# Patient Record
Sex: MALE | Race: WHITE | NOT HISPANIC OR LATINO | Employment: OTHER | URBAN - METROPOLITAN AREA
[De-identification: names, ages, dates, MRNs, and addresses within clinical notes are randomized per-mention and may not be internally consistent; named-entity substitution may affect disease eponyms.]

---

## 2018-02-05 RX ORDER — CLOBETASOL PROPIONATE 0.46 MG/ML
SOLUTION TOPICAL
Qty: 50 ML | Refills: 5 | Status: SHIPPED | OUTPATIENT
Start: 2018-02-05 | End: 2018-02-20

## 2018-02-05 NOTE — TELEPHONE ENCOUNTER
Patient requesting refill on clobetasol solution [facility]. Has apt with you on 2/20/2018

## 2018-02-20 ENCOUNTER — OFFICE VISIT (OUTPATIENT)
Dept: DERMATOLOGY | Facility: CLINIC | Age: 68
End: 2018-02-20
Payer: MEDICARE

## 2018-02-20 VITALS — HEIGHT: 69 IN | WEIGHT: 169 LBS | BODY MASS INDEX: 25.03 KG/M2

## 2018-02-20 DIAGNOSIS — Z87.2 HISTORY OF ACTINIC KERATOSES: ICD-10-CM

## 2018-02-20 DIAGNOSIS — L81.4 SOLAR LENTIGO: ICD-10-CM

## 2018-02-20 DIAGNOSIS — L82.1 SEBORRHEIC KERATOSES: ICD-10-CM

## 2018-02-20 DIAGNOSIS — L73.8 SEBACEOUS HYPERPLASIA OF FACE: ICD-10-CM

## 2018-02-20 DIAGNOSIS — L93.0 DISCOID LUPUS: Primary | ICD-10-CM

## 2018-02-20 PROCEDURE — 99999 PR PBB SHADOW E&M-EST. PATIENT-LVL II: CPT | Mod: PBBFAC,,, | Performed by: DERMATOLOGY

## 2018-02-20 PROCEDURE — 1159F MED LIST DOCD IN RCRD: CPT | Mod: ,,, | Performed by: DERMATOLOGY

## 2018-02-20 PROCEDURE — 1126F AMNT PAIN NOTED NONE PRSNT: CPT | Mod: ,,, | Performed by: DERMATOLOGY

## 2018-02-20 PROCEDURE — 99213 OFFICE O/P EST LOW 20 MIN: CPT | Mod: S$PBB,,, | Performed by: DERMATOLOGY

## 2018-02-20 PROCEDURE — 99212 OFFICE O/P EST SF 10 MIN: CPT | Mod: PBBFAC,PO | Performed by: DERMATOLOGY

## 2018-02-20 RX ORDER — LISINOPRIL 20 MG/1
20 TABLET ORAL DAILY
COMMUNITY
End: 2023-02-14

## 2018-02-20 RX ORDER — CLOBETASOL PROPIONATE 0.46 MG/ML
SOLUTION TOPICAL
Qty: 50 ML | Refills: 5 | Status: SHIPPED | OUTPATIENT
Start: 2018-02-20 | End: 2021-06-10

## 2018-02-20 NOTE — PROGRESS NOTES
Subjective:       Patient ID:  Sid Cuello Jr. is a 67 y.o. male who presents for   Chief Complaint   Patient presents with    Skin Check     UBSE     Patient last seen 6/2016  DLE (dx 9/2015)  Strict sun protection  Clobetasol ointment controls flare    FINAL PATHOLOGIC DIAGNOSIS  [facility] DIAGNOSIS:  LEFT CHEEK, PUNCH:  Perivascular and periadnexal dermatitis with increased interstitial mucin and focal  interface damage.  See note.  Note: The pattern is suspicious for a connective tissue disorder such as lupus.  Mitch Mandujano M.D., [facility]        Review of Systems   Constitutional: Negative for fever, chills, weight loss, weight gain, fatigue, night sweats and malaise.   HENT: Negative for mouth sores.    Respiratory: Negative for cough and shortness of breath.    Genitourinary: Negative for frequency.   Musculoskeletal: Positive for arthralgias (chronic mild attributes to OA).   Skin: Positive for rash, activity-related sunscreen use and wears hat. Negative for itching and daily sunscreen use.   Hematologic/Lymphatic: Bruises/bleeds easily.        Objective:    Physical Exam   Constitutional: He appears well-developed and well-nourished. No distress.   Neurological: He is alert and oriented to person, place, and time. He is not disoriented.   Psychiatric: He has a normal mood and affect.   Skin:   Areas Examined (abnormalities noted in diagram):   Scalp / Hair Palpated and Inspected  Head / Face Inspection Performed  Neck Inspection Performed  Chest / Axilla Inspection Performed  Abdomen Inspection Performed  Back Inspection Performed  RUE Inspected  LUE Inspection Performed  Nails and Digits Inspection Performed                   Diagram Legend     Erythematous scaling macule/papule c/w actinic keratosis       Vascular papule c/w angioma      Pigmented verrucoid papule/plaque c/w seborrheic keratosis      Yellow umbilicated papule c/w sebaceous hyperplasia      Irregularly shaped tan macule c/w  lentigo     1-2 mm smooth white papules consistent with Milia      Movable subcutaneous cyst with punctum c/w epidermal inclusion cyst      Subcutaneous movable cyst c/w pilar cyst      Firm pink to brown papule c/w dermatofibroma      Pedunculated fleshy papule(s) c/w skin tag(s)      Evenly pigmented macule c/w junctional nevus     Mildly variegated pigmented, slightly irregular-bordered macule c/w mildly atypical nevus      Flesh colored to evenly pigmented papule c/w intradermal nevus       Pink pearly papule/plaque c/w basal cell carcinoma      Erythematous hyperkeratotic cursted plaque c/w SCC      Surgical scar with no sign of skin cancer recurrence      Open and closed comedones      Inflammatory papules and pustules      Verrucoid papule consistent consistent with wart     Erythematous eczematous patches and plaques     Dystrophic onycholytic nail with subungual debris c/w onychomycosis     Umbilicated papule    Erythematous-base heme-crusted tan verrucoid plaque consistent with inflamed seborrheic keratosis     Erythematous Silvery Scaling Plaque c/w Psoriasis     See annotation      Assessment / Plan:        Discoid lupus  -     clobetasol (TEMOVATE) 0.05 % external solution; AAA scalp QHS PRN flare  Dispense: 50 mL; Refill: 5  Continue strict sun protection, clobetasol solution BID PRN  JULIANA negative 3/2016  Recent labs for cardiologist Dr Fisher, obtain copy  ROS reassuring    Discussed plaquenil for better control, patient declines, wishes to manage with topicals only    History of actinic keratoses  No lesion on exposed skin today.   Repeat UBSE in 6 months    Solar lentigo  This is a benign hyperpigmented sun induced lesion. Daily sun protection will reduce the number of new lesions. Treatment of these benign lesions are considered cosmetic.    Seborrheic keratoses  These are benign inherited growths without a malignant potential. Reassurance given to patient. No treatment is necessary.      Sebaceous hyperplasia of face  This is a common condition representing benign enlargement of the sebaceous lobule. It typically occurs in adulthood. Reassurance given to patient.     Patient instructed in importance in daily sun protection of at least spf 30. Sun avoidance and topical protection discussed.   Recommend Elta MD (physician office) COTZ sensitive (available online) for daily use on face and neck.  Patient encouraged to wear hat for all outdoor exposure.   Also discussed sun protective clothing.             Follow-up if symptoms worsen or fail to improve.

## 2019-04-26 ENCOUNTER — TELEPHONE (OUTPATIENT)
Dept: UROLOGY | Facility: CLINIC | Age: 69
End: 2019-04-26

## 2019-04-26 NOTE — TELEPHONE ENCOUNTER
Spoke with patient's wife she states patient has a lump about the size of prune on his testicle requesting to be seen earlier. Per  patient scheduled for Monday 4/29 at 10am. Wife verbally voiced understanding.

## 2019-04-26 NOTE — TELEPHONE ENCOUNTER
----- Message from Olga Lidia Fuller sent at 4/26/2019  9:36 AM CDT -----  Contact: Iris Cuello (Spouse)  Type:  Sooner Apoointment Request    Caller is requesting a sooner appointment.  Caller declined first available appointment listed below.  Caller will not accept being placed on the waitlist and is requesting a message be sent to doctor.    Name of Caller:  Iris Cuello (Spouse)  When is the first available appointment?  05/13/2019  Symptoms:  large lump on testicle  Best Call Back Number:  [phone] or [phone]  Additional Information:

## 2019-04-29 ENCOUNTER — APPOINTMENT (OUTPATIENT)
Dept: LAB | Facility: HOSPITAL | Age: 69
End: 2019-04-29
Attending: UROLOGY
Payer: MEDICARE

## 2019-04-29 ENCOUNTER — OFFICE VISIT (OUTPATIENT)
Dept: UROLOGY | Facility: CLINIC | Age: 69
End: 2019-04-29
Payer: MEDICARE

## 2019-04-29 VITALS
HEIGHT: 69 IN | DIASTOLIC BLOOD PRESSURE: 91 MMHG | RESPIRATION RATE: 18 BRPM | WEIGHT: 178.56 LBS | SYSTOLIC BLOOD PRESSURE: 138 MMHG | BODY MASS INDEX: 26.45 KG/M2 | HEART RATE: 104 BPM

## 2019-04-29 DIAGNOSIS — N50.89 SCROTAL MASS: Primary | ICD-10-CM

## 2019-04-29 DIAGNOSIS — R31.29 MICROHEMATURIA: ICD-10-CM

## 2019-04-29 DIAGNOSIS — Z12.5 PROSTATE CANCER SCREENING: ICD-10-CM

## 2019-04-29 LAB
BACTERIA #/AREA URNS HPF: ABNORMAL /HPF
BILIRUB SERPL-MCNC: ABNORMAL MG/DL
BLOOD URINE, POC: ABNORMAL
COLOR, POC UA: YELLOW
GLUCOSE UR QL STRIP: ABNORMAL
KETONES UR QL STRIP: ABNORMAL
LEUKOCYTE ESTERASE URINE, POC: ABNORMAL
MICROSCOPIC COMMENT: ABNORMAL
NITRITE, POC UA: ABNORMAL
PH, POC UA: 8
PROTEIN, POC: 30
RBC #/AREA URNS HPF: 5 /HPF (ref 0–4)
SPECIFIC GRAVITY, POC UA: 1.02
SQUAMOUS #/AREA URNS HPF: 2 /HPF
UROBILINOGEN, POC UA: 1
WBC #/AREA URNS HPF: 2 /HPF (ref 0–5)

## 2019-04-29 PROCEDURE — 81000 URINALYSIS NONAUTO W/SCOPE: CPT

## 2019-04-29 PROCEDURE — 99213 OFFICE O/P EST LOW 20 MIN: CPT | Mod: PBBFAC,PN | Performed by: UROLOGY

## 2019-04-29 PROCEDURE — 81002 URINALYSIS NONAUTO W/O SCOPE: CPT | Mod: PBBFAC,PN | Performed by: UROLOGY

## 2019-04-29 PROCEDURE — 99203 PR OFFICE/OUTPT VISIT, NEW, LEVL III, 30-44 MIN: ICD-10-PCS | Mod: S$PBB,,, | Performed by: UROLOGY

## 2019-04-29 PROCEDURE — 99999 PR PBB SHADOW E&M-EST. PATIENT-LVL III: CPT | Mod: PBBFAC,,, | Performed by: UROLOGY

## 2019-04-29 PROCEDURE — 99203 OFFICE O/P NEW LOW 30 MIN: CPT | Mod: S$PBB,,, | Performed by: UROLOGY

## 2019-04-29 PROCEDURE — 99999 PR PBB SHADOW E&M-EST. PATIENT-LVL III: ICD-10-PCS | Mod: PBBFAC,,, | Performed by: UROLOGY

## 2019-04-29 NOTE — PROGRESS NOTES
"[facility] Urology New Patient/H&P:    Sid Cuello Jr. is a 68 y.o. male who presents for evaluation of lump on testicle    Just noticed a grape-sized smooth lump in right scrotum a few days ago.  Not bothersome, no pain  Dont know how long it's been there  Feels separate from, or on side of testicle like "a 3rd testicle" and feels like it is floating around on its own    No hematuria, dysuria, LUTS  AUA SS 1/0  Udip trc blood otherwise negative  Takes plavix and asa 81 for CAD, Hx coronary stenting 2007  1 ppd smoker still.  Dr Sotomayor is cardiologist ~Q4 mos visits - last seen february  Last PCP Brad, so no PCP in 2 years  Last psa 1.4 in 2015  No fam history of testis ca, prostate ca, bladder ca or any gu malig    Past Medical History:   Diagnosis Date    Coronary artery disease     Essential hypertension 8/10/2015    Hyperlipidemia LDL goal < 100 8/10/2015       Past Surgical History:   Procedure Laterality Date    CORONARY ANGIOPLASTY WITH STENT PLACEMENT  2007    VASECTOMY         Family History   Problem Relation Age of Onset    No Known Problems Mother     Melanoma Neg Hx     Psoriasis Neg Hx     Lupus Neg Hx     Eczema Neg Hx        Social History     Socioeconomic History    Marital status:      Spouse name: Not on file    Number of children: Not on file    Years of education: Not on file    Highest education level: Not on file   Occupational History    Not on file   Social Needs    Financial resource strain: Not on file    Food insecurity:     Worry: Not on file     Inability: Not on file    Transportation needs:     Medical: Not on file     Non-medical: Not on file   Tobacco Use    Smoking status: Current Every Day Smoker    Smokeless tobacco: Never Used   Substance and Sexual Activity    Alcohol use: Yes    Drug use: Not on file    Sexual activity: Not on file   Lifestyle    Physical activity:     Days per week: Not on file     Minutes per session: Not on file    " "Stress: Not on file   Relationships    Social connections:     Talks on phone: Not on file     Gets together: Not on file     Attends Sikh service: Not on file     Active member of club or organization: Not on file     Attends meetings of clubs or organizations: Not on file     Relationship status: Not on file   Other Topics Concern    Not on file   Social History Narrative    Not on file       Review of patient's allergies indicates:  No Known Allergies    Medications Reviewed: see MAR    ROS:    Constitutional: denies fevers, chills, night sweats, fatigue, malaise  Respiratory: negative for cough, shortness of breath, wheezing, dyspnea.  Cardiovascular: + for high blood pressure, negative for chest pain, varicose veins, ankle swelling, palpitations, syncope.  GI: negative for abdominal pain, heartburn, indigestion, nausea, vomiting, constipation, diarrhea, blood in stool.   Urology: as noted above in HPI  Endocrinology: negative for cold intolerance, excessive thirst, not feeling tired/sluggish, no heat intolerance.   Hematology/Lymph: negative for easy bleeding, easy bruising, swollen glands.  Musculoskeletal: negative for back pain, joint pain, joint swelling, neck pain.  Allergy-Immunology: negative for seasonal allergies, negative for unusual infections.   Skin: negative for boils, breast lumps, hives, itching, rash.   Neurology: negative for, dizziness, headache, tingling/numbness, tremors.   Psych: satisfied with life; negative for, anxiety, depression, suicidal thoughts.     PHYSICAL EXAM:    Vitals:    04/29/19 0956   BP: (!) 138/91   Pulse: 104   Resp: 18     Body mass index is 26.37 kg/m². Weight: 81 kg (178 lb 9.2 oz) Height: 5' 9" (175.3 cm)       General: Alert, cooperative, no distress, appears stated age  Head: Normocephalic, without obvious abnormality, atraumatic  Neck: no masses, no thyromegaly, no lymphadenopathy  Eyes: PERRL, conjunctiva/corneas clear  Lungs: Respirations unlabored, " normal effort, no accessory muscle use  CV: Warm and well perfused extremities  Abdomen: Soft, non-tender, no CVA tenderness, no hepatosplenomegaly, no hernia  Penis: phallus normal, circumcised, well cared for, no plaques or lesions.   Scrotum: no cysts, no lesions, no rash, no hydrocele.   - on exam felt firm mass in right inguinal canal - no impulse on valsalva or cough as if to suggest hernia  - with valsalva and/or manipulation, dropped into upper hemiscrotum and noted to be 2-3cm discrete firm round smooth mobile mass, likely of spermatic cord given location  Epididymes: normal, nontender, symmetrical, no masses or cysts.   Testes: normal, both descended, no masses.   Urethra: palpably normal with orthotopic meatus of normal size    MICHAEL: normal sphincter tone, no masses, no hemmorrhoids   PROSTATE: 25-30g, no nodules, non-tender, symmetrical.   Extremities: Extremities normal, atraumatic, no cyanosis or edema  Skin: Normal color, texture, and turgor, no rashes or lesions  Psych: Appropriate, well oriented, normal affect, normal mood  Neuro: Non-focal    Lab Results   Component Value Date    PSA 1.4 08/10/2015    PSA 1.1 06/14/2011    PSA 1.1 02/06/2007       LABS:    Recent Results (from the past 336 hour(s))   POCT URINE DIPSTICK WITHOUT MICROSCOPE    Collection Time: 04/29/19 10:07 AM   Result Value Ref Range    Color, UA yellow     Spec Grav UA 1.020     pH, UA 8     WBC, UA neg     Nitrite, UA neg     Protein 30     Glucose, UA neg     Ketones, UA neg     Urobilinogen, UA 1.0     Bilirubin neg     Blood, UA neg          Assessment/Diagnosis:    1. Scrotal mass  POCT URINE DIPSTICK WITHOUT MICROSCOPE    US Scrotum And Testicles   2. Microhematuria  Urinalysis Microscopic   3. Prostate cancer screening  PSA, Screening       Plans:  Discrete round mobile mass in right upper hemiscrotum, possible along spermatic cord as was in inguinal canal at rest and dropped to right upper scrotum with manipulation  Start  with screening scrotal US. If cystic or benign appearing (such as spermatic cord lipoma), can follow as majority of paratesticular masses benign.  If not simply cystic/fat component without vascularity, consider further eval with CT and possible resection.   Briefly discussed, would approach inguinal to explore cord/testis and remove mass.  Would need cardiac clearance/bloodthinner hold    As well since trc blood on urine dip (likely from chronic anticoag) will send UA micro and further eval if true microhematuria >3-5 rbc/hpf, with ct urogram and cysto.  Will chart check and advise.    Also discussed annual prostate cancer screening, with MICHAEL and PSA. PSA last in 2015 normal and historically normal. MICHAEL benign. Will check psa when goes for US    30 mins spent in encounter, over half in counseling  Will chart check results for further plan and f/u.

## 2019-05-01 DIAGNOSIS — R31.21 ASYMPTOMATIC MICROSCOPIC HEMATURIA: ICD-10-CM

## 2019-05-06 ENCOUNTER — HOSPITAL ENCOUNTER (OUTPATIENT)
Dept: RADIOLOGY | Facility: HOSPITAL | Age: 69
Discharge: HOME | End: 2019-05-06
Attending: UROLOGY
Payer: MEDICARE

## 2019-05-06 DIAGNOSIS — N50.89 SCROTAL MASS: ICD-10-CM

## 2019-05-06 DIAGNOSIS — R31.21 ASYMPTOMATIC MICROSCOPIC HEMATURIA: ICD-10-CM

## 2019-05-06 PROCEDURE — 25500020 PHARM REV CODE 255: Performed by: UROLOGY

## 2019-05-06 PROCEDURE — 74178 CT ABD&PLV WO CNTR FLWD CNTR: CPT | Mod: 26,,, | Performed by: RADIOLOGY

## 2019-05-06 PROCEDURE — 76870 US SCROTUM AND TESTICLES: ICD-10-PCS | Mod: 26,,, | Performed by: RADIOLOGY

## 2019-05-06 PROCEDURE — 76870 US EXAM SCROTUM: CPT | Mod: 26,,, | Performed by: RADIOLOGY

## 2019-05-06 PROCEDURE — 74178 CT ABD&PLV WO CNTR FLWD CNTR: CPT | Mod: TC

## 2019-05-06 PROCEDURE — 74178 CT UROGRAM ABD PELVIS W WO: ICD-10-PCS | Mod: 26,,, | Performed by: RADIOLOGY

## 2019-05-06 PROCEDURE — 76870 US EXAM SCROTUM: CPT | Mod: TC

## 2019-05-06 RX ADMIN — IOHEXOL 125 ML: 350 INJECTION, SOLUTION INTRAVENOUS at 10:05

## 2019-05-23 ENCOUNTER — TELEPHONE (OUTPATIENT)
Dept: UROLOGY | Facility: CLINIC | Age: 69
End: 2019-05-23

## 2019-05-23 DIAGNOSIS — N50.89 MASS OF RIGHT TESTIS: Primary | ICD-10-CM

## 2019-05-23 DIAGNOSIS — R93.5 ABNORMAL COMPUTERIZED AXIAL TOMOGRAPHY OF ABDOMEN: ICD-10-CM

## 2019-05-23 DIAGNOSIS — R16.0 HEPATOMEGALY: ICD-10-CM

## 2019-05-23 DIAGNOSIS — D49.59 TESTICULAR TUMOR: ICD-10-CM

## 2019-05-24 DIAGNOSIS — R31.29 MICROHEMATURIA: Primary | ICD-10-CM

## 2019-05-24 RX ORDER — LIDOCAINE HYDROCHLORIDE 20 MG/ML
JELLY TOPICAL ONCE
Status: CANCELLED | OUTPATIENT
Start: 2019-05-24 | End: 2019-05-24

## 2019-05-24 NOTE — TELEPHONE ENCOUNTER
Patient with right scrotal/inguinal mass and microhematuria  Previously refused advice for cystoscopy to complete GH workup but did do CTU and scrotal US of mass    Mass is not simply cystic and some concern of malignancy has been raised by radiology. Largely cystic, which is usually not malignant, has septations/complex features, and there is non specific lymph node swelling on CT. Needs further eval.    Would recommend serum testicular tumor markers to evaluate for malignancy, order placed, and returning for cystoscopy to complete hematuria evaluation and discuss further management of right inguinoscrotal mass    Please have him complete tumor markers 1 week prior to cysto date he chooses in june

## 2019-05-24 NOTE — TELEPHONE ENCOUNTER
Patient advised of test results.    He is agreeable to have cysto 6/4.  Please place orders.   Scheduled for labs next week.

## 2019-05-28 ENCOUNTER — LAB VISIT (OUTPATIENT)
Dept: LAB | Facility: HOSPITAL | Age: 69
End: 2019-05-28
Attending: UROLOGY
Payer: MEDICARE

## 2019-05-28 DIAGNOSIS — R93.5 ABNORMAL COMPUTERIZED AXIAL TOMOGRAPHY OF ABDOMEN: ICD-10-CM

## 2019-05-28 DIAGNOSIS — N50.89 MASS OF RIGHT TESTIS: ICD-10-CM

## 2019-05-28 LAB
AFP SERPL-MCNC: 2.7 NG/ML (ref 0–8.4)
LDH SERPL L TO P-CCNC: 145 U/L (ref 110–260)

## 2019-05-28 PROCEDURE — 36415 COLL VENOUS BLD VENIPUNCTURE: CPT

## 2019-05-28 PROCEDURE — 82105 ALPHA-FETOPROTEIN SERUM: CPT

## 2019-05-28 PROCEDURE — 83615 LACTATE (LD) (LDH) ENZYME: CPT

## 2019-05-29 ENCOUNTER — TELEPHONE (OUTPATIENT)
Dept: UROLOGY | Facility: CLINIC | Age: 69
End: 2019-05-29

## 2019-05-29 LAB — B-HCG SERPL-ACNC: <0.6 IU/L

## 2019-05-29 NOTE — TELEPHONE ENCOUNTER
Spoke w pts wife she had questions verifying meds to be stopped before cystoscopy all questions were answered to the best of my knowledge ( encourage to hold blood thinners and if taking fish oil) pts wife voiced ok and understanding.

## 2019-05-29 NOTE — TELEPHONE ENCOUNTER
----- Message from Kacie Medina sent at 5/29/2019  9:29 AM CDT -----  Contact: self  Type: Needs Medical Advice    Who Called:  Wife, Jovana Cuello  Symptoms (please be specific):    How long has patient had these symptoms:    Pharmacy name and phone #:    Best Call Back Number: [phone]  Additional Information: Patient needs to know when and what medications he should stop before his colonoscopy on 06/04/19. Please call patient's wife. Thanks!

## 2019-05-31 ENCOUNTER — TELEPHONE (OUTPATIENT)
Dept: UROLOGY | Facility: CLINIC | Age: 69
End: 2019-05-31

## 2019-05-31 NOTE — TELEPHONE ENCOUNTER
Ok to cancel cysto but should advise him to come in at f/u slot on Monday 6/3 that is held to discuss abnormal results of CT and further plans

## 2019-05-31 NOTE — TELEPHONE ENCOUNTER
----- Message from Shanika Little sent at 5/31/2019  9:05 AM CDT -----  Type: Needs Medical Advice    Who Called:  self  Symptoms (please be specific):  Pt requesting to speak to Dr   How long has patient had these symptoms:  Has a procedure scheduled Tuesday   Pharmacy name and phone #:    Best Call Back Number: [phone] (home)     Additional Information: states his lab results does show this ?

## 2019-05-31 NOTE — TELEPHONE ENCOUNTER
Spoke w pt states he would like to cancel procedure on 6/4 will call and reschedule if he has any further prob.

## 2019-06-02 NOTE — PROGRESS NOTES
"[facility] Urology Progress Note    Sid Cuello Jr. is a 68 y.o. male who presents for follow up of scrotal mass and hematuria    I last saw him on 4/29/19 noticing new finding of a grape-sized smooth lump in right scrotum separate from, or on side of testicle like "a 3rd testicle" and feels like it is floating around on its own  No hematuria, dysuria, LUTS (AUA SS 1/0) and Udip with trace blood. Takes plavix and asa 81 for CAD, Hx coronary stenting 2007. 1 ppd smoker still. Dr Sotomayor is cardiologist ~Q4 mos visits - last seen february  Last PCP Brad, so no PCP in 2 years, Last psa 1.4 in 2015. No fam history of testis ca, prostate ca, bladder ca or any gu malig  On exam felt firm mass in right inguinal canal - no impulse on valsalva or cough as if to suggest hernia. With valsalva and/or manipulation, dropped into upper hemiscrotum and noted to be 2-3cm discrete firm round smooth mobile mass, likely of spermatic cord origin given location. MICHAEL 25-30g benign. PSA 1.0.  UA micro did demonstrate 5 rbc/hpf so CT urogram done to evaluate, and carry through midthigh to correlate with scrotal US for eval of right hemiscrotal mass  Serum tumor markers as precaution ordered and normal (B-hCG <0.6, AFP 2.7, )    Scrotal US 5/6/19:  A cystic mass is present within the high right hemiscrotum, measuring 2.2 x 1.7 x 2.4 cm.  A mildly thickened septation color Doppler blood flow extends through the finding. The right testicle measures 5.3 x 1.9 x 3.6 cm and the left 5.1 x 1.9 x 3.3 cm.  Normal testicular blood flow is present bilaterally.  There is no testicular mass.  There is a small right hydrocele.  There are small bilateral epididymal cysts measuring up to 9 mm.  - Mildly complex cystic mass within the high right hemiscrotum, with a thickened septation demonstrating blood flow raising some consideration for neoplasm.    CT urogram 5/6/19:  The liver is diffusely hypoattenuating reflecting fatty infiltration. " " The spleen, pancreas, adrenal glands, and bowel are unremarkable. There is no nephroureterolithiasis or hydroureteronephrosis.  No renal, ureteral, or bladder mass is identified.  The prostate gland is moderately enlarged.  There is moderate calcification of the aorta. There are no suspicious osseous lesions.  There is a cystic mass containing a moderately thickened septation located within the distal right inguinal canal, measuring 2.7 x 2.7 by 2.1 cm.  There are a few mildly enlarged bilateral iliofemoral lymph nodes measuring up to 11 mm in short axis.    He was scheduled for cystoscopy 6/4/19 to complete micro hematuria workup and further discuss management of his scrotal mass, though called in wanted to cancel his cystoscopy and therefore presents today for office consultation to further discuss the findings on his CT and scrotal ultrasound as they relate to his right hemiscrotal mass.    No gross blood in urine  No inguinoscrotal pain from mass.    ROS: A comprehensive 10 system review was performed and is negative except as noted above in HPI    PHYSICAL EXAM:    Vitals:    06/03/19 0908   BP: (!) 146/93   Pulse: 85   Resp: 18     Body mass index is 27.02 kg/m². Weight: 83 kg (182 lb 15.7 oz) Height: 5' 9" (175.3 cm)       General: Alert, cooperative, no distress, appears stated age   Head: Normocephalic, without obvious abnormality, atraumatic   Eyes: PERRL, conjunctiva/corneas clear   Lungs: Respirations unlabored   Heart: Warm and well perfused   Abdomen: soft NT ND  Extremities: Extremities normal, atraumatic, no cyanosis or edema   Skin: Skin color, texture, turgor normal, no rashes or lesions   Psych: Appropriate   Neurologic: Non-focal       Recent Results (from the past 336 hour(s))   BHCG Quant, Tumor Marker    Collection Time: 05/28/19  9:16 AM   Result Value Ref Range    Beta-hCG, Quantitative (Tumor Marker) <0.6 <1.4 IU/L   AFP TUMOR MARKER    Collection Time: 05/28/19  9:16 AM   Result Value Ref " Range    AFP 2.7 0.0 - 8.4 ng/mL   LACTATE DEHYDROGENASE    Collection Time: 05/28/19  9:16 AM   Result Value Ref Range     110 - 260 U/L       ASSESSMENT   1. Scrotal mass      right   2. Microhematuria         Plan    We did have a long discussion about the differential diagnosis of paratesticular masses, noting 70% are often benign. Given largely cystic component this is most likely, however given complexity with internal vascularity and local questionably lymphadenopathy and solid component, with imaging concerning for potential malignancy, would recommend resection. Given the US/CT appearance and potential for malignancy we did discuss surgical excision.   Given the rare chance of malignancy we did discuss radical orchiectomy vs excision of mass and risks and benefits of both.   Discussed this as an inguinal approach. All benefits and risks of procedure discussed with patient, including but not limited to need for further procedures/treatment based on pathology, pain, infection, bleeding, hematoma, persistence of pain. All questions answered and appropriate informed consent obtained for right radical orchiectomy vs excision of spermatic cord mass  Did discuss going into the procedure with planned radical orchiectomy for oncologic control, however if intraoperatively the mass appeared quite distinctly separate from any spermatic cord or testicular involvement could just excise the mass and manage further dependent on pathology.  We did discuss the implications of radical orchiectomy in detail.  He will need cardiac clearance from Dr Sotomayor prior, as well as be cleared to hold aspirin and plavix for 1 week preop and 2-3 days postop.   Discussed slightly higher risk of scrotal hematoma and bleeding complications with baseline bloodthinner use.  Will perform flexible cystoscopy in same anesthetic event to complete microhematuria evaluation.   All risks and benefits were discussed, and appropriate informed  consent was obtained.  He will consider surgical resection at this time and he does have follow-up with his cardiologist later this month who I will cc on all the notes and recommendations, as well as to get clearance both to proceed with general anesthesia and to hold blood thinners in the perioperative period.    25 mins spent in encounter, over half in counseling

## 2019-06-03 ENCOUNTER — OFFICE VISIT (OUTPATIENT)
Dept: UROLOGY | Facility: CLINIC | Age: 69
End: 2019-06-03
Payer: MEDICARE

## 2019-06-03 VITALS
HEART RATE: 85 BPM | HEIGHT: 69 IN | DIASTOLIC BLOOD PRESSURE: 93 MMHG | RESPIRATION RATE: 18 BRPM | BODY MASS INDEX: 27.11 KG/M2 | WEIGHT: 183 LBS | SYSTOLIC BLOOD PRESSURE: 146 MMHG

## 2019-06-03 DIAGNOSIS — R31.29 MICROHEMATURIA: ICD-10-CM

## 2019-06-03 DIAGNOSIS — N50.89 SCROTAL MASS: Primary | ICD-10-CM

## 2019-06-03 PROCEDURE — 99214 PR OFFICE/OUTPT VISIT, EST, LEVL IV, 30-39 MIN: ICD-10-PCS | Mod: S$PBB,,, | Performed by: UROLOGY

## 2019-06-03 PROCEDURE — 99999 PR PBB SHADOW E&M-EST. PATIENT-LVL III: CPT | Mod: PBBFAC,,, | Performed by: UROLOGY

## 2019-06-03 PROCEDURE — 99213 OFFICE O/P EST LOW 20 MIN: CPT | Mod: PBBFAC,PN | Performed by: UROLOGY

## 2019-06-03 PROCEDURE — 99214 OFFICE O/P EST MOD 30 MIN: CPT | Mod: S$PBB,,, | Performed by: UROLOGY

## 2019-06-03 PROCEDURE — 99999 PR PBB SHADOW E&M-EST. PATIENT-LVL III: ICD-10-PCS | Mod: PBBFAC,,, | Performed by: UROLOGY

## 2019-06-26 ENCOUNTER — TELEPHONE (OUTPATIENT)
Dept: UROLOGY | Facility: CLINIC | Age: 69
End: 2019-06-26

## 2019-06-26 NOTE — TELEPHONE ENCOUNTER
Spoke with patient.  He has not decided if he is going to do the surgery or not.  If he decides to proceed, he will schedule with Dr. Christie for clearance.

## 2019-06-26 NOTE — TELEPHONE ENCOUNTER
----- Message from Kymberly Frausto sent at 6/26/2019 12:32 PM CDT -----  Type: Needs Medical Advice    Who Called:  Kallie Cherry    Best Call Back Number: [phone]  Additional Information: patient was supposed to come there to get surgical clearance today however stated he wasn't coming because he hasn't decided to have the surgery yet

## 2019-09-27 ENCOUNTER — PATIENT MESSAGE (OUTPATIENT)
Dept: UROLOGY | Facility: CLINIC | Age: 69
End: 2019-09-27

## 2019-09-29 DIAGNOSIS — N50.89 PARATESTICULAR MASS: Primary | ICD-10-CM

## 2019-09-29 DIAGNOSIS — R31.0 GROSS HEMATURIA: ICD-10-CM

## 2019-09-29 RX ORDER — LIDOCAINE HYDROCHLORIDE 10 MG/ML
10 INJECTION, SOLUTION EPIDURAL; INFILTRATION; INTRACAUDAL; PERINEURAL ONCE
Status: CANCELLED | OUTPATIENT
Start: 2019-09-29 | End: 2019-09-29

## 2019-10-10 ENCOUNTER — HOSPITAL ENCOUNTER (OUTPATIENT)
Dept: PREADMISSION TESTING | Facility: HOSPITAL | Age: 69
Discharge: HOME | End: 2019-10-10
Attending: UROLOGY
Payer: MEDICARE

## 2019-10-10 ENCOUNTER — HOSPITAL ENCOUNTER (OUTPATIENT)
Dept: RADIOLOGY | Facility: HOSPITAL | Age: 69
Discharge: HOME | End: 2019-10-10
Attending: UROLOGY
Payer: MEDICARE

## 2019-10-10 VITALS — BODY MASS INDEX: 25.92 KG/M2 | HEIGHT: 69 IN | WEIGHT: 175 LBS

## 2019-10-10 DIAGNOSIS — R31.0 GROSS HEMATURIA: ICD-10-CM

## 2019-10-10 DIAGNOSIS — N50.89 PARATESTICULAR MASS: ICD-10-CM

## 2019-10-10 LAB
ANION GAP SERPL CALC-SCNC: 9 MMOL/L (ref 8–16)
BASOPHILS # BLD AUTO: 0.02 K/UL (ref 0–0.2)
BASOPHILS NFR BLD: 0.4 % (ref 0–1.9)
BILIRUB UR QL STRIP: ABNORMAL
BUN SERPL-MCNC: 7 MG/DL (ref 8–23)
CALCIUM SERPL-MCNC: 8.8 MG/DL (ref 8.7–10.5)
CHLORIDE SERPL-SCNC: 101 MMOL/L (ref 95–110)
CLARITY UR: CLEAR
CO2 SERPL-SCNC: 30 MMOL/L (ref 23–29)
COLOR UR: YELLOW
CREAT SERPL-MCNC: 1 MG/DL (ref 0.5–1.4)
DIFFERENTIAL METHOD: ABNORMAL
EOSINOPHIL # BLD AUTO: 0.1 K/UL (ref 0–0.5)
EOSINOPHIL NFR BLD: 1 % (ref 0–8)
ERYTHROCYTE [DISTWIDTH] IN BLOOD BY AUTOMATED COUNT: 14.3 % (ref 11.5–14.5)
EST. GFR  (AFRICAN AMERICAN): >60 ML/MIN/1.73 M^2
EST. GFR  (NON AFRICAN AMERICAN): >60 ML/MIN/1.73 M^2
GLUCOSE SERPL-MCNC: 100 MG/DL (ref 70–110)
GLUCOSE UR QL STRIP: NEGATIVE
HCT VFR BLD AUTO: 43.2 % (ref 40–54)
HGB BLD-MCNC: 14.4 G/DL (ref 14–18)
HGB UR QL STRIP: ABNORMAL
IMM GRANULOCYTES # BLD AUTO: 0 K/UL (ref 0–0.04)
INR PPP: 1 (ref 0.8–1.2)
KETONES UR QL STRIP: ABNORMAL
LEUKOCYTE ESTERASE UR QL STRIP: NEGATIVE
LYMPHOCYTES # BLD AUTO: 1.7 K/UL (ref 1–4.8)
LYMPHOCYTES NFR BLD: 32.9 % (ref 18–48)
MCH RBC QN AUTO: 36.8 PG (ref 27–31)
MCHC RBC AUTO-ENTMCNC: 33.3 G/DL (ref 32–36)
MCV RBC AUTO: 111 FL (ref 82–98)
MONOCYTES # BLD AUTO: 0.4 K/UL (ref 0.3–1)
MONOCYTES NFR BLD: 7.6 % (ref 4–15)
NEUTROPHILS # BLD AUTO: 3.1 K/UL (ref 1.8–7.7)
NEUTROPHILS NFR BLD: 58.1 % (ref 38–73)
NITRITE UR QL STRIP: NEGATIVE
NRBC BLD-RTO: 0 /100 WBC
PH UR STRIP: 6 [PH] (ref 5–8)
PLATELET # BLD AUTO: 162 K/UL (ref 150–350)
PMV BLD AUTO: 9.7 FL (ref 9.2–12.9)
POTASSIUM SERPL-SCNC: 3.8 MMOL/L (ref 3.5–5.1)
PROT UR QL STRIP: ABNORMAL
PROTHROMBIN TIME: 9.9 SEC (ref 9–12.5)
RBC # BLD AUTO: 3.91 M/UL (ref 4.6–6.2)
SODIUM SERPL-SCNC: 140 MMOL/L (ref 136–145)
SP GR UR STRIP: >=1.03 (ref 1–1.03)
URN SPEC COLLECT METH UR: ABNORMAL
UROBILINOGEN UR STRIP-ACNC: 1 EU/DL
WBC # BLD AUTO: 5.26 K/UL (ref 3.9–12.7)

## 2019-10-10 PROCEDURE — 80048 BASIC METABOLIC PNL TOTAL CA: CPT

## 2019-10-10 PROCEDURE — 85610 PROTHROMBIN TIME: CPT

## 2019-10-10 PROCEDURE — 85025 COMPLETE CBC W/AUTO DIFF WBC: CPT

## 2019-10-10 PROCEDURE — 36415 COLL VENOUS BLD VENIPUNCTURE: CPT

## 2019-10-10 PROCEDURE — 99900103 DSU ONLY-NO CHARGE-INITIAL HR (STAT)

## 2019-10-10 PROCEDURE — 71046 X-RAY EXAM CHEST 2 VIEWS: CPT | Mod: 26,,, | Performed by: RADIOLOGY

## 2019-10-10 PROCEDURE — 81003 URINALYSIS AUTO W/O SCOPE: CPT

## 2019-10-10 PROCEDURE — 71046 XR CHEST PA AND LATERAL: ICD-10-PCS | Mod: 26,,, | Performed by: RADIOLOGY

## 2019-10-10 PROCEDURE — 71046 X-RAY EXAM CHEST 2 VIEWS: CPT | Mod: TC,FY

## 2019-10-10 PROCEDURE — 93005 ELECTROCARDIOGRAM TRACING: CPT

## 2019-10-10 PROCEDURE — 87086 URINE CULTURE/COLONY COUNT: CPT

## 2019-10-10 PROCEDURE — 93010 ELECTROCARDIOGRAM REPORT: CPT | Mod: ,,, | Performed by: INTERNAL MEDICINE

## 2019-10-10 PROCEDURE — 99900104 DSU ONLY-NO CHARGE-EA ADD'L HR (STAT)

## 2019-10-10 PROCEDURE — 93010 EKG 12-LEAD: ICD-10-PCS | Mod: ,,, | Performed by: INTERNAL MEDICINE

## 2019-10-10 NOTE — DISCHARGE INSTRUCTIONS
To confirm, Your doctor has instructed you that surgery is scheduled for: 10/23/19   BRAYAN  [phone]    Please report to [facility], [facility] the morning of surgery. You must check-in and receive a wristband before going to your procedure.    Pre-Op will call the afternoon prior to surgery between 1:00 and 6:00 PM with the final arrival time.  Phone number: [phone]    PLEASE NOTE:  The surgery schedule has many variables which may affect the time of your surgery case.  Family members should be available if your surgery time changes.  Plan to be here the day of your procedure between 4-6 hours.    MEDICATIONS:  TAKE ONLY THESE MEDICATIONS WITH A SMALL SIP OF WATER THE MORNING OF YOUR PROCEDURE:   METOPROLOL    DO NOT TAKE THESE MEDICATIONS 5-7 DAYS PRIOR to your procedure or per your surgeon's request: ASPIRIN AND PLAVIX,  ALEVE, ADVIL, IBUPROFEN, FISH OIL VITAMIN E, HERBALS-LAST DOSE 10/15/19  (May take Tylenol)                                       NO LISINOPRIL AM OF SURGERY    ONLY if you are prescribed any types of blood thinners such as:  Aspirin, Coumadin, Plavix, Pradaxa, Xarelto, Aggrenox, Effient, Eliquis, Savasya, Brilinta, or any other, ask your surgeon whether you should stop taking them and how long before surgery you should stop.  You may also need to verify with the prescribing physician if it is ok to stop your medication.      INSTRUCTIONS IMPORTANT!!  · Do not eat or drink anything between midnight and the time of your procedure- this includes gum, mints, and candy.  · Do not smoke or drink alcoholic beverages 24 hours prior to your procedure.  · Shower the night before AND the morning of your procedure with a Chlorhexidine wash such as Hibiclens or Dial antibacterial soap from the neck down.  Do not get it on your face or in your eyes.  You may use your own shampoo and face wash. This helps your skin to be as bacteria free as possible.    · If you wear contact  lenses, dentures, hearing aids or glasses, bring a container to put them in during surgery and give to a family member for safe keeping.  Please leave all jewelry, piercing's and valuables at home.   · DO NOT remove hair from the surgery site.  Do not shave the incision site unless you are given specific instructions to do so.    · ONLY if you have been diagnosed with sleep apnea please bring your C-PAP machine.  · ONLY if you wear home oxygen please bring your portable oxygen tank the day of your procedure.  · ONLY if you have a history of OPEN HEART SURGERY you will need a clearance from your Cardiologist per Anesthesia.      · ONLY for patients requiring bowel prep, written instructions will be given by your doctor's office.  · ONLY if you have a neuro stimulator, please bring the controller with you the morning of surgery  · ONLY if a type and screen test is needed before surgery, please return:  · If your doctor has scheduled you for an overnight stay, bring a small overnight bag with any personal items you need.  · Make arrangements in advance for transportation home by a responsible adult.  It is not safe to drive a vehicle during the 24 hours after anesthesia.      · Visiting hours are 10:00AM to 8:30PM.  For the safety of all patients, visitors under the age of 12 are not allowed above the first floor of the hospital.    · All [facility] facilities and properties are tobacco free.  Smoking is NOT allowed.       If you have any questions about these instructions, call Pre-Op Admit  Nursing at [phone] or the Pre-Op Day Surgery Unit at [phone].

## 2019-10-11 LAB — BACTERIA UR CULT: NO GROWTH

## 2019-10-22 ENCOUNTER — ANESTHESIA EVENT (OUTPATIENT)
Dept: SURGERY | Facility: HOSPITAL | Age: 69
End: 2019-10-22
Payer: MEDICARE

## 2019-10-22 RX ORDER — SODIUM CHLORIDE, SODIUM LACTATE, POTASSIUM CHLORIDE, CALCIUM CHLORIDE 600; 310; 30; 20 MG/100ML; MG/100ML; MG/100ML; MG/100ML
500 INJECTION, SOLUTION INTRAVENOUS ONCE
Status: CANCELLED | OUTPATIENT
Start: 2019-10-22 | End: 2019-10-22

## 2019-10-23 ENCOUNTER — HOSPITAL ENCOUNTER (OUTPATIENT)
Facility: HOSPITAL | Age: 69
Discharge: HOME | End: 2019-10-23
Attending: UROLOGY | Admitting: UROLOGY
Payer: MEDICARE

## 2019-10-23 ENCOUNTER — ANESTHESIA (OUTPATIENT)
Dept: SURGERY | Facility: HOSPITAL | Age: 69
End: 2019-10-23
Payer: MEDICARE

## 2019-10-23 VITALS
HEART RATE: 65 BPM | TEMPERATURE: 98 F | HEIGHT: 69 IN | RESPIRATION RATE: 18 BRPM | OXYGEN SATURATION: 97 % | WEIGHT: 179 LBS | DIASTOLIC BLOOD PRESSURE: 88 MMHG | SYSTOLIC BLOOD PRESSURE: 167 MMHG | BODY MASS INDEX: 26.51 KG/M2

## 2019-10-23 DIAGNOSIS — R31.0 GROSS HEMATURIA: ICD-10-CM

## 2019-10-23 DIAGNOSIS — N50.89 PARATESTICULAR MASS: ICD-10-CM

## 2019-10-23 PROCEDURE — 63600175 PHARM REV CODE 636 W HCPCS: Performed by: NURSE ANESTHETIST, CERTIFIED REGISTERED

## 2019-10-23 PROCEDURE — 99900103 DSU ONLY-NO CHARGE-INITIAL HR (STAT): Performed by: UROLOGY

## 2019-10-23 PROCEDURE — 63600175 PHARM REV CODE 636 W HCPCS: Performed by: ANESTHESIOLOGY

## 2019-10-23 PROCEDURE — D9220A PRA ANESTHESIA: Mod: CRNA,,, | Performed by: NURSE ANESTHETIST, CERTIFIED REGISTERED

## 2019-10-23 PROCEDURE — 25000003 PHARM REV CODE 250: Performed by: NURSE ANESTHETIST, CERTIFIED REGISTERED

## 2019-10-23 PROCEDURE — 71000033 HC RECOVERY, INTIAL HOUR: Performed by: UROLOGY

## 2019-10-23 PROCEDURE — 36000707: Performed by: UROLOGY

## 2019-10-23 PROCEDURE — 63600175 PHARM REV CODE 636 W HCPCS: Performed by: UROLOGY

## 2019-10-23 PROCEDURE — 36000706: Performed by: UROLOGY

## 2019-10-23 PROCEDURE — D9220A PRA ANESTHESIA: ICD-10-PCS | Mod: CRNA,,, | Performed by: NURSE ANESTHETIST, CERTIFIED REGISTERED

## 2019-10-23 PROCEDURE — 52000 PR CYSTOURETHROSCOPY: ICD-10-PCS | Mod: 59,,, | Performed by: UROLOGY

## 2019-10-23 PROCEDURE — D9220A PRA ANESTHESIA: Mod: ANES,,, | Performed by: ANESTHESIOLOGY

## 2019-10-23 PROCEDURE — 37000008 HC ANESTHESIA 1ST 15 MINUTES: Performed by: UROLOGY

## 2019-10-23 PROCEDURE — 52000 CYSTOURETHROSCOPY: CPT | Mod: 59,,, | Performed by: UROLOGY

## 2019-10-23 PROCEDURE — 54530 PR REMOVAL TESTIS,RADICAL: ICD-10-PCS | Mod: RT,,, | Performed by: UROLOGY

## 2019-10-23 PROCEDURE — 99900104 DSU ONLY-NO CHARGE-EA ADD'L HR (STAT): Performed by: UROLOGY

## 2019-10-23 PROCEDURE — 37000009 HC ANESTHESIA EA ADD 15 MINS: Performed by: UROLOGY

## 2019-10-23 PROCEDURE — D9220A PRA ANESTHESIA: ICD-10-PCS | Mod: ANES,,, | Performed by: ANESTHESIOLOGY

## 2019-10-23 PROCEDURE — 88309 TISSUE SPECIMEN TO PATHOLOGY - SURGERY: ICD-10-PCS | Mod: 26,,, | Performed by: PATHOLOGY

## 2019-10-23 PROCEDURE — 71000015 HC POSTOP RECOV 1ST HR: Performed by: UROLOGY

## 2019-10-23 PROCEDURE — 54530 REMOVAL OF TESTIS: CPT | Mod: RT,,, | Performed by: UROLOGY

## 2019-10-23 PROCEDURE — 25000003 PHARM REV CODE 250: Performed by: ANESTHESIOLOGY

## 2019-10-23 PROCEDURE — 88309 TISSUE EXAM BY PATHOLOGIST: CPT | Performed by: PATHOLOGY

## 2019-10-23 PROCEDURE — 88309 TISSUE EXAM BY PATHOLOGIST: CPT | Mod: 26,,, | Performed by: PATHOLOGY

## 2019-10-23 RX ORDER — GLYCOPYRROLATE 0.2 MG/ML
INJECTION INTRAMUSCULAR; INTRAVENOUS
Status: DISCONTINUED | OUTPATIENT
Start: 2019-10-23 | End: 2019-10-23

## 2019-10-23 RX ORDER — DEXAMETHASONE SODIUM PHOSPHATE 4 MG/ML
INJECTION, SOLUTION INTRA-ARTICULAR; INTRALESIONAL; INTRAMUSCULAR; INTRAVENOUS; SOFT TISSUE
Status: DISCONTINUED | OUTPATIENT
Start: 2019-10-23 | End: 2019-10-23

## 2019-10-23 RX ORDER — FENTANYL CITRATE 50 UG/ML
INJECTION, SOLUTION INTRAMUSCULAR; INTRAVENOUS
Status: DISCONTINUED | OUTPATIENT
Start: 2019-10-23 | End: 2019-10-23

## 2019-10-23 RX ORDER — ONDANSETRON 2 MG/ML
4 INJECTION INTRAMUSCULAR; INTRAVENOUS ONCE
Status: ACTIVE | OUTPATIENT
Start: 2019-10-23

## 2019-10-23 RX ORDER — CEPHALEXIN 500 MG/1
500 CAPSULE ORAL 3 TIMES DAILY
Qty: 15 CAPSULE | Refills: 0 | Status: SHIPPED | OUTPATIENT
Start: 2019-10-23 | End: 2019-10-28

## 2019-10-23 RX ORDER — MEPERIDINE HYDROCHLORIDE 50 MG/ML
12.5 INJECTION INTRAMUSCULAR; INTRAVENOUS; SUBCUTANEOUS ONCE AS NEEDED
Status: ACTIVE | OUTPATIENT
Start: 2019-10-23 | End: 2019-10-24

## 2019-10-23 RX ORDER — ONDANSETRON 2 MG/ML
4 INJECTION INTRAMUSCULAR; INTRAVENOUS DAILY PRN
Status: DISCONTINUED | OUTPATIENT
Start: 2019-10-23 | End: 2019-10-23 | Stop reason: HOSPADM

## 2019-10-23 RX ORDER — SODIUM CHLORIDE, SODIUM LACTATE, POTASSIUM CHLORIDE, CALCIUM CHLORIDE 600; 310; 30; 20 MG/100ML; MG/100ML; MG/100ML; MG/100ML
10 INJECTION, SOLUTION INTRAVENOUS CONTINUOUS
Status: ACTIVE | OUTPATIENT
Start: 2019-10-23

## 2019-10-23 RX ORDER — MIDAZOLAM HYDROCHLORIDE 1 MG/ML
INJECTION, SOLUTION INTRAMUSCULAR; INTRAVENOUS
Status: DISCONTINUED | OUTPATIENT
Start: 2019-10-23 | End: 2019-10-23

## 2019-10-23 RX ORDER — ROCURONIUM BROMIDE 10 MG/ML
INJECTION, SOLUTION INTRAVENOUS
Status: DISCONTINUED | OUTPATIENT
Start: 2019-10-23 | End: 2019-10-23

## 2019-10-23 RX ORDER — SODIUM CHLORIDE 0.9 % (FLUSH) 0.9 %
3 SYRINGE (ML) INJECTION
Status: ACTIVE | OUTPATIENT
Start: 2019-10-23

## 2019-10-23 RX ORDER — LIDOCAINE HYDROCHLORIDE 10 MG/ML
1 INJECTION, SOLUTION EPIDURAL; INFILTRATION; INTRACAUDAL; PERINEURAL ONCE
Status: ACTIVE | OUTPATIENT
Start: 2019-10-23

## 2019-10-23 RX ORDER — OXYCODONE AND ACETAMINOPHEN 5; 325 MG/1; MG/1
1 TABLET ORAL EVERY 6 HOURS PRN
Qty: 15 TABLET | Refills: 0 | Status: SHIPPED | OUTPATIENT
Start: 2019-10-23 | End: 2021-02-23

## 2019-10-23 RX ORDER — HYDROMORPHONE HYDROCHLORIDE 2 MG/ML
0.2 INJECTION, SOLUTION INTRAMUSCULAR; INTRAVENOUS; SUBCUTANEOUS EVERY 5 MIN PRN
Status: ACTIVE | OUTPATIENT
Start: 2019-10-23

## 2019-10-23 RX ORDER — HYDROMORPHONE HYDROCHLORIDE 2 MG/ML
0.2 INJECTION, SOLUTION INTRAMUSCULAR; INTRAVENOUS; SUBCUTANEOUS EVERY 5 MIN PRN
Status: DISCONTINUED | OUTPATIENT
Start: 2019-10-23 | End: 2019-10-23 | Stop reason: HOSPADM

## 2019-10-23 RX ORDER — FENTANYL CITRATE 50 UG/ML
25 INJECTION, SOLUTION INTRAMUSCULAR; INTRAVENOUS EVERY 5 MIN PRN
Status: DISCONTINUED | OUTPATIENT
Start: 2019-10-23 | End: 2019-10-23 | Stop reason: HOSPADM

## 2019-10-23 RX ORDER — FENTANYL CITRATE 50 UG/ML
25 INJECTION, SOLUTION INTRAMUSCULAR; INTRAVENOUS EVERY 5 MIN PRN
Status: ACTIVE | OUTPATIENT
Start: 2019-10-23

## 2019-10-23 RX ORDER — OXYCODONE HYDROCHLORIDE 5 MG/1
5 TABLET ORAL
Status: ACTIVE | OUTPATIENT
Start: 2019-10-23

## 2019-10-23 RX ORDER — LIDOCAINE HYDROCHLORIDE 10 MG/ML
10 INJECTION, SOLUTION EPIDURAL; INFILTRATION; INTRACAUDAL; PERINEURAL ONCE
Status: DISCONTINUED | OUTPATIENT
Start: 2019-10-23 | End: 2019-10-23 | Stop reason: HOSPADM

## 2019-10-23 RX ORDER — LIDOCAINE HCL/PF 100 MG/5ML
SYRINGE (ML) INTRAVENOUS
Status: DISCONTINUED | OUTPATIENT
Start: 2019-10-23 | End: 2019-10-23

## 2019-10-23 RX ORDER — DIPHENHYDRAMINE HYDROCHLORIDE 50 MG/ML
25 INJECTION INTRAMUSCULAR; INTRAVENOUS EVERY 6 HOURS PRN
Status: DISCONTINUED | OUTPATIENT
Start: 2019-10-23 | End: 2019-10-23 | Stop reason: HOSPADM

## 2019-10-23 RX ORDER — SODIUM CHLORIDE 0.9 % (FLUSH) 0.9 %
3 SYRINGE (ML) INJECTION EVERY 8 HOURS
Status: DISCONTINUED | OUTPATIENT
Start: 2019-10-23 | End: 2019-10-23 | Stop reason: HOSPADM

## 2019-10-23 RX ORDER — ONDANSETRON HYDROCHLORIDE 2 MG/ML
INJECTION, SOLUTION INTRAMUSCULAR; INTRAVENOUS
Status: DISCONTINUED | OUTPATIENT
Start: 2019-10-23 | End: 2019-10-23

## 2019-10-23 RX ORDER — SODIUM CHLORIDE, SODIUM LACTATE, POTASSIUM CHLORIDE, CALCIUM CHLORIDE 600; 310; 30; 20 MG/100ML; MG/100ML; MG/100ML; MG/100ML
75 INJECTION, SOLUTION INTRAVENOUS CONTINUOUS
Status: ACTIVE | OUTPATIENT
Start: 2019-10-23

## 2019-10-23 RX ORDER — SODIUM CHLORIDE 0.9 % (FLUSH) 0.9 %
3 SYRINGE (ML) INJECTION EVERY 8 HOURS
Status: ACTIVE | OUTPATIENT
Start: 2019-10-23

## 2019-10-23 RX ORDER — PROPOFOL 10 MG/ML
VIAL (ML) INTRAVENOUS
Status: DISCONTINUED | OUTPATIENT
Start: 2019-10-23 | End: 2019-10-23

## 2019-10-23 RX ORDER — CEFAZOLIN SODIUM 2 G/50ML
2 SOLUTION INTRAVENOUS ONCE
Status: COMPLETED | OUTPATIENT
Start: 2019-10-23 | End: 2019-10-23

## 2019-10-23 RX ORDER — OXYCODONE HYDROCHLORIDE 5 MG/1
5 TABLET ORAL
Status: DISCONTINUED | OUTPATIENT
Start: 2019-10-23 | End: 2019-10-23 | Stop reason: HOSPADM

## 2019-10-23 RX ORDER — SUCCINYLCHOLINE CHLORIDE 20 MG/ML
INJECTION INTRAMUSCULAR; INTRAVENOUS
Status: DISCONTINUED | OUTPATIENT
Start: 2019-10-23 | End: 2019-10-23

## 2019-10-23 RX ORDER — ACETAMINOPHEN 10 MG/ML
INJECTION, SOLUTION INTRAVENOUS
Status: DISCONTINUED | OUTPATIENT
Start: 2019-10-23 | End: 2019-10-23

## 2019-10-23 RX ORDER — PHENYLEPHRINE HYDROCHLORIDE 10 MG/ML
INJECTION INTRAVENOUS
Status: DISCONTINUED | OUTPATIENT
Start: 2019-10-23 | End: 2019-10-23

## 2019-10-23 RX ORDER — SODIUM CHLORIDE 0.9 % (FLUSH) 0.9 %
3 SYRINGE (ML) INJECTION
Status: DISCONTINUED | OUTPATIENT
Start: 2019-10-23 | End: 2019-10-23 | Stop reason: HOSPADM

## 2019-10-23 RX ORDER — DIPHENHYDRAMINE HYDROCHLORIDE 50 MG/ML
25 INJECTION INTRAMUSCULAR; INTRAVENOUS EVERY 6 HOURS PRN
Status: ACTIVE | OUTPATIENT
Start: 2019-10-23

## 2019-10-23 RX ORDER — MEPERIDINE HYDROCHLORIDE 50 MG/ML
12.5 INJECTION INTRAMUSCULAR; INTRAVENOUS; SUBCUTANEOUS ONCE AS NEEDED
Status: DISCONTINUED | OUTPATIENT
Start: 2019-10-23 | End: 2019-10-23 | Stop reason: HOSPADM

## 2019-10-23 RX ADMIN — ROCURONIUM BROMIDE 10 MG: 10 INJECTION, SOLUTION INTRAVENOUS at 11:10

## 2019-10-23 RX ADMIN — DEXAMETHASONE SODIUM PHOSPHATE 4 MG: 4 INJECTION, SOLUTION INTRAMUSCULAR; INTRAVENOUS at 11:10

## 2019-10-23 RX ADMIN — OXYCODONE HYDROCHLORIDE 5 MG: 5 TABLET ORAL at 01:10

## 2019-10-23 RX ADMIN — PROPOFOL 160 MG: 10 INJECTION, EMULSION INTRAVENOUS at 11:10

## 2019-10-23 RX ADMIN — FENTANYL CITRATE 100 MCG: 50 INJECTION, SOLUTION INTRAMUSCULAR; INTRAVENOUS at 11:10

## 2019-10-23 RX ADMIN — SODIUM CHLORIDE, SODIUM LACTATE, POTASSIUM CHLORIDE, AND CALCIUM CHLORIDE 10 ML/HR: .6; .31; .03; .02 INJECTION, SOLUTION INTRAVENOUS at 10:10

## 2019-10-23 RX ADMIN — LIDOCAINE HYDROCHLORIDE 75 MG: 20 INJECTION, SOLUTION INTRAVENOUS at 11:10

## 2019-10-23 RX ADMIN — ACETAMINOPHEN 1000 MG: 10 INJECTION, SOLUTION INTRAVENOUS at 11:10

## 2019-10-23 RX ADMIN — SODIUM CHLORIDE, SODIUM LACTATE, POTASSIUM CHLORIDE, AND CALCIUM CHLORIDE: .6; .31; .03; .02 INJECTION, SOLUTION INTRAVENOUS at 11:10

## 2019-10-23 RX ADMIN — ONDANSETRON 4 MG: 2 INJECTION, SOLUTION INTRAMUSCULAR; INTRAVENOUS at 11:10

## 2019-10-23 RX ADMIN — SUCCINYLCHOLINE CHLORIDE 120 MG: 20 INJECTION, SOLUTION INTRAMUSCULAR; INTRAVENOUS at 11:10

## 2019-10-23 RX ADMIN — CEFAZOLIN SODIUM 2 G: 2 SOLUTION INTRAVENOUS at 11:10

## 2019-10-23 RX ADMIN — GLYCOPYRROLATE 0.2 MG: 0.2 INJECTION, SOLUTION INTRAMUSCULAR; INTRAVENOUS at 11:10

## 2019-10-23 RX ADMIN — PHENYLEPHRINE HYDROCHLORIDE 100 MCG: 10 INJECTION INTRAVENOUS at 12:10

## 2019-10-23 RX ADMIN — MIDAZOLAM 2 MG: 1 INJECTION INTRAMUSCULAR; INTRAVENOUS at 10:10

## 2019-10-23 NOTE — PLAN OF CARE
arrived ambulatory with wife in the waiting room, plan of care was reviewed and warm blanket provided

## 2019-10-23 NOTE — ANESTHESIA PREPROCEDURE EVALUATION
10/23/2019  Sid Cuello Jr. is a 68 y.o., male.    Anesthesia Evaluation    I have reviewed the Patient Summary Reports.    I have reviewed the Nursing Notes.   I have reviewed the Medications.     Review of Systems  Anesthesia Hx:  No problems with previous Anesthesia    Social:  Smoker    Cardiovascular:   Hypertension, well controlled CAD asymptomatic CABG/stent (stent 12 years ago)  hyperlipidemia    Pulmonary:  Pulmonary Normal    Renal/:  Renal/ Normal     Neurological:  Neurology Normal    Endocrine:  Endocrine Normal        Physical Exam  General:  Well nourished    Airway/Jaw/Neck:  Airway Findings: Mouth Opening: Normal Tongue: Normal  General Airway Assessment: Adult  Oropharynx Findings:  Mallampati: II  Jaw/Neck Findings:  Neck ROM: Normal ROM     Eyes/Ears/Nose:  Eyes/Ears/Nose Findings:    Dental:  Dental Findings:   Chest/Lungs:  Chest/Lungs Findings: Normal Respiratory Rate     Heart/Vascular:  Heart Findings: Rate: Normal  Rhythm: Regular Rhythm        Mental Status:  Mental Status Findings:  Cooperative, Alert and Oriented         Anesthesia Plan  Type of Anesthesia, risks & benefits discussed:  Anesthesia Type:  general  Patient's Preference:   Intra-op Monitoring Plan: standard ASA monitors  Intra-op Monitoring Plan Comments:   Post Op Pain Control Plan: multimodal analgesia  Post Op Pain Control Plan Comments:   Induction:   IV  Beta Blocker:  Patient is on a Beta-Blocker and has received one dose within the past 24 hours (No further documentation required).       Informed Consent: Patient understands risks and agrees with Anesthesia plan.  Questions answered. Anesthesia consent signed with patient.  ASA Score: 3     Day of Surgery Review of History & Physical:  There are no significant changes.   H&P completed by Anesthesiologist.       Ready For Surgery From Anesthesia  Perspective.

## 2019-10-23 NOTE — DISCHARGE INSTRUCTIONS
Anesthesia: General Anesthesia     You are watched continuously during your procedure by your anesthesia provider.     Youre due to have surgery. During surgery, youll be given medicine called anesthesia or anesthetic. This will keep you comfortable and pain-free. Your anesthesia provider will use general anesthesia.  What is general anesthesia?  General anesthesia puts you into a state like deep sleep. It goes into the bloodstream (IV anesthetics), into the lungs (gas anesthetics), or both. You feel nothing during the procedure. You will not remember it. During the procedure, the anesthesia provider monitors you continuously. He or she checks your heart rate and rhythm, blood pressure, breathing, and blood oxygen.  · IV anesthetics. IV anesthetics are given through an IV line in your arm. Theyre often given first. This is so you are asleep before a gas anesthetic is started. Some kinds of IV anesthetics relieve pain. Others relax you. Your doctor will decide which kind is best in your case.  · Gas anesthetics. Gas anesthetics are breathed into the lungs. They are often used to keep you asleep. They can be given through a facemask or a tube placed in your larynx or trachea (breathing tube).  ¨ If you have a facemask, your anesthesia provider will most likely place it over your nose and mouth while youre still awake. Youll breathe oxygen through the mask as your IV anesthetic is started. Gas anesthetic may be added through the mask.  ¨ If you have a tube in the larynx or trachea, it will be inserted into your throat after youre asleep.  Anesthesia tools and medicines  You will likely have:  · IV anesthetics. These are put into an IV line into your bloodstream.  · Gas anesthetics. You breathe these anesthetics into your lungs, where they pass into your bloodstream.  · Pulse oximeter. This is a small clip that is attached to the end of your finger. This measures your blood oxygen level.  · Electrocardiography  leads (electrodes). These are small sticky pads that are placed on your chest. They record your heart rate and rhythm.  · Blood pressure cuff. This reads your blood pressure.  Risks and possible complications  General anesthesia has some risks. These include:  · Breathing problems  · Nausea and vomiting  · Sore throat or hoarseness (usually temporary)  · Allergic reaction to the anesthetic  · Irregular heartbeat (rare)  · Cardiac arrest (rare)   Anesthesia safety  · Follow all instructions you are given for how long not to eat or drink before your procedure.  · Be sure your doctor knows what medicines and drugs you take. This includes over-the-counter medicines, herbs, supplements, alcohol or other drugs. You will be asked when those were last taken.  · Have an adult family member or friend drive you home after the procedure.  · For the first 24 hours after your surgery:  ¨ Do not drive or use heavy equipment.  ¨ Do not make important decisions or sign legal documents. If important decisions or signing legal documents is necessary during the first 24 hours after surgery, have a trusted family member or spouse act on your behalf.  ¨ Avoid alcohol.  ¨ Have a responsible adult stay with you. He or she can watch for problems and help keep you safe.  Date Last Reviewed: 12/1/2016  © 1863-7560 [employer]. [address] [place] [address], [place] [address]. All rights reserved. This information is not intended as a substitute for professional medical care. Always follow your healthcare professional's instructions.        Discharge Instructions for Deep Vein Thrombosis (DVT)  A blood clot or thrombus that forms in a large, deep vein is called a deep vein thrombosis (DVT). If a DVT is not treated, part of the clot (embolus) can break off and travel to your lungs. This is called a pulmonary embolus (PE). This can cut off the flow of blood to part or all of the lung. PE is a medical emergency and may cause  death.   Healthcare providers use the term venous thromboembolism (VTE) to describe these two conditions: DVT and PE. They use the term VTE because the two conditions are very closely related. And because their prevention and treatment are also closely related.   Make sure you follow all instructions for taking your medicine, follow-up care, and diet and lifestyle changes.  Medicine  Your healthcare provider will usually prescribe an anticoagulant medicine. This medicine is a blood thinner that helps to prevent new blood clots. Anticoagulants can be given by mouth (oral), by injection, or into your vein (intravenous or IV). Commonly used anticoagulants include warfarin and heparin. Newer anticoagulants may also be used. They include rivaroxaban, apixaban, dabigatran, and enoxaparin. Your healthcare provider will provide specific instructions on how to take your anticoagulant. You may take more than one type for a period of time.  Make sure to take your anticoagulant exactly as directed. If you miss a dose, call your healthcare provider to find out what you should do. These medicines increase the chance of bleeding. So it is very important to take them correctly. Be sure to tell all of your healthcare providers, including dentists, that you are taking an anticoagulant.  Follow-up monitoring  If you are taking warfarin, you will need regular blood tests to see how it is working. These blood tests include a prothrombin time (PT), also reported as an international normalized ratio (INR). Your dose of warfarin may be changed based on the test results. It is very important that you keep your testing appointments after you leave the hospital.  Be sure you understand the following information before you go home:   My goal PT/INR is between _____ and _____.   My next PT/INR blood draw will be on ______________ (date) at _______________ (time) at __________________________________________ (name of healthcare provider or  clinic and address).   The name of the healthcare provider who will monitor my anticoagulation is ________________________ and the phone number is _________________________.  Depending on which anticoagulant you are prescribed, you may need other blood tests. Before you are discharged, your healthcare provider will review what testing is needed in detail.  Diet and warfarin  Vitamin K can interact with warfarin and reduce its ability to thin your blood. Vitamin K helps your blood clot. So sudden changes in vitamin K intake can affect the way warfarin works. You dont need to avoid foods with vitamin K. Instead, keep the amount you eat about the same each day. Foods high in vitamin K include:  · Leafy green vegetables like spinach, cabbage, and kale  · Avocado  · Asparagus  · Egg yolks  · Oils like canola, olive, and soybean  When taking warfarin, don't change your diet without first checking with your healthcare provider.  The other anticoagulants do not have the same interaction with vitamin K that warfarin does.   Medicines and your anticoagulant  Some medicines may cause problems with anticoagulant. Check with your healthcare provider before making any changes to your medicines. And don't take over-the-counter (OTC) medicines without checking with your provider. Some medicines interact with your anticoagulant and make your blood too thin. This increases your risk of bleeding. Others may stop your anticoagulant from doing its job, making your blood too thick. So it is very important to tell your healthcare provider about all of the medicines you take, including OTCs and herbal supplements. Don't start or stop taking any medicine, including OTCs, unless your healthcare provider tells you to.  Medicines that may cause problems with your anticoagulant include:  · Some antibiotic medicines  · Some heart medicines  · Cimetidine  · Aspirin or other nonsteroidal anti-inflammatory drugs (NSAIDs) like ibuprofen,  naproxen.  · Some medicines for depression, cancer, HIV infection, diabetes, seizures, gout, high cholesterol, or thyroid disease  · Vitamins with vitamin K  · Some herbal products like Beasley's wort, garlic, coenzyme Q10, tumeric, and ginkgo biloba  Home care  To help prevent blood clots, you might do the following:  · Wiggle your toes and move your ankles while sitting or lying down.  · When traveling by car, make frequent stops to get up and move around.  · On long airplane rides, get up and move around when possible. If you cant get up, wiggle your toes, move your ankles and tighten your calves to keep your blood moving.  · If you have to stay in bed, do leg exercises.  · Wear support or compression stockings, if prescribed by your healthcare provider.  · Rest and put your legs up whenever they feel swollen or heavy.  · Raise the foot of your mattress 5 to 6 inches, using a foam wedge.  Lifestyle changes  To help you stay healthy, especially your heart and blood vessels, you should:  · Begin an exercise program, if you are not exercising. Ask your healthcare provider how to get started. Try walking, inside or out.  · Stay at a healthy weight. Get help to lose any extra pounds.  · Keep blood pressure in a healthy range  · If you smoke, make a plan to quit. Ask your healthcare provider about stop-smoking programs to help you quit.  Call 911  Call 911 right away if you have the following symptoms. They may mean a blood clot in your lungs:  · Chest pain  · Trouble breathing  · Fast heartbeat  · Sweating  · Fainting  · Coughing (may cough up blood)  · Heavy or uncontrolled bleeding  When to call your healthcare provider  Call your healthcare provider if you have pain, swelling, or redness in your leg, arm, or other area. These symptoms may mean a blood clot.  If you take blood thinners and are bleeding, you may have:  · Blood in the urine   · Bleeding with bowel movements  · Very dark or tar-like stool  · Vomiting  with blood  · Coughing with blood  · Bleeding from the nose  · Bleeding from the gums  · A cut that will not stop bleeding  · Bleeding from the vagina  Date Last Reviewed: 5/1/2016  © 5841-5289 [employer]. [address] [place] [address], [place] [address]. All rights reserved. This information is not intended as a substitute for professional medical care. Always follow your healthcare professional's instructions.

## 2019-10-23 NOTE — OP NOTE
[facility]     Date: 10/23/2019     Staff Surgeon: David Mera MD     Assistant: VERONICA Shipman     Pre-Op Diagnosis:   Right paratesticular mass  Microhematuria     Post-Op Diagnosis: same     Procedure(s) Performed:   Right radical inguinal orchiectomy  Cystoscopy, flexible     Specimen(s): right testicle with paratesticular mass and distal spermatic cord     Anesthesia: General LMA anesthesia     Findings: right firm paratesticular mass within spermatic cord, approx 2-3cm with some cystic component and surrounding hypervascularity     Estimated Blood Loss: minimal     Drains: none     Complications: none    Indications for procedure:  Mr. Cuello is a 67 yo M who presented in April 2019 with right scrotal mass, paratesticular.  On examination a for mass was felt in the right inguinal canal which could be brought down into the upper hemiscrotum noting to be a 2-3 cm discrete firm round smooth mobile mass of spermatic cord origin.  He was also noted to have microscopic blood in his urine with 5 red blood cells per high-power field on urinalysis microscopic.  Scrotal ultrasound demonstrated a 2.4 cm cystic mass in the high right hemiscrotum with a thickened septation demonstrating Doppler blood flow raising consideration for neoplasm.  Follow-up CT urogram to workup his microscopic hematuria was negative in the urinary tract, and carried through the mid thigh to evaluate the partesticular mass.  A cystic mass containing moderately thickened septation in the distal right inguinal canal measuring 2.7 cm with a few mildly enlarged bilateral iliofemoral lymph nodes measuring up to 11 mm in short axis was noted.  He was scheduled for cystoscopy to complete his microscopic hematuria workup in further discussion of management of his scrotal mass, but he cancelled.  Of note he has a 1 pack per day smoker and takes aspirin and Plavix. We did have a long discussion about the differential  diagnosis of paratesticular masses, noting approximately 70% to be benign, though given imaging findings with vascularity concerning for neoplasm, discussed surgical excision. He elected to proceed with right radical inguinal orchiectomy (with possible excision of mass only a found to be distinctly separate from spermatic cord) after discussion of risks, benefits, and alternatives.  Elected to proceed with flexible cystoscopy concurrent with this procedure to complete his microhematuria workup.  All questions answered and appropriate informed consent obtained.      Procedure in detail:   After appropriate informed consent was obtained, the patient was taken to the operating room and placed in the supine position. He was prepped and draped in standard sterile fashion. Preoperative antibiotics, ancef, were given, and a WHO-approved time out was performed.      An approximate 4-5 inch incision was made over right inguinal area between anterior superior iliac spine and pubic tubercle. Bovie electrocautery was used to dissect through subcutaneous fat and fascial layers until the external oblique fascia was exposed, and then incised from external ring proximally about 5-6 inches. Hemostat placed on each border of fascial layer to facilitate identification and dissection.  Kitner used to bluntly dissect beneath the fascia to free it up. Ilioinguinal nerve identified and protected during dissection. Of note, fascia in this area was weak and there was more superior violation in fascia to incision from blunt dissection which was later closed separately.     The proximal spermatic cord was identified and bluntly dissected free with sweeping finger motion posteriorly and penrose placed around it and secured on tension with hemostat to isolate it in a tourniquet fashion. Further blunt dissection of cord performed proximally below external oblique fascia as far proximal as could be mobilized. The proximal cord was split into 2  vascular pedicles, each isolated in a clamp, with a Chloe clamp placed just distally on the stump below. The entire cord was clamped proximally above all this as well, and bovie electrocautery used to transect cord between the proximal 3 clamps and the more distal clamp to perform high ligation of the cord.     The two vascular pedicles were suture ligated with 2-0 silk stick ties, and reinforced with additional 0-silk tie more proximally. The main cord stump below was stick tied with 0-silk and then reinforced with 0-silk free tie, of which long suture tags were left in place in case of need for future identification. These stumps appeared hemostatic at this time and we focused on removal of the testis with remaining spermatic cord en bloc.     Through the inguinal canal along the cord, blunt dissection was employed anteriorly and posteriorly to the testis to free from scrotal attachments. Right hemiscrotum was then inverted toward inguinal canal and gentle pressure was used to deliver the right testis onto the operative field, which at this time was isolated with blue towels to avoid contact with surrounding tissue.  Gubernacular attachments were then taken down in hemostatic fashion with bovie, which was also used to gain hemostasis of surrounding tissue. The right testis, with paratesticular mass, and spermatic cord were removed en bloc and sent as one specimen to pathology. Inguinoscrotal area then copiously irrigated with sterile water, and all top gloves changed.     Inguinal ligament external oblique fascia reapproximated in running fashion with 2-0 vicryl lateral to medial, up to point medially to leave fingerbreadths to approximate inguinal ring, and taking caution to protect the nerve. 2-0 vicryl also used to close the smaller more superior opening in external oblique fascia mentioned above. The scrotum was then inverted once more and no significant bleeding noted. Bovie used as spot cautery to gain  adequate hemostasis.  Area again irrigated and once dry appeared hemostatic.     Inguinal incision closed in 3 layers, with two layers of 2-0 interrupted deep dermal sutures, and 4-0 monocryl running subcuticular suture to close the skin. Skin incision overlied by dermabond, which once adequately dry, atop which was placed 4x8s and large tegaderm as pressure dressing. Kerlix subscrotal fluff and jock strap for scrotal support placed.    Before placing scrotal support and jockstrap however, a digital flexible cystoscope was brought onto the field and passed per urethra into the bladder.  Anterior urethra appeared normal, and prostatic urethra had mild lateral lobe enlargement with some element of bladder outlet obstruction, approximately 80%.  The bladder was systematically inspected and the bilateral ureteral orifices were located in the orthotopic position on the trigone.  There were no mucosal lesions or tumors.  There were diffuse grade 1-2 trabeculations, more prominent at the bladder base, consistent with his prostatic obstruction.  On retroflexion, no median lobe was noted though there was some erythema at the bladder neck and hypervascularity over the impression of the prostate at the bladder base and at the bladder neck.  This is the most likely cause for his microscopic hematuria in combination with blood thinners.  Upon removal of the cystoscope, a 15 Tajik red rubber in and out catheter was passed per urethra to drain the bladder of the cystoscopic irrigation fluid.  There was a small amount of blood drip from the urethral meatus from passage of the catheter and pressure was held for 5 min.       The patient tolerated the procedure well. There were no complications. All instrument, sponge, and needle counts were correct x2 at the end of the case. He was awakened and taken to PACU without incident        Disposition: Home today status post uncomplicated procedure as above. He can remove dressings in 24  hours, after which time he should employ scrotal support. Has been given course of PO abx and PO pain control, and will follow up in 2 weeks for postop check and review of pathology

## 2019-10-23 NOTE — PLAN OF CARE
Pt awake, alert.  Vital signs stable, denies nausea, states pain is 3 , abd soft, dressings clean dry intact. Scrotal support in place. Small amount blood noted at penis, voided small amount of pale red urine, no clots. No adverse effects of anesthesia noted. Transferred to  Postop per stretcher,  Report given to Kacie

## 2019-10-23 NOTE — ANESTHESIA POSTPROCEDURE EVALUATION
Anesthesia Post Evaluation    Patient: Sid Cuello JrHeather    Procedure(s) Performed: Procedure(s) (LRB):  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 (Right)  CYSTOSCOPY (flexible) (N/A)    Final Anesthesia Type: general  Patient location during evaluation: PACU  Patient participation: Yes- Able to Participate  Level of consciousness: awake and alert and oriented  Post-procedure vital signs: reviewed and stable  Pain management: adequate  Airway patency: patent  PONV status at discharge: No PONV  Anesthetic complications: no      Cardiovascular status: blood pressure returned to baseline and stable  Respiratory status: unassisted and spontaneous ventilation  Hydration status: euvolemic  Follow-up not needed.          Vitals Value Taken Time   /76 10/23/2019  1:39 PM   Temp 36.5 °C (97.7 °F) 10/23/2019  1:20 PM   Pulse 80 10/23/2019  1:40 PM   Resp 23 10/23/2019  1:40 PM   SpO2 96 % 10/23/2019  1:40 PM   Vitals shown include unvalidated device data.      No case tracking events are documented in the log.      Pain/Eva Score: Eva Score: 8 (10/23/2019  1:25 PM)

## 2019-10-23 NOTE — BRIEF OP NOTE
[facility] Urology  Brief Operative/Discharge Note    Date: 10/23/2019    Staff Surgeon: David Mera MD    Assistant: VERONICA Shipman     Pre-Op Diagnosis: Right paratesticular mass, microhematuria     Post-Op Diagnosis: same     Procedure(s) Performed:   Right radical inguinal orchiectomy  cystoscopy     Specimen(s): right testicle with paratesticular mass and distal spermatic cord     Anesthesia: General LMA anesthesia     Findings: right firm paratesticular mass approx 2-3cm     Estimated Blood Loss: minimal     Drains: none     Complications: none     Disposition: pacu to home  Discharge home today status post uncomplicated procedure as above  Diet - resume home diet  Follow up: 2 week postop visit 11/8/19 at 1115 am  Instructions:   1.  Hold aspirin x3 days, and hold plavix x5 days  2.  No lifting, pushing, pulling greater than 10 lb x4 weeks  3.  Scrotal support at all times and postoperative healing period (type briefs, compression shorts, jockstrap)  4.  In 24 hours, okay to remove gauze/Tegaderm (clear dressing) and shower.  Pat incision dry.  5.  Incision has Dermabond (surgical glue ) overlying it and stitches underneath the skin which will dissolve on their own.  After about 1 week the Dermabond will start to flake up and peel off, after which time can help it along in the shower.  6.  May see small amount of blood in the urine or blood drip from tip of penis in the 1st 48hrs  7. No driving while taking Rx pain meds.  8. Complete antibiotics  9. Can alternate tylenol/advil and take Rx pain meds as needed (remember Rx pain meds can be constipating, so use stool softener if needed    Meds:     Medication List      START taking these medications    cephALEXin 500 MG capsule  Commonly known as:  KEFLEX  Take 1 capsule (500 mg total) by mouth 3 (three) times daily. for 5 days     oxyCODONE-acetaminophen 5-325 mg per tablet  Commonly known as:  PERCOCET  Take 1 tablet by mouth every 6 (six) hours  as needed (pain not relieved by otc agents).        CONTINUE taking these medications    aspirin 81 MG EC tablet  Commonly known as:  ECOTRIN     atorvastatin 40 MG tablet  Commonly known as:  LIPITOR     clobetasol 0.05 % external solution  Commonly known as:  TEMOVATE  AAA scalp QHS PRN flare     clopidogrel 75 mg tablet  Commonly known as:  PLAVIX     lisinopril 20 MG tablet  Commonly known as:  PRINIVIL,ZESTRIL     metoprolol succinate 25 MG 24 hr tablet  Commonly known as:  TOPROL-XL     triamcinolone acetonide 0.1% 0.1 % cream  Commonly known as:  KENALOG  AAA left cheek bid           Where to Get Your Medications      These medications were sent to [id number] - [place] - [address] [facility]  [address] [place] [address]    Phone:  [phone]   · cephALEXin 500 MG capsule  · oxyCODONE-acetaminophen 5-325 mg per tablet

## 2019-10-23 NOTE — TRANSFER OF CARE
"Anesthesia Transfer of Care Note    Patient: Sid Cuello Jr.    Procedure(s) Performed: Procedure(s) (LRB):  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 (Right)  CYSTOSCOPY (flexible) (N/A)    Patient location: PACU    Anesthesia Type: general    Transport from OR: Transported from OR on 2-3 L/min O2 by NC with adequate spontaneous ventilation    Post pain: adequate analgesia    Post assessment: no apparent anesthetic complications and tolerated procedure well    Post vital signs: stable    Level of consciousness: awake, alert and oriented    Nausea/Vomiting: no nausea/vomiting    Complications: none    Transfer of care protocol was followed      Last vitals:   Visit Vitals  BP (!) 170/97   Pulse 90   Temp 36.3 °C (97.4 °F) (Skin)   Resp 20   Ht 5' 9" (1.753 m)   Wt 81.2 kg (179 lb)   SpO2 (!) 94%   BMI 26.43 kg/m²     "

## 2019-10-23 NOTE — H&P
"[facility] Urology Progress Note     Sid Cuello Jr. is a 68 y.o. male who presents for follow up of scrotal mass and hematuria     I last saw him on 4/29/19 noticing new finding of a grape-sized smooth lump in right scrotum separate from, or on side of testicle like "a 3rd testicle" and feels like it is floating around on its own  No hematuria, dysuria, LUTS (AUA SS 1/0) and Udip with trace blood. Takes plavix and asa 81 for CAD, Hx coronary stenting 2007. 1 ppd smoker still. Dr Sotomayor is cardiologist ~Q4 mos visits - last seen february  Last PCP Brad, so no PCP in 2 years, Last psa 1.4 in 2015. No fam history of testis ca, prostate ca, bladder ca or any gu malig  On exam felt firm mass in right inguinal canal - no impulse on valsalva or cough as if to suggest hernia. With valsalva and/or manipulation, dropped into upper hemiscrotum and noted to be 2-3cm discrete firm round smooth mobile mass, likely of spermatic cord origin given location. MICHAEL 25-30g benign. PSA 1.0.  UA micro did demonstrate 5 rbc/hpf so CT urogram done to evaluate, and carry through midthigh to correlate with scrotal US for eval of right hemiscrotal mass  Serum tumor markers as precaution ordered and normal (B-hCG <0.6, AFP 2.7, )     Scrotal US 5/6/19:  A cystic mass is present within the high right hemiscrotum, measuring 2.2 x 1.7 x 2.4 cm.  A mildly thickened septation color Doppler blood flow extends through the finding. The right testicle measures 5.3 x 1.9 x 3.6 cm and the left 5.1 x 1.9 x 3.3 cm.  Normal testicular blood flow is present bilaterally.  There is no testicular mass.  There is a small right hydrocele.  There are small bilateral epididymal cysts measuring up to 9 mm.  - Mildly complex cystic mass within the high right hemiscrotum, with a thickened septation demonstrating blood flow raising some consideration for neoplasm.     CT urogram 5/6/19:  The liver is diffusely hypoattenuating reflecting fatty infiltration. " " The spleen, pancreas, adrenal glands, and bowel are unremarkable. There is no nephroureterolithiasis or hydroureteronephrosis.  No renal, ureteral, or bladder mass is identified.  The prostate gland is moderately enlarged.  There is moderate calcification of the aorta. There are no suspicious osseous lesions.  There is a cystic mass containing a moderately thickened septation located within the distal right inguinal canal, measuring 2.7 x 2.7 by 2.1 cm.  There are a few mildly enlarged bilateral iliofemoral lymph nodes measuring up to 11 mm in short axis.    He was scheduled for cystoscopy 6/4/19 to complete micro hematuria workup and further discuss management of his scrotal mass, though called in wanted to cancel his cystoscopy and therefore presents today for office consultation to further discuss the findings on his CT and scrotal ultrasound as they relate to his right hemiscrotal mass.    No gross blood in urine  No inguinoscrotal pain from mass.     ROS: A comprehensive 10 system review was performed and is negative except as noted above in HPI     PHYSICAL EXAM:         Vitals:     06/03/19 0908   BP: (!) 146/93   Pulse: 85   Resp: 18      Body mass index is 27.02 kg/m². Weight: 83 kg (182 lb 15.7 oz) Height: 5' 9" (175.3 cm)         General: Alert, cooperative, no distress, appears stated age   Head: Normocephalic, without obvious abnormality, atraumatic   Eyes: PERRL, conjunctiva/corneas clear   Lungs: Respirations unlabored   Heart: Warm and well perfused   Abdomen: soft NT ND  Extremities: Extremities normal, atraumatic, no cyanosis or edema   Skin: Skin color, texture, turgor normal, no rashes or lesions   Psych: Appropriate   Neurologic: Non-focal         Recent Results         Recent Results (from the past 336 hour(s))   CG Quant, Tumor Marker     Collection Time: 05/28/19  9:16 AM   Result Value Ref Range     Beta-hCG, Quantitative (Tumor Marker) <0.6 <1.4 IU/L   AFP TUMOR MARKER     Collection " Time: 05/28/19  9:16 AM   Result Value Ref Range     AFP 2.7 0.0 - 8.4 ng/mL   LACTATE DEHYDROGENASE     Collection Time: 05/28/19  9:16 AM   Result Value Ref Range      110 - 260 U/L            ASSESSMENT   1. Scrotal mass        right   2. Microhematuria            Plan    We did have a long discussion about the differential diagnosis of paratesticular masses, noting 70% are often benign. Given largely cystic component this is most likely, however given complexity with internal vascularity and local questionably lymphadenopathy and solid component, with imaging concerning for potential malignancy, would recommend resection. Given the US/CT appearance and potential for malignancy we did discuss surgical excision.   Given the rare chance of malignancy we did discuss radical orchiectomy vs excision of mass and risks and benefits of both.   Discussed this as an inguinal approach. All benefits and risks of procedure discussed with patient, including but not limited to need for further procedures/treatment based on pathology, pain, infection, bleeding, hematoma, persistence of pain. All questions answered and appropriate informed consent obtained for right radical orchiectomy vs excision of spermatic cord mass  Did discuss going into the procedure with planned radical orchiectomy for oncologic control, however if intraoperatively the mass appeared quite distinctly separate from any spermatic cord or testicular involvement could just excise the mass and manage further dependent on pathology.  We did discuss the implications of radical orchiectomy in detail.  He will need cardiac clearance from Dr Sotomayor prior, as well as be cleared to hold aspirin and plavix for 1 week preop and 2-3 days postop.   Discussed slightly higher risk of scrotal hematoma and bleeding complications with baseline bloodthinner use.  Will perform flexible cystoscopy in same anesthetic event to complete microhematuria evaluation.   All  risks and benefits were discussed, and appropriate informed consent was obtained.  He will consider surgical resection at this time and he does have follow-up with his cardiologist later this month who I will cc on all the notes and recommendations, as well as to get clearance both to proceed with general anesthesia and to hold blood thinners in the perioperative period.      Site marked in preop holding and all questions patient and wife had

## 2019-10-23 NOTE — PLAN OF CARE
Patient states he's ready to go home.  Pain tolerable at 3/10.  Denies nausea.  Dressing CDI.  Wife at beside.  Awaiting patient to be able to urinate.  IVF wide open.

## 2019-11-04 ENCOUNTER — TELEPHONE (OUTPATIENT)
Dept: UROLOGY | Facility: CLINIC | Age: 69
End: 2019-11-04

## 2019-11-04 NOTE — TELEPHONE ENCOUNTER
Patient's wife advised.  She says that he has some lower leg swelling on that side as well (sock is marking his ankle).

## 2019-11-04 NOTE — TELEPHONE ENCOUNTER
Right scrotum is swollen.  Orchiectomy 10/23.  Looks like there is another testicle there.  Dull ache, not pain.   No fever or chills.    Scheduled for f/u Friday.

## 2019-11-04 NOTE — TELEPHONE ENCOUNTER
----- Message from Zak Diez sent at 11/4/2019  9:56 AM CST -----  Contact: Iris  Type: Needs Medical Advice    Who Called:  Patient's wife Iris  Symptoms (please be specific):    How long has patient had these symptoms:    Pharmacy name and phone #:    Best Call Back Number: [phone]  Additional Information: have questions regarding patient's symptoms swelling

## 2019-11-04 NOTE — TELEPHONE ENCOUNTER
May be edema or hematoma as he did resume bloodthinners. As long as not having pain, would advise good scrotal support (jock strap, compression shorts, tight briefs etc) and will evaluate at visit friday

## 2019-11-08 ENCOUNTER — OFFICE VISIT (OUTPATIENT)
Dept: UROLOGY | Facility: CLINIC | Age: 69
End: 2019-11-08
Payer: MEDICARE

## 2019-11-08 VITALS
WEIGHT: 180.75 LBS | HEART RATE: 81 BPM | SYSTOLIC BLOOD PRESSURE: 145 MMHG | DIASTOLIC BLOOD PRESSURE: 90 MMHG | RESPIRATION RATE: 18 BRPM | BODY MASS INDEX: 26.77 KG/M2 | HEIGHT: 69 IN

## 2019-11-08 DIAGNOSIS — M79.673 PAIN OF FOOT, UNSPECIFIED LATERALITY: Primary | ICD-10-CM

## 2019-11-08 DIAGNOSIS — N50.89 SCROTAL SWELLING: Primary | ICD-10-CM

## 2019-11-08 DIAGNOSIS — N50.89 PARATESTICULAR MASS: ICD-10-CM

## 2019-11-08 PROCEDURE — 99999 PR PBB SHADOW E&M-EST. PATIENT-LVL III: ICD-10-PCS | Mod: PBBFAC,,, | Performed by: UROLOGY

## 2019-11-08 PROCEDURE — 99024 POSTOP FOLLOW-UP VISIT: CPT | Mod: POP,,, | Performed by: UROLOGY

## 2019-11-08 PROCEDURE — 99024 PR POST-OP FOLLOW-UP VISIT: ICD-10-PCS | Mod: POP,,, | Performed by: UROLOGY

## 2019-11-08 PROCEDURE — 99999 PR PBB SHADOW E&M-EST. PATIENT-LVL III: CPT | Mod: PBBFAC,,, | Performed by: UROLOGY

## 2019-11-08 PROCEDURE — 99213 OFFICE O/P EST LOW 20 MIN: CPT | Mod: PBBFAC,PN | Performed by: UROLOGY

## 2019-11-08 NOTE — PROGRESS NOTES
[facility] Urology Progress Note    Sid Cuello Jr. is a 69 y.o. male who presents for p/o follow up of right inguinal radical orchiectomy for paratesticular mass.    presented in April 2019 with right scrotal mass, paratesticular.  On examination a for mass was felt in the right inguinal canal which could be brought down into the upper hemiscrotum noting to be a 2-3 cm discrete firm round smooth mobile mass of spermatic cord origin.  He was also noted to have microscopic blood in his urine with 5 red blood cells per high-power field on urinalysis microscopic.  Scrotal ultrasound demonstrated a 2.4 cm cystic mass in the high right hemiscrotum with a thickened septation demonstrating Doppler blood flow raising consideration for neoplasm.  Follow-up CT urogram to workup his microscopic hematuria was negative in the urinary tract, and carried through the mid thigh to evaluate the partesticular mass.  A cystic mass containing moderately thickened septation in the distal right inguinal canal measuring 2.7 cm with a few mildly enlarged bilateral iliofemoral lymph nodes measuring up to 11 mm in short axis was noted.  He was scheduled for cystoscopy to complete his microscopic hematuria workup in further discussion of management of his scrotal mass, but he cancelled.  Of note he has a 1 pack per day smoker and takes aspirin and Plavix. We did have a long discussion about the differential diagnosis of paratesticular masses, noting approximately 70% to be benign, though given imaging findings with vascularity concerning for neoplasm, discussed surgical excision. He elected to proceed with right radical inguinal orchiectomy (with possible excision of mass only a found to be distinctly separate from spermatic cord) after discussion of risks, benefits, and alternatives.  Elected to proceed with flexible cystoscopy concurrent with this procedure to complete his microhematuria workup.    10/23/19:  right firm paratesticular mass  "within spermatic cord, approx 2-3cm with some cystic component and surrounding hypervascularity.   Cystoscopy with moderate prostatic lateral lobe obstruction with minimal bladder trabeculation and hypervascularity over impression of prostate at bladder base otherwise negative    Has done well postop.  Does have right scrotal swelling, minimal.  Has been wearing supportive underwear  No pain  Denies leg swelling, noted previous concern from his wife was after wearing tight socks  Has occasional weak stream but no sperm draining and notes his stream is pretty constant.  No bothersome lower urinary tract symptoms.    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  There is also an epididymal cyst 0.5 cm.        ROS: A comprehensive 10 system review was performed and is negative except as noted above in HPI    PHYSICAL EXAM:    Vitals:    11/08/19 1119   BP: (!) 145/90   Pulse: 81   Resp: 18     Body mass index is 26.7 kg/m². Weight: 82 kg (180 lb 12.4 oz) Height: 5' 9" (175.3 cm)       Right inguinal incision well healed  Some dermabond still in place  Mild swelling under incision  Right hemiscrotum with mild firmness within. Consistent with hematoma. NTTP  Difficult to assess if contiguous with inguinal canal. Cant r/o hernia      ASSESSMENT   1. Scrotal swelling  US Scrotum And Testicles   2. Paratesticular mass         Plan    Reviewed benign pathology.  He did resume blood thinners 3 days postoperatively, most suspicious for intrascrotal hematoma.  As there is still some edema and puffiness under the incision, and difficult to assess if this is contiguous with right hemiscrotal swelling concerning for hernia, advice he get a scrotal ultrasound in the next 2 weeks.  He should continue scrotal support.  He is asymptomatic and having a pain and some of the postoperative edema will continue to go down.  RTC 3 months for recheck and " follow up with LUTS.  If stable can return on an annual basis.

## 2019-11-11 ENCOUNTER — OFFICE VISIT (OUTPATIENT)
Dept: ORTHOPEDICS | Facility: CLINIC | Age: 69
End: 2019-11-11
Payer: MEDICARE

## 2019-11-11 ENCOUNTER — HOSPITAL ENCOUNTER (OUTPATIENT)
Dept: RADIOLOGY | Facility: HOSPITAL | Age: 69
Discharge: HOME | End: 2019-11-11
Attending: ORTHOPAEDIC SURGERY
Payer: MEDICARE

## 2019-11-11 VITALS
WEIGHT: 176.38 LBS | DIASTOLIC BLOOD PRESSURE: 89 MMHG | HEIGHT: 69 IN | SYSTOLIC BLOOD PRESSURE: 165 MMHG | HEART RATE: 83 BPM | BODY MASS INDEX: 26.12 KG/M2

## 2019-11-11 DIAGNOSIS — M79.671 RIGHT FOOT PAIN: Primary | ICD-10-CM

## 2019-11-11 DIAGNOSIS — M79.673 PAIN OF FOOT, UNSPECIFIED LATERALITY: ICD-10-CM

## 2019-11-11 PROCEDURE — 99999 PR PBB SHADOW E&M-EST. PATIENT-LVL III: CPT | Mod: PBBFAC,,, | Performed by: ORTHOPAEDIC SURGERY

## 2019-11-11 PROCEDURE — 99203 OFFICE O/P NEW LOW 30 MIN: CPT | Mod: S$PBB,,, | Performed by: ORTHOPAEDIC SURGERY

## 2019-11-11 PROCEDURE — 99999 PR PBB SHADOW E&M-EST. PATIENT-LVL III: ICD-10-PCS | Mod: PBBFAC,,, | Performed by: ORTHOPAEDIC SURGERY

## 2019-11-11 PROCEDURE — 99213 OFFICE O/P EST LOW 20 MIN: CPT | Mod: PBBFAC,25,PN | Performed by: ORTHOPAEDIC SURGERY

## 2019-11-11 PROCEDURE — 73630 X-RAY EXAM OF FOOT: CPT | Mod: TC,PN,RT

## 2019-11-11 PROCEDURE — 73630 XR FOOT COMPLETE 3 VIEW RIGHT: ICD-10-PCS | Mod: 26,RT,, | Performed by: RADIOLOGY

## 2019-11-11 PROCEDURE — 73630 X-RAY EXAM OF FOOT: CPT | Mod: 26,RT,, | Performed by: RADIOLOGY

## 2019-11-11 PROCEDURE — 99203 PR OFFICE/OUTPT VISIT, NEW, LEVL III, 30-44 MIN: ICD-10-PCS | Mod: S$PBB,,, | Performed by: ORTHOPAEDIC SURGERY

## 2021-02-09 ENCOUNTER — OFFICE VISIT (OUTPATIENT)
Dept: DERMATOLOGY | Facility: CLINIC | Age: 71
End: 2021-02-09
Payer: MEDICARE

## 2021-02-09 DIAGNOSIS — L82.1 SEBORRHEIC KERATOSES: ICD-10-CM

## 2021-02-09 DIAGNOSIS — L98.9 DISEASE OF SKIN AND SUBCUTANEOUS TISSUE: Primary | ICD-10-CM

## 2021-02-09 DIAGNOSIS — L81.4 SOLAR LENTIGO: ICD-10-CM

## 2021-02-09 PROCEDURE — 88305 TISSUE EXAM BY PATHOLOGIST: ICD-10-PCS | Mod: 26,,, | Performed by: PATHOLOGY

## 2021-02-09 PROCEDURE — 99214 OFFICE O/P EST MOD 30 MIN: CPT | Mod: 25,,, | Performed by: DERMATOLOGY

## 2021-02-09 PROCEDURE — 88305 TISSUE EXAM BY PATHOLOGIST: CPT | Mod: 26,,, | Performed by: PATHOLOGY

## 2021-02-09 PROCEDURE — 99999 PR PBB SHADOW E&M-EST. PATIENT-LVL III: CPT | Mod: PBBFAC,,, | Performed by: DERMATOLOGY

## 2021-02-09 PROCEDURE — 99214 PR OFFICE/OUTPT VISIT, EST, LEVL IV, 30-39 MIN: ICD-10-PCS | Mod: 25,,, | Performed by: DERMATOLOGY

## 2021-02-09 PROCEDURE — 88305 TISSUE EXAM BY PATHOLOGIST: CPT | Performed by: PATHOLOGY

## 2021-02-09 PROCEDURE — 99999 PR PBB SHADOW E&M-EST. PATIENT-LVL III: ICD-10-PCS | Mod: PBBFAC,,, | Performed by: DERMATOLOGY

## 2021-02-09 PROCEDURE — 99213 OFFICE O/P EST LOW 20 MIN: CPT | Mod: PBBFAC,25,PO | Performed by: DERMATOLOGY

## 2021-02-09 RX ORDER — BETAMETHASONE DIPROPIONATE 0.5 MG/G
CREAM TOPICAL
Qty: 50 G | Refills: 2 | Status: SHIPPED | OUTPATIENT
Start: 2021-02-09 | End: 2021-06-10 | Stop reason: ALTCHOICE

## 2021-02-09 RX ORDER — BETAMETHASONE DIPROPIONATE 0.5 MG/G
LOTION TOPICAL
Qty: 60 ML | Refills: 2 | Status: SHIPPED | OUTPATIENT
Start: 2021-02-09 | End: 2023-07-31 | Stop reason: SDUPTHER

## 2021-02-11 LAB
COMMENT: NORMAL
FINAL PATHOLOGIC DIAGNOSIS: NORMAL
GROSS: NORMAL
MICROSCOPIC EXAM: NORMAL

## 2021-02-19 ENCOUNTER — LAB VISIT (OUTPATIENT)
Dept: LAB | Facility: HOSPITAL | Age: 71
End: 2021-02-19
Attending: DERMATOLOGY
Payer: MEDICARE

## 2021-02-19 DIAGNOSIS — L98.9 DISEASE OF SKIN AND SUBCUTANEOUS TISSUE: ICD-10-CM

## 2021-02-19 LAB — ERYTHROCYTE [SEDIMENTATION RATE] IN BLOOD BY WESTERGREN METHOD: 35 MM/HR (ref 0–10)

## 2021-02-19 PROCEDURE — 86038 ANTINUCLEAR ANTIBODIES: CPT

## 2021-02-19 PROCEDURE — 85651 RBC SED RATE NONAUTOMATED: CPT

## 2021-02-19 PROCEDURE — 36415 COLL VENOUS BLD VENIPUNCTURE: CPT

## 2021-02-22 LAB — ANA SER QL IF: NORMAL

## 2021-02-23 ENCOUNTER — OFFICE VISIT (OUTPATIENT)
Dept: DERMATOLOGY | Facility: CLINIC | Age: 71
End: 2021-02-23
Payer: MEDICARE

## 2021-02-23 DIAGNOSIS — L93.0 DISCOID LUPUS: Primary | ICD-10-CM

## 2021-02-23 PROCEDURE — 99213 PR OFFICE/OUTPT VISIT, EST, LEVL III, 20-29 MIN: ICD-10-PCS | Mod: S$PBB,,, | Performed by: DERMATOLOGY

## 2021-02-23 PROCEDURE — 99999 PR PBB SHADOW E&M-EST. PATIENT-LVL II: ICD-10-PCS | Mod: PBBFAC,,, | Performed by: DERMATOLOGY

## 2021-02-23 PROCEDURE — 99212 OFFICE O/P EST SF 10 MIN: CPT | Mod: PBBFAC,PO | Performed by: DERMATOLOGY

## 2021-02-23 PROCEDURE — 99999 PR PBB SHADOW E&M-EST. PATIENT-LVL II: CPT | Mod: PBBFAC,,, | Performed by: DERMATOLOGY

## 2021-02-23 PROCEDURE — 99213 OFFICE O/P EST LOW 20 MIN: CPT | Mod: S$PBB,,, | Performed by: DERMATOLOGY

## 2021-02-23 RX ORDER — CYANOCOBALAMIN 1000 UG/ML
1000 INJECTION, SOLUTION INTRAMUSCULAR; SUBCUTANEOUS
COMMUNITY
Start: 2020-12-07

## 2021-03-09 ENCOUNTER — IMMUNIZATION (OUTPATIENT)
Dept: PRIMARY CARE CLINIC | Facility: CLINIC | Age: 71
End: 2021-03-09
Payer: MEDICARE

## 2021-03-09 DIAGNOSIS — Z23 NEED FOR VACCINATION: Primary | ICD-10-CM

## 2021-03-09 PROCEDURE — 91300 COVID-19, MRNA, LNP-S, PF, 30 MCG/0.3 ML DOSE VACCINE: ICD-10-PCS | Mod: S$GLB,,, | Performed by: FAMILY MEDICINE

## 2021-03-09 PROCEDURE — 0001A COVID-19, MRNA, LNP-S, PF, 30 MCG/0.3 ML DOSE VACCINE: CPT | Mod: CV19,S$GLB,, | Performed by: FAMILY MEDICINE

## 2021-03-09 PROCEDURE — 91300 COVID-19, MRNA, LNP-S, PF, 30 MCG/0.3 ML DOSE VACCINE: CPT | Mod: S$GLB,,, | Performed by: FAMILY MEDICINE

## 2021-03-09 PROCEDURE — 0001A COVID-19, MRNA, LNP-S, PF, 30 MCG/0.3 ML DOSE VACCINE: ICD-10-PCS | Mod: CV19,S$GLB,, | Performed by: FAMILY MEDICINE

## 2021-03-30 ENCOUNTER — IMMUNIZATION (OUTPATIENT)
Dept: PRIMARY CARE CLINIC | Facility: CLINIC | Age: 71
End: 2021-03-30
Payer: MEDICARE

## 2021-03-30 DIAGNOSIS — Z23 NEED FOR VACCINATION: Primary | ICD-10-CM

## 2021-03-30 PROCEDURE — 91300 COVID-19, MRNA, LNP-S, PF, 30 MCG/0.3 ML DOSE VACCINE: ICD-10-PCS | Mod: S$GLB,,, | Performed by: FAMILY MEDICINE

## 2021-03-30 PROCEDURE — 91300 COVID-19, MRNA, LNP-S, PF, 30 MCG/0.3 ML DOSE VACCINE: CPT | Mod: S$GLB,,, | Performed by: FAMILY MEDICINE

## 2021-03-30 PROCEDURE — 0002A COVID-19, MRNA, LNP-S, PF, 30 MCG/0.3 ML DOSE VACCINE: CPT | Mod: CV19,S$GLB,, | Performed by: FAMILY MEDICINE

## 2021-03-30 PROCEDURE — 0002A COVID-19, MRNA, LNP-S, PF, 30 MCG/0.3 ML DOSE VACCINE: ICD-10-PCS | Mod: CV19,S$GLB,, | Performed by: FAMILY MEDICINE

## 2021-04-29 PROBLEM — R77.8 ABNORMAL SPEP: Status: ACTIVE | Noted: 2021-04-29

## 2021-04-30 ENCOUNTER — OFFICE VISIT (OUTPATIENT)
Dept: HEMATOLOGY/ONCOLOGY | Facility: CLINIC | Age: 71
End: 2021-04-30
Payer: MEDICARE

## 2021-04-30 VITALS
TEMPERATURE: 98 F | BODY MASS INDEX: 23.18 KG/M2 | RESPIRATION RATE: 18 BRPM | SYSTOLIC BLOOD PRESSURE: 154 MMHG | DIASTOLIC BLOOD PRESSURE: 91 MMHG | HEIGHT: 69 IN | WEIGHT: 156.5 LBS | HEART RATE: 88 BPM

## 2021-04-30 DIAGNOSIS — R77.8 ABNORMAL SPEP: ICD-10-CM

## 2021-04-30 DIAGNOSIS — D53.9 NUTRITIONAL ANEMIA, UNSPECIFIED: ICD-10-CM

## 2021-04-30 DIAGNOSIS — R71.8 ELEVATED MCV: Primary | ICD-10-CM

## 2021-04-30 DIAGNOSIS — D75.89 MACROCYTOSIS: ICD-10-CM

## 2021-04-30 PROCEDURE — 99203 PR OFFICE/OUTPT VISIT, NEW, LEVL III, 30-44 MIN: ICD-10-PCS | Mod: S$GLB,,, | Performed by: INTERNAL MEDICINE

## 2021-04-30 PROCEDURE — 99203 OFFICE O/P NEW LOW 30 MIN: CPT | Mod: S$GLB,,, | Performed by: INTERNAL MEDICINE

## 2021-05-14 ENCOUNTER — OFFICE VISIT (OUTPATIENT)
Dept: URGENT CARE | Facility: CLINIC | Age: 71
End: 2021-05-14
Payer: MEDICARE

## 2021-05-14 VITALS
HEART RATE: 79 BPM | BODY MASS INDEX: 23.52 KG/M2 | DIASTOLIC BLOOD PRESSURE: 64 MMHG | HEIGHT: 69 IN | WEIGHT: 158.81 LBS | TEMPERATURE: 99 F | SYSTOLIC BLOOD PRESSURE: 97 MMHG | OXYGEN SATURATION: 100 %

## 2021-05-14 DIAGNOSIS — R05.9 COUGH: Primary | ICD-10-CM

## 2021-05-14 DIAGNOSIS — J40 BRONCHITIS: ICD-10-CM

## 2021-05-14 LAB
CTP QC/QA: YES
SARS-COV-2 RDRP RESP QL NAA+PROBE: NEGATIVE

## 2021-05-14 PROCEDURE — U0002 COVID-19 LAB TEST NON-CDC: HCPCS | Mod: QW,CR,S$GLB, | Performed by: NURSE PRACTITIONER

## 2021-05-14 PROCEDURE — 99204 OFFICE O/P NEW MOD 45 MIN: CPT | Mod: S$GLB,,, | Performed by: NURSE PRACTITIONER

## 2021-05-14 PROCEDURE — 99204 PR OFFICE/OUTPT VISIT, NEW, LEVL IV, 45-59 MIN: ICD-10-PCS | Mod: S$GLB,,, | Performed by: NURSE PRACTITIONER

## 2021-05-14 PROCEDURE — U0002: ICD-10-PCS | Mod: QW,CR,S$GLB, | Performed by: NURSE PRACTITIONER

## 2021-05-14 RX ORDER — DOXYCYCLINE 100 MG/1
100 CAPSULE ORAL 2 TIMES DAILY
Qty: 20 CAPSULE | Refills: 0 | Status: SHIPPED | OUTPATIENT
Start: 2021-05-14 | End: 2021-06-10 | Stop reason: ALTCHOICE

## 2021-05-14 RX ORDER — BENZONATATE 200 MG/1
200 CAPSULE ORAL 3 TIMES DAILY PRN
Qty: 30 CAPSULE | Refills: 0 | Status: SHIPPED | OUTPATIENT
Start: 2021-05-14 | End: 2021-05-24

## 2021-05-14 RX ORDER — PREDNISONE 20 MG/1
20 TABLET ORAL DAILY
Qty: 5 TABLET | Refills: 0 | Status: SHIPPED | OUTPATIENT
Start: 2021-05-14 | End: 2021-05-19

## 2021-05-17 ENCOUNTER — LAB VISIT (OUTPATIENT)
Dept: LAB | Facility: HOSPITAL | Age: 71
End: 2021-05-17
Attending: INTERNAL MEDICINE
Payer: MEDICARE

## 2021-05-17 DIAGNOSIS — R71.8 ELEVATED MCV: ICD-10-CM

## 2021-05-17 DIAGNOSIS — D53.9 NUTRITIONAL ANEMIA, UNSPECIFIED: ICD-10-CM

## 2021-05-17 DIAGNOSIS — D75.89 MACROCYTOSIS: ICD-10-CM

## 2021-05-17 DIAGNOSIS — R77.8 ABNORMAL SPEP: ICD-10-CM

## 2021-05-17 LAB
FOLATE SERPL-MCNC: 2.6 NG/ML (ref 4–24)
IGA SERPL-MCNC: 908 MG/DL (ref 40–350)
IGG SERPL-MCNC: 3450 MG/DL (ref 650–1600)
IGM SERPL-MCNC: 141 MG/DL (ref 50–300)
VIT B12 SERPL-MCNC: 1756 PG/ML (ref 210–950)

## 2021-05-17 PROCEDURE — 82784 ASSAY IGA/IGD/IGG/IGM EACH: CPT | Mod: 59 | Performed by: INTERNAL MEDICINE

## 2021-05-17 PROCEDURE — 86334 IMMUNOFIX E-PHORESIS SERUM: CPT | Performed by: INTERNAL MEDICINE

## 2021-05-17 PROCEDURE — 86334 PATHOLOGIST INTERPRETATION IFE: ICD-10-PCS | Mod: 26,,, | Performed by: PATHOLOGY

## 2021-05-17 PROCEDURE — 36415 COLL VENOUS BLD VENIPUNCTURE: CPT | Mod: PO | Performed by: INTERNAL MEDICINE

## 2021-05-17 PROCEDURE — 82232 ASSAY OF BETA-2 PROTEIN: CPT | Performed by: INTERNAL MEDICINE

## 2021-05-17 PROCEDURE — 83520 IMMUNOASSAY QUANT NOS NONAB: CPT | Performed by: INTERNAL MEDICINE

## 2021-05-17 PROCEDURE — 86334 IMMUNOFIX E-PHORESIS SERUM: CPT | Mod: 26,,, | Performed by: PATHOLOGY

## 2021-05-17 PROCEDURE — 82607 VITAMIN B-12: CPT | Performed by: INTERNAL MEDICINE

## 2021-05-17 PROCEDURE — 82746 ASSAY OF FOLIC ACID SERUM: CPT | Performed by: INTERNAL MEDICINE

## 2021-05-18 LAB
B2 MICROGLOB SERPL-MCNC: 3.9 UG/ML (ref 0–2.5)
INTERPRETATION SERPL IFE-IMP: NORMAL
KAPPA LC SER QL IA: 8.79 MG/DL (ref 0.33–1.94)
KAPPA LC/LAMBDA SER IA: 1.05 (ref 0.26–1.65)
LAMBDA LC SER QL IA: 8.39 MG/DL (ref 0.57–2.63)

## 2021-05-20 LAB — PATHOLOGIST INTERPRETATION IFE: NORMAL

## 2021-05-21 ENCOUNTER — APPOINTMENT (OUTPATIENT)
Dept: LAB | Facility: HOSPITAL | Age: 71
End: 2021-05-21
Attending: INTERNAL MEDICINE
Payer: MEDICARE

## 2021-05-27 ENCOUNTER — OFFICE VISIT (OUTPATIENT)
Dept: HEMATOLOGY/ONCOLOGY | Facility: CLINIC | Age: 71
End: 2021-05-27
Payer: MEDICARE

## 2021-05-27 VITALS
HEART RATE: 93 BPM | SYSTOLIC BLOOD PRESSURE: 133 MMHG | DIASTOLIC BLOOD PRESSURE: 88 MMHG | WEIGHT: 152.88 LBS | TEMPERATURE: 98 F | RESPIRATION RATE: 17 BRPM | BODY MASS INDEX: 22.58 KG/M2

## 2021-05-27 DIAGNOSIS — R77.8 ABNORMAL SPEP: Primary | ICD-10-CM

## 2021-05-27 DIAGNOSIS — R71.8 ELEVATED MCV: ICD-10-CM

## 2021-05-27 PROCEDURE — 99215 PR OFFICE/OUTPT VISIT, EST, LEVL V, 40-54 MIN: ICD-10-PCS | Mod: S$GLB,,, | Performed by: INTERNAL MEDICINE

## 2021-05-27 PROCEDURE — 99215 OFFICE O/P EST HI 40 MIN: CPT | Mod: S$GLB,,, | Performed by: INTERNAL MEDICINE

## 2021-06-10 ENCOUNTER — OFFICE VISIT (OUTPATIENT)
Dept: FAMILY MEDICINE | Facility: CLINIC | Age: 71
End: 2021-06-10
Payer: MEDICARE

## 2021-06-10 VITALS
SYSTOLIC BLOOD PRESSURE: 138 MMHG | RESPIRATION RATE: 17 BRPM | DIASTOLIC BLOOD PRESSURE: 89 MMHG | HEART RATE: 87 BPM | WEIGHT: 154.13 LBS | TEMPERATURE: 99 F | BODY MASS INDEX: 22.83 KG/M2 | HEIGHT: 69 IN | OXYGEN SATURATION: 100 %

## 2021-06-10 DIAGNOSIS — G62.9 NEUROPATHY: Primary | ICD-10-CM

## 2021-06-10 DIAGNOSIS — R20.2 PARESTHESIAS: ICD-10-CM

## 2021-06-10 PROCEDURE — 99203 PR OFFICE/OUTPT VISIT, NEW, LEVL III, 30-44 MIN: ICD-10-PCS | Mod: S$PBB,,, | Performed by: FAMILY MEDICINE

## 2021-06-10 PROCEDURE — 99203 OFFICE O/P NEW LOW 30 MIN: CPT | Mod: S$PBB,,, | Performed by: FAMILY MEDICINE

## 2021-06-10 PROCEDURE — 99215 OFFICE O/P EST HI 40 MIN: CPT | Performed by: FAMILY MEDICINE

## 2021-06-15 ENCOUNTER — TELEPHONE (OUTPATIENT)
Dept: PHYSICAL MEDICINE AND REHAB | Facility: CLINIC | Age: 71
End: 2021-06-15

## 2021-08-20 ENCOUNTER — OFFICE VISIT (OUTPATIENT)
Dept: PHYSICAL MEDICINE AND REHAB | Facility: CLINIC | Age: 71
End: 2021-08-20
Payer: MEDICARE

## 2021-08-20 ENCOUNTER — LAB VISIT (OUTPATIENT)
Dept: LAB | Facility: HOSPITAL | Age: 71
End: 2021-08-20
Attending: FAMILY MEDICINE
Payer: MEDICARE

## 2021-08-20 DIAGNOSIS — G62.9 NEUROPATHY: ICD-10-CM

## 2021-08-20 DIAGNOSIS — R20.2 PARESTHESIAS: ICD-10-CM

## 2021-08-20 DIAGNOSIS — G62.9 PERIPHERAL NERVE DISORDER: Primary | ICD-10-CM

## 2021-08-20 DIAGNOSIS — R20.2 PARESTHESIA: ICD-10-CM

## 2021-08-20 LAB — TSH SERPL DL<=0.005 MIU/L-ACNC: 1.25 UIU/ML (ref 0.4–4)

## 2021-08-20 PROCEDURE — 99499 UNLISTED E&M SERVICE: CPT | Mod: S$PBB,,, | Performed by: PHYSICAL MEDICINE & REHABILITATION

## 2021-08-20 PROCEDURE — 95886 MUSC TEST DONE W/N TEST COMP: CPT | Mod: PBBFAC,PN | Performed by: PHYSICAL MEDICINE & REHABILITATION

## 2021-08-20 PROCEDURE — 95886 PR EMG COMPLETE, W/ NERVE CONDUCTION STUDIES, 5+ MUSCLES: ICD-10-PCS | Mod: 26,S$PBB,, | Performed by: PHYSICAL MEDICINE & REHABILITATION

## 2021-08-20 PROCEDURE — 95886 MUSC TEST DONE W/N TEST COMP: CPT | Mod: 26,S$PBB,, | Performed by: PHYSICAL MEDICINE & REHABILITATION

## 2021-08-20 PROCEDURE — 95910 NRV CNDJ TEST 7-8 STUDIES: CPT | Mod: 26,S$PBB,, | Performed by: PHYSICAL MEDICINE & REHABILITATION

## 2021-08-20 PROCEDURE — 36415 COLL VENOUS BLD VENIPUNCTURE: CPT | Mod: PO | Performed by: FAMILY MEDICINE

## 2021-08-20 PROCEDURE — 95910 NRV CNDJ TEST 7-8 STUDIES: CPT | Mod: PBBFAC,PN | Performed by: PHYSICAL MEDICINE & REHABILITATION

## 2021-08-20 PROCEDURE — 99499 NO LOS: ICD-10-PCS | Mod: S$PBB,,, | Performed by: PHYSICAL MEDICINE & REHABILITATION

## 2021-08-20 PROCEDURE — 84425 ASSAY OF VITAMIN B-1: CPT | Performed by: FAMILY MEDICINE

## 2021-08-20 PROCEDURE — 95910 PR NERVE CONDUCTION STUDY; 7-8 STUDIES: ICD-10-PCS | Mod: 26,S$PBB,, | Performed by: PHYSICAL MEDICINE & REHABILITATION

## 2021-08-20 PROCEDURE — 84443 ASSAY THYROID STIM HORMONE: CPT | Performed by: FAMILY MEDICINE

## 2021-08-26 ENCOUNTER — OFFICE VISIT (OUTPATIENT)
Dept: HEMATOLOGY/ONCOLOGY | Facility: CLINIC | Age: 71
End: 2021-08-26
Payer: MEDICARE

## 2021-08-26 ENCOUNTER — TELEPHONE (OUTPATIENT)
Dept: HEMATOLOGY/ONCOLOGY | Facility: CLINIC | Age: 71
End: 2021-08-26

## 2021-08-26 VITALS
SYSTOLIC BLOOD PRESSURE: 115 MMHG | BODY MASS INDEX: 22.31 KG/M2 | TEMPERATURE: 98 F | WEIGHT: 151.13 LBS | RESPIRATION RATE: 18 BRPM | DIASTOLIC BLOOD PRESSURE: 83 MMHG | HEART RATE: 80 BPM

## 2021-08-26 DIAGNOSIS — D89.2 PARAPROTEINEMIA: ICD-10-CM

## 2021-08-26 DIAGNOSIS — R77.8 ABNORMAL SPEP: ICD-10-CM

## 2021-08-26 DIAGNOSIS — R71.8 ELEVATED MCV: Primary | ICD-10-CM

## 2021-08-26 PROCEDURE — 99212 OFFICE O/P EST SF 10 MIN: CPT | Mod: S$GLB,,, | Performed by: INTERNAL MEDICINE

## 2021-08-26 PROCEDURE — 99212 PR OFFICE/OUTPT VISIT, EST, LEVL II, 10-19 MIN: ICD-10-PCS | Mod: S$GLB,,, | Performed by: INTERNAL MEDICINE

## 2021-08-27 LAB — VIT B1 BLD-MCNC: 41 UG/L (ref 38–122)

## 2021-09-07 ENCOUNTER — OFFICE VISIT (OUTPATIENT)
Dept: FAMILY MEDICINE | Facility: CLINIC | Age: 71
End: 2021-09-07
Payer: MEDICARE

## 2021-09-07 VITALS
HEART RATE: 92 BPM | BODY MASS INDEX: 22.26 KG/M2 | DIASTOLIC BLOOD PRESSURE: 84 MMHG | SYSTOLIC BLOOD PRESSURE: 128 MMHG | OXYGEN SATURATION: 99 % | WEIGHT: 150.31 LBS | TEMPERATURE: 98 F | HEIGHT: 69 IN

## 2021-09-07 DIAGNOSIS — G62.89 NEUROPATHY, PERIPHERAL AXONAL: Primary | ICD-10-CM

## 2021-09-07 PROCEDURE — 99214 OFFICE O/P EST MOD 30 MIN: CPT | Performed by: FAMILY MEDICINE

## 2021-09-07 PROCEDURE — 99213 PR OFFICE/OUTPT VISIT, EST, LEVL III, 20-29 MIN: ICD-10-PCS | Mod: S$PBB,,, | Performed by: FAMILY MEDICINE

## 2021-09-07 PROCEDURE — 99213 OFFICE O/P EST LOW 20 MIN: CPT | Mod: S$PBB,,, | Performed by: FAMILY MEDICINE

## 2021-11-16 ENCOUNTER — LAB VISIT (OUTPATIENT)
Dept: LAB | Facility: HOSPITAL | Age: 71
End: 2021-11-16
Attending: FAMILY MEDICINE
Payer: MEDICARE

## 2021-11-16 DIAGNOSIS — R77.8 ABNORMAL SPEP: ICD-10-CM

## 2021-11-16 DIAGNOSIS — R71.8 ELEVATED MCV: ICD-10-CM

## 2021-11-16 LAB
ALBUMIN SERPL BCP-MCNC: 2.5 G/DL (ref 3.5–5.2)
ALP SERPL-CCNC: 159 U/L (ref 55–135)
ALT SERPL W/O P-5'-P-CCNC: 21 U/L (ref 10–44)
ANION GAP SERPL CALC-SCNC: 7 MMOL/L (ref 8–16)
ANISOCYTOSIS BLD QL SMEAR: SLIGHT
AST SERPL-CCNC: 57 U/L (ref 10–40)
BASOPHILS # BLD AUTO: 0.03 K/UL (ref 0–0.2)
BASOPHILS NFR BLD: 0.8 % (ref 0–1.9)
BILIRUB SERPL-MCNC: 0.6 MG/DL (ref 0.1–1)
BUN SERPL-MCNC: 3 MG/DL (ref 8–23)
CALCIUM SERPL-MCNC: 8.3 MG/DL (ref 8.7–10.5)
CHLORIDE SERPL-SCNC: 97 MMOL/L (ref 95–110)
CO2 SERPL-SCNC: 29 MMOL/L (ref 23–29)
CREAT SERPL-MCNC: 0.7 MG/DL (ref 0.5–1.4)
DIFFERENTIAL METHOD: ABNORMAL
EOSINOPHIL # BLD AUTO: 0 K/UL (ref 0–0.5)
EOSINOPHIL NFR BLD: 1 % (ref 0–8)
ERYTHROCYTE [DISTWIDTH] IN BLOOD BY AUTOMATED COUNT: 15.7 % (ref 11.5–14.5)
EST. GFR  (AFRICAN AMERICAN): >60 ML/MIN/1.73 M^2
EST. GFR  (NON AFRICAN AMERICAN): >60 ML/MIN/1.73 M^2
GLUCOSE SERPL-MCNC: 76 MG/DL (ref 70–110)
HCT VFR BLD AUTO: 35.5 % (ref 40–54)
HGB BLD-MCNC: 12 G/DL (ref 14–18)
IGA SERPL-MCNC: 848 MG/DL (ref 40–350)
IGG SERPL-MCNC: 3848 MG/DL (ref 650–1600)
IGM SERPL-MCNC: 141 MG/DL (ref 50–300)
IMM GRANULOCYTES # BLD AUTO: 0 K/UL (ref 0–0.04)
IMM GRANULOCYTES NFR BLD AUTO: 0 % (ref 0–0.5)
LYMPHOCYTES # BLD AUTO: 1.9 K/UL (ref 1–4.8)
LYMPHOCYTES NFR BLD: 47.5 % (ref 18–48)
MCH RBC QN AUTO: 36.8 PG (ref 27–31)
MCHC RBC AUTO-ENTMCNC: 33.8 G/DL (ref 32–36)
MCV RBC AUTO: 109 FL (ref 82–98)
MONOCYTES # BLD AUTO: 0.3 K/UL (ref 0.3–1)
MONOCYTES NFR BLD: 6.8 % (ref 4–15)
NEUTROPHILS # BLD AUTO: 1.8 K/UL (ref 1.8–7.7)
NEUTROPHILS NFR BLD: 43.9 % (ref 38–73)
NRBC BLD-RTO: 1 /100 WBC
PLATELET # BLD AUTO: 218 K/UL (ref 150–450)
PMV BLD AUTO: 9.6 FL (ref 9.2–12.9)
POTASSIUM SERPL-SCNC: 3 MMOL/L (ref 3.5–5.1)
PROT SERPL-MCNC: 8.8 G/DL (ref 6–8.4)
RBC # BLD AUTO: 3.26 M/UL (ref 4.6–6.2)
SODIUM SERPL-SCNC: 133 MMOL/L (ref 136–145)
WBC # BLD AUTO: 4 K/UL (ref 3.9–12.7)

## 2021-11-16 PROCEDURE — 80053 COMPREHEN METABOLIC PANEL: CPT | Performed by: INTERNAL MEDICINE

## 2021-11-16 PROCEDURE — 83520 IMMUNOASSAY QUANT NOS NONAB: CPT | Mod: 59 | Performed by: INTERNAL MEDICINE

## 2021-11-16 PROCEDURE — 82232 ASSAY OF BETA-2 PROTEIN: CPT | Performed by: INTERNAL MEDICINE

## 2021-11-16 PROCEDURE — 82784 ASSAY IGA/IGD/IGG/IGM EACH: CPT | Mod: 59 | Performed by: INTERNAL MEDICINE

## 2021-11-16 PROCEDURE — 84165 PROTEIN E-PHORESIS SERUM: CPT | Performed by: INTERNAL MEDICINE

## 2021-11-16 PROCEDURE — 36415 COLL VENOUS BLD VENIPUNCTURE: CPT | Mod: PO | Performed by: INTERNAL MEDICINE

## 2021-11-16 PROCEDURE — 84165 PROTEIN E-PHORESIS SERUM: CPT | Mod: 26,,, | Performed by: PATHOLOGY

## 2021-11-16 PROCEDURE — 84165 PATHOLOGIST INTERPRETATION SPE: ICD-10-PCS | Mod: 26,,, | Performed by: PATHOLOGY

## 2021-11-16 PROCEDURE — 85025 COMPLETE CBC W/AUTO DIFF WBC: CPT | Performed by: INTERNAL MEDICINE

## 2021-11-17 LAB
ALBUMIN SERPL ELPH-MCNC: 2.78 G/DL (ref 3.35–5.55)
ALPHA1 GLOB SERPL ELPH-MCNC: 0.27 G/DL (ref 0.17–0.41)
ALPHA2 GLOB SERPL ELPH-MCNC: 0.69 G/DL (ref 0.43–0.99)
B-GLOBULIN SERPL ELPH-MCNC: 0.98 G/DL (ref 0.5–1.1)
B2 MICROGLOB SERPL-MCNC: 4.6 UG/ML (ref 0–2.5)
GAMMA GLOB SERPL ELPH-MCNC: 3.78 G/DL (ref 0.67–1.58)
KAPPA LC SER QL IA: 11.82 MG/DL (ref 0.33–1.94)
KAPPA LC/LAMBDA SER IA: 1.27 (ref 0.26–1.65)
LAMBDA LC SER QL IA: 9.29 MG/DL (ref 0.57–2.63)
PATHOLOGIST INTERPRETATION SPE: NORMAL
PROT SERPL-MCNC: 8.5 G/DL (ref 6–8.4)

## 2022-01-02 NOTE — PROGRESS NOTES
[facility] Hematology/Oncology  PROGRESS NOTE -   Follow-up Visit      Subjective:       Patient ID:   NAME: Sid Cuello Jr. : 1950     71 y.o. male    Referring Doc: Светлана  Other Physicians: Rose Mary Posada           Chief Complaint: abn SPEP f/u       History of Present Illness:     Patient returns today for a regularly scheduled follow-up visit.  The patient is here today to go over the results of the recently ordered labs, tests and studies. He is here by himself.    He has chronic neuropathy issues in feet and he had nerve conduction study in the past.    He has been having a lot of fatigue and lack of energy      Breathing ok. No CP, SOB, HA's or N/V.     He is a retired           Discussed [employer] precautions and he had his vaccinations        ROS:   GEN: normal without any fever, night sweats or weight loss; chronic fatigue; chronic neuropathy in feet  HEENT: normal with no HA's, sore throat, stiff neck, changes in vision  CV: normal with no CP, SOB, PND, TRAN or orthopnea  PULM: normal with no SOB, cough, hemoptysis, sputum or pleuritic pain  GI: normal with no abdominal pain, nausea, vomiting, constipation, diarrhea, melanotic stools, BRBPR, or hematemesis  : normal with no hematuria, dysuria  BREAST: normal with no mass, discharge, pain  SKIN: normal with no rash, erythema, bruising, or swelling    Pain Scale: 0    Allergies:  Review of patient's allergies indicates:  No Known Allergies    Medications:    Current Outpatient Medications:     aspirin (ECOTRIN) 81 MG EC tablet, Take 81 mg by mouth once daily., Disp: , Rfl:     atorvastatin (LIPITOR) 40 MG tablet, , Disp: , Rfl:     betamethasone dipropionate (DIPROLENE) 0.05 % lotion, Apply thin film to scalp QHS PRN itch, Disp: 60 mL, Rfl: 2    clopidogrel (PLAVIX) 75 mg tablet, Take 75 mg by mouth once daily., Disp: , Rfl:     cyanocobalamin 1,000 mcg/mL injection, INJECT 1 ML INTO THE SKIN ONCE A MONTH, Disp: ,  "Rfl:     lisinopril (PRINIVIL,ZESTRIL) 20 MG tablet, Take 20 mg by mouth once daily., Disp: , Rfl:     metoprolol succinate (TOPROL-XL) 25 MG 24 hr tablet, Take 25 mg by mouth once daily., Disp: , Rfl:     triamcinolone acetonide 0.1% (KENALOG) 0.1 % cream, AAA left cheek bid, Disp: 45 g, Rfl: 3  No current facility-administered medications for this visit.    Facility-Administered Medications Ordered in Other Visits:     diphenhydrAMINE injection 25 mg, 25 mg, Intravenous, Q6H PRN, Dhruv Gutierrez MD    fentaNYL injection 25 mcg, 25 mcg, Intravenous, Q5 Min PRN, Dhruv Gutierrez MD    hydromorphone (PF) injection 0.2 mg, 0.2 mg, Intravenous, Q5 Min PRN, Dhruv Gutierrez MD    lactated ringers infusion, 10 mL/hr, Intravenous, Continuous, Dhruv Gutierrez MD, Stopped at 10/23/19 1450    lactated ringers infusion, 75 mL/hr, Intravenous, Continuous, Dhruv Gutierrez MD    lidocaine (PF) 10 mg/ml (1%) injection 10 mg, 1 mL, Intradermal, Once, Dhruv Gutierrez MD    ondansetron injection 4 mg, 4 mg, Intravenous, Once, Dhruv Gutierrez MD    oxyCODONE immediate release tablet 5 mg, 5 mg, Oral, PRN, Dhruv Gutierrez MD    promethazine (PHENERGAN) 6.25 mg in dextrose 5 % 50 mL IVPB, 6.25 mg, Intravenous, Q10 Min PRN, Dhruv Gutierrez MD    sodium chloride 0.9% flush 3 mL, 3 mL, Intravenous, Q8H, Dhruv Gutiererz MD    sodium chloride 0.9% flush 3 mL, 3 mL, Intravenous, PRN, Dhruv Gutierrez MD    PMHx/PSHx Updates:  See patient's last visit with me on 8/26/2021.  See H&P on 4/30/2021        Pathology:  Cancer Staging  No matching staging information was found for the patient.          Objective:     Vitals:  Blood pressure (!) 155/91, pulse 78, temperature 97.8 °F (36.6 °C), resp. rate 18, height 5' 9" (1.753 m), weight 70.3 kg (155 lb).    Physical Examination:   GEN: no apparent distress, comfortable; AAOx3  HEAD: atraumatic and normocephalic; alopecia  EYES: no " pallor, no icterus, PERRLA  ENT: OMM, no pharyngeal erythema, external ears WNL; no nasal discharge; no thrush  NECK: no masses, thyroid normal, trachea midline, no LAD/LN's, supple  CV: RRR with no murmur; normal pulse; normal S1 and S2; no pedal edema  CHEST: Normal respiratory effort; CTAB; normal breath sounds; no wheeze or crackles  ABDOM: nontender and nondistended; soft; normal bowel sounds; no rebound/guarding  MUSC/Skeletal: ROM normal; no crepitus; joints normal; no deformities or arthropathy  EXTREM: no clubbing, cyanosis, inflammation or swelling  SKIN: no rashes, lesions, ulcers, petechiae or subcutaneous nodules; chronic age related skin changes  : no toussaint  NEURO: grossly intact; motor/sensory WNL; AAOx3; no tremors  PSYCH: normal mood, affect and behavior  LYMPH: normal cervical, supraclavicular, axillary and groin LN's        Labs:     Lab Results   Component Value Date    WBC 4.00 11/16/2021    HGB 12.0 (L) 11/16/2021    HCT 35.5 (L) 11/16/2021     (H) 11/16/2021     11/16/2021     CMP  Sodium   Date Value Ref Range Status   11/16/2021 133 (L) 136 - 145 mmol/L Final     Potassium   Date Value Ref Range Status   11/16/2021 3.0 (L) 3.5 - 5.1 mmol/L Final     Chloride   Date Value Ref Range Status   11/16/2021 97 95 - 110 mmol/L Final     CO2   Date Value Ref Range Status   11/16/2021 29 23 - 29 mmol/L Final     Glucose   Date Value Ref Range Status   11/16/2021 76 70 - 110 mg/dL Final     BUN   Date Value Ref Range Status   11/16/2021 3 (L) 8 - 23 mg/dL Final     Creatinine   Date Value Ref Range Status   11/16/2021 0.7 0.5 - 1.4 mg/dL Final     Calcium   Date Value Ref Range Status   11/16/2021 8.3 (L) 8.7 - 10.5 mg/dL Final     Total Protein   Date Value Ref Range Status   11/16/2021 8.8 (H) 6.0 - 8.4 g/dL Final     Albumin   Date Value Ref Range Status   11/16/2021 2.5 (L) 3.5 - 5.2 g/dL Final     Total Bilirubin   Date Value Ref Range Status   11/16/2021 0.6 0.1 - 1.0 mg/dL Final      Comment:     For infants and newborns, interpretation of results should be based  on gestational age, weight and in agreement with clinical  observations.    Premature Infant recommended reference ranges:  Up to 24 hours.............<8.0 mg/dL  Up to 48 hours............<12.0 mg/dL  3-5 days..................<15.0 mg/dL  6-29 days.................<15.0 mg/dL       Alkaline Phosphatase   Date Value Ref Range Status   11/16/2021 159 (H) 55 - 135 U/L Final     AST   Date Value Ref Range Status   11/16/2021 57 (H) 10 - 40 U/L Final     ALT   Date Value Ref Range Status   11/16/2021 21 10 - 44 U/L Final     Anion Gap   Date Value Ref Range Status   11/16/2021 7 (L) 8 - 16 mmol/L Final     eGFR if    Date Value Ref Range Status   11/16/2021 >60.0 >60 mL/min/1.73 m^2 Final     eGFR if non    Date Value Ref Range Status   11/16/2021 >60.0 >60 mL/min/1.73 m^2 Final     Comment:     Calculation used to obtain the estimated glomerular filtration  rate (eGFR) is the CKD-EPI equation.        [place] Free Light Chains 0.33 - 1.94 mg/dL 11.82 High   8.79 High     Lambda Free Light Chains 0.57 - 2.63 mg/dL 9.29 High   8.39 High     Kappa/Lambda FLC Ratio 0.26 - 1.65 1.27  1.05 CM      Beta-2 Microglobulin 0.0 - 2.5 ug/mL 4.6 High        IgG 650 - 1600 mg/dL 3848 High   3450 High  CM    Comment: IgG Cord Blood Reference Range: 650-1600 mg/dL.   IgA 40 - 350 mg/dL 848 High   908 High  CM    Comment: IgA Cord Blood Reference Range: <5 mg/dL.   IgM 50 - 300 mg/dL 141  141 CM    Comment: IgM Cord Blood Reference Range: <25 mg/dL.     Pathologist Interpretation SPE REVIEWED    Comment:    Electronically reviewed and signed by:   Symone Adame MD   Signed on 11/17/21 at 15:41   Increased total protein.   Increased gamma globulin, polyclonal         Radiology/Diagnostic Studies:    No results found.    I have reviewed all available lab results and radiology reports.    Assessment/Plan:   (1) 71 y.o. male  with  diagnosis of abnormal SPEP who has been referred by Dr Sotomayor for evaluation by medical hematology/oncology.   -  He had an SPEP on 4/16/2021 with no evidence of any M-protein He sees Dr Anglin for discoid Lupus and I suspect that this is the cause of the protein issues seen on the labs.    5/27/2021:  - IgG is elevated but the immunofoxation studies in the serum and urine are both negative for any M-protein  - kappa/lambda ratio is also WNL  - I do not suspect multiple myeloma at this time  - I recommended referral to see Dr Blanco at [facility] (who is a paraproteinemia specialist) and also consideration for a bone marrow biopsy to further elucidate - however, he is not inclined to doing either at this time  - will repeat protein studies every 6 months      8/26/2021:  - patient here for short-term f/u visit  - repeat protein studies are due in Nov 2021  -recommended referral to see Dr Francis reese [facility] (who is a paraproteinemia specialist) and also consideration for a bone marrow biopsy to further elucidate - however, he was not inclined to doing either previously and does not want to go to [place]  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 set up bone marrow biopsy  2.  recommended referral to see Dr Blanco at [facility] (who is a paraproteinemia specialist) and he is  adamant that he will not go to [place]  3. Encourage tobacco cessation  4. Recommend consideration for referral to neurology  5. F/u with PCP, Card, Derm and        RTC in 4-6 weeks  Fax note to Albino Sotomayor Pinsky, Erickson, Safa       Discussion:     COVID-19 Discussion:    I had long discussion with patient and any applicable family about the COVID-19 coronavirus epidemic and the recommended precautions with regard to cancer and/or hematology patients. I have re-iterated the CDC recommendations for adequate hand washing, use of hand -like products, and coughing into elbow, etc. In addition, especially for our patients who are on chemotherapy and/or our otherwise immunocompromised patients, I have recommended avoidance of crowds, including movie theaters, restaurants, churches, etc. I have recommended avoidance of any sick or symptomatic family members and/or friends. I have also recommended avoidance of any raw and unwashed food products, and general avoidance of food items that have not been prepared by themselves. The patient has been asked to call us immediately with any symptom developments, issues, questions or other general concerns.       I spent over 25 mins of time with the patient. Reviewed results of the recently ordered labs, tests and studies; made directives with regards to the results. Over half of this time was spent couseling and coordinating care.    I have explained all of the above in detail and the patient understands all of the current recommendation(s). I have answered all of their questions to the best of my ability and to their complete satisfaction.   The patient is to continue with the current management plan.            Electronically signed by Antolin Javier MD                        Answers for HPI/ROS submitted by the patient on 12/31/2021  appetite change : No  unexpected weight change: No  mouth sores: No  visual disturbance: No  cough: No  shortness of  breath: No  chest pain: No  abdominal pain: No  diarrhea: No  frequency: No  back pain: No  rash: No  headaches: No  adenopathy: No  nervous/ anxious: No

## 2022-01-02 NOTE — H&P (VIEW-ONLY)
[facility] Hematology/Oncology  PROGRESS NOTE -   Follow-up Visit      Subjective:       Patient ID:   NAME: Sid Cuello Jr. : 1950     71 y.o. male    Referring Doc: Светлана  Other Physicians: Rose Mary Posada           Chief Complaint: abn SPEP f/u       History of Present Illness:     Patient returns today for a regularly scheduled follow-up visit.  The patient is here today to go over the results of the recently ordered labs, tests and studies. He is here by himself.    He has chronic neuropathy issues in feet and he had nerve conduction study in the past.    He has been having a lot of fatigue and lack of energy      Breathing ok. No CP, SOB, HA's or N/V.     He is a retired           Discussed Chronicity precautions and he had his vaccinations        ROS:   GEN: normal without any fever, night sweats or weight loss; chronic fatigue; chronic neuropathy in feet  HEENT: normal with no HA's, sore throat, stiff neck, changes in vision  CV: normal with no CP, SOB, PND, TRAN or orthopnea  PULM: normal with no SOB, cough, hemoptysis, sputum or pleuritic pain  GI: normal with no abdominal pain, nausea, vomiting, constipation, diarrhea, melanotic stools, BRBPR, or hematemesis  : normal with no hematuria, dysuria  BREAST: normal with no mass, discharge, pain  SKIN: normal with no rash, erythema, bruising, or swelling    Pain Scale: 0    Allergies:  Review of patient's allergies indicates:  No Known Allergies    Medications:    Current Outpatient Medications:     aspirin (ECOTRIN) 81 MG EC tablet, Take 81 mg by mouth once daily., Disp: , Rfl:     atorvastatin (LIPITOR) 40 MG tablet, , Disp: , Rfl:     betamethasone dipropionate (DIPROLENE) 0.05 % lotion, Apply thin film to scalp QHS PRN itch, Disp: 60 mL, Rfl: 2    clopidogrel (PLAVIX) 75 mg tablet, Take 75 mg by mouth once daily., Disp: , Rfl:     cyanocobalamin 1,000 mcg/mL injection, INJECT 1 ML INTO THE SKIN ONCE A MONTH, Disp: ,  "Rfl:     lisinopril (PRINIVIL,ZESTRIL) 20 MG tablet, Take 20 mg by mouth once daily., Disp: , Rfl:     metoprolol succinate (TOPROL-XL) 25 MG 24 hr tablet, Take 25 mg by mouth once daily., Disp: , Rfl:     triamcinolone acetonide 0.1% (KENALOG) 0.1 % cream, AAA left cheek bid, Disp: 45 g, Rfl: 3  No current facility-administered medications for this visit.    Facility-Administered Medications Ordered in Other Visits:     diphenhydrAMINE injection 25 mg, 25 mg, Intravenous, Q6H PRN, Dhruv Gutierrez MD    fentaNYL injection 25 mcg, 25 mcg, Intravenous, Q5 Min PRN, Dhruv Gutierrez MD    hydromorphone (PF) injection 0.2 mg, 0.2 mg, Intravenous, Q5 Min PRN, Dhruv Gutierrez MD    lactated ringers infusion, 10 mL/hr, Intravenous, Continuous, Dhruv Gutierrez MD, Stopped at 10/23/19 1450    lactated ringers infusion, 75 mL/hr, Intravenous, Continuous, Dhruv Gutierrez MD    lidocaine (PF) 10 mg/ml (1%) injection 10 mg, 1 mL, Intradermal, Once, hDruv Gutierrez MD    ondansetron injection 4 mg, 4 mg, Intravenous, Once, Dhruv Gutierrez MD    oxyCODONE immediate release tablet 5 mg, 5 mg, Oral, PRN, Dhruv Gutierrez MD    promethazine (PHENERGAN) 6.25 mg in dextrose 5 % 50 mL IVPB, 6.25 mg, Intravenous, Q10 Min PRN, Dhruv Gutierrez MD    sodium chloride 0.9% flush 3 mL, 3 mL, Intravenous, Q8H, Dhruv Gutierrez MD    sodium chloride 0.9% flush 3 mL, 3 mL, Intravenous, PRN, Dhruv Gutierrez MD    PMHx/PSHx Updates:  See patient's last visit with me on 8/26/2021.  See H&P on 4/30/2021        Pathology:  Cancer Staging  No matching staging information was found for the patient.          Objective:     Vitals:  Blood pressure (!) 155/91, pulse 78, temperature 97.8 °F (36.6 °C), resp. rate 18, height 5' 9" (1.753 m), weight 70.3 kg (155 lb).    Physical Examination:   GEN: no apparent distress, comfortable; AAOx3  HEAD: atraumatic and normocephalic; alopecia  EYES: no " pallor, no icterus, PERRLA  ENT: OMM, no pharyngeal erythema, external ears WNL; no nasal discharge; no thrush  NECK: no masses, thyroid normal, trachea midline, no LAD/LN's, supple  CV: RRR with no murmur; normal pulse; normal S1 and S2; no pedal edema  CHEST: Normal respiratory effort; CTAB; normal breath sounds; no wheeze or crackles  ABDOM: nontender and nondistended; soft; normal bowel sounds; no rebound/guarding  MUSC/Skeletal: ROM normal; no crepitus; joints normal; no deformities or arthropathy  EXTREM: no clubbing, cyanosis, inflammation or swelling  SKIN: no rashes, lesions, ulcers, petechiae or subcutaneous nodules; chronic age related skin changes  : no toussaint  NEURO: grossly intact; motor/sensory WNL; AAOx3; no tremors  PSYCH: normal mood, affect and behavior  LYMPH: normal cervical, supraclavicular, axillary and groin LN's        Labs:     Lab Results   Component Value Date    WBC 4.00 11/16/2021    HGB 12.0 (L) 11/16/2021    HCT 35.5 (L) 11/16/2021     (H) 11/16/2021     11/16/2021     CMP  Sodium   Date Value Ref Range Status   11/16/2021 133 (L) 136 - 145 mmol/L Final     Potassium   Date Value Ref Range Status   11/16/2021 3.0 (L) 3.5 - 5.1 mmol/L Final     Chloride   Date Value Ref Range Status   11/16/2021 97 95 - 110 mmol/L Final     CO2   Date Value Ref Range Status   11/16/2021 29 23 - 29 mmol/L Final     Glucose   Date Value Ref Range Status   11/16/2021 76 70 - 110 mg/dL Final     BUN   Date Value Ref Range Status   11/16/2021 3 (L) 8 - 23 mg/dL Final     Creatinine   Date Value Ref Range Status   11/16/2021 0.7 0.5 - 1.4 mg/dL Final     Calcium   Date Value Ref Range Status   11/16/2021 8.3 (L) 8.7 - 10.5 mg/dL Final     Total Protein   Date Value Ref Range Status   11/16/2021 8.8 (H) 6.0 - 8.4 g/dL Final     Albumin   Date Value Ref Range Status   11/16/2021 2.5 (L) 3.5 - 5.2 g/dL Final     Total Bilirubin   Date Value Ref Range Status   11/16/2021 0.6 0.1 - 1.0 mg/dL Final      Comment:     For infants and newborns, interpretation of results should be based  on gestational age, weight and in agreement with clinical  observations.    Premature Infant recommended reference ranges:  Up to 24 hours.............<8.0 mg/dL  Up to 48 hours............<12.0 mg/dL  3-5 days..................<15.0 mg/dL  6-29 days.................<15.0 mg/dL       Alkaline Phosphatase   Date Value Ref Range Status   11/16/2021 159 (H) 55 - 135 U/L Final     AST   Date Value Ref Range Status   11/16/2021 57 (H) 10 - 40 U/L Final     ALT   Date Value Ref Range Status   11/16/2021 21 10 - 44 U/L Final     Anion Gap   Date Value Ref Range Status   11/16/2021 7 (L) 8 - 16 mmol/L Final     eGFR if    Date Value Ref Range Status   11/16/2021 >60.0 >60 mL/min/1.73 m^2 Final     eGFR if non    Date Value Ref Range Status   11/16/2021 >60.0 >60 mL/min/1.73 m^2 Final     Comment:     Calculation used to obtain the estimated glomerular filtration  rate (eGFR) is the CKD-EPI equation.        [place] Free Light Chains 0.33 - 1.94 mg/dL 11.82 High   8.79 High     [place] Free Light Chains 0.57 - 2.63 mg/dL 9.29 High   8.39 High     Kappa/Lambda FLC Ratio 0.26 - 1.65 1.27  1.05 CM      Beta-2 Microglobulin 0.0 - 2.5 ug/mL 4.6 High        IgG 650 - 1600 mg/dL 3848 High   3450 High  CM    Comment: IgG Cord Blood Reference Range: 650-1600 mg/dL.   IgA 40 - 350 mg/dL 848 High   908 High  CM    Comment: IgA Cord Blood Reference Range: <5 mg/dL.   IgM 50 - 300 mg/dL 141  141 CM    Comment: IgM Cord Blood Reference Range: <25 mg/dL.     Pathologist Interpretation SPE REVIEWED    Comment:    Electronically reviewed and signed by:   Symone Adame MD   Signed on 11/17/21 at 15:41   Increased total protein.   Increased gamma globulin, polyclonal         Radiology/Diagnostic Studies:    No results found.    I have reviewed all available lab results and radiology reports.    Assessment/Plan:   (1) 71 y.o. male  with  diagnosis of abnormal SPEP who has been referred by Dr Sotomayor for evaluation by medical hematology/oncology.   -  He had an SPEP on 4/16/2021 with no evidence of any M-protein He sees Dr Anglin for discoid Lupus and I suspect that this is the cause of the protein issues seen on the labs.    5/27/2021:  - IgG is elevated but the immunofoxation studies in the serum and urine are both negative for any M-protein  - kappa/lambda ratio is also WNL  - I do not suspect multiple myeloma at this time  - I recommended referral to see Dr Blanco at [facility] (who is a paraproteinemia specialist) and also consideration for a bone marrow biopsy to further elucidate - however, he is not inclined to doing either at this time  - will repeat protein studies every 6 months      8/26/2021:  - patient here for short-term f/u visit  - repeat protein studies are due in Nov 2021  -recommended referral to see Dr Francis reese [facility] (who is a paraproteinemia specialist) and also consideration for a bone marrow biopsy to further elucidate - however, he was not inclined to doing either previously and does not want to go [place]  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 set up bone marrow biopsy  2.  recommended referral to see Dr Blanco at [facility] (who is a paraproteinemia specialist) and he is  adamant that he will not go to [place]  3. Encourage tobacco cessation  4. Recommend consideration for referral to neurology  5. F/u with PCP, Card, Derm and        RTC in 4-6 weeks  Fax note to Albino Sotomayor Pinsky, Erickson, Safa       Discussion:     COVID-19 Discussion:    I had long discussion with patient and any applicable family about the COVID-19 coronavirus epidemic and the recommended precautions with regard to cancer and/or hematology patients. I have re-iterated the CDC recommendations for adequate hand washing, use of hand -like products, and coughing into elbow, etc. In addition, especially for our patients who are on chemotherapy and/or our otherwise immunocompromised patients, I have recommended avoidance of crowds, including movie theaters, restaurants, churches, etc. I have recommended avoidance of any sick or symptomatic family members and/or friends. I have also recommended avoidance of any raw and unwashed food products, and general avoidance of food items that have not been prepared by themselves. The patient has been asked to call us immediately with any symptom developments, issues, questions or other general concerns.       I spent over 25 mins of time with the patient. Reviewed results of the recently ordered labs, tests and studies; made directives with regards to the results. Over half of this time was spent couseling and coordinating care.    I have explained all of the above in detail and the patient understands all of the current recommendation(s). I have answered all of their questions to the best of my ability and to their complete satisfaction.   The patient is to continue with the current management plan.            Electronically signed by Antolin Javier MD                        Answers for HPI/ROS submitted by the patient on 12/31/2021  appetite change : No  unexpected weight change: No  mouth sores: No  visual disturbance: No  cough: No  shortness of  breath: No  chest pain: No  abdominal pain: No  diarrhea: No  frequency: No  back pain: No  rash: No  headaches: No  adenopathy: No  nervous/ anxious: No

## 2022-01-03 ENCOUNTER — TELEPHONE (OUTPATIENT)
Dept: RADIOLOGY | Facility: HOSPITAL | Age: 72
End: 2022-01-03
Payer: MEDICARE

## 2022-01-03 ENCOUNTER — OFFICE VISIT (OUTPATIENT)
Dept: HEMATOLOGY/ONCOLOGY | Facility: CLINIC | Age: 72
End: 2022-01-03
Payer: MEDICARE

## 2022-01-03 VITALS
HEIGHT: 69 IN | DIASTOLIC BLOOD PRESSURE: 91 MMHG | RESPIRATION RATE: 18 BRPM | WEIGHT: 155 LBS | SYSTOLIC BLOOD PRESSURE: 155 MMHG | HEART RATE: 78 BPM | TEMPERATURE: 98 F | BODY MASS INDEX: 22.96 KG/M2

## 2022-01-03 DIAGNOSIS — R77.8 ABNORMAL SPEP: Primary | ICD-10-CM

## 2022-01-03 DIAGNOSIS — R71.8 ELEVATED MCV: ICD-10-CM

## 2022-01-03 PROCEDURE — 99214 OFFICE O/P EST MOD 30 MIN: CPT | Mod: S$GLB,,, | Performed by: INTERNAL MEDICINE

## 2022-01-03 PROCEDURE — 99214 PR OFFICE/OUTPT VISIT, EST, LEVL IV, 30-39 MIN: ICD-10-PCS | Mod: S$GLB,,, | Performed by: INTERNAL MEDICINE

## 2022-01-03 NOTE — NURSING
Bone marrow biopsy scheduled @ [facility] on 1/10 @ 10am with arrival @ 8am.  Patient to hold aspirin and Plavix after 1/4.  Pre-procedure instructions given and understanding verbalized.

## 2022-01-10 ENCOUNTER — HOSPITAL ENCOUNTER (OUTPATIENT)
Dept: RADIOLOGY | Facility: HOSPITAL | Age: 72
Discharge: HOME | End: 2022-01-10
Attending: INTERNAL MEDICINE
Payer: MEDICARE

## 2022-01-10 VITALS
WEIGHT: 155 LBS | RESPIRATION RATE: 18 BRPM | HEART RATE: 90 BPM | DIASTOLIC BLOOD PRESSURE: 78 MMHG | OXYGEN SATURATION: 97 % | HEIGHT: 69 IN | SYSTOLIC BLOOD PRESSURE: 128 MMHG | TEMPERATURE: 98 F | BODY MASS INDEX: 22.96 KG/M2

## 2022-01-10 DIAGNOSIS — R77.8 ABNORMAL SPEP: ICD-10-CM

## 2022-01-10 DIAGNOSIS — R71.8 ELEVATED MCV: ICD-10-CM

## 2022-01-10 LAB
APTT PPP: 30.5 SEC (ref 23.3–35.1)
BASOPHILS # BLD AUTO: 0.04 K/UL (ref 0–0.2)
BASOPHILS NFR BLD: 1.2 % (ref 0–1.9)
DIFFERENTIAL METHOD: ABNORMAL
EOSINOPHIL # BLD AUTO: 0 K/UL (ref 0–0.5)
EOSINOPHIL NFR BLD: 1.2 % (ref 0–8)
ERYTHROCYTE [DISTWIDTH] IN BLOOD BY AUTOMATED COUNT: 13.3 % (ref 11.5–14.5)
HCT VFR BLD AUTO: 37.8 % (ref 40–54)
HGB BLD-MCNC: 12.5 G/DL (ref 14–18)
IMM GRANULOCYTES # BLD AUTO: 0.01 K/UL (ref 0–0.04)
IMM GRANULOCYTES NFR BLD AUTO: 0.3 % (ref 0–0.5)
INR PPP: 1.2
LYMPHOCYTES # BLD AUTO: 1.4 K/UL (ref 1–4.8)
LYMPHOCYTES NFR BLD: 41 % (ref 18–48)
MCH RBC QN AUTO: 35.4 PG (ref 27–31)
MCHC RBC AUTO-ENTMCNC: 33.1 G/DL (ref 32–36)
MCV RBC AUTO: 107 FL (ref 82–98)
MONOCYTES # BLD AUTO: 0.2 K/UL (ref 0.3–1)
MONOCYTES NFR BLD: 6.5 % (ref 4–15)
NEUTROPHILS # BLD AUTO: 1.7 K/UL (ref 1.8–7.7)
NEUTROPHILS NFR BLD: 49.8 % (ref 38–73)
NRBC BLD-RTO: 0 /100 WBC
PLATELET # BLD AUTO: 164 K/UL (ref 150–450)
PMV BLD AUTO: 9.4 FL (ref 9.2–12.9)
PROTHROMBIN TIME: 14.1 SEC (ref 11.4–13.7)
RBC # BLD AUTO: 3.53 M/UL (ref 4.6–6.2)
SARS-COV-2 RDRP RESP QL NAA+PROBE: NEGATIVE
WBC # BLD AUTO: 3.39 K/UL (ref 3.9–12.7)

## 2022-01-10 PROCEDURE — 85025 COMPLETE CBC W/AUTO DIFF WBC: CPT | Performed by: RADIOLOGY

## 2022-01-10 PROCEDURE — 85730 THROMBOPLASTIN TIME PARTIAL: CPT | Performed by: RADIOLOGY

## 2022-01-10 PROCEDURE — 25000003 PHARM REV CODE 250: Performed by: RADIOLOGY

## 2022-01-10 PROCEDURE — 88313 SPECIAL STAINS GROUP 2: CPT | Mod: TC,59

## 2022-01-10 PROCEDURE — 63600175 PHARM REV CODE 636 W HCPCS: Performed by: RADIOLOGY

## 2022-01-10 PROCEDURE — 85610 PROTHROMBIN TIME: CPT | Performed by: RADIOLOGY

## 2022-01-10 PROCEDURE — U0002 COVID-19 LAB TEST NON-CDC: HCPCS | Performed by: RADIOLOGY

## 2022-01-10 PROCEDURE — 88341 IMHCHEM/IMCYTCHM EA ADD ANTB: CPT | Mod: TC,59

## 2022-01-10 PROCEDURE — 77012 CT SCAN FOR NEEDLE BIOPSY: CPT

## 2022-01-10 RX ORDER — SODIUM CHLORIDE 9 MG/ML
INJECTION, SOLUTION INTRAVENOUS
Status: DISCONTINUED | OUTPATIENT
Start: 2022-01-10 | End: 2022-01-10

## 2022-01-10 RX ORDER — MIDAZOLAM HYDROCHLORIDE 1 MG/ML
INJECTION INTRAMUSCULAR; INTRAVENOUS CODE/TRAUMA/SEDATION MEDICATION
Status: DISCONTINUED | OUTPATIENT
Start: 2022-01-10 | End: 2022-01-10

## 2022-01-10 RX ORDER — ACETAMINOPHEN 325 MG/1
650 TABLET ORAL EVERY 4 HOURS PRN
Status: DISCONTINUED | OUTPATIENT
Start: 2022-01-10 | End: 2022-01-11 | Stop reason: HOSPADM

## 2022-01-10 RX ORDER — FENTANYL CITRATE 50 UG/ML
INJECTION, SOLUTION INTRAMUSCULAR; INTRAVENOUS CODE/TRAUMA/SEDATION MEDICATION
Status: DISCONTINUED | OUTPATIENT
Start: 2022-01-10 | End: 2022-01-10

## 2022-01-10 RX ADMIN — FENTANYL CITRATE 50 MCG: 50 INJECTION INTRAMUSCULAR; INTRAVENOUS at 09:01

## 2022-01-10 RX ADMIN — MIDAZOLAM HYDROCHLORIDE 2 MG: 1 INJECTION, SOLUTION INTRAMUSCULAR; INTRAVENOUS at 09:01

## 2022-01-10 RX ADMIN — SODIUM CHLORIDE 250 ML/HR: 900 INJECTION, SOLUTION INTRAVENOUS at 09:01

## 2022-01-10 NOTE — DISCHARGE INSTRUCTIONS
Patient Education       Bone Marrow Aspiration or Biopsy   Why is this procedure done?   Blood cells are made in the inside of bones. This area is called the bone marrow. The doctor may take a sample of liquid bone marrow with a needle. This is a bone marrow aspiration. Other times the doctor may take a piece of solid tissue which is a bone marrow biopsy. The liquid sample looks like blood and is sent to the lab for testing. The solid tissue is sent to the pathology lab. Bone marrow is most often taken from the hip bone.  A bone marrow biopsy is done to:  · See how your bone marrow is working if your blood counts are not normal  · Learn more about why you are feeling sick  · See if a drug you are taking for a blood disease, such as leukemia, is working  · See if you are a match for someone who needs a transplant  · Look for cancers that affect the bone marrow  · Find out if cancer or infection has spread to your bones  What will the results be?   The biopsy test result will tell your doctor if you have an illness. It will also tell your doctor if more treatment is needed. Your doctor may do other tests to go along with this one.  What happens before the procedure?   · Your doctor will take your history and do an exam. Talk to your doctor about:  ? All the drugs you are taking. Be sure to include all prescription and over-the-counter (OTC) drugs, and herbal supplements. Tell the doctor about any drug allergy. Bring a list of drugs you take with you.  ? Any bleeding problems. Be sure to tell your doctor if you are taking any drugs that may cause bleeding. Some of these are warfarin, rivaroxaban, apixaban, ticagrelor, clopidogrel, ketorolac, ibuprofen, naproxen, or aspirin. Certain vitamins and herbs, such as garlic and fish oil, may also add to the risk for bleeding. You may need to stop these drugs as well. Talk to your doctor about them.  ? If you need to stop eating or drinking before your procedure.  · You may not  be allowed to drive right away after the procedure if you receive drugs to help you relax. Ask a family member or a friend to drive you home.  What happens during the procedure?   · You will either lie on your side or face down on your belly.  · Sometimes, the doctor will give you a special drug to help you relax for the procedure. If so, you may have an IV in your arm. These drugs can make you sleepy and you may not remember what happens.  · You will be given a numbing drug in the skin and muscle where the biopsy will be taken. This will help you stay pain free during the procedure.  · Your doctor will clean the area where the needle will be put in. Your doctor will put a needle through your skin and into the bone. The bone marrow liquid is pulled up into a syringe, or the biopsy is taken with a special tool.  · You may feel pain or pressure during the test. The doctor will take the needle out when there is enough. More than one sample may be needed.  · Your doctor will hold pressure on the site to stop bleeding. Then, a bandage is put on the site.  · The procedure takes 10 to 20 minutes.  What happens after the procedure?   · You will remain lying down for 30 to 60 minutes. When the bleeding stops, your doctor will tell you if you can go home. If you had drugs to relax you, someone will need to drive you home.  · You may feel some pain. Talk with your doctor about what drugs you may take to help with this pain.  · You may apply ice packs if needed for discomfort.  What care is needed at home?   · The biopsy site may have some bruising.  · You may have to limit your activity for a day or two. Talk to your doctor about the right amount of activity for you.  · Talk to your doctor about how to care for your biopsy site. Ask your doctor about:  ? When you should change your bandages  ? When you may take a bath or shower  ? When you may go back to your normal activities like work or driving  What follow-up care is  needed?   Your doctor may ask you to make visits to the office to check on your progress. Be sure to keep these visits.  What problems could happen?   · Pain  · Bleeding  · Infection  · Skin irritation  · Scarring  When do I need to call the doctor?   · Signs of infection. These include fever over 100.4°F (38°C) and chills.  · Signs of wound infection. These include swelling, redness, warmth around the cut site; too much pain when touched; yellowish, greenish, or bloody discharge; foul smell coming from the cut site; cut site opens up.  Where can I learn more?   American Association for Clinical Chemistry  https://labtestsonline.org/tests/bone-marrow-aspiration-and-biopsy   American Society of Clinical Oncology  https://www.cancer.net/navigating-cancer-care/diagnosing-cancer/tests-and-procedures/bone-marrow-aspiration-and-biopsy   [employer]  https://www.cancerresearchuk.org/about-cancer/cancer-in-general/tests/bone-marrow-test   Last Reviewed Date   2021-04-19  Consumer Information Use and Disclaimer   This information is not specific medical advice and does not replace information you receive from your health care provider. This is only a brief summary of general information. It does NOT include all information about conditions, illnesses, injuries, tests, procedures, treatments, therapies, discharge instructions or life-style choices that may apply to you. You must talk with your health care provider for complete information about your health and treatment options. This information should not be used to decide whether or not to accept your health care providers advice, instructions or recommendations. Only your health care provider has the knowledge and training to provide advice that is right for you.  Copyright   Copyright © 2021 [employer] and its affiliates and/or licensors. All rights reserved.

## 2022-01-10 NOTE — PLAN OF CARE
Pt tolerated bone marrow biopsy well no co pain no acute distress noted vs stable back to room via stretcher with rn to room in asu

## 2022-02-01 ENCOUNTER — OFFICE VISIT (OUTPATIENT)
Dept: HEMATOLOGY/ONCOLOGY | Facility: CLINIC | Age: 72
End: 2022-02-01
Payer: MEDICARE

## 2022-02-01 VITALS
DIASTOLIC BLOOD PRESSURE: 96 MMHG | BODY MASS INDEX: 22.3 KG/M2 | TEMPERATURE: 97 F | HEART RATE: 87 BPM | WEIGHT: 151 LBS | SYSTOLIC BLOOD PRESSURE: 149 MMHG

## 2022-02-01 DIAGNOSIS — R71.8 ELEVATED MCV: ICD-10-CM

## 2022-02-01 DIAGNOSIS — R77.8 ABNORMAL SPEP: Primary | ICD-10-CM

## 2022-02-01 PROCEDURE — 99213 PR OFFICE/OUTPT VISIT, EST, LEVL III, 20-29 MIN: ICD-10-PCS | Mod: S$GLB,,, | Performed by: INTERNAL MEDICINE

## 2022-02-01 PROCEDURE — 99213 OFFICE O/P EST LOW 20 MIN: CPT | Mod: S$GLB,,, | Performed by: INTERNAL MEDICINE

## 2022-07-21 ENCOUNTER — LAB VISIT (OUTPATIENT)
Dept: LAB | Facility: HOSPITAL | Age: 72
End: 2022-07-21
Attending: INTERNAL MEDICINE
Payer: MEDICARE

## 2022-07-21 DIAGNOSIS — R77.8 ABNORMAL SPEP: ICD-10-CM

## 2022-07-21 DIAGNOSIS — R71.8 ELEVATED MCV: ICD-10-CM

## 2022-07-21 LAB
ALBUMIN SERPL BCP-MCNC: 2.5 G/DL (ref 3.5–5.2)
ALP SERPL-CCNC: 149 U/L (ref 55–135)
ALT SERPL W/O P-5'-P-CCNC: 18 U/L (ref 10–44)
ANION GAP SERPL CALC-SCNC: 9 MMOL/L (ref 8–16)
AST SERPL-CCNC: 49 U/L (ref 10–40)
BASOPHILS # BLD AUTO: 0.03 K/UL (ref 0–0.2)
BASOPHILS NFR BLD: 0.8 % (ref 0–1.9)
BILIRUB SERPL-MCNC: 1.4 MG/DL (ref 0.1–1)
BUN SERPL-MCNC: 6 MG/DL (ref 8–23)
CALCIUM SERPL-MCNC: 8.5 MG/DL (ref 8.7–10.5)
CHLORIDE SERPL-SCNC: 97 MMOL/L (ref 95–110)
CO2 SERPL-SCNC: 27 MMOL/L (ref 23–29)
CREAT SERPL-MCNC: 0.8 MG/DL (ref 0.5–1.4)
DIFFERENTIAL METHOD: ABNORMAL
EOSINOPHIL # BLD AUTO: 0 K/UL (ref 0–0.5)
EOSINOPHIL NFR BLD: 0.8 % (ref 0–8)
ERYTHROCYTE [DISTWIDTH] IN BLOOD BY AUTOMATED COUNT: 14.5 % (ref 11.5–14.5)
EST. GFR  (AFRICAN AMERICAN): >60 ML/MIN/1.73 M^2
EST. GFR  (NON AFRICAN AMERICAN): >60 ML/MIN/1.73 M^2
GLUCOSE SERPL-MCNC: 86 MG/DL (ref 70–110)
HCT VFR BLD AUTO: 35.6 % (ref 40–54)
HGB BLD-MCNC: 11.7 G/DL (ref 14–18)
IGA SERPL-MCNC: 933 MG/DL (ref 40–350)
IGG SERPL-MCNC: 3731 MG/DL (ref 650–1600)
IGM SERPL-MCNC: 132 MG/DL (ref 50–300)
IMM GRANULOCYTES # BLD AUTO: 0 K/UL (ref 0–0.04)
IMM GRANULOCYTES NFR BLD AUTO: 0 % (ref 0–0.5)
LYMPHOCYTES # BLD AUTO: 1.2 K/UL (ref 1–4.8)
LYMPHOCYTES NFR BLD: 29.2 % (ref 18–48)
MCH RBC QN AUTO: 36.4 PG (ref 27–31)
MCHC RBC AUTO-ENTMCNC: 32.9 G/DL (ref 32–36)
MCV RBC AUTO: 111 FL (ref 82–98)
MONOCYTES # BLD AUTO: 0.3 K/UL (ref 0.3–1)
MONOCYTES NFR BLD: 8.1 % (ref 4–15)
NEUTROPHILS # BLD AUTO: 2.4 K/UL (ref 1.8–7.7)
NEUTROPHILS NFR BLD: 61.1 % (ref 38–73)
NRBC BLD-RTO: 0 /100 WBC
PLATELET # BLD AUTO: 196 K/UL (ref 150–450)
PMV BLD AUTO: 9.9 FL (ref 9.2–12.9)
POTASSIUM SERPL-SCNC: 3.9 MMOL/L (ref 3.5–5.1)
PROT SERPL-MCNC: 8.6 G/DL (ref 6–8.4)
RBC # BLD AUTO: 3.21 M/UL (ref 4.6–6.2)
SODIUM SERPL-SCNC: 133 MMOL/L (ref 136–145)
WBC # BLD AUTO: 3.97 K/UL (ref 3.9–12.7)

## 2022-07-21 PROCEDURE — 36415 COLL VENOUS BLD VENIPUNCTURE: CPT | Mod: PO | Performed by: INTERNAL MEDICINE

## 2022-07-21 PROCEDURE — 82784 ASSAY IGA/IGD/IGG/IGM EACH: CPT | Mod: 59 | Performed by: INTERNAL MEDICINE

## 2022-07-21 PROCEDURE — 85025 COMPLETE CBC W/AUTO DIFF WBC: CPT | Performed by: INTERNAL MEDICINE

## 2022-07-21 PROCEDURE — 84165 PROTEIN E-PHORESIS SERUM: CPT | Mod: 26,,, | Performed by: PATHOLOGY

## 2022-07-21 PROCEDURE — 82232 ASSAY OF BETA-2 PROTEIN: CPT | Performed by: INTERNAL MEDICINE

## 2022-07-21 PROCEDURE — 83520 IMMUNOASSAY QUANT NOS NONAB: CPT | Performed by: INTERNAL MEDICINE

## 2022-07-21 PROCEDURE — 84165 PROTEIN E-PHORESIS SERUM: CPT | Performed by: INTERNAL MEDICINE

## 2022-07-21 PROCEDURE — 80053 COMPREHEN METABOLIC PANEL: CPT | Performed by: INTERNAL MEDICINE

## 2022-07-21 PROCEDURE — 84165 PATHOLOGIST INTERPRETATION SPE: ICD-10-PCS | Mod: 26,,, | Performed by: PATHOLOGY

## 2022-07-22 LAB
ALBUMIN SERPL ELPH-MCNC: 2.69 G/DL (ref 3.35–5.55)
ALPHA1 GLOB SERPL ELPH-MCNC: 0.29 G/DL (ref 0.17–0.41)
ALPHA2 GLOB SERPL ELPH-MCNC: 0.7 G/DL (ref 0.43–0.99)
B-GLOBULIN SERPL ELPH-MCNC: 1.22 G/DL (ref 0.5–1.1)
B2 MICROGLOB SERPL-MCNC: 4.3 UG/ML (ref 0–2.5)
GAMMA GLOB SERPL ELPH-MCNC: 3.4 G/DL (ref 0.67–1.58)
KAPPA LC SER QL IA: 13.02 MG/DL (ref 0.33–1.94)
KAPPA LC/LAMBDA SER IA: 1.32 (ref 0.26–1.65)
LAMBDA LC SER QL IA: 9.88 MG/DL (ref 0.57–2.63)
PATHOLOGIST INTERPRETATION SPE: NORMAL
PROT SERPL-MCNC: 8.3 G/DL (ref 6–8.4)

## 2022-07-29 NOTE — PROGRESS NOTES
[facility] Hematology/Oncology  PROGRESS NOTE -   Follow-up Visit      Subjective:       Patient ID:   NAME: Sid Cuello Jr. : 1950     71 y.o. male    Referring Doc: Светлана  Other Physicians: Rose Mary Posada           Chief Complaint: abn SPEP f/u       History of Present Illness:     Patient returns today for a regularly scheduled follow-up visit.  The patient is here today to go over the results of the recently ordered labs, tests and studies. He is here by himself.    He had some recent labs with Dr Sotomayor and is seeing him next week    He has chronic neuropathy issues in feet and he had nerve conduction study in the past.    He continues to have residual fatigue and general lack of energy      Breathing ok. No CP, SOB, HA's or N/V.     He is a retired           Discussed covid19 precautions and he had his vaccinations        ROS:   GEN: normal without any fever, night sweats or weight loss; chronic fatigue; chronic neuropathy in feet  HEENT: normal with no HA's, sore throat, stiff neck, changes in vision  CV: normal with no CP, SOB, PND, TRAN or orthopnea  PULM: normal with no SOB, cough, hemoptysis, sputum or pleuritic pain  GI: normal with no abdominal pain, nausea, vomiting, constipation, diarrhea, melanotic stools, BRBPR, or hematemesis  : normal with no hematuria, dysuria  BREAST: normal with no mass, discharge, pain  SKIN: normal with no rash, erythema, bruising, or swelling    Pain Scale: 0    Allergies:  Review of patient's allergies indicates:  No Known Allergies    Medications:    Current Outpatient Medications:     aspirin (ECOTRIN) 81 MG EC tablet, Take 81 mg by mouth once daily., Disp: , Rfl:     atorvastatin (LIPITOR) 40 MG tablet, , Disp: , Rfl:     betamethasone dipropionate (DIPROLENE) 0.05 % lotion, Apply thin film to scalp QHS PRN itch, Disp: 60 mL, Rfl: 2    clopidogrel (PLAVIX) 75 mg tablet, Take 75 mg by mouth once daily., Disp: , Rfl:      cyanocobalamin 1,000 mcg/mL injection, INJECT 1 ML INTO THE SKIN ONCE A MONTH, Disp: , Rfl:     lisinopril (PRINIVIL,ZESTRIL) 20 MG tablet, Take 20 mg by mouth once daily., Disp: , Rfl:     metoprolol succinate (TOPROL-XL) 25 MG 24 hr tablet, Take 25 mg by mouth once daily., Disp: , Rfl:     triamcinolone acetonide 0.1% (KENALOG) 0.1 % cream, AAA left cheek bid, Disp: 45 g, Rfl: 3  No current facility-administered medications for this visit.    Facility-Administered Medications Ordered in Other Visits:     diphenhydrAMINE injection 25 mg, 25 mg, Intravenous, Q6H PRN, Dhruv Gutierrez MD    fentaNYL injection 25 mcg, 25 mcg, Intravenous, Q5 Min PRN, Dhruv Gutierrez MD    hydromorphone (PF) injection 0.2 mg, 0.2 mg, Intravenous, Q5 Min PRN, Dhruv Gutierrez MD    lactated ringers infusion, 10 mL/hr, Intravenous, Continuous, Dhruv Gutierrez MD, Stopped at 10/23/19 1450    lactated ringers infusion, 75 mL/hr, Intravenous, Continuous, Dhruv Gutierrez MD    lidocaine (PF) 10 mg/ml (1%) injection 10 mg, 1 mL, Intradermal, Once, Dhruv Gutierrez MD    ondansetron injection 4 mg, 4 mg, Intravenous, Once, Dhruv Gutierrez MD    oxyCODONE immediate release tablet 5 mg, 5 mg, Oral, PRN, Dhruv Gutierrez MD    promethazine (PHENERGAN) 6.25 mg in dextrose 5 % 50 mL IVPB, 6.25 mg, Intravenous, Q10 Min PRN, Dhruv Gutierrez MD    sodium chloride 0.9% flush 3 mL, 3 mL, Intravenous, Q8H, Dhruv Gutierrez MD    sodium chloride 0.9% flush 3 mL, 3 mL, Intravenous, PRN, Dhruv Gutierrez MD    PMHx/PSHx Updates:  See patient's last visit with me on 2/1/2022  See H&P on 4/30/2021        Pathology:  Cancer Staging  No matching staging information was found for the patient.    Bone marrow biopsy 1/10/2022:    BONE MARROW, RIGHT ILIAC CREST, ASPIRATE, CLOT SECTION, AND CORE BIOPSY:   --CELLULAR MARROW (APPROXIMATELY 25% TO 35%) WITH TRILINEAGE    HEMATOPOIETIC ELEMENTS AND  "OCCASIONAL   PLASMA CELLS; FURTHER CHARACTERIZATION PENDING IMMUNOHISTOCHEMISTRY;    FINAL DIAGNOSIS TO FOLLOW.   --PERIPHERAL BLOOD WITH NORMAL THROMBOCYTE COUNT (164,000/MICROLITER);    ANEMIA (HEMOGLOBIN 12.5 GRAM/     DECILITER); AND LEUKOPENIA (3,390/MICROLITER).     Plasma cells (bright CD38+/+) comprise approximately 2% of the    total sample and demonstrate polyclonal expression of immunoglobulin    light chains. The plasma cell population co-expresses CD19.      INTERPRETATION:     Immunophenotyping fails to identify any abnormal cell populations. A    small population (approximately 2%) of polyclonal plasma cells is    present.      Objective:     Vitals:  Blood pressure (!) 161/106, pulse 102, temperature 97.6 °F (36.4 °C), resp. rate 18, height 5' 9" (1.753 m), weight 67.2 kg (148 lb 1.6 oz).    Physical Examination:   GEN: no apparent distress, comfortable; AAOx3  HEAD: atraumatic and normocephalic; alopecia  EYES: no pallor, no icterus, PERRLA  ENT: OMM, no pharyngeal erythema, external ears WNL; no nasal discharge; no thrush  NECK: no masses, thyroid normal, trachea midline, no LAD/LN's, supple  CV: RRR with no murmur; normal pulse; normal S1 and S2; no pedal edema  CHEST: Normal respiratory effort; CTAB; normal breath sounds; no wheeze or crackles  ABDOM: nontender and nondistended; soft; normal bowel sounds; no rebound/guarding  MUSC/Skeletal: ROM normal; no crepitus; joints normal; no deformities or arthropathy  EXTREM: no clubbing, cyanosis, inflammation or swelling  SKIN: no rashes, lesions, ulcers, petechiae or subcutaneous nodules; chronic age related skin changes  : no toussaint  NEURO: grossly intact; motor/sensory WNL; AAOx3; no tremors  PSYCH: normal mood, affect and behavior  LYMPH: normal cervical, supraclavicular, axillary and groin LN's        Labs:     Lab Results   Component Value Date    WBC 3.97 07/21/2022    HGB 11.7 (L) 07/21/2022    HCT 35.6 (L) 07/21/2022     (H) " 07/21/2022     07/21/2022     CMP  Sodium   Date Value Ref Range Status   07/21/2022 133 (L) 136 - 145 mmol/L Final     Potassium   Date Value Ref Range Status   07/21/2022 3.9 3.5 - 5.1 mmol/L Final     Chloride   Date Value Ref Range Status   07/21/2022 97 95 - 110 mmol/L Final     CO2   Date Value Ref Range Status   07/21/2022 27 23 - 29 mmol/L Final     Glucose   Date Value Ref Range Status   07/21/2022 86 70 - 110 mg/dL Final     BUN   Date Value Ref Range Status   07/21/2022 6 (L) 8 - 23 mg/dL Final     Creatinine   Date Value Ref Range Status   07/21/2022 0.8 0.5 - 1.4 mg/dL Final     Calcium   Date Value Ref Range Status   07/21/2022 8.5 (L) 8.7 - 10.5 mg/dL Final     Total Protein   Date Value Ref Range Status   07/21/2022 8.6 (H) 6.0 - 8.4 g/dL Final     Albumin   Date Value Ref Range Status   07/21/2022 2.5 (L) 3.5 - 5.2 g/dL Final     Total Bilirubin   Date Value Ref Range Status   07/21/2022 1.4 (H) 0.1 - 1.0 mg/dL Final     Comment:     For infants and newborns, interpretation of results should be based  on gestational age, weight and in agreement with clinical  observations.    Premature Infant recommended reference ranges:  Up to 24 hours.............<8.0 mg/dL  Up to 48 hours............<12.0 mg/dL  3-5 days..................<15.0 mg/dL  6-29 days.................<15.0 mg/dL       Alkaline Phosphatase   Date Value Ref Range Status   07/21/2022 149 (H) 55 - 135 U/L Final     AST   Date Value Ref Range Status   07/21/2022 49 (H) 10 - 40 U/L Final     ALT   Date Value Ref Range Status   07/21/2022 18 10 - 44 U/L Final     Anion Gap   Date Value Ref Range Status   07/21/2022 9 8 - 16 mmol/L Final     eGFR if    Date Value Ref Range Status   07/21/2022 >60.0 >60 mL/min/1.73 m^2 Final     eGFR if non    Date Value Ref Range Status   07/21/2022 >60.0 >60 mL/min/1.73 m^2 Final     Comment:     Calculation used to obtain the estimated glomerular filtration  rate (eGFR)  is the CKD-EPI equation.         Kappa/Lambda FLC Ratio 0.26 - 1.65 1.27  1.32     Beta-2 Microglobulin 0.0 - 2.5 ug/mL 4.3        IgG 650 - 1600 mg/dL 3848 High   3731   Comment: IgG Cord Blood Reference Range: 650-1600 mg/dL.   IgA 40 - 350 mg/dL 848 High   933   Comment: IgA Cord Blood Reference Range: <5 mg/dL.   IgM 50 - 300 mg/dL 141  132   Comment: IgM Cord Blood Reference Range: <25 mg/dL.     Pathologist Interpretation SPE REVIEWED    Comment:    Electronically reviewed and signed by:   Odalys Washington M.D.   Signed on 07/22/22 at 16:55   Normal total protein.   Increased gamma globulin, polyclonal.   No paraprotein bands detected.          Contains abnormal data Protein Electrophoresis, Serum  Order: [id number]   Status: Final result     Visible to patient: Yes (seen)     Next appt: None     Dx: Elevated MCV; Abnormal SPEP     0 Result Notes    Component Ref Range & Units 11 d ago 8 mo ago   Protein, Serum 6.0 - 8.4 g/dL 8.3  8.5 High  CM    Comment: Serum protein electrophoresis and immunofixation results should be   interpreted in clinical context in that some therapeutic agents can   result   in false positive results (example, daratumumab). Correlation with   the   patient s therapeutic regimen is required.    Albumin 3.35 - 5.55 g/dL 2.69 Low   2.78 Low     Alpha-1 0.17 - 0.41 g/dL 0.29  0.27    Alpha-2 0.43 - 0.99 g/dL 0.70  0.69    Beta 0.50 - 1.10 g/dL 1.22 High   0.98    Gamma 0.67 - 1.58 g/dL 3.40 High   3.78 High     Resulting Agency           Pathologist Interpretation UPE REVIEWED    Comment:    Electronically reviewed and signed by:   Briana Melton D.O.   Signed on 07/25/22 at 22:40   Increased total protein.   Predominant protein is albumin at 100% of total protein         Radiology/Diagnostic Studies:    No results found.    I have reviewed all available lab results and radiology reports.    Assessment/Plan:   (1) 71 y.o. male  with diagnosis of abnormal SPEP who has been referred by   Светлана for evaluation by medical hematology/oncology.   -  He had an SPEP on 4/16/2021 with no evidence of any M-protein He sees Dr Anglin for discoid Lupus and I suspect that this is the cause of the protein issues seen on the labs.    5/27/2021:  - IgG is elevated but the immunofoxation studies in the serum and urine are both negative for any M-protein  - kappa/lambda ratio is also WNL  - I do not suspect multiple myeloma at this time  - I recommended referral to see Dr Blanco at [facility] (who is a paraproteinemia specialist) and also consideration for a bone marrow biopsy to further elucidate - however, he is not inclined to doing either at this time  - will repeat protein studies every 6 months      8/26/2021:  - patient here for short-term f/u visit  - repeat protein studies are due in Nov 2021  -recommended referral to see Dr Blanco at [facility] (who is a paraproteinemia specialist) and also consideration for a bone marrow biopsy to further elucidate - however, he was not inclined to doing either previously and does not want to go to [place]  - I have already told the patient that this is the only way one can get a second opinion    1/3/2022:  - he is adamant that he does not want to go to [place] or [facility]  - discussed again with the patient about getting a bone marrow biopsy and he is now willing to consider getting one done  - I explained to him that we are limited as to what I can do for him by what he will or will not do     2/1/2022:  - recent bone marrow biopsy on 1/10/2022 with only a 2% population of polyclonal plasma cells with on abnormal immunophenotypical population identified  - he declined referral to Dr Blanco at [facility]  - will proceed with observation and monitoring of his labs and protein studies for now    8/1/2022:  - discussed the results of the latest protein studies and they seem to be relatively stable; again there in no monoclonal component to his gammopathy  - he is adamant that he does  not want to be evaluated by my proteinopathy specialist Dr Blanco at [facility]  - continue with observtaion         (2) CAD s/p stent     (3) HTN and Hypercholeterolemia     (4) Discoid Lupus/SLE - followed by Dr Anglin     (5) Macrocytic indices with elevated MCV but no anemia     (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 recommend proceeding with observation and regular monitoring of labs  2. he declined referral to see Dr Blanco at [facility]  3. Encourage tobacco cessation  4. Recommend consideration for referral to neurology for his neuropathy issues  5. Repeat protein studies every 6 months  6. F/u with PCP, Card, Derm and        RTC in 6 months  Fax note to Albino Sotomayor Pinsky, [place], [place]       Discussion:     COVID-19 Discussion:    I had long discussion with patient and any applicable family about the COVID-19 coronavirus epidemic and the recommended precautions with regard to cancer and/or hematology patients. I have re-iterated the CDC recommendations for adequate hand washing, use of hand -like products, and coughing into elbow, etc. In addition, especially for our patients who are on chemotherapy and/or our otherwise immunocompromised patients, I have recommended avoidance of crowds, including movie theaters, restaurants, churches, etc. I have recommended avoidance of any sick or symptomatic family members and/or friends. I have also recommended avoidance of any raw and unwashed food products, and general avoidance of food items that have not been prepared by themselves. The patient has been asked to call us immediately with any symptom developments, issues, questions or other general concerns.       I spent over 25 mins of time with the patient. Reviewed results of the recently ordered labs, tests and studies; made directives with regards to the results. Over half of this time was spent couseling  and coordinating care.    I have explained all of the above in detail and the patient understands all of the current recommendation(s). I have answered all of their questions to the best of my ability and to their complete satisfaction.   The patient is to continue with the current management plan.            Electronically signed by Antolin Javier MD                             Answers for HPI/ROS submitted by the patient on 7/29/2022  appetite change : No  unexpected weight change: No  mouth sores: No  visual disturbance: No  cough: No  shortness of breath: No  chest pain: No  abdominal pain: No  diarrhea: No  frequency: No  back pain: No  rash: No  headaches: No  adenopathy: No  nervous/ anxious: No

## 2022-08-01 ENCOUNTER — OFFICE VISIT (OUTPATIENT)
Dept: HEMATOLOGY/ONCOLOGY | Facility: CLINIC | Age: 72
End: 2022-08-01
Payer: MEDICARE

## 2022-08-01 VITALS
WEIGHT: 148.13 LBS | HEIGHT: 69 IN | HEART RATE: 102 BPM | TEMPERATURE: 98 F | RESPIRATION RATE: 18 BRPM | DIASTOLIC BLOOD PRESSURE: 106 MMHG | SYSTOLIC BLOOD PRESSURE: 161 MMHG | BODY MASS INDEX: 21.94 KG/M2

## 2022-08-01 DIAGNOSIS — D53.9 NUTRITIONAL ANEMIA, UNSPECIFIED: ICD-10-CM

## 2022-08-01 DIAGNOSIS — R77.8 ABNORMAL SPEP: Primary | ICD-10-CM

## 2022-08-01 DIAGNOSIS — R71.8 ELEVATED MCV: ICD-10-CM

## 2022-08-01 DIAGNOSIS — D64.9 ANEMIA, UNSPECIFIED TYPE: ICD-10-CM

## 2022-08-01 PROCEDURE — 99213 OFFICE O/P EST LOW 20 MIN: CPT | Mod: S$GLB,,, | Performed by: INTERNAL MEDICINE

## 2022-08-01 PROCEDURE — 99213 PR OFFICE/OUTPT VISIT, EST, LEVL III, 20-29 MIN: ICD-10-PCS | Mod: S$GLB,,, | Performed by: INTERNAL MEDICINE

## 2023-01-23 ENCOUNTER — LAB VISIT (OUTPATIENT)
Dept: LAB | Facility: HOSPITAL | Age: 73
End: 2023-01-23
Attending: INTERNAL MEDICINE
Payer: MEDICARE

## 2023-01-23 DIAGNOSIS — R71.8 ELEVATED MCV: ICD-10-CM

## 2023-01-23 DIAGNOSIS — D53.9 NUTRITIONAL ANEMIA, UNSPECIFIED: ICD-10-CM

## 2023-01-23 DIAGNOSIS — R77.8 ABNORMAL SPEP: ICD-10-CM

## 2023-01-23 DIAGNOSIS — D64.9 ANEMIA, UNSPECIFIED TYPE: ICD-10-CM

## 2023-01-23 LAB
ALBUMIN SERPL BCP-MCNC: 2.9 G/DL (ref 3.5–5.2)
ALP SERPL-CCNC: 121 U/L (ref 55–135)
ALT SERPL W/O P-5'-P-CCNC: 11 U/L (ref 10–44)
ANION GAP SERPL CALC-SCNC: 11 MMOL/L (ref 8–16)
AST SERPL-CCNC: 37 U/L (ref 10–40)
B2 MICROGLOB SERPL-MCNC: 5.2 UG/ML (ref 0–2.5)
BASOPHILS # BLD AUTO: 0.03 K/UL (ref 0–0.2)
BASOPHILS NFR BLD: 0.6 % (ref 0–1.9)
BILIRUB SERPL-MCNC: 1.4 MG/DL (ref 0.1–1)
BUN SERPL-MCNC: 11 MG/DL (ref 8–23)
CALCIUM SERPL-MCNC: 8.7 MG/DL (ref 8.7–10.5)
CHLORIDE SERPL-SCNC: 98 MMOL/L (ref 95–110)
CO2 SERPL-SCNC: 25 MMOL/L (ref 23–29)
CREAT SERPL-MCNC: 1 MG/DL (ref 0.5–1.4)
DIFFERENTIAL METHOD: ABNORMAL
EOSINOPHIL # BLD AUTO: 0 K/UL (ref 0–0.5)
EOSINOPHIL NFR BLD: 0.9 % (ref 0–8)
ERYTHROCYTE [DISTWIDTH] IN BLOOD BY AUTOMATED COUNT: 15.1 % (ref 11.5–14.5)
EST. GFR  (NO RACE VARIABLE): >60 ML/MIN/1.73 M^2
FERRITIN SERPL-MCNC: 123 NG/ML (ref 20–300)
FOLATE SERPL-MCNC: 3.5 NG/ML (ref 4–24)
GLUCOSE SERPL-MCNC: 95 MG/DL (ref 70–110)
HCT VFR BLD AUTO: 36.8 % (ref 40–54)
HGB BLD-MCNC: 11.8 G/DL (ref 14–18)
IGA SERPL-MCNC: 783 MG/DL (ref 40–350)
IGG SERPL-MCNC: 3756 MG/DL (ref 650–1600)
IGM SERPL-MCNC: 128 MG/DL (ref 50–300)
IMM GRANULOCYTES # BLD AUTO: 0.01 K/UL (ref 0–0.04)
IMM GRANULOCYTES NFR BLD AUTO: 0.2 % (ref 0–0.5)
IRON SERPL-MCNC: 75 UG/DL (ref 45–160)
LYMPHOCYTES # BLD AUTO: 2.1 K/UL (ref 1–4.8)
LYMPHOCYTES NFR BLD: 45 % (ref 18–48)
MCH RBC QN AUTO: 34.6 PG (ref 27–31)
MCHC RBC AUTO-ENTMCNC: 32.1 G/DL (ref 32–36)
MCV RBC AUTO: 108 FL (ref 82–98)
MONOCYTES # BLD AUTO: 0.3 K/UL (ref 0.3–1)
MONOCYTES NFR BLD: 5.6 % (ref 4–15)
NEUTROPHILS # BLD AUTO: 2.2 K/UL (ref 1.8–7.7)
NEUTROPHILS NFR BLD: 47.7 % (ref 38–73)
NRBC BLD-RTO: 0 /100 WBC
PLATELET # BLD AUTO: 178 K/UL (ref 150–450)
PMV BLD AUTO: 10.5 FL (ref 9.2–12.9)
POTASSIUM SERPL-SCNC: 3.5 MMOL/L (ref 3.5–5.1)
PROT SERPL-MCNC: 8.9 G/DL (ref 6–8.4)
RBC # BLD AUTO: 3.41 M/UL (ref 4.6–6.2)
SATURATED IRON: 26 % (ref 20–50)
SODIUM SERPL-SCNC: 134 MMOL/L (ref 136–145)
TOTAL IRON BINDING CAPACITY: 290 UG/DL (ref 250–450)
TRANSFERRIN SERPL-MCNC: 196 MG/DL (ref 200–375)
VIT B12 SERPL-MCNC: 675 PG/ML (ref 210–950)
WBC # BLD AUTO: 4.62 K/UL (ref 3.9–12.7)

## 2023-01-23 PROCEDURE — 82746 ASSAY OF FOLIC ACID SERUM: CPT | Performed by: INTERNAL MEDICINE

## 2023-01-23 PROCEDURE — 82784 ASSAY IGA/IGD/IGG/IGM EACH: CPT | Mod: 59 | Performed by: INTERNAL MEDICINE

## 2023-01-23 PROCEDURE — 36415 COLL VENOUS BLD VENIPUNCTURE: CPT | Performed by: INTERNAL MEDICINE

## 2023-01-23 PROCEDURE — 83521 IG LIGHT CHAINS FREE EACH: CPT | Mod: 59 | Performed by: INTERNAL MEDICINE

## 2023-01-23 PROCEDURE — 84165 PROTEIN E-PHORESIS SERUM: CPT | Mod: 26,,, | Performed by: PATHOLOGY

## 2023-01-23 PROCEDURE — 82607 VITAMIN B-12: CPT | Performed by: INTERNAL MEDICINE

## 2023-01-23 PROCEDURE — 84165 PATHOLOGIST INTERPRETATION SPE: ICD-10-PCS | Mod: 26,,, | Performed by: PATHOLOGY

## 2023-01-23 PROCEDURE — 84165 PROTEIN E-PHORESIS SERUM: CPT | Performed by: INTERNAL MEDICINE

## 2023-01-23 PROCEDURE — 82232 ASSAY OF BETA-2 PROTEIN: CPT | Performed by: INTERNAL MEDICINE

## 2023-01-23 PROCEDURE — 80053 COMPREHEN METABOLIC PANEL: CPT | Performed by: INTERNAL MEDICINE

## 2023-01-23 PROCEDURE — 85025 COMPLETE CBC W/AUTO DIFF WBC: CPT | Performed by: INTERNAL MEDICINE

## 2023-01-23 PROCEDURE — 84466 ASSAY OF TRANSFERRIN: CPT | Performed by: INTERNAL MEDICINE

## 2023-01-23 PROCEDURE — 82728 ASSAY OF FERRITIN: CPT | Performed by: INTERNAL MEDICINE

## 2023-01-24 LAB
ALBUMIN SERPL ELPH-MCNC: 3.1 G/DL (ref 3.35–5.55)
ALPHA1 GLOB SERPL ELPH-MCNC: 0.22 G/DL (ref 0.17–0.41)
ALPHA2 GLOB SERPL ELPH-MCNC: 0.69 G/DL (ref 0.43–0.99)
B-GLOBULIN SERPL ELPH-MCNC: 1.2 G/DL (ref 0.5–1.1)
GAMMA GLOB SERPL ELPH-MCNC: 3.6 G/DL (ref 0.67–1.58)
KAPPA LC SER QL IA: 10.68 MG/DL (ref 0.33–1.94)
KAPPA LC/LAMBDA SER IA: 1.17 (ref 0.26–1.65)
LAMBDA LC SER QL IA: 9.1 MG/DL (ref 0.57–2.63)
PATHOLOGIST INTERPRETATION SPE: NORMAL
PROT SERPL-MCNC: 8.8 G/DL (ref 6–8.4)

## 2023-01-25 ENCOUNTER — LAB VISIT (OUTPATIENT)
Dept: LAB | Facility: HOSPITAL | Age: 73
End: 2023-01-25
Attending: INTERNAL MEDICINE
Payer: MEDICARE

## 2023-01-25 DIAGNOSIS — R77.8 ABNORMAL SPEP: ICD-10-CM

## 2023-01-25 DIAGNOSIS — R71.8 ELEVATED MCV: ICD-10-CM

## 2023-01-25 LAB
PROT 24H UR-MRATE: 144 MG/SPEC (ref 0–100)
PROT UR-MCNC: 32 MG/DL (ref 0–15)
URINE COLLECTION DURATION: 24 HR
URINE VOLUME: 450 ML

## 2023-01-25 PROCEDURE — 84166 PATHOLOGIST INTERPRETATION UPE: ICD-10-PCS | Mod: 26,,, | Performed by: PATHOLOGY

## 2023-01-25 PROCEDURE — 84166 PROTEIN E-PHORESIS/URINE/CSF: CPT | Performed by: INTERNAL MEDICINE

## 2023-01-25 PROCEDURE — 84156 ASSAY OF PROTEIN URINE: CPT | Performed by: INTERNAL MEDICINE

## 2023-01-25 PROCEDURE — 84166 PROTEIN E-PHORESIS/URINE/CSF: CPT | Mod: 26,,, | Performed by: PATHOLOGY

## 2023-01-26 LAB — PROT PATTERN UR ELPH-IMP: NORMAL

## 2023-01-27 LAB — PATHOLOGIST INTERPRETATION UPE: NORMAL

## 2023-02-02 ENCOUNTER — TELEPHONE (OUTPATIENT)
Dept: FAMILY MEDICINE | Facility: CLINIC | Age: 73
End: 2023-02-02

## 2023-02-14 ENCOUNTER — HOSPITAL ENCOUNTER (INPATIENT)
Facility: HOSPITAL | Age: 73
LOS: 3 days | Discharge: HOME | DRG: 308 | End: 2023-02-17
Attending: EMERGENCY MEDICINE | Admitting: INTERNAL MEDICINE
Payer: MEDICARE

## 2023-02-14 ENCOUNTER — OFFICE VISIT (OUTPATIENT)
Dept: FAMILY MEDICINE | Facility: CLINIC | Age: 73
DRG: 308 | End: 2023-02-14
Payer: MEDICARE

## 2023-02-14 VITALS
OXYGEN SATURATION: 99 % | BODY MASS INDEX: 23.49 KG/M2 | HEIGHT: 69 IN | SYSTOLIC BLOOD PRESSURE: 121 MMHG | DIASTOLIC BLOOD PRESSURE: 83 MMHG | WEIGHT: 158.63 LBS | HEART RATE: 71 BPM

## 2023-02-14 DIAGNOSIS — R06.02 SOB (SHORTNESS OF BREATH): Primary | ICD-10-CM

## 2023-02-14 DIAGNOSIS — I48.91 NEW ONSET ATRIAL FIBRILLATION: ICD-10-CM

## 2023-02-14 DIAGNOSIS — I48.92 NEW ONSET ATRIAL FLUTTER: Primary | ICD-10-CM

## 2023-02-14 DIAGNOSIS — R07.9 CHEST PAIN: ICD-10-CM

## 2023-02-14 DIAGNOSIS — I50.9 CHF (CONGESTIVE HEART FAILURE): ICD-10-CM

## 2023-02-14 DIAGNOSIS — I48.92 ATRIAL FLUTTER WITH RAPID VENTRICULAR RESPONSE: ICD-10-CM

## 2023-02-14 DIAGNOSIS — I50.9 ACUTE CONGESTIVE HEART FAILURE, UNSPECIFIED HEART FAILURE TYPE: ICD-10-CM

## 2023-02-14 LAB
ALBUMIN SERPL BCP-MCNC: 2.9 G/DL (ref 3.5–5.2)
ALP SERPL-CCNC: 93 U/L (ref 55–135)
ALT SERPL W/O P-5'-P-CCNC: 13 U/L (ref 10–44)
AMPHET+METHAMPHET UR QL: NEGATIVE
ANION GAP SERPL CALC-SCNC: 8 MMOL/L (ref 8–16)
AST SERPL-CCNC: 35 U/L (ref 10–40)
BACTERIA #/AREA URNS HPF: NEGATIVE /HPF
BARBITURATES UR QL SCN>200 NG/ML: NEGATIVE
BASOPHILS # BLD AUTO: 0.04 K/UL (ref 0–0.2)
BASOPHILS NFR BLD: 1 % (ref 0–1.9)
BENZODIAZ UR QL SCN>200 NG/ML: NEGATIVE
BILIRUB SERPL-MCNC: 1.1 MG/DL (ref 0.1–1)
BILIRUB UR QL STRIP: NEGATIVE
BNP SERPL-MCNC: 950 PG/ML (ref 0–99)
BUN SERPL-MCNC: 17 MG/DL (ref 8–23)
BZE UR QL SCN: NEGATIVE
CALCIUM SERPL-MCNC: 8.4 MG/DL (ref 8.7–10.5)
CANNABINOIDS UR QL SCN: NEGATIVE
CHLORIDE SERPL-SCNC: 99 MMOL/L (ref 95–110)
CK SERPL-CCNC: 57 U/L (ref 20–200)
CLARITY UR: CLEAR
CO2 SERPL-SCNC: 26 MMOL/L (ref 23–29)
COLOR UR: YELLOW
CREAT SERPL-MCNC: 1.1 MG/DL (ref 0.5–1.4)
CREAT UR-MCNC: 66 MG/DL (ref 23–375)
DIFFERENTIAL METHOD: ABNORMAL
EOSINOPHIL # BLD AUTO: 0 K/UL (ref 0–0.5)
EOSINOPHIL NFR BLD: 0.5 % (ref 0–8)
ERYTHROCYTE [DISTWIDTH] IN BLOOD BY AUTOMATED COUNT: 15.1 % (ref 11.5–14.5)
EST. GFR  (NO RACE VARIABLE): >60 ML/MIN/1.73 M^2
GLUCOSE SERPL-MCNC: 91 MG/DL (ref 70–110)
GLUCOSE UR QL STRIP: NEGATIVE
HCT VFR BLD AUTO: 37 % (ref 40–54)
HGB BLD-MCNC: 11.9 G/DL (ref 14–18)
HGB UR QL STRIP: ABNORMAL
HYALINE CASTS #/AREA URNS LPF: 1 /LPF
IMM GRANULOCYTES # BLD AUTO: 0.01 K/UL (ref 0–0.04)
IMM GRANULOCYTES NFR BLD AUTO: 0.2 % (ref 0–0.5)
INR PPP: 1.1 (ref 0.8–1.2)
KETONES UR QL STRIP: NEGATIVE
LEUKOCYTE ESTERASE UR QL STRIP: NEGATIVE
LIPASE SERPL-CCNC: 32 U/L (ref 4–60)
LYMPHOCYTES # BLD AUTO: 1.7 K/UL (ref 1–4.8)
LYMPHOCYTES NFR BLD: 39.9 % (ref 18–48)
MAGNESIUM SERPL-MCNC: 1.7 MG/DL (ref 1.6–2.6)
MCH RBC QN AUTO: 34.5 PG (ref 27–31)
MCHC RBC AUTO-ENTMCNC: 32.2 G/DL (ref 32–36)
MCV RBC AUTO: 107 FL (ref 82–98)
MICROSCOPIC COMMENT: ABNORMAL
MONOCYTES # BLD AUTO: 0.3 K/UL (ref 0.3–1)
MONOCYTES NFR BLD: 5.9 % (ref 4–15)
NEUTROPHILS # BLD AUTO: 2.2 K/UL (ref 1.8–7.7)
NEUTROPHILS NFR BLD: 52.5 % (ref 38–73)
NITRITE UR QL STRIP: NEGATIVE
NRBC BLD-RTO: 0 /100 WBC
OPIATES UR QL SCN: NEGATIVE
PCP UR QL SCN>25 NG/ML: NEGATIVE
PH UR STRIP: 6 [PH] (ref 5–8)
PLATELET # BLD AUTO: 156 K/UL (ref 150–450)
PMV BLD AUTO: 11.3 FL (ref 9.2–12.9)
POTASSIUM SERPL-SCNC: 3.8 MMOL/L (ref 3.5–5.1)
PROT SERPL-MCNC: 8.9 G/DL (ref 6–8.4)
PROT UR QL STRIP: NEGATIVE
PROTHROMBIN TIME: 11.4 SEC (ref 9–12.5)
RBC # BLD AUTO: 3.45 M/UL (ref 4.6–6.2)
RBC #/AREA URNS HPF: 5 /HPF (ref 0–4)
SARS-COV-2 RDRP RESP QL NAA+PROBE: NEGATIVE
SODIUM SERPL-SCNC: 133 MMOL/L (ref 136–145)
SP GR UR STRIP: 1.01 (ref 1–1.03)
SQUAMOUS #/AREA URNS HPF: 0 /HPF
TOXICOLOGY INFORMATION: NORMAL
TROPONIN I SERPL HS-MCNC: 11 PG/ML (ref 0–14.9)
TROPONIN I SERPL HS-MCNC: 12 PG/ML (ref 0–14.9)
TSH SERPL DL<=0.005 MIU/L-ACNC: 3.44 UIU/ML (ref 0.34–5.6)
URN SPEC COLLECT METH UR: ABNORMAL
UROBILINOGEN UR STRIP-ACNC: NEGATIVE EU/DL
WBC # BLD AUTO: 4.21 K/UL (ref 3.9–12.7)
WBC #/AREA URNS HPF: 1 /HPF (ref 0–5)

## 2023-02-14 PROCEDURE — 93005 ELECTROCARDIOGRAM TRACING: CPT | Performed by: SPECIALIST

## 2023-02-14 PROCEDURE — 25000003 PHARM REV CODE 250: Performed by: EMERGENCY MEDICINE

## 2023-02-14 PROCEDURE — 84484 ASSAY OF TROPONIN QUANT: CPT | Performed by: EMERGENCY MEDICINE

## 2023-02-14 PROCEDURE — 80307 DRUG TEST PRSMV CHEM ANLYZR: CPT | Performed by: EMERGENCY MEDICINE

## 2023-02-14 PROCEDURE — 99214 OFFICE O/P EST MOD 30 MIN: CPT | Mod: 25,S$PBB,AQ, | Performed by: FAMILY MEDICINE

## 2023-02-14 PROCEDURE — 93005 ELECTROCARDIOGRAM TRACING: CPT | Performed by: INTERNAL MEDICINE

## 2023-02-14 PROCEDURE — 93005 ELECTROCARDIOGRAM TRACING: CPT | Mod: PBBFAC | Performed by: FAMILY MEDICINE

## 2023-02-14 PROCEDURE — 85610 PROTHROMBIN TIME: CPT | Performed by: EMERGENCY MEDICINE

## 2023-02-14 PROCEDURE — 85025 COMPLETE CBC W/AUTO DIFF WBC: CPT | Performed by: EMERGENCY MEDICINE

## 2023-02-14 PROCEDURE — 99285 EMERGENCY DEPT VISIT HI MDM: CPT | Mod: 25

## 2023-02-14 PROCEDURE — 84443 ASSAY THYROID STIM HORMONE: CPT | Performed by: EMERGENCY MEDICINE

## 2023-02-14 PROCEDURE — 99214 PR OFFICE/OUTPT VISIT, EST, LEVL IV, 30-39 MIN: ICD-10-PCS | Mod: 25,S$PBB,AQ, | Performed by: FAMILY MEDICINE

## 2023-02-14 PROCEDURE — 93010 EKG 12-LEAD: ICD-10-PCS | Mod: ,,, | Performed by: SPECIALIST

## 2023-02-14 PROCEDURE — 93010 ELECTROCARDIOGRAM REPORT: CPT | Mod: ,,, | Performed by: SPECIALIST

## 2023-02-14 PROCEDURE — 96366 THER/PROPH/DIAG IV INF ADDON: CPT

## 2023-02-14 PROCEDURE — 96365 THER/PROPH/DIAG IV INF INIT: CPT

## 2023-02-14 PROCEDURE — 83880 ASSAY OF NATRIURETIC PEPTIDE: CPT | Performed by: EMERGENCY MEDICINE

## 2023-02-14 PROCEDURE — 83690 ASSAY OF LIPASE: CPT | Performed by: EMERGENCY MEDICINE

## 2023-02-14 PROCEDURE — 93010 POCT EKG 12-LEAD: ICD-10-PCS | Mod: S$PBB,AQ,, | Performed by: FAMILY MEDICINE

## 2023-02-14 PROCEDURE — 25000003 PHARM REV CODE 250: Performed by: INTERNAL MEDICINE

## 2023-02-14 PROCEDURE — 87040 BLOOD CULTURE FOR BACTERIA: CPT | Mod: 59 | Performed by: EMERGENCY MEDICINE

## 2023-02-14 PROCEDURE — 80053 COMPREHEN METABOLIC PANEL: CPT | Performed by: EMERGENCY MEDICINE

## 2023-02-14 PROCEDURE — 81001 URINALYSIS AUTO W/SCOPE: CPT | Performed by: EMERGENCY MEDICINE

## 2023-02-14 PROCEDURE — 82550 ASSAY OF CK (CPK): CPT | Performed by: EMERGENCY MEDICINE

## 2023-02-14 PROCEDURE — 63600175 PHARM REV CODE 636 W HCPCS: Performed by: EMERGENCY MEDICINE

## 2023-02-14 PROCEDURE — 93010 ELECTROCARDIOGRAM REPORT: CPT | Mod: S$PBB,AQ,, | Performed by: FAMILY MEDICINE

## 2023-02-14 PROCEDURE — 83735 ASSAY OF MAGNESIUM: CPT | Performed by: EMERGENCY MEDICINE

## 2023-02-14 PROCEDURE — 21400001 HC TELEMETRY ROOM

## 2023-02-14 PROCEDURE — U0002 COVID-19 LAB TEST NON-CDC: HCPCS | Performed by: EMERGENCY MEDICINE

## 2023-02-14 PROCEDURE — 96375 TX/PRO/DX INJ NEW DRUG ADDON: CPT

## 2023-02-14 PROCEDURE — 63600175 PHARM REV CODE 636 W HCPCS: Performed by: INTERNAL MEDICINE

## 2023-02-14 PROCEDURE — 99214 OFFICE O/P EST MOD 30 MIN: CPT | Mod: 25 | Performed by: FAMILY MEDICINE

## 2023-02-14 RX ORDER — TALC
6 POWDER (GRAM) TOPICAL NIGHTLY PRN
Status: DISCONTINUED | OUTPATIENT
Start: 2023-02-14 | End: 2023-02-17 | Stop reason: HOSPADM

## 2023-02-14 RX ORDER — LANOLIN ALCOHOL/MO/W.PET/CERES
800 CREAM (GRAM) TOPICAL
Status: DISCONTINUED | OUTPATIENT
Start: 2023-02-14 | End: 2023-02-17 | Stop reason: HOSPADM

## 2023-02-14 RX ORDER — FUROSEMIDE 20 MG/1
20 TABLET ORAL DAILY
Status: DISCONTINUED | OUTPATIENT
Start: 2023-02-15 | End: 2023-02-15

## 2023-02-14 RX ORDER — ATORVASTATIN CALCIUM 10 MG/1
10 TABLET, FILM COATED ORAL DAILY
COMMUNITY
Start: 2022-12-26

## 2023-02-14 RX ORDER — ONDANSETRON 2 MG/ML
4 INJECTION INTRAMUSCULAR; INTRAVENOUS EVERY 8 HOURS PRN
Status: DISCONTINUED | OUTPATIENT
Start: 2023-02-14 | End: 2023-02-17 | Stop reason: HOSPADM

## 2023-02-14 RX ORDER — ATORVASTATIN CALCIUM 10 MG/1
10 TABLET, FILM COATED ORAL DAILY
Status: DISCONTINUED | OUTPATIENT
Start: 2023-02-14 | End: 2023-02-17 | Stop reason: HOSPADM

## 2023-02-14 RX ORDER — SODIUM,POTASSIUM PHOSPHATES 280-250MG
2 POWDER IN PACKET (EA) ORAL
Status: DISCONTINUED | OUTPATIENT
Start: 2023-02-14 | End: 2023-02-17 | Stop reason: HOSPADM

## 2023-02-14 RX ORDER — POLYETHYLENE GLYCOL 3350 17 G/17G
17 POWDER, FOR SOLUTION ORAL DAILY PRN
Status: DISCONTINUED | OUTPATIENT
Start: 2023-02-14 | End: 2023-02-17 | Stop reason: HOSPADM

## 2023-02-14 RX ORDER — ACETAMINOPHEN 325 MG/1
650 TABLET ORAL EVERY 8 HOURS PRN
Status: DISCONTINUED | OUTPATIENT
Start: 2023-02-14 | End: 2023-02-17 | Stop reason: HOSPADM

## 2023-02-14 RX ORDER — METOPROLOL SUCCINATE 25 MG/1
25 TABLET, EXTENDED RELEASE ORAL DAILY
Status: DISCONTINUED | OUTPATIENT
Start: 2023-02-15 | End: 2023-02-15

## 2023-02-14 RX ORDER — DILTIAZEM HYDROCHLORIDE 30 MG/1
30 TABLET, FILM COATED ORAL EVERY 6 HOURS
Status: DISCONTINUED | OUTPATIENT
Start: 2023-02-14 | End: 2023-02-15

## 2023-02-14 RX ORDER — NALOXONE HCL 0.4 MG/ML
0.02 VIAL (ML) INJECTION
Status: DISCONTINUED | OUTPATIENT
Start: 2023-02-14 | End: 2023-02-17 | Stop reason: HOSPADM

## 2023-02-14 RX ORDER — LISINOPRIL 5 MG/1
5 TABLET ORAL DAILY
Status: ON HOLD | COMMUNITY
Start: 2022-12-17 | End: 2023-02-17 | Stop reason: SDUPTHER

## 2023-02-14 RX ORDER — ENOXAPARIN SODIUM 100 MG/ML
1 INJECTION SUBCUTANEOUS EVERY 12 HOURS
Status: DISCONTINUED | OUTPATIENT
Start: 2023-02-14 | End: 2023-02-16

## 2023-02-14 RX ORDER — ASPIRIN 81 MG/1
81 TABLET ORAL DAILY
Status: DISCONTINUED | OUTPATIENT
Start: 2023-02-15 | End: 2023-02-17 | Stop reason: HOSPADM

## 2023-02-14 RX ORDER — FUROSEMIDE 10 MG/ML
20 INJECTION INTRAMUSCULAR; INTRAVENOUS
Status: COMPLETED | OUTPATIENT
Start: 2023-02-14 | End: 2023-02-14

## 2023-02-14 RX ADMIN — DILTIAZEM HYDROCHLORIDE 10 MG/HR: 100 INJECTION, POWDER, LYOPHILIZED, FOR SOLUTION INTRAVENOUS at 12:02

## 2023-02-14 RX ADMIN — DILTIAZEM HYDROCHLORIDE 30 MG: 30 TABLET, FILM COATED ORAL at 09:02

## 2023-02-14 RX ADMIN — FUROSEMIDE 20 MG: 10 INJECTION, SOLUTION INTRAMUSCULAR; INTRAVENOUS at 02:02

## 2023-02-14 RX ADMIN — DILTIAZEM HYDROCHLORIDE 10 MG/HR: 100 INJECTION, POWDER, LYOPHILIZED, FOR SOLUTION INTRAVENOUS at 08:02

## 2023-02-14 NOTE — ED PROVIDER NOTES
Encounter Date: 2/14/2023       History     Chief Complaint   Patient presents with    Abnormal ECG     A FIB PER CARDIOLOGIST OFFICE    Cough    Shortness of Breath    Foot Swelling     BILAT     72-year-old male with history of coronary artery disease, hypertension, hyperlipidemia presents complaining of progressively worsening leg edema orthopnea PND and dyspnea with exertion over the past 2 months.  Denies any history of similar symptoms.  Denies abdominal pain or abdominal distention.  He has been urinating normally.  He has had a cough with clear sputum.  Patient was seen at his cardiologist's office today and was sent to the emergency room secondary to these symptoms along with new onset a fib/flutter.  He denies any headache or visual changes.  He denies any focal weakness numbness tingling or paresthesias.  He denies fever, chills body aches or malaise.  He denies any other problems complaints.      Review of patient's allergies indicates:  No Known Allergies  Past Medical History:   Diagnosis Date    Abnormal SPEP 4/29/2021    Coronary artery disease 2007    MI   1 c. stent    Discoid lupus     Essential hypertension 8/10/2015    Hyperlipidemia LDL goal < 100 8/10/2015     Past Surgical History:   Procedure Laterality Date    CORONARY ANGIOPLASTY WITH STENT PLACEMENT  2007    CYSTOSCOPY N/A 10/23/2019    Procedure: CYSTOSCOPY (flexible);  Surgeon: David Mera MD;  Location: 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 OR;  Service: Urology;  Laterality: Right;    VASECTOMY       Family History   Problem Relation Age of Onset    No Known Problems Mother     Melanoma Neg Hx     Psoriasis Neg Hx     Lupus Neg Hx     Eczema Neg Hx      Social History     Tobacco Use    Smoking status: Every Day     Types: Cigarettes     Smokeless tobacco: Never   Substance Use Topics    Alcohol use: Yes     Comment: social    Drug use: Never     Review of Systems   Constitutional:  Positive for activity change, appetite change and fatigue. Negative for chills, diaphoresis, fever and unexpected weight change.   HENT: Negative.  Negative for congestion, dental problem, ear pain, nosebleeds, rhinorrhea, sinus pain, sore throat, trouble swallowing and voice change.    Eyes: Negative.  Negative for pain and visual disturbance.   Respiratory:  Positive for cough and shortness of breath. Negative for chest tightness and wheezing.    Cardiovascular:  Positive for leg swelling. Negative for chest pain and palpitations.   Gastrointestinal: Negative.  Negative for abdominal pain, blood in stool, constipation, diarrhea, nausea and vomiting.   Endocrine: Negative.    Genitourinary: Negative.  Negative for difficulty urinating, dysuria, flank pain, frequency, penile pain, testicular pain and urgency.   Musculoskeletal: Negative.  Negative for arthralgias, back pain, gait problem, joint swelling, myalgias, neck pain and neck stiffness.   Skin: Negative.  Negative for pallor and rash.   Neurological:  Positive for light-headedness. Negative for dizziness, seizures, syncope, facial asymmetry, speech difficulty, weakness, numbness and headaches.   Hematological: Negative.  Does not bruise/bleed easily.   Psychiatric/Behavioral: Negative.  Negative for confusion.    All other systems reviewed and are negative.    Physical Exam     Initial Vitals [02/14/23 1139]   BP Pulse Resp Temp SpO2   110/87 (!) 142 20 98.3 °F (36.8 °C) 99 %      MAP       --         Physical Exam    Nursing note and vitals reviewed.  Constitutional: He appears well-nourished. He is active and cooperative. He appears ill. No distress.   HENT:   Head: Normocephalic and atraumatic.   Right Ear: Tympanic membrane normal.   Left Ear: Tympanic membrane normal.   Nose: Nose normal. No mucosal edema or  rhinorrhea.   Mouth/Throat: Uvula is midline, oropharynx is clear and moist and mucous membranes are normal. No oral lesions. No trismus in the jaw. No uvula swelling. No oropharyngeal exudate, posterior oropharyngeal edema, posterior oropharyngeal erythema or tonsillar abscesses.   Eyes: Conjunctivae, EOM and lids are normal. Pupils are equal, round, and reactive to light. Right eye exhibits no discharge. Left eye exhibits no discharge. No scleral icterus.   Neck: Trachea normal and phonation normal. Neck supple. No thyromegaly present. No stridor present. No tracheal deviation present. No JVD present.   Normal range of motion.   Full passive range of motion without pain.     Cardiovascular:  Normal heart sounds, intact distal pulses and normal pulses. An irregularly irregular rhythm present.   Tachycardia present.   Exam reveals no gallop, no distant heart sounds, no friction rub and no decreased pulses.       No murmur heard.  Pulmonary/Chest: Accessory muscle usage present. No stridor. Tachypnea noted. No respiratory distress. He has no decreased breath sounds. He has no wheezes. He has no rhonchi. He has rales in the right middle field, the right lower field, the left middle field and the left lower field.   Pulse ox 90% on room air, placed on 3 L of supplemental oxygen with improvement to 92%.  Patient is not on oxygen home.   Abdominal: Abdomen is soft. Bowel sounds are normal. He exhibits no distension, no pulsatile midline mass and no mass. There is no abdominal tenderness. No hernia. There is no rebound and no guarding.   Musculoskeletal:         General: No tenderness or edema. Normal range of motion.      Right hand: Normal. No tenderness or bony tenderness. Normal range of motion. Normal strength. Normal sensation. Normal capillary refill. Normal pulse.      Left hand: Normal. No tenderness or bony tenderness. Normal range of motion. Normal strength. Normal sensation. Normal capillary refill. Normal  pulse.      Cervical back: Normal, full passive range of motion without pain, normal range of motion and neck supple. No swelling, edema, erythema, rigidity, tenderness or bony tenderness. No pain with movement, spinous process tenderness or muscular tenderness. Normal range of motion and normal range of motion. Normal.      Thoracic back: Normal. No swelling, tenderness or bony tenderness. Normal range of motion.      Lumbar back: Normal. No swelling, edema, tenderness or bony tenderness. Normal range of motion.      Right lower leg: Swelling present.      Left lower leg: Swelling present.      Right ankle: Swelling present.      Left ankle: Swelling present.      Right foot: Normal range of motion and normal capillary refill. Swelling present. No tenderness or bony tenderness. Normal pulse.      Left foot: Normal range of motion and normal capillary refill. Swelling present. No tenderness or bony tenderness. Normal pulse.      Comments: Pulses are 2+ throughout, cap refill is less than 2 sec throughout, no edema noted, extremities are nontender throughout with full range of motion     Lymphadenopathy:     He has no cervical adenopathy.   Neurological: He is alert and oriented to person, place, and time. He has normal strength. No cranial nerve deficit or sensory deficit. Coordination and gait normal. GCS score is 15. GCS eye subscore is 4. GCS verbal subscore is 5. GCS motor subscore is 6.   Skin: Skin is warm and dry. Capillary refill takes less than 2 seconds. No ecchymosis, no petechiae and no rash noted. No cyanosis or erythema. No pallor.   Psychiatric: He has a normal mood and affect. His speech is normal and behavior is normal. Judgment and thought content normal. Cognition and memory are normal.       ED Course   Procedures  Labs Reviewed   CBC W/ AUTO DIFFERENTIAL - Abnormal; Notable for the following components:       Result Value    RBC 3.45 (*)     Hemoglobin 11.9 (*)     Hematocrit 37.0 (*)     MCV  107 (*)     MCH 34.5 (*)     RDW 15.1 (*)     All other components within normal limits   COMPREHENSIVE METABOLIC PANEL - Abnormal; Notable for the following components:    Sodium 133 (*)     Calcium 8.4 (*)     Total Protein 8.9 (*)     Albumin 2.9 (*)     Total Bilirubin 1.1 (*)     All other components within normal limits   B-TYPE NATRIURETIC PEPTIDE - Abnormal; Notable for the following components:     (*)     All other components within normal limits   CULTURE, BLOOD   CULTURE, BLOOD   TROPONIN I HIGH SENSITIVITY   PROTIME-INR   SARS-COV-2 RNA AMPLIFICATION, QUAL   MAGNESIUM   LIPASE   CK   TSH   DRUG SCREEN PANEL, URINE EMERGENCY   URINALYSIS, REFLEX TO URINE CULTURE   TROPONIN I HIGH SENSITIVITY        ECG Results              EKG 12-lead (In process)  Result time 02/14/23 12:01:47      In process by Interface, Lab In [facility] (02/14/23 12:01:47)                   Narrative:    Test Reason : R07.9,    Vent. Rate : 131 BPM     Atrial Rate : 094 BPM     P-R Int : 000 ms          QRS Dur : 082 ms      QT Int : 326 ms       P-R-T Axes : 000 -45 035 degrees     QTc Int : 481 ms    Atrial fibrillation with rapid ventricular response  Low voltage QRS  Left anterior fascicular block  Cannot rule out Anterior infarct ,age undetermined  Abnormal ECG  When compared with ECG of 10-OCT-2019 07:27,  Atrial fibrillation has replaced Sinus rhythm  Vent. rate has increased BY  51 BPM  Minimal criteria for Anterior infarct are now Present  Nonspecific T wave abnormality now evident in Lateral leads    Referred By: [email or web address]   SELF           Confirmed By:                                   Imaging Results              X-Ray Chest AP Portable (Final result)  Result time 02/14/23 12:31:32      Final result by Gideon De La Paz MD (02/14/23 12:31:32)                   Narrative:    Reason: SOB    FINDINGS:    Portable chest with comparison chest x-ray October 10, 2019 show normal cardiomediastinal silhouette.  Mixed  interstitial and hazy lung opacities of the right mid to lower lung noted. Retrocardiac linear opacities observed. Pulmonary vasculature is normal. No acute osseous abnormality.    IMPRESSION:    1.  Mixed interstitial and hazy lung opacities of the right mid to lower lung could reflect infectious infiltrate, atelectasis and/or asymmetric pulmonary edema.  2.  Retrocardiac linear opacities could reflect subsegmental atelectasis or infiltrate.    Electronically signed by:  Gideon De La Paz DO  2/14/2023 12:31 PM CST Workstation: [id number]                                     Medications   diltiaZEM 100 mg in dextrose 5% 100 mL IVPB (ready to mix system) (non-titrating) (10 mg/hr Intravenous New Bag 2/14/23 1208)   furosemide injection 20 mg (20 mg Intravenous Given 2/14/23 1449)     Medical Decision Making:   Initial Assessment:   72-year-old male with rapid heart rate shortness of breath and leg edema.  Differential Diagnosis:   Includes but is not limited to heart failure, acute coronary syndrome, pneumonia, hepatic failure, renal failure, hypoalbuminemia.  Clinical Tests:   Lab Tests: Reviewed and Ordered  Radiological Study: Ordered and Reviewed  Medical Tests: Ordered and Reviewed  ED Management:  Heart rate has improved with Cardizem.  X-ray findings suggestive of pulmonary edema.  Clinical exam is suggestive of CHF/volume overload as well with an elevated BNP and bilateral leg edema.  Leg edema is symmetric.  There is no tenderness palpation.  Extremities are neurovascularly intact throughout.  Diuresis has been initiated.  Heart rate is improved with IV Cardizem.  I have discussed the case in detail with the hospitalist provider who has assumed care and will admit.          Attending Attestation:         Attending Critical Care:   Critical Care Times:   Direct Patient Care (initial evaluation, reassessments, and time considering the case)................................................................12  minutes.   Additional History from reviewing old medical records or taking additional history from the family, EMS, PCP, etc.......................5 minutes.   Ordering, Reviewing, and Interpreting Diagnostic Studies...............................................................................................................5 minutes.   Documentation..................................................................................................................................................................................5 minutes.   Consultation with other Physicians. .................................................................................................................................................5 minutes.   Consultation with the patient's family directly relating to the patient's condition, care, and DNR status (when patient unable)......5 minutes.   ==============================================================  Total Critical Care Time - exclusive of procedural time: 37 minutes.  ==============================================================                     Clinical Impression:   Final diagnoses:  [I48.92] New onset atrial flutter (Primary)  [I48.92] Atrial flutter with rapid ventricular response  [I50.9] Acute congestive heart failure, unspecified heart failure type  [I48.91] New onset atrial fibrillation        ED Disposition Condition    Admit                 Oneyda Lopez MD  02/14/23 [id number]

## 2023-02-14 NOTE — Clinical Note
Diagnosis: New onset atrial fibrillation [320598]   Future Attending Provider: CATERINA TAN [[id number]]   Admitting Provider:: CATERINA TAN [[id number]]   Special Needs:: No Special Needs [1]

## 2023-02-14 NOTE — PROGRESS NOTES
Called 's office regarding the patient EKG per . Spoke to Karen who was relaying the information to the PA in the office. They consulted with  stating this was new onset os A-Fib and for the patient to report to the emergency room.

## 2023-02-14 NOTE — PROGRESS NOTES
SUBJECTIVE:    Patient ID: Sid Cuello Jr. is a 72 y.o. male.    Chief Complaint: Shortness of Breath, Cough, and Foot Swelling  72-year-old male last seen more than 1 year ago presents to clinic today complaining of shortness of breath, cough and leg swelling for the past 3 months.  Patient also states that whenever he eats he becomes very tired in needs to take a nap and he can take a nap anytime a day.    He he is being followed by Hematology for abnormal SPEP.          Significant past medical history  Discoid Taya:   Hyperlipidemia: Atorvastatin 10mg   Hypertension: Lisinopril 5mg, Metoprolol XL 25mg  Coronary artery disease status post stent placement 2007: Plavix 75mg           Specialist  Hematology/oncology:  Dr. Javier  Dermatology:  Dr. Anglin  Urology:  Dr. Mera  Orthopedics:  Dr. Trevizo  Cardiology: Dr Sotomayor        Smoke: 1 ppd  ETOH: 4 a day  Exercise: None       Shortness of Breath  This is a new problem. The current episode started 1 to 4 weeks ago. The problem occurs daily. The problem has been waxing and waning. The average episode lasts 2 Hours. Associated symptoms include leg swelling, orthopnea, rhinorrhea and sputum production. Pertinent negatives include no abdominal pain, chest pain, claudication, coryza, ear pain, fever, headaches, hemoptysis, leg pain, PND, rash, sore throat, syncope, vomiting or wheezing. The symptoms are aggravated by exercise. The patient has no known risk factors for DVT/PE. He has tried nothing for the symptoms. The treatment provided no relief. His past medical history is significant for CAD and a heart failure. There is no history of allergies, aspirin allergies, asthma, bronchiolitis, chronic lung disease, COPD, DVT, PE, pneumonia or a recent surgery.       Past Medical History:   Diagnosis Date    Abnormal SPEP 4/29/2021    Coronary artery disease 2007    MI   1 c. stent    Discoid lupus     Essential hypertension 8/10/2015    Hyperlipidemia LDL  goal < 100 8/10/2015     Social History     Socioeconomic History    Marital status:    Tobacco Use    Smoking status: Every Day     Types: Cigarettes    Smokeless tobacco: Never   Substance and Sexual Activity    Alcohol use: Yes     Comment: social    Drug use: Never     Past Surgical History:   Procedure Laterality Date    CORONARY ANGIOPLASTY WITH STENT PLACEMENT  2007    CYSTOSCOPY N/A 10/23/2019    Procedure: CYSTOSCOPY (flexible);  Surgeon: David Mera MD;  Location: [facility] OR;  Service: Urology;  Laterality: N/A;    FOOT SURGERY Left 1999    ORCHIECTOMY Right 10/23/2019    Procedure: 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     Current Outpatient Medications   Medication Sig Dispense Refill    aspirin (ECOTRIN) 81 MG EC tablet Take 81 mg by mouth once daily.      atorvastatin (LIPITOR) 10 MG tablet Take 10 mg by mouth once daily.      betamethasone dipropionate (DIPROLENE) 0.05 % lotion Apply thin film to scalp QHS PRN itch (Patient taking differently: Apply 1 application topically nightly. Apply thin film to scalp QHS PRN itch) 60 mL 2    clopidogrel (PLAVIX) 75 mg tablet Take 75 mg by mouth once daily.      cyanocobalamin 1,000 mcg/mL injection Inject 1,000 mcg into the muscle every 30 days.      lisinopriL (PRINIVIL,ZESTRIL) 5 MG tablet Take 5 mg by mouth once daily.      metoprolol succinate (TOPROL-XL) 25 MG 24 hr tablet Take 25 mg by mouth once daily.      triamcinolone acetonide 0.1% (KENALOG) 0.1 % cream AAA left cheek bid (Patient taking differently: Apply 1 g topically 2 (two) times daily. AAA left cheek bid) 45 g 3     fluticasone-umeclidin-vilanter (TRELEGY ELLIPTA) 100-62.5-25 mcg DsDv Inhale 1 puff into the lungs once daily. (Patient taking differently: Inhale 1 puff into the lungs once daily. NEW Rx - NOT YET STARTED) 60 each 3     Facility-Administered Medications Ordered in Other Visits   Medication Dose Route Frequency Provider Last Rate Last Admin    diltiaZEM 100 mg in dextrose 5% 100 mL IVPB (ready to mix system) (non-titrating)  10 mg/hr Intravenous Continuous Oneyda Lopez MD 10 mL/hr at 02/14/23 1208 10 mg/hr at 02/14/23 1208    diphenhydrAMINE injection 25 mg  25 mg Intravenous Q6H PRN Dhruv Gutierrez MD        fentaNYL injection 25 mcg  25 mcg Intravenous Q5 Min PRN Dhruv Gutierrez MD        hydromorphone (PF) injection 0.2 mg  0.2 mg Intravenous Q5 Min PRN Dhruv Gutierrez MD        lactated ringers infusion  10 mL/hr Intravenous Continuous Dhruv Gutierrez MD   Stopped at 10/23/19 1450    lactated ringers infusion  75 mL/hr Intravenous Continuous Dhruv Gutierrez MD        lidocaine (PF) 10 mg/ml (1%) injection 10 mg  1 mL Intradermal Once Dhruv Gutierrez MD        ondansetron injection 4 mg  4 mg Intravenous Once Dhruv Gutierrez MD        oxyCODONE immediate release tablet 5 mg  5 mg Oral PRN Dhruv Gutierrez MD        promethazine (PHENERGAN) 6.25 mg in dextrose 5 % 50 mL IVPB  6.25 mg Intravenous Q10 Min PRN Dhruv Gutierrez MD        sodium chloride 0.9% flush 3 mL  3 mL Intravenous Q8H Dhruv Gutierrez MD        sodium chloride 0.9% flush 3 mL  3 mL Intravenous PRN Dhruv Gutierrez MD         Review of patient's allergies indicates:  No Known Allergies    Review of Systems   Constitutional:  Positive for fatigue. Negative for activity change, appetite change, chills, diaphoresis and fever.   HENT:  Positive for rhinorrhea. Negative for congestion, ear pain and sore throat.    Respiratory:  Positive for cough, sputum production and shortness of breath. Negative  "for hemoptysis, choking, chest tightness and wheezing.    Cardiovascular:  Positive for orthopnea and leg swelling. Negative for chest pain, claudication, syncope and PND.   Gastrointestinal:  Negative for abdominal pain, anal bleeding, blood in stool, constipation, diarrhea and vomiting.   Genitourinary:  Negative for dysuria, flank pain, frequency and urgency.   Skin:  Negative for rash.   Neurological:  Negative for headaches.        Blood pressure 121/83, pulse 71, height 5' 9" (1.753 m), weight 71.9 kg (158 lb 9.6 oz), SpO2 99 %. Body mass index is 23.42 kg/m².   Objective:      Physical Exam  Vitals (Patient tachycardic) reviewed.   Constitutional:       General: He is not in acute distress.     Appearance: Normal appearance. He is normal weight. He is not ill-appearing or toxic-appearing.   HENT:      Head: Normocephalic and atraumatic.   Cardiovascular:      Rate and Rhythm: Tachycardia present. Rhythm irregular.      Comments: Very faint heart sounds.      Patient with EKG in clinic today demonstrates atrial fib patient does not have a history of atrial fibrillation contacted his Cardiology office a recommended he go to the emergency room for initial evaluation of new onset AFib.  Neurological:      Mental Status: He is alert.           Assessment:       1. SOB (shortness of breath)    2. New onset atrial fibrillation         Plan:           SOB (shortness of breath)  -     POCT EKG 12-LEAD (Manually Resulted by Ordering Provider)  -     X-Ray Chest PA And Lateral; Future; Expected date: 02/14/2023  -     BNP; Future; Expected date: 02/14/2023    New onset atrial fibrillation  Patient with new onset atrial fibrillation on EKG in clinic, cardiology office recommended referral to the emergency room for initial evaluation.                      "

## 2023-02-15 ENCOUNTER — CLINICAL SUPPORT (OUTPATIENT)
Dept: CARDIOLOGY | Facility: HOSPITAL | Age: 73
DRG: 308 | End: 2023-02-15
Attending: INTERNAL MEDICINE
Payer: MEDICARE

## 2023-02-15 VITALS — BODY MASS INDEX: 22.51 KG/M2 | WEIGHT: 152 LBS | HEIGHT: 69 IN

## 2023-02-15 PROBLEM — I50.9 CHF (CONGESTIVE HEART FAILURE): Status: ACTIVE | Noted: 2023-02-15

## 2023-02-15 LAB
ANION GAP SERPL CALC-SCNC: 6 MMOL/L (ref 8–16)
BUN SERPL-MCNC: 16 MG/DL (ref 8–23)
CALCIUM SERPL-MCNC: 8.3 MG/DL (ref 8.7–10.5)
CHLORIDE SERPL-SCNC: 101 MMOL/L (ref 95–110)
CO2 SERPL-SCNC: 28 MMOL/L (ref 23–29)
CREAT SERPL-MCNC: 1 MG/DL (ref 0.5–1.4)
ERYTHROCYTE [DISTWIDTH] IN BLOOD BY AUTOMATED COUNT: 15 % (ref 11.5–14.5)
EST. GFR  (NO RACE VARIABLE): >60 ML/MIN/1.73 M^2
GLUCOSE SERPL-MCNC: 93 MG/DL (ref 70–110)
HCT VFR BLD AUTO: 33.1 % (ref 40–54)
HGB BLD-MCNC: 10.8 G/DL (ref 14–18)
MAGNESIUM SERPL-MCNC: 1.7 MG/DL (ref 1.6–2.6)
MAGNESIUM SERPL-MCNC: 1.7 MG/DL (ref 1.6–2.6)
MCH RBC QN AUTO: 34.6 PG (ref 27–31)
MCHC RBC AUTO-ENTMCNC: 32.6 G/DL (ref 32–36)
MCV RBC AUTO: 106 FL (ref 82–98)
PLATELET # BLD AUTO: 145 K/UL (ref 150–450)
PMV BLD AUTO: 11.5 FL (ref 9.2–12.9)
POTASSIUM SERPL-SCNC: 3.6 MMOL/L (ref 3.5–5.1)
POTASSIUM SERPL-SCNC: 4.1 MMOL/L (ref 3.5–5.1)
RBC # BLD AUTO: 3.12 M/UL (ref 4.6–6.2)
SODIUM SERPL-SCNC: 135 MMOL/L (ref 136–145)
WBC # BLD AUTO: 3.97 K/UL (ref 3.9–12.7)

## 2023-02-15 PROCEDURE — 80048 BASIC METABOLIC PNL TOTAL CA: CPT | Performed by: INTERNAL MEDICINE

## 2023-02-15 PROCEDURE — 21400001 HC TELEMETRY ROOM

## 2023-02-15 PROCEDURE — 36415 COLL VENOUS BLD VENIPUNCTURE: CPT | Performed by: INTERNAL MEDICINE

## 2023-02-15 PROCEDURE — 84132 ASSAY OF SERUM POTASSIUM: CPT | Performed by: STUDENT IN AN ORGANIZED HEALTH CARE EDUCATION/TRAINING PROGRAM

## 2023-02-15 PROCEDURE — 85027 COMPLETE CBC AUTOMATED: CPT | Performed by: INTERNAL MEDICINE

## 2023-02-15 PROCEDURE — 83735 ASSAY OF MAGNESIUM: CPT | Mod: 91 | Performed by: STUDENT IN AN ORGANIZED HEALTH CARE EDUCATION/TRAINING PROGRAM

## 2023-02-15 PROCEDURE — 93306 TTE W/DOPPLER COMPLETE: CPT

## 2023-02-15 PROCEDURE — 25000003 PHARM REV CODE 250: Performed by: INTERNAL MEDICINE

## 2023-02-15 PROCEDURE — 63600175 PHARM REV CODE 636 W HCPCS: Performed by: INTERNAL MEDICINE

## 2023-02-15 PROCEDURE — 36415 COLL VENOUS BLD VENIPUNCTURE: CPT | Performed by: STUDENT IN AN ORGANIZED HEALTH CARE EDUCATION/TRAINING PROGRAM

## 2023-02-15 PROCEDURE — 83735 ASSAY OF MAGNESIUM: CPT | Performed by: INTERNAL MEDICINE

## 2023-02-15 RX ORDER — METOPROLOL SUCCINATE 50 MG/1
50 TABLET, EXTENDED RELEASE ORAL 2 TIMES DAILY
Status: DISCONTINUED | OUTPATIENT
Start: 2023-02-15 | End: 2023-02-17 | Stop reason: HOSPADM

## 2023-02-15 RX ORDER — FUROSEMIDE 10 MG/ML
40 INJECTION INTRAMUSCULAR; INTRAVENOUS 3 TIMES DAILY
Status: DISCONTINUED | OUTPATIENT
Start: 2023-02-15 | End: 2023-02-16

## 2023-02-15 RX ADMIN — ASPIRIN 81 MG: 81 TABLET, COATED ORAL at 08:02

## 2023-02-15 RX ADMIN — ENOXAPARIN SODIUM 70 MG: 80 INJECTION SUBCUTANEOUS at 08:02

## 2023-02-15 RX ADMIN — FUROSEMIDE 40 MG: 10 INJECTION, SOLUTION INTRAVENOUS at 09:02

## 2023-02-15 RX ADMIN — Medication 800 MG: at 05:02

## 2023-02-15 RX ADMIN — METOPROLOL SUCCINATE 50 MG: 50 TABLET, FILM COATED, EXTENDED RELEASE ORAL at 08:02

## 2023-02-15 RX ADMIN — POTASSIUM BICARBONATE 50 MEQ: 977.5 TABLET, EFFERVESCENT ORAL at 05:02

## 2023-02-15 RX ADMIN — ATORVASTATIN CALCIUM 10 MG: 10 TABLET, FILM COATED ORAL at 08:02

## 2023-02-15 RX ADMIN — FUROSEMIDE 40 MG: 10 INJECTION, SOLUTION INTRAVENOUS at 03:02

## 2023-02-15 RX ADMIN — DILTIAZEM HYDROCHLORIDE 30 MG: 30 TABLET, FILM COATED ORAL at 02:02

## 2023-02-15 RX ADMIN — Medication 800 MG: at 09:02

## 2023-02-15 RX ADMIN — FUROSEMIDE 40 MG: 10 INJECTION, SOLUTION INTRAVENOUS at 08:02

## 2023-02-15 NOTE — PROGRESS NOTES
[facility] Medicine  Progress Note    Patient Name: Sid Cuello Jr.  MRN: [id number]  Patient Class: IP- Inpatient   Admission Date: 2/14/2023  Length of Stay: 1 days  Attending Physician: Bala Sarkar MD  Primary Care Provider: Yan Posada MD        Subjective:     Principal Problem:Atrial fibrillation with RVR        HPI:  Patient is a 72-year-old  male with known history of CAD status post stenting in 2007, essential hypertension and discoid lupus who was sent to the ED from PCP's office secondary to new diagnosis of atrial fibrillation.    Patient reports being in his usual state of health until about Anjali when he noticed gradually worsening bilateral lower extremity edema.  This has become severe over the last 2 days.  Also he reports worsening shortness of breath over the last 2 weeks.  Shortness of breath is worse with exertion and better with rest.  Reports easy fatigability however this is a chronic issue for him.  Denies chest pain, palpitations, orthopnea or paroxysmal nocturnal dyspnea.  He has history of abnormal SPEP profile but no evidence of myeloma.  He follows with Dr. Javier.     He is a retired .  Smokes 1 pack per day.  Consumes alcohol about 3-4 times per week.    Rest of the 10 point review of systems is negative except as mentioned above.      Overview/Hospital Course:  No notes on file    Interval History:  Patient says he has been short of breath and having cough productive of clear sputum.  No fever chills.  He denies chest pain or palpitations.  He endorses lower extremity swelling that has been going on for some time.    He has had extensive workup in the past for abnormal serum proteins.  He has declined consult with [facility] in the past in visits with Dr Javier. He has hypoalbuminemia, peripheral neuropathy, and anemia with some concern for development of subvariant of MGUS / Amyloid light chain deposition.  He would greatly benefit from consultation with paraprotein specialist as recommended by Dr Wolfe in the past.     Workup including bone marrow biopsy has been overall inconclusive in establishing his diagnosis. He likely needs additional bone marrow biopsy and possible PET scan as an outpatient. Question of whether his paraproteinema could be playing a role in his CHF development and arrhythmia.     Review of Systems   All other systems reviewed and are negative.  Objective:     Vital Signs (Most Recent):  Temp: 97.8 °F (36.6 °C) (02/15/23 0737)  Pulse: 106 (02/15/23 0737)  Resp: 20 (02/15/23 0737)  BP: 108/71 (02/15/23 0737)  SpO2: (!) 91 % (02/15/23 0737)   Vital Signs (24h Range):  Temp:  [97.7 °F (36.5 °C)-98.3 °F (36.8 °C)] 97.8 °F (36.6 °C)  Pulse:  [] 106  Resp:  [20-34] 20  SpO2:  [84 %-99 %] 91 %  BP: ()/(50-93) 108/71     Weight: 69 kg (152 lb 3.2 oz)  Body mass index is 22.48 kg/m².    Intake/Output Summary (Last 24 hours) at 2/15/2023 1002  Last data filed at 2/15/2023 0915  Gross per 24 hour   Intake 120 ml   Output 600 ml   Net -480 ml      Physical Exam  Vitals reviewed.   Constitutional:       Appearance: Normal appearance.   HENT:      Head: Normocephalic and atraumatic.      Nose: Nose normal.      Mouth/Throat:      Mouth: Mucous membranes are moist.   Eyes:      Pupils: Pupils are equal, round, and reactive to light.   Cardiovascular:      Rate and Rhythm: Tachycardia present. Rhythm irregular.      Pulses: Normal pulses.      Heart sounds: Normal heart sounds. No murmur heard.    No friction rub. No gallop.   Pulmonary:      Effort: Pulmonary effort is normal. No respiratory distress.      Breath sounds: Normal breath sounds.      Comments: Crackles at the bases  Abdominal:      General: Abdomen is flat. Bowel sounds are normal. There is no distension.      Palpations: Abdomen is soft.      Tenderness: There is no abdominal tenderness.   Musculoskeletal:         General: No  swelling. Normal range of motion.      Cervical back: Normal range of motion and neck supple.      Right lower leg: Edema present.      Left lower leg: Edema present.   Skin:     General: Skin is warm and dry.   Neurological:      General: No focal deficit present.      Mental Status: He is alert and oriented to person, place, and time.   Psychiatric:         Mood and Affect: Mood normal.         Behavior: Behavior normal.         Thought Content: Thought content normal.         Judgment: Judgment normal.       Significant Labs: All pertinent labs within the past 24 hours have been reviewed.    Significant Imaging: I have reviewed all pertinent imaging results/findings within the past 24 hours.      Assessment/Plan:      * Atrial fibrillation with RVR  Patient with Paroxysmal (<7 days) atrial fibrillation which is uncontrolled currently with Calcium Channel Blocker. Patient is currently in atrial fibrillation.[id number] Score: 2. Anticoagulation indicated. Anticoagulation done with enoxaparin.    Telemetry monitoring   Continue diltiazem drip for rate control  Obtain echocardiogram > pending  Cardiology consultation > appreciate Dr Sotomayor's recommendations  Aggressive diuresis  Will adjust rate controlling medications as we complete the workup    CHF (congestive heart failure)  CHF exacerbation with volume overload, elevated BNP, and SOB / Coughing  - echo is pending  - diuresis as noted elsewhere  - hx of CAD  - further plans based on Echo results    Abnormal SPEP  The patient has had extensive workup for abnormal paraproteins. He has declined to see paraprotein specialist in the past.   - I am concerned he may have a MGUS subvariant and could possibly have amyloid light chain deposition. He has neuropathy, hypoalbuminemia (suggesting nephropathy), anemia, and may be playing a role in CHF / Arrhythmia  - Will strongly urge him to follow up with Dr Blanco at [facility] as recommended by Dr Javier  - he also may need  repeat bone marrow biopsy etc as an outpatient        Essential hypertension  Hold lisinopril given soft pressures and to accommodate calcium channel blockade   Continue beta-blocker; on metoprolol succinate 25 mg once daily  Adjusted per Dr Sotomayor      Coronary artery disease involving native coronary artery without angina pectoris  Continue aspirin.  Hold clopidogrel (remote history of stenting) to avoid triple therapy  Continue statin and beta-blocker      VTE Risk Mitigation (From admission, onward)         Ordered     enoxaparin injection 70 mg  Every 12 hours         02/14/23 2042     IP VTE HIGH RISK PATIENT  Once         02/14/23 2042     Place sequential compression device  Until discontinued         02/14/23 2042                Discharge Planning   KALINA:      Code Status: Full Code   Is the patient medically ready for discharge?:     Reason for patient still in hospital (select all that apply): Treatment                     Bala Sarkar MD  Department of Hospital Medicine   [facility]

## 2023-02-15 NOTE — ASSESSMENT & PLAN NOTE
Patient with Paroxysmal (<7 days) atrial fibrillation which is uncontrolled currently with Calcium Channel Blocker. Patient is currently in atrial fibrillation.[id number] Score: 2. Anticoagulation indicated. Anticoagulation done with enoxaparin.    Telemetry monitoring   Continue diltiazem drip for rate control  Obtain echocardiogram > pending  Cardiology consultation > appreciate Dr Sotomayor's recommendations  Aggressive diuresis  Will adjust rate controlling medications as we complete the workup

## 2023-02-15 NOTE — SUBJECTIVE & OBJECTIVE
Past Medical History:   Diagnosis Date    Abnormal SPEP 4/29/2021    Coronary artery disease 2007    MI   1 c. 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;  Surgeon: David Mera MD;  Location: [facility] OR;  Service: Urology;  Laterality: Right;    VASECTOMY         Review of patient's allergies indicates:  No Known Allergies    Current Facility-Administered Medications on File Prior to Encounter   Medication    diphenhydrAMINE injection 25 mg    fentaNYL injection 25 mcg    hydromorphone (PF) injection 0.2 mg    lactated ringers infusion    lactated ringers infusion    lidocaine (PF) 10 mg/ml (1%) injection 10 mg    ondansetron injection 4 mg    oxyCODONE immediate release tablet 5 mg    promethazine (PHENERGAN) 6.25 mg in dextrose 5 % 50 mL IVPB    sodium chloride 0.9% flush 3 mL    sodium chloride 0.9% flush 3 mL     Current Outpatient Medications on File Prior to Encounter   Medication Sig    aspirin (ECOTRIN) 81 MG EC tablet Take 81 mg by mouth once daily.    atorvastatin (LIPITOR) 10 MG tablet Take 10 mg by mouth once daily.    clopidogrel (PLAVIX) 75 mg tablet Take 75 mg by mouth once daily.    cyanocobalamin 1,000 mcg/mL injection Inject 1,000 mcg into the muscle every 30 days.    lisinopriL (PRINIVIL,ZESTRIL) 5 MG tablet Take 5 mg by mouth once daily.    metoprolol succinate (TOPROL-XL) 25 MG 24 hr tablet Take 25 mg by mouth once daily.    triamcinolone acetonide 0.1% (KENALOG) 0.1 % cream AAA left cheek bid (Patient taking differently: Apply 1 g topically 2  (two) times daily. AAA left cheek bid)    betamethasone dipropionate (DIPROLENE) 0.05 % lotion Apply thin film to scalp QHS PRN itch (Patient taking differently: Apply 1 application topically nightly. Apply thin film to scalp QHS PRN itch)    fluticasone-umeclidin-vilanter (TRELEGY ELLIPTA) 100-62.5-25 mcg DsDv Inhale 1 puff into the lungs once daily. (Patient taking differently: Inhale 1 puff into the lungs once daily. NEW Rx - NOT YET STARTED)    [DISCONTINUED] atorvastatin (LIPITOR) 40 MG tablet     [DISCONTINUED] lisinopril (PRINIVIL,ZESTRIL) 20 MG tablet Take 20 mg by mouth once daily.     Family History       Problem Relation (Age of Onset)    No Known Problems Mother          Tobacco Use    Smoking status: Every Day     Types: Cigarettes    Smokeless tobacco: Never   Substance and Sexual Activity    Alcohol use: Yes     Comment: social    Drug use: Never    Sexual activity: Not on file       Objective:     Vital Signs (Most Recent):  Temp: 98.3 °F (36.8 °C) (02/14/23 1139)  Pulse: 108 (02/14/23 1747)  Resp: (!) 33 (02/14/23 1747)  BP: 109/87 (02/14/23 1740)  SpO2: (!) 91 % (02/14/23 1747)   Vital Signs (24h Range):  Temp:  [98.3 °F (36.8 °C)] 98.3 °F (36.8 °C)  Pulse:  [] 108  Resp:  [20-33] 33  SpO2:  [91 %-99 %] 91 %  BP: (108-121)/(77-93) 109/87     Weight: 71.7 kg (158 lb)  Body mass index is 23.33 kg/m².    Physical Exam      General: Patient resting comfortably in no acute distress. Appears stated age  Head: Normocephalic and atraumatic   Eyes:  No conjunctival pallor. No scleral icterus.  Mouth: Oral mucosa moist   Lungs:  Tachypnea noted.  No accessory muscle use.  Cor:  Tachycardic.  Irregularly irregular.  No murmurs.  2+ pitting pedal edema.  Abd: Soft. Nontender  Musculoskeletal: No arthropathy, deformity.  Skin: No rashes, swelling, or erythema.  Neuro: A&O x 3. Moving all extremities equally. Follows commands  Ext: No clubbing. No cyanosis. Peripheral pulses +  Psych: Normal mood and  affect. Memory intact     Significant Labs: All pertinent labs within the past 24 hours have been reviewed.  CBC:   Recent Labs   Lab 02/14/23  1154   WBC 4.21   HGB 11.9*   HCT 37.0*        CMP:   Recent Labs   Lab 02/14/23  1154   *   K 3.8   CL 99   CO2 26   GLU 91   BUN 17   CREATININE 1.1   CALCIUM 8.4*   PROT 8.9*   ALBUMIN 2.9*   BILITOT 1.1*   ALKPHOS 93   AST 35   ALT 13   ANIONGAP 8     Cardiac Markers:   Recent Labs   Lab 02/14/23  1157   *     Troponin:   Recent Labs   Lab 02/14/23  1154 02/14/23  1634   TROPONINIHS 11.0 12.0       Significant Imaging: I have reviewed all pertinent imaging results/findings within the past 24 hours.    Imaging Results              X-Ray Chest AP Portable (Final result)  Result time 02/14/23 12:31:32      Final result by Gideon De La Paz MD (02/14/23 12:31:32)                   Narrative:    Reason: SOB    FINDINGS:    Portable chest with comparison chest x-ray October 10, 2019 show normal cardiomediastinal silhouette.  Mixed interstitial and hazy lung opacities of the right mid to lower lung noted. Retrocardiac linear opacities observed. Pulmonary vasculature is normal. No acute osseous abnormality.    IMPRESSION:    1.  Mixed interstitial and hazy lung opacities of the right mid to lower lung could reflect infectious infiltrate, atelectasis and/or asymmetric pulmonary edema.  2.  Retrocardiac linear opacities could reflect subsegmental atelectasis or infiltrate.    Electronically signed by:  Gideon De La Paz DO  2/14/2023 12:31 PM CST Workstation: [id number]                                    Results for orders placed or performed during the hospital encounter of 02/14/23   EKG 12-lead    Collection Time: 02/14/23 11:41 AM    Narrative    Test Reason : R07.9,    Vent. Rate : 131 BPM     Atrial Rate : 094 BPM     P-R Int : 000 ms          QRS Dur : 082 ms      QT Int : 326 ms       P-R-T Axes : 000 -45 035 degrees     QTc Int : 481 ms    Atrial  fibrillation with rapid ventricular response  Low voltage QRS  Left anterior fascicular block  Cannot rule out Anterior infarct ,age undetermined  Abnormal ECG  When compared with ECG of 10-OCT-2019 07:27,  Atrial fibrillation has replaced Sinus rhythm  Vent. rate has increased BY  51 BPM  Minimal criteria for Anterior infarct are now Present  Nonspecific T wave abnormality now evident in Lateral leads    Referred By: [email or web address]   SELF           Confirmed By:

## 2023-02-15 NOTE — ASSESSMENT & PLAN NOTE
CHF exacerbation with volume overload, elevated BNP, and SOB / Coughing  - echo is pending  - diuresis as noted elsewhere  - hx of CAD  - further plans based on Echo results

## 2023-02-15 NOTE — ASSESSMENT & PLAN NOTE
Patient with Paroxysmal (<7 days) atrial fibrillation which is uncontrolled currently with Calcium Channel Blocker. Patient is currently in atrial fibrillation.[id number] Score: 2. Anticoagulation indicated. Anticoagulation done with enoxaparin.    Telemetry monitoring   Continue diltiazem drip at 10 milligram/hour.  We will start oral diltiazem 30 mg q.6 hours in an attempt to wean the drip  Obtain echocardiogram   Cardiology consultation  Status post 20 mg of IV furosemide  Switch to p.o. furosemide tomorrow

## 2023-02-15 NOTE — PLAN OF CARE
[facility]  Initial Discharge Assessment       Primary Care Provider: Yan Posada MD    Admission Diagnosis: New onset atrial fibrillation [I48.91]  New onset atrial flutter [I48.92]    Admission Date: 2/14/2023  Expected Discharge Date:     Discharge Barriers Identified: None    Assessment completed at bedside.  Advanced directives not addressed at this time.  Patient intends to discharge home where he lives with his wife, Jovana Cuello [phone].  No needs identified at this time.    Payor: MEDICARE / Plan: MEDICARE PART A & B / Product Type: Government /     Extended Emergency Contact Information  Primary Emergency Contact: Jovana Cuello  Address: [address]           [place] [address] [address] [place]  Home Phone: [phone]  Mobile Phone: [phone]  Relation: Spouse  Preferred language: English   needed? No    Discharge Plan A: Home  Discharge Plan B: Home with family      [id number] - [place] - [address] & [address]  [address]  [place] [address]  Phone: [phone] Fax: [phone]    [facility] - [facility] [address]  [address] [address]  Phone: [phone] Fax: [phone]      Initial Assessment (most recent)       Adult Discharge Assessment - 02/15/23 1522          Discharge Assessment    Assessment Type Discharge Planning Assessment     Confirmed/corrected address, phone number and insurance Yes     Confirmed Demographics Correct on Facesheet     Source of Information patient     When was your last doctors appointment? 02/14/23     Communicated KALINA with patient/caregiver Yes     Reason For Admission afib with rvr     People in Home spouse     Facility Arrived From: home     Do you expect to return to your current living situation? Yes     Do you have help at home or someone to help you manage your care at home? Yes     Who are your  caregiver(s) and their phone number(s)? Jovana Cuello [phone]     Prior to hospitilization cognitive status: Alert/Oriented     Current cognitive status: Alert/Oriented     Equipment Currently Used at Home none     Readmission within 30 days? No     Patient currently being followed by outpatient case management? No     Do you currently have service(s) that help you manage your care at home? No     Do you take prescription medications? Yes     Do you have prescription coverage? Yes     Coverage [employer]     Do you have any problems affording any of your prescribed medications? No     Is the patient taking medications as prescribed? yes     Who is going to help you get home at discharge? Jovana Cuello [phone]     How do you get to doctors appointments? car, drives self     Are you on dialysis? No     Do you take coumadin? No     Discharge Plan A Home     Discharge Plan B Home with family     DME Needed Upon Discharge  none     Discharge Plan discussed with: Patient     Discharge Barriers Identified None

## 2023-02-15 NOTE — ASSESSMENT & PLAN NOTE
Hold lisinopril given soft pressures and to accommodate calcium channel blockade   Continue beta-blocker; on metoprolol succinate 25 mg once daily

## 2023-02-15 NOTE — PROGRESS NOTES
[facility]   Cardiology Note    Consult Requested By:  Hospital medicine  Reason for Consult:  Atrial fib    SUBJECTIVE:     History of Present Illness:  72-year-old white male admitted with increasing shortness of breath over the past 2 weeks with edema and rapid atrial fibrillation.  Patient denies any fever or chills.  Patient has been having increasing leg edema as well as PND and orthopnea.  He also has been losing some weight.  He is never had atrial fibrillation in the past.  He does have a history of coronary artery disease status post stent remotely    Review of patient's allergies indicates:  No Known Allergies    Past Medical History:   Diagnosis Date    Abnormal SPEP 4/29/2021    Coronary artery disease 2007    MI   1 c. 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    OBJECTIVE:     Vital Signs (Most Recent)  Temp: 97.8 °F (36.6 °C) (02/15/23 0737)  Pulse: 106 (02/15/23 0737)  Resp: 20 (02/15/23 0737)  BP:  108/71 (02/15/23 0737)  SpO2: (!) 91 % (02/15/23 0737)    Vital Signs Range (Last 24H):  Temp:  [97.7 °F (36.5 °C)-98.3 °F (36.8 °C)]   Pulse:  []   Resp:  [20-34]   BP: ()/(50-93)   SpO2:  [84 %-99 %]     I & O (Last 24H):    Intake/Output Summary (Last 24 hours) at 2/15/2023 0819  Last data filed at 2/15/2023 0516  Gross per 24 hour   Intake 120 ml   Output 300 ml   Net -180 ml       Current Diet:     Current Diet Order   Procedures    Diet Low Sodium, 2gm        Allergies:  Review of patient's allergies indicates:  No Known Allergies    Meds:  Scheduled Meds:   aspirin  81 mg Oral Daily    atorvastatin  10 mg Oral Daily    diltiaZEM  30 mg Oral Q6H    enoxaparin  1 mg/kg Subcutaneous Q12H    furosemide  20 mg Oral Daily    metoprolol succinate  25 mg Oral Daily     Continuous Infusions:   dilTIAZem Stopped (02/14/23 9705)     PRN Meds:acetaminophen, magnesium oxide, magnesium oxide, melatonin, naloxone, ondansetron, polyethylene glycol, potassium bicarbonate, potassium bicarbonate, potassium bicarbonate, potassium, sodium phosphates, potassium, sodium phosphates, potassium, sodium phosphates    Oxygen/Ventilator Data (Last 24H):  (if applicable)        Hemodynamic Parameters (Last 24H):   (if applicable)        Laboratory and Radiology Data:  Recent Results (from the past 24 hour(s))   COVID-19 Rapid Screening    Collection Time: 02/14/23 11:46 AM   Result Value Ref Range    SARS-CoV-2 RNA, Amplification, Qual Negative Negative   CBC auto differential    Collection Time: 02/14/23 11:54 AM   Result Value Ref Range    WBC 4.21 3.90 - 12.70 K/uL    RBC 3.45 (L) 4.60 - 6.20 M/uL    Hemoglobin 11.9 (L) 14.0 - 18.0 g/dL    Hematocrit 37.0 (L) 40.0 - 54.0 %     (H) 82 - 98 fL    MCH 34.5 (H) 27.0 - 31.0 pg    MCHC 32.2 32.0 - 36.0 g/dL    RDW 15.1 (H) 11.5 - 14.5 %    Platelets 156 150 - 450 K/uL    MPV 11.3 9.2 - 12.9 fL    Immature Granulocytes 0.2 0.0 - 0.5 %    Gran # (ANC) 2.2 1.8 - 7.7 K/uL     Immature Grans (Abs) 0.01 0.00 - 0.04 K/uL    Lymph # 1.7 1.0 - 4.8 K/uL    Mono # 0.3 0.3 - 1.0 K/uL    Eos # 0.0 0.0 - 0.5 K/uL    Baso # 0.04 0.00 - 0.20 K/uL    nRBC 0 0 /100 WBC    Gran % 52.5 38.0 - 73.0 %    Lymph % 39.9 18.0 - 48.0 %    Mono % 5.9 4.0 - 15.0 %    Eosinophil % 0.5 0.0 - 8.0 %    Basophil % 1.0 0.0 - 1.9 %    Differential Method Automated    Comprehensive metabolic panel    Collection Time: 02/14/23 11:54 AM   Result Value Ref Range    Sodium 133 (L) 136 - 145 mmol/L    Potassium 3.8 3.5 - 5.1 mmol/L    Chloride 99 95 - 110 mmol/L    CO2 26 23 - 29 mmol/L    Glucose 91 70 - 110 mg/dL    BUN 17 8 - 23 mg/dL    Creatinine 1.1 0.5 - 1.4 mg/dL    Calcium 8.4 (L) 8.7 - 10.5 mg/dL    Total Protein 8.9 (H) 6.0 - 8.4 g/dL    Albumin 2.9 (L) 3.5 - 5.2 g/dL    Total Bilirubin 1.1 (H) 0.1 - 1.0 mg/dL    Alkaline Phosphatase 93 55 - 135 U/L    AST 35 10 - 40 U/L    ALT 13 10 - 44 U/L    Anion Gap 8 8 - 16 mmol/L    eGFR >60.0 >60 mL/min/1.73 m^2   Troponin I High Sensitivity #1    Collection Time: 02/14/23 11:54 AM   Result Value Ref Range    Troponin I High Sensitivity 11.0 0.0 - 14.9 pg/mL   Protime-INR    Collection Time: 02/14/23 11:54 AM   Result Value Ref Range    Prothrombin Time 11.4 9.0 - 12.5 sec    INR 1.1 0.8 - 1.2   Magnesium    Collection Time: 02/14/23 11:54 AM   Result Value Ref Range    Magnesium 1.7 1.6 - 2.6 mg/dL   Lipase    Collection Time: 02/14/23 11:57 AM   Result Value Ref Range    Lipase 32 4 - 60 U/L   CK    Collection Time: 02/14/23 11:57 AM   Result Value Ref Range    CPK 57 20 - 200 U/L   TSH    Collection Time: 02/14/23 11:57 AM   Result Value Ref Range    TSH 3.440 0.340 - 5.600 uIU/mL   B-Type natriuretic peptide (BNP)    Collection Time: 02/14/23 11:57 AM   Result Value Ref Range     (H) 0 - 99 pg/mL   Blood culture #2 **CANNOT BE ORDERED STAT**    Collection Time: 02/14/23  4:16 PM    Specimen: Peripheral, Forearm, Left; Blood   Result Value Ref Range    Blood  Culture, Routine No Growth to date    Drug screen panel, in-house    Collection Time: 02/14/23  4:28 PM   Result Value Ref Range    Benzodiazepines Negative Negative    Cocaine (Metab.) Negative Negative    Opiate Scrn, Ur Negative Negative    Barbiturate Screen, Ur Negative Negative    Amphetamine Screen, Ur Negative Negative    THC Negative Negative    Phencyclidine Negative Negative    Creatinine, Urine 66.0 23.0 - 375.0 mg/dL    Toxicology Information SEE COMMENT    Urinalysis, Reflex to Urine Culture Urine, Clean Catch    Collection Time: 02/14/23  4:28 PM    Specimen: Urine, Clean Catch   Result Value Ref Range    Specimen UA Urine, Clean Catch     Color, UA Yellow Yellow, Straw, Rachel    Appearance, UA Clear Clear    pH, UA 6.0 5.0 - 8.0    Specific Gravity, UA 1.010 1.005 - 1.030    Protein, UA Negative Negative    Glucose, UA Negative Negative    Ketones, UA Negative Negative    Bilirubin (UA) Negative Negative    Occult Blood UA 3+ (A) Negative    Nitrite, UA Negative Negative    Urobilinogen, UA Negative Negative EU/dL    Leukocytes, UA Negative Negative   Urinalysis Microscopic    Collection Time: 02/14/23  4:28 PM   Result Value Ref Range    RBC, UA 5 (H) 0 - 4 /hpf    WBC, UA 1 0 - 5 /hpf    Bacteria Negative None-Occ /hpf    Squam Epithel, UA 0 /hpf    Hyaline Casts, UA 1 0-1/lpf /lpf    Microscopic Comment SEE COMMENT    Troponin I High Sensitivity #2    Collection Time: 02/14/23  4:34 PM   Result Value Ref Range    Troponin I High Sensitivity 12.0 0.0 - 14.9 pg/mL   Blood culture #1 **CANNOT BE ORDERED STAT**    Collection Time: 02/14/23  4:34 PM    Specimen: Peripheral, Antecubital, Left; Blood   Result Value Ref Range    Blood Culture, Routine No Growth to date    Basic Metabolic Panel (BMP)    Collection Time: 02/15/23  4:30 AM   Result Value Ref Range    Sodium 135 (L) 136 - 145 mmol/L    Potassium 3.6 3.5 - 5.1 mmol/L    Chloride 101 95 - 110 mmol/L    CO2 28 23 - 29 mmol/L    Glucose 93 70 -  110 mg/dL    BUN 16 8 - 23 mg/dL    Creatinine 1.0 0.5 - 1.4 mg/dL    Calcium 8.3 (L) 8.7 - 10.5 mg/dL    Anion Gap 6 (L) 8 - 16 mmol/L    eGFR >60.0 >60 mL/min/1.73 m^2   Magnesium    Collection Time: 02/15/23  4:30 AM   Result Value Ref Range    Magnesium 1.7 1.6 - 2.6 mg/dL   CBC Without Differential    Collection Time: 02/15/23  4:30 AM   Result Value Ref Range    WBC 3.97 3.90 - 12.70 K/uL    RBC 3.12 (L) 4.60 - 6.20 M/uL    Hemoglobin 10.8 (L) 14.0 - 18.0 g/dL    Hematocrit 33.1 (L) 40.0 - 54.0 %     (H) 82 - 98 fL    MCH 34.6 (H) 27.0 - 31.0 pg    MCHC 32.6 32.0 - 36.0 g/dL    RDW 15.0 (H) 11.5 - 14.5 %    Platelets 145 (L) 150 - 450 K/uL    MPV 11.5 9.2 - 12.9 fL     Imaging Results              X-Ray Chest AP Portable (Final result)  Result time 02/14/23 12:31:32      Final result by Gideon De La Paz MD (02/14/23 12:31:32)                   Narrative:    Reason: SOB    FINDINGS:    Portable chest with comparison chest x-ray October 10, 2019 show normal cardiomediastinal silhouette.  Mixed interstitial and hazy lung opacities of the right mid to lower lung noted. Retrocardiac linear opacities observed. Pulmonary vasculature is normal. No acute osseous abnormality.    IMPRESSION:    1.  Mixed interstitial and hazy lung opacities of the right mid to lower lung could reflect infectious infiltrate, atelectasis and/or asymmetric pulmonary edema.  2.  Retrocardiac linear opacities could reflect subsegmental atelectasis or infiltrate.    Electronically signed by:  Gideon De La Paz DO  2/14/2023 12:31 PM [facility] Workstation: [id number]                                    12-lead EKG interpretation:  (if applicable)      Current Cardiac Rhythm:   (if applicable)    Physical Exam:   Physical Exam  Constitutional:       General: He is in acute distress.   Cardiovascular:      Rate and Rhythm: Tachycardia present. Rhythm irregular.      Heart sounds: Murmur heard.     No gallop.   Pulmonary:      Breath sounds: Rhonchi  and rales present.   Abdominal:      General: Abdomen is flat. Bowel sounds are normal.      Palpations: Abdomen is soft.   Musculoskeletal:         General: Swelling present.       ASSESSMENT/PLAN:   Assessment:   Probable congestive heart failure  Dyspnea doubt pneumonia  Atrial fibrillation  Coronary artery disease status post stent 2007  Troponins are negative    Plan:   Will check echocardiogram diurese aggressively will make further recommendations after patient is stabilized.  In addition will improve rate control and recommend anticoagulation

## 2023-02-15 NOTE — ASSESSMENT & PLAN NOTE
Continue aspirin.  Hold clopidogrel (remote history of stenting) to avoid triple therapy  Continue statin and beta-blocker

## 2023-02-15 NOTE — ASSESSMENT & PLAN NOTE
The patient has had extensive workup for abnormal paraproteins. He has declined to see paraprotein specialist in the past.   - I am concerned he may have a MGUS subvariant and could possibly have amyloid light chain deposition. He has neuropathy, hypoalbuminemia (suggesting nephropathy), anemia, and may be playing a role in CHF / Arrhythmia  - Will strongly urge him to follow up with Dr Blanco at [facility] as recommended by Dr Javier  - he also may need repeat bone marrow biopsy etc as an outpatient

## 2023-02-15 NOTE — SUBJECTIVE & OBJECTIVE
Interval History:  Patient says he has been short of breath and having cough productive of clear sputum.  No fever chills.  He denies chest pain or palpitations.  He endorses lower extremity swelling that has been going on for some time.    He has had extensive workup in the past for abnormal serum proteins.  He has declined consult with [facility] Hem / Onc in the past in visits with Dr Javier. He has hypoalbuminemia, peripheral neuropathy, and anemia with some concern for development of subvariant of MGUS / Amyloid light chain deposition. He would greatly benefit from consultation with paraprotein specialist as recommended by Dr Wolfe in the past.     Workup including bone marrow biopsy has been overall inconclusive in establishing his diagnosis. He likely needs additional bone marrow biopsy and possible PET scan as an outpatient. Question of whether his paraproteinema could be playing a role in his CHF development and arrhythmia.     Review of Systems   All other systems reviewed and are negative.  Objective:     Vital Signs (Most Recent):  Temp: 97.8 °F (36.6 °C) (02/15/23 0737)  Pulse: 106 (02/15/23 0737)  Resp: 20 (02/15/23 0737)  BP: 108/71 (02/15/23 0737)  SpO2: (!) 91 % (02/15/23 0737)   Vital Signs (24h Range):  Temp:  [97.7 °F (36.5 °C)-98.3 °F (36.8 °C)] 97.8 °F (36.6 °C)  Pulse:  [] 106  Resp:  [20-34] 20  SpO2:  [84 %-99 %] 91 %  BP: ()/(50-93) 108/71     Weight: 69 kg (152 lb 3.2 oz)  Body mass index is 22.48 kg/m².    Intake/Output Summary (Last 24 hours) at 2/15/2023 1002  Last data filed at 2/15/2023 0915  Gross per 24 hour   Intake 120 ml   Output 600 ml   Net -480 ml      Physical Exam  Vitals reviewed.   Constitutional:       Appearance: Normal appearance.   HENT:      Head: Normocephalic and atraumatic.      Nose: Nose normal.      Mouth/Throat:      Mouth: Mucous membranes are moist.   Eyes:      Pupils: Pupils are equal, round, and reactive to light.   Cardiovascular:       Rate and Rhythm: Tachycardia present. Rhythm irregular.      Pulses: Normal pulses.      Heart sounds: Normal heart sounds. No murmur heard.    No friction rub. No gallop.   Pulmonary:      Effort: Pulmonary effort is normal. No respiratory distress.      Breath sounds: Normal breath sounds.      Comments: Crackles at the bases  Abdominal:      General: Abdomen is flat. Bowel sounds are normal. There is no distension.      Palpations: Abdomen is soft.      Tenderness: There is no abdominal tenderness.   Musculoskeletal:         General: No swelling. Normal range of motion.      Cervical back: Normal range of motion and neck supple.      Right lower leg: Edema present.      Left lower leg: Edema present.   Skin:     General: Skin is warm and dry.   Neurological:      General: No focal deficit present.      Mental Status: He is alert and oriented to person, place, and time.   Psychiatric:         Mood and Affect: Mood normal.         Behavior: Behavior normal.         Thought Content: Thought content normal.         Judgment: Judgment normal.       Significant Labs: All pertinent labs within the past 24 hours have been reviewed.    Significant Imaging: I have reviewed all pertinent imaging results/findings within the past 24 hours.

## 2023-02-15 NOTE — ASSESSMENT & PLAN NOTE
Hold lisinopril given soft pressures and to accommodate calcium channel blockade   Continue beta-blocker; on metoprolol succinate 25 mg once daily  Adjusted per Dr Sotomayor

## 2023-02-15 NOTE — H&P
[facility] - Emergency Dept  Hospital Medicine  History & Physical    Patient Name: Sid Cuello Jr.  MRN: [id number]  Patient Class: IP- Inpatient  Admission Date: 2/14/2023  Attending Physician: Ethan Lundberg MD  Primary Care Provider: Yan Posada MD         Patient information was obtained from patient, past medical records and ER records.     Subjective:     Principal Problem:Atrial fibrillation with RVR    Chief Complaint:   Chief Complaint   Patient presents with    Abnormal ECG     A FIB PER CARDIOLOGIST OFFICE    Cough    Shortness of Breath    Foot Swelling     BILAT        HPI: Patient is a 72-year-old  male with known history of CAD status post stenting in 2007, essential hypertension and discoid lupus who was sent to the ED from PCP's office secondary to new diagnosis of atrial fibrillation.    Patient reports being in his usual state of health until about [place] when he noticed gradually worsening bilateral lower extremity edema.  This has become severe over the last 2 days.  Also he reports worsening shortness of breath over the last 2 weeks.  Shortness of breath is worse with exertion and better with rest.  Reports easy fatigability however this is a chronic issue for him.  Denies chest pain, palpitations, orthopnea or paroxysmal nocturnal dyspnea.  He has history of abnormal SPEP profile but no evidence of myeloma.  He follows with Dr. Javier.     He is a retired .  Smokes 1 pack per day.  Consumes alcohol about 3-4 times per week.    Rest of the 10 point review of systems is negative except as mentioned above.      Past Medical History:   Diagnosis Date    Abnormal SPEP 4/29/2021    Coronary artery disease 2007    MI   1 c. 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;  Surgeon: David Mera MD;  Location: [facility] OR;  Service: Urology;  Laterality: Right;    VASECTOMY         Review of patient's allergies indicates:  No Known Allergies    Current Facility-Administered Medications on File Prior to Encounter   Medication    diphenhydrAMINE injection 25 mg    fentaNYL injection 25 mcg    hydromorphone (PF) injection 0.2 mg    lactated ringers infusion    lactated ringers infusion    lidocaine (PF) 10 mg/ml (1%) injection 10 mg    ondansetron injection 4 mg    oxyCODONE immediate release tablet 5 mg    promethazine (PHENERGAN) 6.25 mg in dextrose 5 % 50 mL IVPB    sodium chloride 0.9% flush 3 mL    sodium chloride 0.9% flush 3 mL     Current Outpatient Medications on File Prior to Encounter   Medication Sig    aspirin (ECOTRIN) 81 MG EC tablet Take 81 mg by mouth once daily.    atorvastatin (LIPITOR) 10 MG tablet Take 10 mg by mouth once daily.    clopidogrel (PLAVIX) 75 mg tablet Take 75 mg by mouth once daily.    cyanocobalamin 1,000 mcg/mL injection Inject 1,000 mcg into the muscle every 30 days.    lisinopriL (PRINIVIL,ZESTRIL) 5 MG tablet Take 5 mg by mouth once daily.    metoprolol succinate (TOPROL-XL) 25 MG 24 hr tablet Take 25 mg by mouth once daily.    triamcinolone acetonide 0.1% (KENALOG) 0.1 % cream AAA left cheek bid (Patient taking differently: Apply 1 g topically 2 (two) times daily. AAA left cheek bid)    betamethasone dipropionate (DIPROLENE) 0.05 % lotion Apply thin film to scalp QHS PRN itch (Patient taking differently: Apply 1 application topically nightly. Apply thin film to scalp QHS PRN itch)    fluticasone-umeclidin-vilanter (TRELEGY ELLIPTA) 100-62.5-25  mcg DsDv Inhale 1 puff into the lungs once daily. (Patient taking differently: Inhale 1 puff into the lungs once daily. NEW Rx - NOT YET STARTED)    [DISCONTINUED] atorvastatin (LIPITOR) 40 MG tablet     [DISCONTINUED] lisinopril (PRINIVIL,ZESTRIL) 20 MG tablet Take 20 mg by mouth once daily.     Family History       Problem Relation (Age of Onset)    No Known Problems Mother          Tobacco Use    Smoking status: Every Day     Types: Cigarettes    Smokeless tobacco: Never   Substance and Sexual Activity    Alcohol use: Yes     Comment: social    Drug use: Never    Sexual activity: Not on file       Objective:     Vital Signs (Most Recent):  Temp: 98.3 °F (36.8 °C) (02/14/23 1139)  Pulse: 108 (02/14/23 1747)  Resp: (!) 33 (02/14/23 1747)  BP: 109/87 (02/14/23 1740)  SpO2: (!) 91 % (02/14/23 1747)   Vital Signs (24h Range):  Temp:  [98.3 °F (36.8 °C)] 98.3 °F (36.8 °C)  Pulse:  [] 108  Resp:  [20-33] 33  SpO2:  [91 %-99 %] 91 %  BP: (108-121)/(77-93) 109/87     Weight: 71.7 kg (158 lb)  Body mass index is 23.33 kg/m².    Physical Exam      General: Patient resting comfortably in no acute distress. Appears stated age  Head: Normocephalic and atraumatic   Eyes:  No conjunctival pallor. No scleral icterus.  Mouth: Oral mucosa moist   Lungs:  Tachypnea noted.  No accessory muscle use.  Cor:  Tachycardic.  Irregularly irregular.  No murmurs.  2+ pitting pedal edema.  Abd: Soft. Nontender  Musculoskeletal: No arthropathy, deformity.  Skin: No rashes, swelling, or erythema.  Neuro: A&O x 3. Moving all extremities equally. Follows commands  Ext: No clubbing. No cyanosis. Peripheral pulses +  Psych: Normal mood and affect. Memory intact     Significant Labs: All pertinent labs within the past 24 hours have been reviewed.  CBC:   Recent Labs   Lab 02/14/23  1154   WBC 4.21   HGB 11.9*   HCT 37.0*        CMP:   Recent Labs   Lab 02/14/23  1154   *   K 3.8   CL 99   CO2 26   GLU 91   BUN 17    CREATININE 1.1   CALCIUM 8.4*   PROT 8.9*   ALBUMIN 2.9*   BILITOT 1.1*   ALKPHOS 93   AST 35   ALT 13   ANIONGAP 8     Cardiac Markers:   Recent Labs   Lab 02/14/23  1157   *     Troponin:   Recent Labs   Lab 02/14/23  1154 02/14/23  1634   TROPONINIHS 11.0 12.0       Significant Imaging: I have reviewed all pertinent imaging results/findings within the past 24 hours.    Imaging Results              X-Ray Chest AP Portable (Final result)  Result time 02/14/23 12:31:32      Final result by Gideon De La Paz MD (02/14/23 12:31:32)                   Narrative:    Reason: SOB    FINDINGS:    Portable chest with comparison chest x-ray October 10, 2019 show normal cardiomediastinal silhouette.  Mixed interstitial and hazy lung opacities of the right mid to lower lung noted. Retrocardiac linear opacities observed. Pulmonary vasculature is normal. No acute osseous abnormality.    IMPRESSION:    1.  Mixed interstitial and hazy lung opacities of the right mid to lower lung could reflect infectious infiltrate, atelectasis and/or asymmetric pulmonary edema.  2.  Retrocardiac linear opacities could reflect subsegmental atelectasis or infiltrate.    Electronically signed by:  Gideon De La Paz DO  2/14/2023 12:31 PM [facility] Workstation: [id number]                                    Results for orders placed or performed during the hospital encounter of 02/14/23   EKG 12-lead    Collection Time: 02/14/23 11:41 AM    Narrative    Test Reason : R07.9,    Vent. Rate : 131 BPM     Atrial Rate : 094 BPM     P-R Int : 000 ms          QRS Dur : 082 ms      QT Int : 326 ms       P-R-T Axes : 000 -45 035 degrees     QTc Int : 481 ms    Atrial fibrillation with rapid ventricular response  Low voltage QRS  Left anterior fascicular block  Cannot rule out Anterior infarct ,age undetermined  Abnormal ECG  When compared with ECG of 10-OCT-2019 07:27,  Atrial fibrillation has replaced Sinus rhythm  Vent. rate has increased BY  51 BPM  Minimal  criteria for Anterior infarct are now Present  Nonspecific T wave abnormality now evident in Lateral leads    Referred By: [email or web address]   SELF           Confirmed By:          Assessment/Plan:     * Atrial fibrillation with RVR  Patient with Paroxysmal (<7 days) atrial fibrillation which is uncontrolled currently with Calcium Channel Blocker. Patient is currently in atrial fibrillation.[id number] Score: 2. Anticoagulation indicated. Anticoagulation done with enoxaparin.    Telemetry monitoring   Continue diltiazem drip at 10 milligram/hour.  We will start oral diltiazem 30 mg q.6 hours in an attempt to wean the drip  Obtain echocardiogram   Cardiology consultation  Status post 20 mg of IV furosemide  Switch to p.o. furosemide tomorrow    Coronary artery disease involving native coronary artery without angina pectoris  Continue aspirin.  Hold clopidogrel (remote history of stenting) to avoid triple therapy  Continue statin and beta-blocker    Abnormal SPEP  Chronic condition with no evidence of myeloma         Essential hypertension  Hold lisinopril given soft pressures and to accommodate calcium channel blockade   Continue beta-blocker; on metoprolol succinate 25 mg once daily      VTE risk high.  Therapeutic enoxaparin as above    Ethan Lundberg MD  Department of Hospital Medicine   [facility]

## 2023-02-16 PROBLEM — F17.200 SMOKER: Chronic | Status: ACTIVE | Noted: 2023-02-16

## 2023-02-16 PROBLEM — I34.0 MITRAL REGURGITATION: Chronic | Status: ACTIVE | Noted: 2023-02-16

## 2023-02-16 PROBLEM — E87.1 HYPONATREMIA: Status: ACTIVE | Noted: 2023-02-16

## 2023-02-16 PROBLEM — I50.9 CHF (CONGESTIVE HEART FAILURE): Chronic | Status: ACTIVE | Noted: 2023-02-15

## 2023-02-16 PROBLEM — D64.9 ANEMIA: Chronic | Status: ACTIVE | Noted: 2023-02-16

## 2023-02-16 LAB
ANION GAP SERPL CALC-SCNC: 6 MMOL/L (ref 8–16)
AORTIC ROOT ANNULUS: 3.7 CM
AORTIC VALVE CUSP SEPERATION: 2.1 CM
AV INDEX (PROSTH): 0.76
AV MEAN GRADIENT: 2 MMHG
AV PEAK GRADIENT: 4 MMHG
AV VALVE AREA: 2.39 CM2
AV VELOCITY RATIO: 0.74
BSA FOR ECHO PROCEDURE: 1.83 M2
BUN SERPL-MCNC: 16 MG/DL (ref 8–23)
CALCIUM SERPL-MCNC: 7.9 MG/DL (ref 8.7–10.5)
CHLORIDE SERPL-SCNC: 95 MMOL/L (ref 95–110)
CO2 SERPL-SCNC: 31 MMOL/L (ref 23–29)
CREAT SERPL-MCNC: 1.1 MG/DL (ref 0.5–1.4)
CV ECHO LV RWT: 0.34 CM
DOP CALC AO PEAK VEL: 0.96 M/S
DOP CALC AO VTI: 12.5 CM
DOP CALC LVOT AREA: 3.1 CM2
DOP CALC LVOT DIAMETER: 2 CM
DOP CALC LVOT PEAK VEL: 0.71 M/S
DOP CALC LVOT STROKE VOLUME: 29.83 CM3
DOP CALC MV VTI: 17.6 CM
DOP CALCLVOT PEAK VEL VTI: 9.5 CM
E WAVE DECELERATION TIME: 162 MSEC
E/A RATIO: 4.09
ECHO LV POSTERIOR WALL: 0.92 CM (ref 0.6–1.1)
EJECTION FRACTION: 40 %
ERYTHROCYTE [DISTWIDTH] IN BLOOD BY AUTOMATED COUNT: 15.1 % (ref 11.5–14.5)
EST. GFR  (NO RACE VARIABLE): >60 ML/MIN/1.73 M^2
FRACTIONAL SHORTENING: 15 % (ref 28–44)
GLUCOSE SERPL-MCNC: 87 MG/DL (ref 70–110)
HCT VFR BLD AUTO: 32.9 % (ref 40–54)
HGB BLD-MCNC: 10.5 G/DL (ref 14–18)
INTERVENTRICULAR SEPTUM: 1.18 CM (ref 0.6–1.1)
LEFT ATRIUM SIZE: 4.1 CM
LEFT INTERNAL DIMENSION IN SYSTOLE: 4.65 CM (ref 2.1–4)
LEFT VENTRICLE DIASTOLIC VOLUME INDEX: 78.8 ML/M2
LEFT VENTRICLE DIASTOLIC VOLUME: 145 ML
LEFT VENTRICLE MASS INDEX: 122 G/M2
LEFT VENTRICLE SYSTOLIC VOLUME INDEX: 54.2 ML/M2
LEFT VENTRICLE SYSTOLIC VOLUME: 99.8 ML
LEFT VENTRICULAR INTERNAL DIMENSION IN DIASTOLE: 5.46 CM (ref 3.5–6)
LEFT VENTRICULAR MASS: 224.67 G
LVOT MG: 1 MMHG
LVOT MV: 0.49 CM/S
MAGNESIUM SERPL-MCNC: 1.6 MG/DL (ref 1.6–2.6)
MCH RBC QN AUTO: 34 PG (ref 27–31)
MCHC RBC AUTO-ENTMCNC: 31.9 G/DL (ref 32–36)
MCV RBC AUTO: 107 FL (ref 82–98)
MV MEAN GRADIENT: 1 MMHG
MV PEAK A VEL: 0.23 M/S
MV PEAK E VEL: 0.94 M/S
MV PEAK GRADIENT: 3 MMHG
MV STENOSIS PRESSURE HALF TIME: 67 MS
MV VALVE AREA BY CONTINUITY EQUATION: 1.69 CM2
MV VALVE AREA P 1/2 METHOD: 3.28 CM2
PISA MRMAX VEL: 4.41 M/S
PISA TR MAX VEL: 2.91 M/S
PLATELET # BLD AUTO: 143 K/UL (ref 150–450)
PMV BLD AUTO: 11.5 FL (ref 9.2–12.9)
POTASSIUM SERPL-SCNC: 3.6 MMOL/L (ref 3.5–5.1)
PV MV: 0.53 M/S
PV PEAK VELOCITY: 0.75 CM/S
RA MAJOR: 5.72 CM
RA PRESSURE: 3 MMHG
RA WIDTH: 5.02 CM
RBC # BLD AUTO: 3.09 M/UL (ref 4.6–6.2)
SODIUM SERPL-SCNC: 132 MMOL/L (ref 136–145)
TR MAX PG: 34 MMHG
TRICUSPID ANNULAR PLANE SYSTOLIC EXCURSION: 1.18 CM
TV REST PULMONARY ARTERY PRESSURE: 37 MMHG
WBC # BLD AUTO: 3.74 K/UL (ref 3.9–12.7)

## 2023-02-16 PROCEDURE — 27000221 HC OXYGEN, UP TO 24 HOURS

## 2023-02-16 PROCEDURE — 99900031 HC PATIENT EDUCATION (STAT)

## 2023-02-16 PROCEDURE — 94799 UNLISTED PULMONARY SVC/PX: CPT

## 2023-02-16 PROCEDURE — 25000003 PHARM REV CODE 250: Performed by: INTERNAL MEDICINE

## 2023-02-16 PROCEDURE — 63600175 PHARM REV CODE 636 W HCPCS: Performed by: INTERNAL MEDICINE

## 2023-02-16 PROCEDURE — 94761 N-INVAS EAR/PLS OXIMETRY MLT: CPT

## 2023-02-16 PROCEDURE — 36415 COLL VENOUS BLD VENIPUNCTURE: CPT | Performed by: INTERNAL MEDICINE

## 2023-02-16 PROCEDURE — 85027 COMPLETE CBC AUTOMATED: CPT | Performed by: INTERNAL MEDICINE

## 2023-02-16 PROCEDURE — 99900035 HC TECH TIME PER 15 MIN (STAT)

## 2023-02-16 PROCEDURE — 21400001 HC TELEMETRY ROOM

## 2023-02-16 PROCEDURE — 83735 ASSAY OF MAGNESIUM: CPT | Performed by: INTERNAL MEDICINE

## 2023-02-16 PROCEDURE — 80048 BASIC METABOLIC PNL TOTAL CA: CPT | Performed by: INTERNAL MEDICINE

## 2023-02-16 RX ORDER — FUROSEMIDE 10 MG/ML
40 INJECTION INTRAMUSCULAR; INTRAVENOUS
Status: DISCONTINUED | OUTPATIENT
Start: 2023-02-17 | End: 2023-02-17

## 2023-02-16 RX ORDER — POTASSIUM CHLORIDE 20 MEQ/1
40 TABLET, EXTENDED RELEASE ORAL ONCE
Status: COMPLETED | OUTPATIENT
Start: 2023-02-16 | End: 2023-02-16

## 2023-02-16 RX ORDER — MAGNESIUM SULFATE HEPTAHYDRATE 40 MG/ML
2 INJECTION, SOLUTION INTRAVENOUS ONCE
Status: COMPLETED | OUTPATIENT
Start: 2023-02-16 | End: 2023-02-16

## 2023-02-16 RX ORDER — AMOXICILLIN 250 MG
2 CAPSULE ORAL 2 TIMES DAILY
Status: DISCONTINUED | OUTPATIENT
Start: 2023-02-16 | End: 2023-02-17 | Stop reason: HOSPADM

## 2023-02-16 RX ORDER — DIGOXIN 0.25 MG/ML
125 INJECTION INTRAMUSCULAR; INTRAVENOUS EVERY 6 HOURS
Status: COMPLETED | OUTPATIENT
Start: 2023-02-16 | End: 2023-02-16

## 2023-02-16 RX ADMIN — DIGOXIN 125 MCG: 0.25 INJECTION INTRAMUSCULAR; INTRAVENOUS at 11:02

## 2023-02-16 RX ADMIN — ATORVASTATIN CALCIUM 10 MG: 10 TABLET, FILM COATED ORAL at 09:02

## 2023-02-16 RX ADMIN — FUROSEMIDE 40 MG: 10 INJECTION, SOLUTION INTRAVENOUS at 05:02

## 2023-02-16 RX ADMIN — ENOXAPARIN SODIUM 70 MG: 80 INJECTION SUBCUTANEOUS at 08:02

## 2023-02-16 RX ADMIN — MAGNESIUM SULFATE HEPTAHYDRATE 2 G: 40 INJECTION, SOLUTION INTRAVENOUS at 04:02

## 2023-02-16 RX ADMIN — ASPIRIN 81 MG: 81 TABLET, COATED ORAL at 08:02

## 2023-02-16 RX ADMIN — METOPROLOL SUCCINATE 50 MG: 50 TABLET, FILM COATED, EXTENDED RELEASE ORAL at 09:02

## 2023-02-16 RX ADMIN — METOPROLOL SUCCINATE 50 MG: 50 TABLET, FILM COATED, EXTENDED RELEASE ORAL at 08:02

## 2023-02-16 RX ADMIN — DIGOXIN 125 MCG: 0.25 INJECTION INTRAMUSCULAR; INTRAVENOUS at 04:02

## 2023-02-16 RX ADMIN — POTASSIUM CHLORIDE 40 MEQ: 1500 TABLET, EXTENDED RELEASE ORAL at 04:02

## 2023-02-16 RX ADMIN — FUROSEMIDE 40 MG: 10 INJECTION, SOLUTION INTRAVENOUS at 01:02

## 2023-02-16 RX ADMIN — APIXABAN 5 MG: 5 TABLET, FILM COATED ORAL at 09:02

## 2023-02-16 NOTE — PLAN OF CARE
Plan of care discussed with pt. VS stable overnight, afebrile. Afib on telemetry. Pt denies any CP or SOB, no distress noted. Pt diuresing adequately with lasix.  Pt educated on fall precautions, verbalized understanding. Call light in reach. Pt free of injuries this shift.  All questions addressed. Pt voices no concerns at this time. Safety and comfort measures maintained during hourly rounding.

## 2023-02-16 NOTE — PLAN OF CARE
Patient independent in room, declines bed alarm.  Family at bedside. Denies needs.      Problem: Adult Inpatient Plan of Care  Goal: Plan of Care Review  Outcome: Ongoing, Progressing  Flowsheets (Taken 2/15/2023 1834)  Plan of Care Reviewed With: patient  Goal: Absence of Hospital-Acquired Illness or Injury  Outcome: Ongoing, Progressing  Goal: Optimal Comfort and Wellbeing  Outcome: Ongoing, Progressing  Intervention: Provide Person-Centered Care  Flowsheets (Taken 2/15/2023 1834)  Trust Relationship/Rapport:   care explained   choices provided   thoughts/feelings acknowledged   reassurance provided

## 2023-02-16 NOTE — PROGRESS NOTES
[facility]  Progress Note    Patient Name: Sid Cuello Jr.  MRN: [id number]  Patient Class: IP- Inpatient   Admission Date: 2/14/2023  Length of Stay: 2 days  Attending Physician: Claire Layne MD  Primary Care Provider: Yan Posada MD        Subjective:     Principal Problem:Atrial fibrillation with RVR        HPI:  Patient is a 72-year-old  male with known history of CAD status post stenting in 2007, essential hypertension and discoid lupus who was sent to the ED from PCP's office secondary to new diagnosis of atrial fibrillation.    Patient reports being in his usual state of health until about Anjali when he noticed gradually worsening bilateral lower extremity edema.  This has become severe over the last 2 days.  Also he reports worsening shortness of breath over the last 2 weeks.  Shortness of breath is worse with exertion and better with rest.  Reports easy fatigability however this is a chronic issue for him.  Denies chest pain, palpitations, orthopnea or paroxysmal nocturnal dyspnea.  He has history of abnormal SPEP profile but no evidence of myeloma.  He follows with Dr. Javier.     He is a retired .  Smokes 1 pack per day.  Consumes alcohol about 3-4 times per week.    Rest of the 10 point review of systems is negative except as mentioned above.      Overview/Hospital Course:  No notes on file    Interval History:  Patient reports shortness of breath is improved but not yet resolved, lower extremity edema is also much improved.  Denies any chest pain.  Has been urinating more.  On nasal cannula, he is not on supplemental oxygen at home.  T-max 100°.  Telemetry atrial fibrillation, uncontrolled heart rate up to 120.  Labs with hemoglobin 10.5, platelet 143, sodium 132, BUN/creatinine 16/1.1, potassium 3.6, magnesium 1.6.  Documented urine output 3375 cc.  Echo with EF of 40% with severe mitral regurgitation.  Discussed with patient.   No visitors at bedside.    Review of Systems   Constitutional:  Negative for fever.   HENT:          Hard of hearing   Respiratory:  Positive for shortness of breath.    Cardiovascular:  Positive for leg swelling. Negative for chest pain.   Gastrointestinal:  Negative for nausea and vomiting.   Psychiatric/Behavioral:  Negative for confusion.    Objective:     Vital Signs (Most Recent):  Temp: 98.1 °F (36.7 °C) (02/16/23 1111)  Pulse: (!) 116 (02/16/23 1424)  Resp: 18 (02/16/23 1424)  BP: 106/76 (02/16/23 1111)  SpO2: 96 % (02/16/23 1424)   Vital Signs (24h Range):  Temp:  [97.7 °F (36.5 °C)-100 °F (37.8 °C)] 98.1 °F (36.7 °C)  Pulse:  [100-127] 116  Resp:  [16-20] 18  SpO2:  [90 %-98 %] 96 %  BP: (105-119)/(68-83) 106/76     Weight: 67 kg (147 lb 11.3 oz)  Body mass index is 21.81 kg/m².    Intake/Output Summary (Last 24 hours) at 2/16/2023 1523  Last data filed at 2/16/2023 1319  Gross per 24 hour   Intake 1680 ml   Output 2575 ml   Net -895 ml      Physical Exam  Vitals and nursing note reviewed.   Constitutional:       General: He is not in acute distress.     Appearance: He is not ill-appearing.      Comments: Sitting in bed, cooperative   HENT:      Head: Normocephalic and atraumatic.      Mouth/Throat:      Mouth: Mucous membranes are moist.   Cardiovascular:      Rate and Rhythm: Normal rate. Rhythm irregular.      Heart sounds: Murmur heard.      Comments: 1+ LE edema present  Pulmonary:      Comments: On NC, bilateral inspiratory crackles from bases to midzones, L>R  Abdominal:      Palpations: Abdomen is soft.      Tenderness: There is no abdominal tenderness.   Skin:     General: Skin is warm.   Neurological:      Mental Status: He is alert and oriented to person, place, and time.   Psychiatric:         Mood and Affect: Mood normal.       Significant Labs: BMP:   Recent Labs   Lab 02/16/23  0429   GLU 87   *   K 3.6   CL 95   CO2 31*   BUN 16   CREATININE 1.1   CALCIUM 7.9*   MG 1.6     CBC:    Recent Labs   Lab 02/15/23  0430 02/16/23  0429   WBC 3.97 3.74*   HGB 10.8* 10.5*   HCT 33.1* 32.9*   * 143*     CMP:   Recent Labs   Lab 02/15/23  0430 02/15/23  1227 02/16/23  0429   *  --  132*   K 3.6 4.1 3.6     --  95   CO2 28  --  31*   GLU 93  --  87   BUN 16  --  16   CREATININE 1.0  --  1.1   CALCIUM 8.3*  --  7.9*   ANIONGAP 6*  --  6*     Cardiac Markers: No results for input(s): CKMB, MYOGLOBIN, BNP, TROPISTAT in the last 48 hours.  Lactic Acid: No results for input(s): LACTATE in the last 48 hours.  Magnesium:   Recent Labs   Lab 02/15/23  0430 02/15/23  1749 02/16/23  0429   MG 1.7 1.7 1.6     Troponin:   Recent Labs   Lab 02/14/23  1634   TROPONINIHS 12.0       Significant Imaging: I have reviewed all pertinent imaging results/findings within the past 24 hours.    X-Ray Chest AP Portable    Result Date: 2/14/2023  Reason: SOB FINDINGS: Portable chest with comparison chest x-ray October 10, 2019 show normal cardiomediastinal silhouette. Mixed interstitial and hazy lung opacities of the right mid to lower lung noted. Retrocardiac linear opacities observed. Pulmonary vasculature is normal. No acute osseous abnormality. IMPRESSION: 1.  Mixed interstitial and hazy lung opacities of the right mid to lower lung could reflect infectious infiltrate, atelectasis and/or asymmetric pulmonary edema. 2.  Retrocardiac linear opacities could reflect subsegmental atelectasis or infiltrate. Electronically signed by:  Gideon De La Paz DO  2/14/2023 12:31 PM CST Workstation: [id number]    Echo Saline Bubble? No    Result Date: 2/16/2023  · Eccentric hypertrophy and mildly decreased systolic function. · The estimated ejection fraction is 40%. · Atrial fibrillation observed. · Mild left atrial enlargement. · Severe mitral regurgitation. · Mild tricuspid regurgitation. · Normal central venous pressure (3 mmHg). · The estimated PA systolic pressure is 37 mmHg.           Assessment/Plan:      Active  Hospital Problems    Diagnosis    *Atrial fibrillation with RVR    Smoker    Anemia    Hyponatremia    Mitral regurgitation, severe    Acute systolic heart failure    Neuropathy, peripheral axonal    Abnormal SPEP    CAD s/p PCI    S/P coronary artery stent placement    Hyperlipidemia LDL goal < 100     Dx updated per 2019 IMO Load       Plan:  Continue care on telemetry floor with continuous cardiac monitoring  Remains in atrial fibrillation with heart rate uncontrolled, continue metoprolol 50 mg b.i.d., digoxin started as per Cardiology recommendation   Continue IV diuresis with Lasix 40 mg q.12h  Strict I/O, daily weights, oral fluid and sodium restriction  Echocardiogram with EF of 40% with severe mitral regurgitation   40 mEq oral potassium replacement, 2 g IV magnesium replacement   Change therapeutic dose Lovenox to Eliquis as per Cardiology recommendation for stroke prophylaxis with atrial fibrillation  Continue to reinforce smoking cessation counseling   Patient with abnormal SPEP, he was referred to [facility], followed locally by Dr. Javier   BNP and chest x-ray in a.m.  A.m. labs ordered  Electrolytes sliding scale repletion  Appreciate Cardiology input   Further plan as per hospital course    VTE Risk Mitigation (From admission, onward)         Ordered     apixaban tablet 5 mg  2 times daily         02/16/23 1524     IP VTE HIGH RISK PATIENT  Once         02/14/23 2042     Place sequential compression device  Until discontinued         02/14/23 2042                Discharge Planning   KALINA: 2/19/2023     Code Status: Full Code   Is the patient medically ready for discharge?:     Reason for patient still in hospital (select all that apply): Patient trending condition  Discharge Plan A: Home                  Claire Layne MD  Department of Hospital Medicine   Counts include 234 beds at the [facility]

## 2023-02-16 NOTE — PROGRESS NOTES
[facility]    Cardiology Progress Note    Subjective:  Patient feels much better he did have a excellent diuresis.  His heart rate is still quite fast at times up to 140.  I will add digoxin to his regimen.  He also will need to be on anticoagulation will start Eliquis.  I have not been able to review the echo and is not been in my box to review    Objective:  Vital Signs (Most Recent)  Temp: 98.3 °F (36.8 °C) (02/16/23 0734)  Pulse: (!) 117 (02/16/23 0734)  Resp: 16 (02/16/23 0734)  BP: 115/76 (02/16/23 0734)  SpO2: 95 % (02/16/23 0734)    Vital Signs Range (Last 24H):  Temp:  [97.7 °F (36.5 °C)-100 °F (37.8 °C)]   Pulse:  []   Resp:  [16-20]   BP: (105-119)/(68-83)   SpO2:  [90 %-95 %]     I & O (Last 24H):    Intake/Output Summary (Last 24 hours) at 2/16/2023 0815  Last data filed at 2/16/2023 0652  Gross per 24 hour   Intake 1080 ml   Output 3375 ml   Net -2295 ml       Current Diet:     Current Diet Order   Procedures    Diet Low Sodium, 2gm        Allergies:  Review of patient's allergies indicates:  No Known Allergies    Meds:  Scheduled Meds:   aspirin  81 mg Oral Daily    atorvastatin  10 mg Oral Daily    enoxaparin  1 mg/kg Subcutaneous Q12H    furosemide (LASIX) injection  40 mg Intravenous TID    metoprolol succinate  50 mg Oral BID     Continuous Infusions:   dilTIAZem Stopped (02/14/23 7189)     PRN Meds:acetaminophen, magnesium oxide, magnesium oxide, melatonin, naloxone, ondansetron, polyethylene glycol, potassium bicarbonate, potassium bicarbonate, potassium bicarbonate, potassium, sodium phosphates, potassium, sodium phosphates, potassium, sodium phosphates    Lab Results :  Recent Results (from the past 24 hour(s))   Echo Saline Bubble? No    Collection Time: 02/15/23 10:14 AM   Result Value Ref Range    BSA 1.83 m2    Left Ventricular Outflow Tract Mean Velocity 0.49 cm/s    Left Ventricular Outflow Tract Mean Gradient 1.00 mmHg    Pulmonary Valve Mean Velocity 0.53 m/s     AORTIC VALVE CUSP SEPERATION 2.10 cm    PV PEAK VELOCITY 0.75 cm/s    LVIDd 5.46 3.5 - 6.0 cm    IVS 1.18 (A) 0.6 - 1.1 cm    Posterior Wall 0.92 0.6 - 1.1 cm    Ao root annulus 3.70 cm    LVIDs 4.65 (A) 2.1 - 4.0 cm    FS 15 28 - 44 %    LV mass 224.67 g    LA size 4.10 cm    TAPSE 1.18 cm    Left Ventricle Relative Wall Thickness 0.34 cm    AV mean gradient 2 mmHg    AV valve area 2.39 cm2    AV Velocity Ratio 0.74     AV index (prosthetic) 0.76     MV mean gradient 1 mmHg    MV valve area p 1/2 method 3.28 cm2    MV valve area by continuity eq 1.69 cm2    E/A ratio 4.09     E wave deceleration time 162.00 msec    LVOT diameter 2.00 cm    LVOT area 3.1 cm2    LVOT peak omid 0.71 m/s    LVOT peak VTI 9.50 cm    Ao peak omid 0.96 m/s    Ao VTI 12.5 cm    Mr max omid 4.41 m/s    LVOT stroke volume 29.83 cm3    AV peak gradient 4 mmHg    MV peak gradient 3 mmHg    MV Peak E Omid 0.94 m/s    TR Max Omid 2.91 m/s    MV VTI 17.6 cm    MV stenosis pressure 1/2 time 67.00 ms    MV Peak A Omid 0.23 m/s    LV Systolic Volume 99.80 mL    LV Systolic Volume Index 54.2 mL/m2    LV Diastolic Volume 145.00 mL    LV Diastolic Volume Index 78.80 mL/m2    LV Mass Index 122 g/m2    RA Major Axis 5.72 cm    Triscuspid Valve Regurgitation Peak Gradient 34 mmHg    RA Width 5.02 cm   Potassium    Collection Time: 02/15/23 12:27 PM   Result Value Ref Range    Potassium 4.1 3.5 - 5.1 mmol/L   Magnesium    Collection Time: 02/15/23  5:49 PM   Result Value Ref Range    Magnesium 1.7 1.6 - 2.6 mg/dL   Basic Metabolic Panel (BMP)    Collection Time: 02/16/23  4:29 AM   Result Value Ref Range    Sodium 132 (L) 136 - 145 mmol/L    Potassium 3.6 3.5 - 5.1 mmol/L    Chloride 95 95 - 110 mmol/L    CO2 31 (H) 23 - 29 mmol/L    Glucose 87 70 - 110 mg/dL    BUN 16 8 - 23 mg/dL    Creatinine 1.1 0.5 - 1.4 mg/dL    Calcium 7.9 (L) 8.7 - 10.5 mg/dL    Anion Gap 6 (L) 8 - 16 mmol/L    eGFR >60.0 >60 mL/min/1.73 m^2   Magnesium    Collection Time: 02/16/23  4:29  "AM   Result Value Ref Range    Magnesium 1.6 1.6 - 2.6 mg/dL   CBC Without Differential    Collection Time: 02/16/23  4:29 AM   Result Value Ref Range    WBC 3.74 (L) 3.90 - 12.70 K/uL    RBC 3.09 (L) 4.60 - 6.20 M/uL    Hemoglobin 10.5 (L) 14.0 - 18.0 g/dL    Hematocrit 32.9 (L) 40.0 - 54.0 %     (H) 82 - 98 fL    MCH 34.0 (H) 27.0 - 31.0 pg    MCHC 31.9 (L) 32.0 - 36.0 g/dL    RDW 15.1 (H) 11.5 - 14.5 %    Platelets 143 (L) 150 - 450 K/uL    MPV 11.5 9.2 - 12.9 fL       Diagnostic Results:  Imaging Results              X-Ray Chest AP Portable (Final result)  Result time 02/14/23 12:31:32      Final result by Gideon De La Paz MD (02/14/23 12:31:32)                   Narrative:    Reason: SOB    FINDINGS:    Portable chest with comparison chest x-ray October 10, 2019 show normal cardiomediastinal silhouette.  Mixed interstitial and hazy lung opacities of the right mid to lower lung noted. Retrocardiac linear opacities observed. Pulmonary vasculature is normal. No acute osseous abnormality.    IMPRESSION:    1.  Mixed interstitial and hazy lung opacities of the right mid to lower lung could reflect infectious infiltrate, atelectasis and/or asymmetric pulmonary edema.  2.  Retrocardiac linear opacities could reflect subsegmental atelectasis or infiltrate.    Electronically signed by:  Gideon De La Paz DO  2/14/2023 12:31 PM CST Workstation: [id number]                                    Recent Cardiac Rhythm   (if applicable)      Physical Exam:  Objective:  General Appearance:  In no acute distress.    Vital signs: (most recent): Blood pressure 115/76, pulse (!) 117, temperature 98.3 °F (36.8 °C), temperature source Oral, resp. rate 16, height 5' 9" (1.753 m), weight 67 kg (147 lb 11.3 oz), SpO2 95 %.    Lungs:  There are rales.    Heart: Irregular rhythm.  S1 normal and S2 normal.  Positive for murmur.    Abdomen: Abdomen is soft.  Bowel sounds are normal.   There is no abdominal tenderness.     Extremities: " There is local swelling.      Current Consults:  IP CONSULT TO HOSPITAL MEDICINE  IP CONSULT TO HOSPITAL MEDICINE  IP CONSULT TO CARDIOLOGY    Assessment/Plan:  Assessment:   Probable congestive heart failure  Dyspnea doubt pneumonia  Atrial fibrillation  Coronary artery disease status post stent 2007  Troponins are negative  Plan:  Add digoxin, change Lovenox to Eliquis.  We may consider cardioversion

## 2023-02-16 NOTE — SUBJECTIVE & OBJECTIVE
Interval History:  Patient reports shortness of breath is improved but not yet resolved, lower extremity edema is also much improved.  Denies any chest pain.  Has been urinating more.  On nasal cannula, he is not on supplemental oxygen at home.  T-max 100°.  Telemetry atrial fibrillation, uncontrolled heart rate up to 120.  Labs with hemoglobin 10.5, platelet 143, sodium 132, BUN/creatinine 16/1.1, potassium 3.6, magnesium 1.6.  Documented urine output 3375 cc.  Echo with EF of 40% with severe mitral regurgitation.  Discussed with patient.  No visitors at bedside.    Review of Systems   Constitutional:  Negative for fever.   HENT:          Hard of hearing   Respiratory:  Positive for shortness of breath.    Cardiovascular:  Positive for leg swelling. Negative for chest pain.   Gastrointestinal:  Negative for nausea and vomiting.   Psychiatric/Behavioral:  Negative for confusion.    Objective:     Vital Signs (Most Recent):  Temp: 98.1 °F (36.7 °C) (02/16/23 1111)  Pulse: (!) 116 (02/16/23 1424)  Resp: 18 (02/16/23 1424)  BP: 106/76 (02/16/23 1111)  SpO2: 96 % (02/16/23 1424)   Vital Signs (24h Range):  Temp:  [97.7 °F (36.5 °C)-100 °F (37.8 °C)] 98.1 °F (36.7 °C)  Pulse:  [100-127] 116  Resp:  [16-20] 18  SpO2:  [90 %-98 %] 96 %  BP: (105-119)/(68-83) 106/76     Weight: 67 kg (147 lb 11.3 oz)  Body mass index is 21.81 kg/m².    Intake/Output Summary (Last 24 hours) at 2/16/2023 1523  Last data filed at 2/16/2023 1319  Gross per 24 hour   Intake 1680 ml   Output 2575 ml   Net -895 ml      Physical Exam  Vitals and nursing note reviewed.   Constitutional:       General: He is not in acute distress.     Appearance: He is not ill-appearing.      Comments: Sitting in bed, cooperative   HENT:      Head: Normocephalic and atraumatic.      Mouth/Throat:      Mouth: Mucous membranes are moist.   Cardiovascular:      Rate and Rhythm: Normal rate. Rhythm irregular.      Heart sounds: Murmur heard.      Comments: 1+ LE edema  present  Pulmonary:      Comments: On NC, bilateral inspiratory crackles from bases to midzones, L>R  Abdominal:      Palpations: Abdomen is soft.      Tenderness: There is no abdominal tenderness.   Skin:     General: Skin is warm.   Neurological:      Mental Status: He is alert and oriented to person, place, and time.   Psychiatric:         Mood and Affect: Mood normal.       Significant Labs: BMP:   Recent Labs   Lab 02/16/23  0429   GLU 87   *   K 3.6   CL 95   CO2 31*   BUN 16   CREATININE 1.1   CALCIUM 7.9*   MG 1.6     CBC:   Recent Labs   Lab 02/15/23  0430 02/16/23  0429   WBC 3.97 3.74*   HGB 10.8* 10.5*   HCT 33.1* 32.9*   * 143*     CMP:   Recent Labs   Lab 02/15/23  0430 02/15/23  1227 02/16/23  0429   *  --  132*   K 3.6 4.1 3.6     --  95   CO2 28  --  31*   GLU 93  --  87   BUN 16  --  16   CREATININE 1.0  --  1.1   CALCIUM 8.3*  --  7.9*   ANIONGAP 6*  --  6*     Cardiac Markers: No results for input(s): CKMB, MYOGLOBIN, BNP, TROPISTAT in the last 48 hours.  Lactic Acid: No results for input(s): LACTATE in the last 48 hours.  Magnesium:   Recent Labs   Lab 02/15/23  0430 02/15/23  1749 02/16/23  0429   MG 1.7 1.7 1.6     Troponin:   Recent Labs   Lab 02/14/23  1634   TROPONINIHS 12.0       Significant Imaging: I have reviewed all pertinent imaging results/findings within the past 24 hours.    X-Ray Chest AP Portable    Result Date: 2/14/2023  Reason: SOB FINDINGS: Portable chest with comparison chest x-ray October 10, 2019 show normal cardiomediastinal silhouette. Mixed interstitial and hazy lung opacities of the right mid to lower lung noted. Retrocardiac linear opacities observed. Pulmonary vasculature is normal. No acute osseous abnormality. IMPRESSION: 1.  Mixed interstitial and hazy lung opacities of the right mid to lower lung could reflect infectious infiltrate, atelectasis and/or asymmetric pulmonary edema. 2.  Retrocardiac linear opacities could reflect subsegmental  atelectasis or infiltrate. Electronically signed by:  Gideon De La Paz DO  2/14/2023 12:31 PM [facility] Workstation: [id number]    Echo Saline Bubble? No    Result Date: 2/16/2023  · Eccentric hypertrophy and mildly decreased systolic function. · The estimated ejection fraction is 40%. · Atrial fibrillation observed. · Mild left atrial enlargement. · Severe mitral regurgitation. · Mild tricuspid regurgitation. · Normal central venous pressure (3 mmHg). · The estimated PA systolic pressure is 37 mmHg.

## 2023-02-17 VITALS
WEIGHT: 147.69 LBS | BODY MASS INDEX: 21.87 KG/M2 | TEMPERATURE: 98 F | RESPIRATION RATE: 18 BRPM | SYSTOLIC BLOOD PRESSURE: 110 MMHG | DIASTOLIC BLOOD PRESSURE: 85 MMHG | OXYGEN SATURATION: 96 % | HEIGHT: 69 IN | HEART RATE: 103 BPM

## 2023-02-17 PROBLEM — E87.1 HYPONATREMIA: Status: RESOLVED | Noted: 2023-02-16 | Resolved: 2023-02-17

## 2023-02-17 PROBLEM — I48.91 ATRIAL FIBRILLATION WITH RVR: Status: RESOLVED | Noted: 2023-02-14 | Resolved: 2023-02-17

## 2023-02-17 PROBLEM — I50.9 CHF (CONGESTIVE HEART FAILURE): Chronic | Status: RESOLVED | Noted: 2023-02-15 | Resolved: 2023-02-17

## 2023-02-17 LAB
ANION GAP SERPL CALC-SCNC: 6 MMOL/L (ref 8–16)
BNP SERPL-MCNC: 1413 PG/ML (ref 0–99)
BUN SERPL-MCNC: 16 MG/DL (ref 8–23)
CALCIUM SERPL-MCNC: 7.9 MG/DL (ref 8.7–10.5)
CHLORIDE SERPL-SCNC: 91 MMOL/L (ref 95–110)
CO2 SERPL-SCNC: 34 MMOL/L (ref 23–29)
CREAT SERPL-MCNC: 1 MG/DL (ref 0.5–1.4)
ERYTHROCYTE [DISTWIDTH] IN BLOOD BY AUTOMATED COUNT: 14.6 % (ref 11.5–14.5)
EST. GFR  (NO RACE VARIABLE): >60 ML/MIN/1.73 M^2
GLUCOSE SERPL-MCNC: 83 MG/DL (ref 70–110)
HCT VFR BLD AUTO: 33.1 % (ref 40–54)
HGB BLD-MCNC: 11 G/DL (ref 14–18)
MAGNESIUM SERPL-MCNC: 1.8 MG/DL (ref 1.6–2.6)
MCH RBC QN AUTO: 34.6 PG (ref 27–31)
MCHC RBC AUTO-ENTMCNC: 33.2 G/DL (ref 32–36)
MCV RBC AUTO: 104 FL (ref 82–98)
PLATELET # BLD AUTO: 155 K/UL (ref 150–450)
PMV BLD AUTO: 11.3 FL (ref 9.2–12.9)
POTASSIUM SERPL-SCNC: 4.1 MMOL/L (ref 3.5–5.1)
RBC # BLD AUTO: 3.18 M/UL (ref 4.6–6.2)
SODIUM SERPL-SCNC: 131 MMOL/L (ref 136–145)
WBC # BLD AUTO: 3.54 K/UL (ref 3.9–12.7)

## 2023-02-17 PROCEDURE — 25000003 PHARM REV CODE 250: Performed by: INTERNAL MEDICINE

## 2023-02-17 PROCEDURE — 83880 ASSAY OF NATRIURETIC PEPTIDE: CPT | Performed by: INTERNAL MEDICINE

## 2023-02-17 PROCEDURE — 83735 ASSAY OF MAGNESIUM: CPT | Performed by: INTERNAL MEDICINE

## 2023-02-17 PROCEDURE — 36415 COLL VENOUS BLD VENIPUNCTURE: CPT | Performed by: INTERNAL MEDICINE

## 2023-02-17 PROCEDURE — 85027 COMPLETE CBC AUTOMATED: CPT | Performed by: INTERNAL MEDICINE

## 2023-02-17 PROCEDURE — 80048 BASIC METABOLIC PNL TOTAL CA: CPT | Performed by: INTERNAL MEDICINE

## 2023-02-17 RX ORDER — METOPROLOL SUCCINATE 50 MG/1
50 TABLET, EXTENDED RELEASE ORAL 2 TIMES DAILY
Qty: 60 TABLET | Refills: 0 | Status: SHIPPED | OUTPATIENT
Start: 2023-02-17 | End: 2023-04-06 | Stop reason: CLARIF

## 2023-02-17 RX ORDER — LISINOPRIL 5 MG/1
5 TABLET ORAL DAILY
Status: DISCONTINUED | OUTPATIENT
Start: 2023-02-17 | End: 2023-02-17 | Stop reason: HOSPADM

## 2023-02-17 RX ORDER — FUROSEMIDE 40 MG/1
40 TABLET ORAL DAILY
Qty: 30 TABLET | Refills: 0 | Status: SHIPPED | OUTPATIENT
Start: 2023-02-18 | End: 2024-01-11

## 2023-02-17 RX ORDER — FUROSEMIDE 40 MG/1
40 TABLET ORAL DAILY
Status: DISCONTINUED | OUTPATIENT
Start: 2023-02-17 | End: 2023-02-17 | Stop reason: HOSPADM

## 2023-02-17 RX ORDER — LISINOPRIL 5 MG/1
2.5 TABLET ORAL DAILY
Status: ON HOLD
Start: 2023-02-17 | End: 2023-08-23 | Stop reason: HOSPADM

## 2023-02-17 RX ADMIN — LISINOPRIL 5 MG: 5 TABLET ORAL at 09:02

## 2023-02-17 RX ADMIN — ASPIRIN 81 MG: 81 TABLET, COATED ORAL at 09:02

## 2023-02-17 RX ADMIN — FUROSEMIDE 40 MG: 40 TABLET ORAL at 09:02

## 2023-02-17 RX ADMIN — METOPROLOL SUCCINATE 50 MG: 50 TABLET, FILM COATED, EXTENDED RELEASE ORAL at 09:02

## 2023-02-17 RX ADMIN — APIXABAN 5 MG: 5 TABLET, FILM COATED ORAL at 09:02

## 2023-02-17 NOTE — PLAN OF CARE
Discharge orders and chart reviewed with no further post-acute discharge needs identified at this time.  At this time, patient is cleared for discharge from Case Management standpoint.        02/17/23 1402   Final Note   Assessment Type Final Discharge Note   Anticipated Discharge Disposition Home   What phone number can be called within the next 1-3 days to see how you are doing after discharge? [phone]   Hospital Resources/Appts/Education Provided Appointments scheduled and added to AVS   Post-Acute Status   Discharge Delays None known at this time

## 2023-02-17 NOTE — PROGRESS NOTES
[facility]    Cardiology Progress Note    Subjective:  Patient is relatively comfortable he has been having good diuresis.  He is pretty much back to baseline and wants to go home      Objective:  Vital Signs (Most Recent)  Temp: 97.8 °F (36.6 °C) (02/17/23 0742)  Pulse: 106 (02/17/23 0742)  Resp: 18 (02/17/23 0742)  BP: 121/70 (02/17/23 0742)  SpO2: (!) 93 % (02/17/23 0742)    Vital Signs Range (Last 24H):  Temp:  [97.4 °F (36.3 °C)-98.1 °F (36.7 °C)]   Pulse:  []   Resp:  [16-18]   BP: ()/(70-84)   SpO2:  [93 %-98 %]     I & O (Last 24H):    Intake/Output Summary (Last 24 hours) at 2/17/2023 0802  Last data filed at 2/17/2023 0459  Gross per 24 hour   Intake 1200 ml   Output 1475 ml   Net -275 ml       Current Diet:     Current Diet Order   Procedures    Diet Low Sodium, 2gm Fluid - 1800mL     Order Specific Question:   Fluid restriction:     Answer:   Fluid - 1800mL        Allergies:  Review of patient's allergies indicates:  No Known Allergies    Meds:  Scheduled Meds:   apixaban  5 mg Oral BID    aspirin  81 mg Oral Daily    atorvastatin  10 mg Oral Daily    furosemide  40 mg Oral Daily    lisinopriL  5 mg Oral Daily    metoprolol succinate  50 mg Oral BID    senna-docusate 8.6-50 mg  2 tablet Oral BID     Continuous Infusions:  PRN Meds:acetaminophen, magnesium oxide, magnesium oxide, melatonin, naloxone, ondansetron, polyethylene glycol, potassium bicarbonate, potassium bicarbonate, potassium bicarbonate, potassium, sodium phosphates, potassium, sodium phosphates, potassium, sodium phosphates    Lab Results :  Recent Results (from the past 24 hour(s))   Basic Metabolic Panel (BMP)    Collection Time: 02/17/23  4:12 AM   Result Value Ref Range    Sodium 131 (L) 136 - 145 mmol/L    Potassium 4.1 3.5 - 5.1 mmol/L    Chloride 91 (L) 95 - 110 mmol/L    CO2 34 (H) 23 - 29 mmol/L    Glucose 83 70 - 110 mg/dL    BUN 16 8 - 23 mg/dL    Creatinine 1.0 0.5 - 1.4 mg/dL    Calcium 7.9 (L) 8.7 -  "10.5 mg/dL    Anion Gap 6 (L) 8 - 16 mmol/L    eGFR >60.0 >60 mL/min/1.73 m^2   Magnesium    Collection Time: 02/17/23  4:12 AM   Result Value Ref Range    Magnesium 1.8 1.6 - 2.6 mg/dL   CBC Without Differential    Collection Time: 02/17/23  4:12 AM   Result Value Ref Range    WBC 3.54 (L) 3.90 - 12.70 K/uL    RBC 3.18 (L) 4.60 - 6.20 M/uL    Hemoglobin 11.0 (L) 14.0 - 18.0 g/dL    Hematocrit 33.1 (L) 40.0 - 54.0 %     (H) 82 - 98 fL    MCH 34.6 (H) 27.0 - 31.0 pg    MCHC 33.2 32.0 - 36.0 g/dL    RDW 14.6 (H) 11.5 - 14.5 %    Platelets 155 150 - 450 K/uL    MPV 11.3 9.2 - 12.9 fL   Brain natriuretic peptide    Collection Time: 02/17/23  4:12 AM   Result Value Ref Range    BNP 1,413 (H) 0 - 99 pg/mL       Diagnostic Results:  Imaging Results              X-Ray Chest AP Portable (Final result)  Result time 02/14/23 12:31:32      Final result by Gideon De La Paz MD (02/14/23 12:31:32)                   Narrative:    Reason: SOB    FINDINGS:    Portable chest with comparison chest x-ray October 10, 2019 show normal cardiomediastinal silhouette.  Mixed interstitial and hazy lung opacities of the right mid to lower lung noted. Retrocardiac linear opacities observed. Pulmonary vasculature is normal. No acute osseous abnormality.    IMPRESSION:    1.  Mixed interstitial and hazy lung opacities of the right mid to lower lung could reflect infectious infiltrate, atelectasis and/or asymmetric pulmonary edema.  2.  Retrocardiac linear opacities could reflect subsegmental atelectasis or infiltrate.    Electronically signed by:  Gideon De La Paz DO  2/14/2023 12:31 PM CST Workstation: [id number]                                    Recent Cardiac Rhythm   (if applicable)      Physical Exam:  Objective:  General Appearance:  In no acute distress.    Vital signs: (most recent): Blood pressure 121/70, pulse 106, temperature 97.8 °F (36.6 °C), temperature source Oral, resp. rate 18, height 5' 9" (1.753 m), weight 67 kg (147 lb " 11.3 oz), SpO2 (!) 93 %.    Lungs:  There are rales.    Heart: Irregular rhythm.  S1 normal and S2 normal.  Positive for murmur.    Abdomen: Abdomen is soft.  Bowel sounds are normal.     Extremities: There is local swelling.      Current Consults:  IP CONSULT TO HOSPITAL MEDICINE  IP CONSULT TO HOSPITAL MEDICINE  IP CONSULT TO CARDIOLOGY    Assessment/Plan:  Assessment:    congestive heart failure  Dyspnea doubt pneumonia  Atrial fibrillation  Coronary artery disease status post stent 2007  Troponins are negative  Severe mitral regurgitation  EF of 40-45%  Plan:  Switch to p.o. Lasix.  I have added some low-dose lisinopril for afterload reduction.  Patient can go home will follow-up in office will probably need angiography and in the near future possible cardioversion

## 2023-02-17 NOTE — RESPIRATORY THERAPY
02/16/23 2233   Patient Assessment/Suction   Level of Consciousness (AVPU) alert   Respiratory Effort Unlabored   Expansion/Accessory Muscles/Retractions no use of accessory muscles   All Lung Fields Breath Sounds clear   PRE-TX-O2   Device (Oxygen Therapy) nasal cannula   $ Is the patient on Low Flow Oxygen? Yes   Flow (L/min) 2   Pulse Oximetry Type Intermittent   $ Pulse Oximetry - Multiple Charge Pulse Oximetry - Multiple   Education   $ Education DME Oxygen;15 min   Respiratory Evaluation   $ Care Plan Tech Time 15 min   $ Eval/Re-eval Charges Re-evaluation

## 2023-02-17 NOTE — PLAN OF CARE
No issues overnight. VS stable on 2LNC. AFIB  controlled w/ digoxin. HR 80-90s. Pt ambulatory denies any needs. WCTM    Problem: Adult Inpatient Plan of Care  Goal: Plan of Care Review  Outcome: Ongoing, Progressing  Goal: Patient-Specific Goal (Individualized)  Outcome: Ongoing, Progressing  Goal: Absence of Hospital-Acquired Illness or Injury  Outcome: Ongoing, Progressing  Goal: Optimal Comfort and Wellbeing  Outcome: Ongoing, Progressing  Goal: Readiness for Transition of Care  Outcome: Ongoing, Progressing     Problem: Fall Injury Risk  Goal: Absence of Fall and Fall-Related Injury  Outcome: Ongoing, Progressing

## 2023-02-18 NOTE — HOSPITAL COURSE
Pt got admitted with new onset A Fib RVR  Pt was started in iv cardizem gtt and later received digoxin inj  Pt already on Metoprolol XL 25 mg at home  and this was doubled to 50 mg xl bid along with initiation of eliquis  Pt was noted to be on acute on chronic systolic heart failure too and was started on iv lasix with excellent diuresis  (ECHO showed severe MR too)  Pts condition got better and was later discharged to home  Pt will follow up with Cardiology MD on outpatient basis

## 2023-02-18 NOTE — DISCHARGE SUMMARY
[facility] Medicine  Discharge Summary      Patient Name: Sid Cuello Jr.  MRN: [id number]  ABHINAV: [id number]  Patient Class: IP- Inpatient  Admission Date: 2/14/2023  Hospital Length of Stay: 3 days  Discharge Date and Time:  02/17/2023 7:32 PM  Attending Physician: No att. providers found   Discharging Provider: Conor Ramírez MD  Primary Care Provider: Yan Posada MD    Primary Care Team: Networked reference to record PCT     HPI:   Patient is a 72-year-old  male with known history of CAD status post stenting in 2007, essential hypertension and discoid lupus who was sent to the ED from PCP's office secondary to new diagnosis of atrial fibrillation.    Patient reports being in his usual state of health until about Anjali when he noticed gradually worsening bilateral lower extremity edema.  This has become severe over the last 2 days.  Also he reports worsening shortness of breath over the last 2 weeks.  Shortness of breath is worse with exertion and better with rest.  Reports easy fatigability however this is a chronic issue for him.  Denies chest pain, palpitations, orthopnea or paroxysmal nocturnal dyspnea.  He has history of abnormal SPEP profile but no evidence of myeloma.  He follows with Dr. Javier.     He is a retired .  Smokes 1 pack per day.  Consumes alcohol about 3-4 times per week.    Rest of the 10 point review of systems is negative except as mentioned above.      * No surgery found *      Hospital Course:   Pt got admitted with new onset A Fib RVR  Pt was started in iv cardizem gtt and later received digoxin inj  Pt already on Metoprolol XL 25 mg at home  and this was doubled to 50 mg xl bid along with initiation of eliquis  Pt was noted to be on acute on chronic systolic heart failure too and was started on iv lasix with excellent diuresis  (ECHO showed severe MR too)  Pts condition got better and was later discharged to home  Pt will follow  up with Cardiology MD on outpatient basis        Goals of Care Treatment Preferences:  Code Status: Full Code      Consults:   Consults (From admission, onward)          Status Ordering Provider     Inpatient consult to Cardiology  Once        Provider:  Raymond Sotomayor MD    Completed CATERINA TAN              Final Active Diagnoses:    Diagnosis Date Noted POA    Smoker [F17.200] 02/16/2023 Yes     Chronic    Anemia [D64.9] 02/16/2023 Yes     Chronic    Mitral regurgitation, severe [I34.0] 02/16/2023 Yes     Chronic    Neuropathy, peripheral axonal [G62.89] 09/07/2021 Yes    Abnormal SPEP [R77.8] 04/29/2021 Yes    CAD s/p PCI [I25.10] 08/10/2015 Yes     Chronic    S/P coronary artery stent placement [Z95.5] 08/10/2015 Not Applicable    Hyperlipidemia LDL goal < 100 [E78.5] 08/10/2015 Yes      Problems Resolved During this Admission:    Diagnosis Date Noted Date Resolved POA    PRINCIPAL PROBLEM:  Atrial fibrillation with RVR [I48.91] 02/14/2023 02/17/2023 Yes    Hyponatremia [E87.1] 02/16/2023 02/17/2023 Yes    Acute systolic heart failure [I50.9] 02/15/2023 02/17/2023 Yes     Chronic       Discharged Condition: good    Disposition: Home or Self Care    Follow Up:   Follow-up Information       Raymond Sotomayor MD Follow up on 2/27/2023.    Specialties: Cardiology, Interventional Cardiology  Why: Appointment scheduled 2/27/23 11:45  Contact information:  [facility] Rodney Carrillo [address] [place] [address] [id number] [phone]                           Patient Instructions:   No discharge procedures on file.    Significant Diagnostic Studies: Labs:   CMP   Recent Labs   Lab 02/16/23  0429 02/17/23  0412   * 131*   K 3.6 4.1   CL 95 91*   CO2 31* 34*   GLU 87 83   BUN 16 16   CREATININE 1.1 1.0   CALCIUM 7.9* 7.9*   ANIONGAP 6* 6*    and CBC   Recent Labs   Lab 02/16/23  0429 02/17/23  0412   WBC 3.74* 3.54*   HGB 10.5* 11.0*   HCT 32.9* 33.1*   * 155       Pending Diagnostic Studies:       None            Medications:  Reconciled Home Medications:      Medication List        START taking these medications      apixaban 5 mg Tab  Commonly known as: ELIQUIS  Take 1 tablet (5 mg total) by mouth 2 (two) times daily.     furosemide 40 MG tablet  Commonly known as: LASIX  Take 1 tablet (40 mg total) by mouth once daily.  Start taking on: February 18, 2023            CHANGE how you take these medications      betamethasone dipropionate 0.05 % lotion  Commonly known as: DIPROLENE  Apply thin film to scalp QHS PRN itch  What changed:   how much to take  how to take this  when to take this     fluticasone-umeclidin-vilanter 100-62.5-25 mcg Dsdv  Commonly known as: TRELEGY ELLIPTA  Inhale 1 puff into the lungs once daily.  What changed: additional instructions     lisinopriL 5 MG tablet  Commonly known as: PRINIVIL,ZESTRIL  Take 0.5 tablets (2.5 mg total) by mouth once daily.  What changed: how much to take     metoprolol succinate 50 MG 24 hr tablet  Commonly known as: TOPROL-XL  Take 1 tablet (50 mg total) by mouth 2 (two) times daily.  What changed:   medication strength  how much to take  when to take this     triamcinolone acetonide 0.1% 0.1 % cream  Commonly known as: KENALOG  AAA left cheek bid  What changed:   how much to take  how to take this  when to take this            CONTINUE taking these medications      atorvastatin 10 MG tablet  Commonly known as: LIPITOR  Take 10 mg by mouth once daily.     clopidogreL 75 mg tablet  Commonly known as: PLAVIX  Take 75 mg by mouth once daily.     cyanocobalamin 1,000 mcg/mL injection  Inject 1,000 mcg into the muscle every 30 days.            STOP taking these medications      aspirin 81 MG EC tablet  Commonly known as: ECOTRIN              Indwelling Lines/Drains at time of discharge:   Lines/Drains/Airways       None                 Physical Exam   Constitutional: He is oriented to person, place, and time.   Cardiovascular: Normal rate.   Neurological: He is alert and  oriented to person, place, and time.     Time spent on the discharge of patient: 45 minutes         Conor Ramírez MD  Department of Hospital Medicine  [facility]

## 2023-02-19 LAB
BACTERIA BLD CULT: NORMAL
BACTERIA BLD CULT: NORMAL

## 2023-03-01 NOTE — PROGRESS NOTES
[facility] Hematology/Oncology  PROGRESS NOTE -   Follow-up Visit      Subjective:       Patient ID:   NAME: Sid Cuello Jr. : 1950     72 y.o. male    Referring Doc: Светлана  Other Physicians: Rose Mary Posada           Chief Complaint: abn SPEP f/u       History of Present Illness:     Patient returns today for a regularly scheduled follow-up visit.  The patient is here today to go over the results of the recently ordered labs, tests and studies. He is here by himself.    He was in hospital for atrial fib couple of weeks ago and he was seen by Dr Sotomayor. I spoke to Dr Sotomayor just last week    He has chronic neuropathy issues in feet     He continues to have residual fatigue and general lack of energy; he has lost some weight over the past 3 yrs which he relates to his fluid issues mostly      Breathing ok. No CP, SOB, HA's or N/V.     He is a retired           Discussed covid19 precautions and he had his vaccinations        ROS:   GEN: normal without any fever, night sweats or weight loss; chronic fatigue; chronic neuropathy in feet  HEENT: normal with no HA's, sore throat, stiff neck, changes in vision  CV: normal with no CP, SOB, PND, TRAN or orthopnea  PULM: normal with no SOB, cough, hemoptysis, sputum or pleuritic pain  GI: normal with no abdominal pain, nausea, vomiting, constipation, diarrhea, melanotic stools, BRBPR, or hematemesis  : normal with no hematuria, dysuria  BREAST: normal with no mass, discharge, pain  SKIN: normal with no rash, erythema, bruising, or swelling    Pain Scale: 0    Allergies:  Review of patient's allergies indicates:  No Known Allergies    Medications:    Current Outpatient Medications:     apixaban (ELIQUIS) 5 mg Tab, Take 1 tablet (5 mg total) by mouth 2 (two) times daily., Disp: 60 tablet, Rfl: 0    atorvastatin (LIPITOR) 10 MG tablet, Take 10 mg by mouth once daily., Disp: , Rfl:     betamethasone dipropionate (DIPROLENE) 0.05 %  lotion, Apply thin film to scalp QHS PRN itch (Patient taking differently: Apply 1 application topically nightly. Apply thin film to scalp QHS PRN itch), Disp: 60 mL, Rfl: 2    clopidogrel (PLAVIX) 75 mg tablet, Take 75 mg by mouth once daily., Disp: , Rfl:     cyanocobalamin 1,000 mcg/mL injection, Inject 1,000 mcg into the muscle every 30 days., Disp: , Rfl:     fluticasone-umeclidin-vilanter (TRELEGY ELLIPTA) 100-62.5-25 mcg DsDv, Inhale 1 puff into the lungs once daily., Disp: 60 each, Rfl: 3    furosemide (LASIX) 40 MG tablet, Take 1 tablet (40 mg total) by mouth once daily., Disp: 30 tablet, Rfl: 0    lisinopriL (PRINIVIL,ZESTRIL) 5 MG tablet, Take 0.5 tablets (2.5 mg total) by mouth once daily., Disp: , Rfl:     metoprolol succinate (TOPROL-XL) 50 MG 24 hr tablet, Take 1 tablet (50 mg total) by mouth 2 (two) times daily., Disp: 60 tablet, Rfl: 0    triamcinolone acetonide 0.1% (KENALOG) 0.1 % cream, AAA left cheek bid (Patient taking differently: Apply 1 g topically 2 (two) times daily. AAA left cheek bid), Disp: 45 g, Rfl: 3  No current facility-administered medications for this visit.    Facility-Administered Medications Ordered in Other Visits:     diphenhydrAMINE injection 25 mg, 25 mg, Intravenous, Q6H PRN, Dhruv Gutierrez MD    fentaNYL injection 25 mcg, 25 mcg, Intravenous, Q5 Min PRN, Dhruv Gutierrez MD    hydromorphone (PF) injection 0.2 mg, 0.2 mg, Intravenous, Q5 Min PRN, Dhruv Gutierrez MD    lactated ringers infusion, 10 mL/hr, Intravenous, Continuous, Dhruv Gutierrez MD, Stopped at 10/23/19 1450    lactated ringers infusion, 75 mL/hr, Intravenous, Continuous, Dhruv Gutierrez MD    lidocaine (PF) 10 mg/ml (1%) injection 10 mg, 1 mL, Intradermal, Once, Dhruv Gutierrez MD    ondansetron injection 4 mg, 4 mg, Intravenous, Once, Dhruv Gutierrez MD    oxyCODONE immediate release tablet 5 mg, 5 mg, Oral, PRN, Dhruv Gutierrez MD    promethazine (PHENERGAN) 6.25 mg  "in dextrose 5 % 50 mL IVPB, 6.25 mg, Intravenous, Q10 Min PRN, Dhruv Gutierrez MD    sodium chloride 0.9% flush 3 mL, 3 mL, Intravenous, Q8H, Dhruv Gutierrez MD    sodium chloride 0.9% flush 3 mL, 3 mL, Intravenous, PRN, Dhruv Gutierrez MD    PMHx/PSHx Updates:  See patient's last visit with me on 8/1/2022  See H&P on 4/30/2021        Pathology:   Cancer Staging   No matching staging information was found for the patient.    Bone marrow biopsy 1/10/2022:    BONE MARROW, RIGHT ILIAC CREST, ASPIRATE, CLOT SECTION, AND CORE BIOPSY:   --CELLULAR MARROW (APPROXIMATELY 25% TO 35%) WITH TRILINEAGE    HEMATOPOIETIC ELEMENTS AND OCCASIONAL   PLASMA CELLS; FURTHER CHARACTERIZATION PENDING IMMUNOHISTOCHEMISTRY;    FINAL DIAGNOSIS TO FOLLOW.   --PERIPHERAL BLOOD WITH NORMAL THROMBOCYTE COUNT (164,000/MICROLITER);    ANEMIA (HEMOGLOBIN 12.5 GRAM/     DECILITER); AND LEUKOPENIA (3,390/MICROLITER).     Plasma cells (bright CD38+/+) comprise approximately 2% of the    total sample and demonstrate polyclonal expression of immunoglobulin    light chains. The plasma cell population co-expresses CD19.      INTERPRETATION:     Immunophenotyping fails to identify any abnormal cell populations. A    small population (approximately 2%) of polyclonal plasma cells is    present.      Objective:     Vitals:  Blood pressure 107/74, pulse 110, temperature 97.5 °F (36.4 °C), height 5' 9" (1.753 m), weight 65.5 kg (144 lb 6.4 oz).    Physical Examination:   GEN: no apparent distress, comfortable; AAOx3; thin  HEAD: atraumatic and normocephalic; alopecia stable due to the lupus  EYES: no pallor, no icterus, PERRLA  ENT: OMM, no pharyngeal erythema, external ears WNL; no nasal discharge; no thrush  NECK: no masses, thyroid normal, trachea midline, no LAD/LN's, supple  CV: RRR with no murmur; normal pulse; normal S1 and S2; no pedal edema  CHEST: Normal respiratory effort; CTAB; normal breath sounds; no wheeze or crackles  ABDOM: " nontender and nondistended; soft; normal bowel sounds; no rebound/guarding  MUSC/Skeletal: ROM normal; no crepitus; joints normal; no deformities or arthropathy  EXTREM: no clubbing, cyanosis, inflammation or swelling  SKIN: no rashes, lesions, ulcers, petechiae or subcutaneous nodules; chronic age related skin changes; 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  : no toussaint  NEURO: grossly intact; motor/sensory WNL; AAOx3; no tremors  PSYCH: normal mood, affect and behavior  LYMPH: normal cervical, supraclavicular, axillary and groin LN's        Labs:     Lab Results   Component Value Date    WBC 3.54 (L) 02/17/2023    HGB 11.0 (L) 02/17/2023    HCT 33.1 (L) 02/17/2023     (H) 02/17/2023     02/17/2023     CMP  Sodium   Date Value Ref Range Status   02/17/2023 131 (L) 136 - 145 mmol/L Final     Potassium   Date Value Ref Range Status   02/17/2023 4.1 3.5 - 5.1 mmol/L Final     Chloride   Date Value Ref Range Status   02/17/2023 91 (L) 95 - 110 mmol/L Final     CO2   Date Value Ref Range Status   02/17/2023 34 (H) 23 - 29 mmol/L Final     Glucose   Date Value Ref Range Status   02/17/2023 83 70 - 110 mg/dL Final     BUN   Date Value Ref Range Status   02/17/2023 16 8 - 23 mg/dL Final     Creatinine   Date Value Ref Range Status   02/17/2023 1.0 0.5 - 1.4 mg/dL Final     Calcium   Date Value Ref Range Status   02/17/2023 7.9 (L) 8.7 - 10.5 mg/dL Final     Total Protein   Date Value Ref Range Status   02/14/2023 8.9 (H) 6.0 - 8.4 g/dL Final     Albumin   Date Value Ref Range Status   02/14/2023 2.9 (L) 3.5 - 5.2 g/dL Final     Total Bilirubin   Date Value Ref Range Status   02/14/2023 1.1 (H) 0.1 - 1.0 mg/dL Final     Comment:     For infants and newborns, interpretation of results should be based  on gestational age, weight and in agreement with clinical  observations.    Premature Infant recommended reference ranges:  Up to 24 hours.............<8.0 mg/dL  Up to 48 hours............<12.0 mg/dL  3-5  days..................<15.0 mg/dL  6-29 days.................<15.0 mg/dL       Alkaline Phosphatase   Date Value Ref Range Status   02/14/2023 93 55 - 135 U/L Final     AST   Date Value Ref Range Status   02/14/2023 35 10 - 40 U/L Final     ALT   Date Value Ref Range Status   02/14/2023 13 10 - 44 U/L Final     Anion Gap   Date Value Ref Range Status   02/17/2023 6 (L) 8 - 16 mmol/L Final     eGFR if    Date Value Ref Range Status   07/21/2022 >60.0 >60 mL/min/1.73 m^2 Final     eGFR if non    Date Value Ref Range Status   07/21/2022 >60.0 >60 mL/min/1.73 m^2 Final     Comment:     Calculation used to obtain the estimated glomerular filtration  rate (eGFR) is the CKD-EPI equation.         Kappa/Lambda FLC Ratio 0.26 - 1.65 1.17       Beta-2 Microglobulin 0.0 - 2.5 ug/mL 5.2        IgG 650 - 1600 mg/dL 3848 High   3756   Comment: IgG Cord Blood Reference Range: 650-1600 mg/dL.   IgA 40 - 350 mg/dL 848 High   783   Comment: IgA Cord Blood Reference Range: <5 mg/dL.   IgM 50 - 300 mg/dL 141  128   Comment: IgM Cord Blood Reference Range: <25 mg/dL.       Pathologist Interpretation UPE REVIEWED    Comment:    Electronically reviewed and signed by:   Peri Narvaez MD   Signed on 01/27/23 at 10:46   No paraprotein bands identified.        Pathologist Interpretation SPE REVIEWED    Comment:    Electronically reviewed and signed by:   Peri Narvaez MD   Signed on 01/24/23 at 15:18   Increased total protein. Increased gamma globulin, polyclonal. No   paraprotein bands detected.            Radiology/Diagnostic Studies:    No results found.    I have reviewed all available lab results and radiology reports.    Assessment/Plan:   (1) 72 y.o. male  with diagnosis of abnormal SPEP who has been referred by Dr Sotomayor for evaluation by medical hematology/oncology.   -  He had an SPEP on 4/16/2021 with no evidence of any M-protein He sees Dr Anglin for discoid Lupus and I suspect that  this is the cause of the protein issues seen on the labs.    5/27/2021:  - IgG is elevated but the immunofoxation studies in the serum and urine are both negative for any M-protein  - kappa/lambda ratio is also WNL  - I do not suspect multiple myeloma at this time  - I recommended referral to see Dr Blanco at [facility] (who is a paraproteinemia specialist) and also consideration for a bone marrow biopsy to further elucidate - however, he is not inclined to doing either at this time  - will repeat protein studies every 6 months      8/26/2021:  - patient here for short-term f/u visit  - repeat protein studies are due in Nov 2021  -recommended referral to see Dr Blanco at [facility] (who is a paraproteinemia specialist) and also consideration for a bone marrow biopsy to further elucidate - however, he was not inclined to doing either previously and does not want to go to [place]  - I have already told the patient that this is the only way one can get a second opinion    1/3/2022:  - he is adamant that he does not want to go to [place] or [facility]  - discussed again with the patient about getting a bone marrow biopsy and he is now willing to consider getting one done  - I explained to him that we are limited as to what I can do for him by what he will or will not do     2/1/2022:  - recent bone marrow biopsy on 1/10/2022 with only a 2% population of polyclonal plasma cells with on abnormal immunophenotypical population identified  - he declined referral to Dr Blanco at [facility]  - will proceed with observation and monitoring of his labs and protein studies for now    8/1/2022:  - discussed the results of the latest protein studies and they seem to be relatively stable; again there in no monoclonal component to his gammopathy  - he is adamant that he does not want to be evaluated by my proteinopathy specialist Dr Blanco at [facility]  - continue with observtaion    3/2/2023:  - his protein studies are relatively stable with no M-protein  component; I do not suspect he has multiple myeloma  - 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 continue with observation and regular monitoring of labs  2. he previously declined referral to see Dr Blanco at [facility] and is not inclined to having any scans done at this time  3. Encourage tobacco cessation  4. Previously recommended consideration for referral to neurology for his neuropathy issues  5. Repeat protein studies every 6 months  6. F/u with PCP, Card, Derm and        RTC in 6 months  Fax note to Albino Sotomayor Pinsky, [place], Saf[place]       Discussion:     COVID-19 Discussion:    I had long discussion with patient and any applicable family about the COVID-19 coronavirus epidemic and the recommended precautions with regard to cancer and/or hematology patients. I have re-iterated the CDC recommendations for adequate hand washing, use of hand -like products, and coughing into elbow, etc. In addition, especially for our patients who are on chemotherapy and/or our otherwise immunocompromised patients, I have recommended avoidance of crowds, including movie theaters, restaurants, churches, etc. I have recommended avoidance of any sick or symptomatic family members and/or friends. I have also recommended avoidance of any raw and unwashed  food products, and general avoidance of food items that have not been prepared by themselves. The patient has been asked to call us immediately with any symptom developments, issues, questions or other general concerns.       I spent over 25 mins of time with the patient. Reviewed results of the recently ordered labs, tests and studies; made directives with regards to the results. Over half of this time was spent couseling and coordinating care.    I have explained all of the above in detail and the patient understands all of the current recommendation(s). I have answered all of their questions to the best of my ability and to their complete satisfaction.   The patient is to continue with the current management plan.            Electronically signed by Antolin Javier MD                              Answers submitted by the patient for this visit:  Review of Systems Questionnaire (Submitted on 2/27/2023)  appetite change : No  unexpected weight change: No  mouth sores: No  visual disturbance: No  cough: Yes  shortness of breath: Yes  chest pain: No  abdominal pain: No  diarrhea: No  frequency: Yes  back pain: No  rash: Yes  headaches: No  adenopathy: No  nervous/ anxious: No

## 2023-03-02 ENCOUNTER — OFFICE VISIT (OUTPATIENT)
Dept: HEMATOLOGY/ONCOLOGY | Facility: CLINIC | Age: 73
End: 2023-03-02
Payer: MEDICARE

## 2023-03-02 VITALS
HEIGHT: 69 IN | SYSTOLIC BLOOD PRESSURE: 107 MMHG | WEIGHT: 144.38 LBS | TEMPERATURE: 98 F | HEART RATE: 110 BPM | DIASTOLIC BLOOD PRESSURE: 74 MMHG | BODY MASS INDEX: 21.38 KG/M2

## 2023-03-02 DIAGNOSIS — R77.8 ABNORMAL SPEP: Primary | ICD-10-CM

## 2023-03-02 DIAGNOSIS — F17.200 SMOKER: Chronic | ICD-10-CM

## 2023-03-02 DIAGNOSIS — D64.9 ANEMIA, UNSPECIFIED TYPE: Chronic | ICD-10-CM

## 2023-03-02 DIAGNOSIS — R71.8 ELEVATED MCV: ICD-10-CM

## 2023-03-02 PROCEDURE — 99213 PR OFFICE/OUTPT VISIT, EST, LEVL III, 20-29 MIN: ICD-10-PCS | Mod: S$GLB,,, | Performed by: INTERNAL MEDICINE

## 2023-03-02 PROCEDURE — 99213 OFFICE O/P EST LOW 20 MIN: CPT | Mod: S$GLB,,, | Performed by: INTERNAL MEDICINE

## 2023-03-27 ENCOUNTER — PATIENT OUTREACH (OUTPATIENT)
Dept: FAMILY MEDICINE | Facility: CLINIC | Age: 73
End: 2023-03-27

## 2023-03-27 NOTE — TELEPHONE ENCOUNTER
Patient Outreach - Colorectal Cancer Screen  Pt. Says he is unable to do anything at this time.  He has an appointment w/Dr. Posada coming up in May and will discuss it with him at the visit.  Appt note added

## 2023-04-06 RX ORDER — SOTALOL HYDROCHLORIDE 80 MG/1
80 TABLET ORAL 2 TIMES DAILY
Status: ON HOLD | COMMUNITY
End: 2023-08-23 | Stop reason: HOSPADM

## 2023-04-10 ENCOUNTER — HOSPITAL ENCOUNTER (OUTPATIENT)
Facility: HOSPITAL | Age: 73
Discharge: HOME | End: 2023-04-10
Attending: INTERNAL MEDICINE | Admitting: INTERNAL MEDICINE
Payer: MEDICARE

## 2023-04-10 ENCOUNTER — ANESTHESIA EVENT (OUTPATIENT)
Dept: CARDIOLOGY | Facility: HOSPITAL | Age: 73
End: 2023-04-10
Payer: MEDICARE

## 2023-04-10 ENCOUNTER — ANESTHESIA (OUTPATIENT)
Dept: CARDIOLOGY | Facility: HOSPITAL | Age: 73
End: 2023-04-10
Payer: MEDICARE

## 2023-04-10 DIAGNOSIS — I48.91 ATRIAL FIBRILLATION, UNSPECIFIED TYPE: ICD-10-CM

## 2023-04-10 DIAGNOSIS — Z01.810 PREOP CARDIOVASCULAR EXAM: ICD-10-CM

## 2023-04-10 DIAGNOSIS — I48.91 A-FIB: ICD-10-CM

## 2023-04-10 LAB
ALBUMIN SERPL BCP-MCNC: 2.9 G/DL (ref 3.5–5.2)
ALP SERPL-CCNC: 128 U/L (ref 55–135)
ALT SERPL W/O P-5'-P-CCNC: 16 U/L (ref 10–44)
ANION GAP SERPL CALC-SCNC: 6 MMOL/L (ref 8–16)
AST SERPL-CCNC: 40 U/L (ref 10–40)
BILIRUB SERPL-MCNC: 1.3 MG/DL (ref 0.1–1)
BUN SERPL-MCNC: 17 MG/DL (ref 8–23)
CALCIUM SERPL-MCNC: 8.7 MG/DL (ref 8.7–10.5)
CHLORIDE SERPL-SCNC: 91 MMOL/L (ref 95–110)
CO2 SERPL-SCNC: 30 MMOL/L (ref 23–29)
CREAT SERPL-MCNC: 1.1 MG/DL (ref 0.5–1.4)
ERYTHROCYTE [DISTWIDTH] IN BLOOD BY AUTOMATED COUNT: 14.8 % (ref 11.5–14.5)
EST. GFR  (NO RACE VARIABLE): >60 ML/MIN/1.73 M^2
GLUCOSE SERPL-MCNC: 97 MG/DL (ref 70–110)
HCT VFR BLD AUTO: 30.8 % (ref 40–54)
HGB BLD-MCNC: 10.2 G/DL (ref 14–18)
MCH RBC QN AUTO: 32.6 PG (ref 27–31)
MCHC RBC AUTO-ENTMCNC: 33.1 G/DL (ref 32–36)
MCV RBC AUTO: 98 FL (ref 82–98)
PLATELET # BLD AUTO: 211 K/UL (ref 150–450)
PMV BLD AUTO: 10 FL (ref 9.2–12.9)
POTASSIUM SERPL-SCNC: 3.6 MMOL/L (ref 3.5–5.1)
PROT SERPL-MCNC: 8.8 G/DL (ref 6–8.4)
RBC # BLD AUTO: 3.13 M/UL (ref 4.6–6.2)
SODIUM SERPL-SCNC: 127 MMOL/L (ref 136–145)
WBC # BLD AUTO: 3.32 K/UL (ref 3.9–12.7)

## 2023-04-10 PROCEDURE — 93010 EKG 12-LEAD: ICD-10-PCS | Mod: 76,,, | Performed by: INTERNAL MEDICINE

## 2023-04-10 PROCEDURE — 63600175 PHARM REV CODE 636 W HCPCS: Performed by: STUDENT IN AN ORGANIZED HEALTH CARE EDUCATION/TRAINING PROGRAM

## 2023-04-10 PROCEDURE — 93005 ELECTROCARDIOGRAM TRACING: CPT | Performed by: INTERNAL MEDICINE

## 2023-04-10 PROCEDURE — 93010 ELECTROCARDIOGRAM REPORT: CPT | Mod: 76,,, | Performed by: INTERNAL MEDICINE

## 2023-04-10 PROCEDURE — 85027 COMPLETE CBC AUTOMATED: CPT | Performed by: INTERNAL MEDICINE

## 2023-04-10 PROCEDURE — D9220A PRA ANESTHESIA: Mod: CRNA,,, | Performed by: NURSE ANESTHETIST, CERTIFIED REGISTERED

## 2023-04-10 PROCEDURE — D9220A PRA ANESTHESIA: ICD-10-PCS | Mod: CRNA,,, | Performed by: NURSE ANESTHETIST, CERTIFIED REGISTERED

## 2023-04-10 PROCEDURE — D9220A PRA ANESTHESIA: ICD-10-PCS | Mod: ANES,,, | Performed by: STUDENT IN AN ORGANIZED HEALTH CARE EDUCATION/TRAINING PROGRAM

## 2023-04-10 PROCEDURE — D9220A PRA ANESTHESIA: Mod: ANES,,, | Performed by: STUDENT IN AN ORGANIZED HEALTH CARE EDUCATION/TRAINING PROGRAM

## 2023-04-10 PROCEDURE — 80053 COMPREHEN METABOLIC PANEL: CPT | Performed by: INTERNAL MEDICINE

## 2023-04-10 PROCEDURE — 92960 CARDIOVERSION ELECTRIC EXT: CPT | Performed by: INTERNAL MEDICINE

## 2023-04-10 PROCEDURE — 37000008 HC ANESTHESIA 1ST 15 MINUTES: Performed by: INTERNAL MEDICINE

## 2023-04-10 RX ORDER — PROPOFOL 10 MG/ML
VIAL (ML) INTRAVENOUS
Status: DISCONTINUED | OUTPATIENT
Start: 2023-04-10 | End: 2023-04-10

## 2023-04-10 RX ADMIN — PROPOFOL 100 MG: 10 INJECTION, EMULSION INTRAVENOUS at 08:04

## 2023-04-10 NOTE — ANESTHESIA POSTPROCEDURE EVALUATION
Anesthesia Post Evaluation    Patient: Sid Cuello JrHeather    Procedure(s) Performed: Procedure(s) (LRB):  Cardioversion or Defibrillation (N/A)    Final Anesthesia Type: MAC      Patient location during evaluation: [facility]  Patient participation: Yes- Able to Participate  Level of consciousness: awake and alert  Post-procedure vital signs: reviewed and stable  Pain management: adequate  Airway patency: patent    PONV status at discharge: No PONV  Anesthetic complications: no      Cardiovascular status: hemodynamically stable  Respiratory status: unassisted, spontaneous ventilation and room air  Hydration status: euvolemic  Follow-up not needed.          Vitals Value Taken Time   /78 04/10/23 0842   Temp  04/10/23 0912   Pulse 84 04/10/23 0910   Resp 26 04/10/23 0910   SpO2 100 % 04/10/23 0910   Vitals shown include unvalidated device data.      No case tracking events are documented in the log.      Pain/Eva Score: No data recorded

## 2023-04-10 NOTE — ANESTHESIA PREPROCEDURE EVALUATION
04/10/2023  Sid Cuello Jr. is a 72 y.o., male.         Tobacco Use:  The patient  reports that he has been smoking cigarettes. He has never used smokeless tobacco.     Results for orders placed or performed during the hospital encounter of 02/14/23   EKG 12-lead    Collection Time: 02/14/23 11:41 AM    Narrative    Test Reason : R07.9,    Vent. Rate : 131 BPM     Atrial Rate : 094 BPM     P-R Int : 000 ms          QRS Dur : 082 ms      QT Int : 326 ms       P-R-T Axes : 000 -45 035 degrees     QTc Int : 481 ms    Atrial fibrillation with rapid ventricular response  Low voltage QRS clockwise rotation    Left anterior fascicular block  Cannot rule out Anterior infarct ,age undetermined  Abnormal ECG  When compared with ECG of 10-OCT-2019 07:27,  Atrial fibrillation has replaced Sinus rhythm  Vent. rate has increased BY  51 BPM  Minimal criteria for Anterior infarct are now Present  Nonspecific T wave abnormality now evident in Lateral leads  Confirmed by David Melgar MD ([id number]) on 2/17/2023 4:10:01 PM    Referred By: [email or web address]   SELF           Confirmed By:David Melgar MD             Lab Results   Component Value Date    WBC 3.54 (L) 02/17/2023    HGB 11.0 (L) 02/17/2023    HCT 33.1 (L) 02/17/2023     (H) 02/17/2023     02/17/2023     BMP  Lab Results   Component Value Date     (L) 02/17/2023    K 4.1 02/17/2023    CL 91 (L) 02/17/2023    CO2 34 (H) 02/17/2023    BUN 16 02/17/2023    CREATININE 1.0 02/17/2023    CALCIUM 7.9 (L) 02/17/2023    ANIONGAP 6 (L) 02/17/2023    GLU 83 02/17/2023    GLU 87 02/16/2023    GLU 93 02/15/2023       Results for orders placed during the hospital encounter of 02/14/23    Echo Saline Bubble? No    Interpretation Summary  · Eccentric hypertrophy and mildly decreased systolic function.  · The estimated ejection fraction is 40%.  · Atrial fibrillation  observed.  · Mild left atrial enlargement.  · Severe mitral regurgitation.  · Mild tricuspid regurgitation.  · Normal central venous pressure (3 mmHg).  · The estimated PA systolic pressure is 37 mmHg.        Pre-op Assessment    I have reviewed the Patient Summary Reports.     I have reviewed the Nursing Notes. I have reviewed the NPO Status.   I have reviewed the Medications.     Review of Systems  Anesthesia Hx:  No problems with previous Anesthesia  Denies Family Hx of Anesthesia complications.   Denies Personal Hx of Anesthesia complications.   Social:  Alcohol Use, Smoker    Hematology/Oncology:         -- Anemia: Hematology Comments: Plavix Therapy   --  Cancer in past history:  Oncology Comments: Testicular Tumor      EENT/Dental:  EENT/Dental Normal Eyes: Visual Impairment Has Bilateral and S/P Extraction - Right Catarract    Cardiovascular:   Hypertension, well controlled Valvular problems/Murmurs, MR CAD asymptomatic CABG/stent Dysrhythmias atrial fibrillation hyperlipidemia ECG has been reviewed. Severe MR    EF 40%    Pulmonary:  Pulmonary Normal    Renal/:  Renal/ Normal     Hepatic/GI:  Hepatic/GI Normal    Musculoskeletal:  Musculoskeletal Normal  Rheumatic Disease, Lupus    Neurological:   Neuromuscular Disease,   Peripheral Neuropathy    Endocrine:  Endocrine Normal    Dermatological:   Discoid lupus   Psych:  Psychiatric Normal           Physical Exam  General: Well nourished, Cooperative, Alert and Oriented    Airway:  Mallampati: III   Mouth Opening: Normal  TM Distance: Normal  Tongue: Normal  Neck ROM: Normal ROM    Dental:  Caps / Implants, Periodontal disease    Chest/Lungs:  Clear to auscultation, Normal Respiratory Rate    Heart:  Rate: Tachycardia  Rhythm: Irregularly Irregular        Anesthesia Plan  Type of Anesthesia, risks & benefits discussed:    Anesthesia Type: Gen Natural Airway  Intra-op Monitoring Plan: Standard ASA Monitors  Post Op Pain Control Plan: multimodal  analgesia  ASA Score: 3  Anesthesia Plan Notes:     POM Mask     Minimal IVF's (recent CHF exacerbation with pulmonary edema)     Ready For Surgery From Anesthesia Perspective.     .

## 2023-04-12 VITALS
HEIGHT: 69 IN | DIASTOLIC BLOOD PRESSURE: 80 MMHG | HEART RATE: 84 BPM | WEIGHT: 141 LBS | RESPIRATION RATE: 20 BRPM | SYSTOLIC BLOOD PRESSURE: 120 MMHG | BODY MASS INDEX: 20.88 KG/M2 | OXYGEN SATURATION: 98 %

## 2023-04-12 LAB
EKG 12-LEAD: NORMAL
PR INTERVAL: NORMAL
PRT AXES: 220
QRS DURATION: 80
QT/QTC: NORMAL
VENTRICULAR RATE: 121

## 2023-04-12 NOTE — ADDENDUM NOTE
Addendum  created 04/12/23 0736 by Brigida Win LPN    Intraprocedure Devices edited, Patient device edited, Patient device removed

## 2023-04-12 NOTE — ADDENDUM NOTE
Addendum  created 04/12/23 1042 by Emani Stephen RN    Flowsheet accepted, Intraprocedure Flowsheets edited

## 2023-04-19 ENCOUNTER — OFFICE VISIT (OUTPATIENT)
Dept: DERMATOLOGY | Facility: CLINIC | Age: 73
End: 2023-04-19
Payer: MEDICARE

## 2023-04-19 VITALS — BODY MASS INDEX: 20.88 KG/M2 | HEIGHT: 69 IN | WEIGHT: 141 LBS

## 2023-04-19 DIAGNOSIS — L93.0 DISCOID LUPUS: Primary | ICD-10-CM

## 2023-04-19 DIAGNOSIS — R63.4 WEIGHT LOSS: ICD-10-CM

## 2023-04-19 DIAGNOSIS — L29.9 PRURITUS: ICD-10-CM

## 2023-04-19 PROCEDURE — 99999 PR PBB SHADOW E&M-EST. PATIENT-LVL III: ICD-10-PCS | Mod: PBBFAC,,, | Performed by: DERMATOLOGY

## 2023-04-19 PROCEDURE — 99213 OFFICE O/P EST LOW 20 MIN: CPT | Mod: PBBFAC,PO | Performed by: DERMATOLOGY

## 2023-04-19 PROCEDURE — 99999 PR PBB SHADOW E&M-EST. PATIENT-LVL III: CPT | Mod: PBBFAC,,, | Performed by: DERMATOLOGY

## 2023-04-19 PROCEDURE — 99214 PR OFFICE/OUTPT VISIT, EST, LEVL IV, 30-39 MIN: ICD-10-PCS | Mod: S$PBB,,, | Performed by: DERMATOLOGY

## 2023-04-19 PROCEDURE — 99214 OFFICE O/P EST MOD 30 MIN: CPT | Mod: S$PBB,,, | Performed by: DERMATOLOGY

## 2023-04-19 RX ORDER — TRIAMCINOLONE ACETONIDE 1 MG/G
CREAM TOPICAL
Qty: 454 G | Refills: 3 | Status: SHIPPED | OUTPATIENT
Start: 2023-04-19

## 2023-04-19 NOTE — PROGRESS NOTES
"  Subjective:      Patient ID:  Sid Cuello Jr. is a 72 y.o. 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  - he previously declined to go see Dr Blanco at [facility] who is a paraproteinemia/myeloma specialist  - discussed Dr Sotomayor's concerns with the patient and recommended consideration for CT Chest/Abdom/pelvis but patient is not inclined to having any scans done at this time    Derm Hx:  Denies Phx of NMSC  Denies Fhx of MM    All meds decade or more  Monthly B12 injection  Current Outpatient Medications:   ·  apixaban (ELIQUIS) 2.5 mg Tab, Take by mouth once daily., Disp: , Rfl:   ·  atorvastatin (LIPITOR) 10 MG tablet, Take 10 mg by mouth once daily., Disp: , Rfl:   ·  betamethasone dipropionate (DIPROLENE) 0.05 % lotion, Apply thin film to scalp QHS PRN itch (Patient taking differently: Apply 1 application topically nightly. Apply thin film to scalp QHS PRN itch), Disp: 60 mL, Rfl: 2  ·  clopidogrel (PLAVIX) 75 mg tablet, Take 75 mg by mouth once daily., Disp: , Rfl:   ·  cyanocobalamin 1,000 mcg/mL injection, Inject 1,000 mcg into the muscle every 30 days., Disp: , Rfl:   ·  fluticasone-umeclidin-vilanter (TRELEGY ELLIPTA) 100-62.5-25 mcg DsDv, " Inhale 1 puff into the lungs once daily., Disp: 60 each, Rfl: 3  ·  lisinopriL (PRINIVIL,ZESTRIL) 5 MG tablet, Take 0.5 tablets (2.5 mg total) by mouth once daily., Disp: , Rfl:   ·  sotaloL (BETAPACE) 80 MG tablet, Take 80 mg by mouth 2 (two) times daily., Disp: , Rfl:   ·  triamcinolone acetonide 0.1% (KENALOG) 0.1 % cream, AAA left cheek bid (Patient taking differently: Apply 1 g topically 2 (two) times daily. AAA left cheek bid), Disp: 45 g, Rfl: 3  ·  furosemide (LASIX) 40 MG tablet, Take 1 tablet (40 mg total) by mouth once daily., Disp: 30 tablet, Rfl: 0    Review of Systems   Constitutional:  Positive for fatigue. Negative for fever, chills, weight loss, weight gain, night sweats and malaise.   HENT:  Negative for mouth sores.    Respiratory:  Negative for cough and shortness of breath.    Genitourinary:  Negative for frequency.   Musculoskeletal:  Positive for arthralgias (some back pain, somewhat acute, hands hips knees with no issue). Negative for myalgias, joint swelling and muscle weakness.   Skin:  Positive for itching, activity-related sunscreen use and wears hat. Negative for rash, sun sensitivity and daily sunscreen use.   Neurological:  Negative for seizures.   Hematologic/Lymphatic: Bruises/bleeds easily.     Objective:   Physical Exam   Constitutional: He appears well-developed and well-nourished. No distress.   Neurological: He is alert and oriented to person, place, and time. He is not disoriented.   Psychiatric: He has a normal mood and affect.   Skin:   Areas Examined (abnormalities noted in diagram):   Scalp / Hair Palpated and Inspected  Head / Face Inspection Performed  Neck Inspection Performed  Chest / Axilla Inspection Performed  Abdomen Inspection Performed  Back Inspection Performed  RUE Inspected  LUE Inspection Performed  Nails and Digits Inspection Performed               Diagram Legend     Erythematous scaling macule/papule c/w actinic keratosis       Vascular papule c/w angioma       Pigmented verrucoid papule/plaque c/w seborrheic keratosis      Yellow umbilicated papule c/w sebaceous hyperplasia      Irregularly shaped tan macule c/w lentigo     1-2 mm smooth white papules consistent with Milia      Movable subcutaneous cyst with punctum c/w epidermal inclusion cyst      Subcutaneous movable cyst c/w pilar cyst      Firm pink to brown papule c/w dermatofibroma      Pedunculated fleshy papule(s) c/w skin tag(s)      Evenly pigmented macule c/w junctional nevus     Mildly variegated pigmented, slightly irregular-bordered macule c/w mildly atypical nevus      Flesh colored to evenly pigmented papule c/w intradermal nevus       Pink pearly papule/plaque c/w basal cell carcinoma      Erythematous hyperkeratotic cursted plaque c/w SCC      Surgical scar with no sign of skin cancer recurrence      Open and closed comedones      Inflammatory papules and pustules      Verrucoid papule consistent consistent with wart     Erythematous eczematous patches and plaques     Dystrophic onycholytic nail with subungual debris c/w onychomycosis     Umbilicated papule    Erythematous-base heme-crusted tan verrucoid plaque consistent with inflamed seborrheic keratosis     Erythematous Silvery Scaling Plaque c/w Psoriasis     See annotation     Latest Reference Range & Units 03/22/16 08:07 02/19/21 06:40   JULIANA Screen Negative <1:80  Negative <1:160 Negative <1:80      Most Recent   Pathologist Interpretation SPE REVIEWED  1/23/23 08:40     Electronically reviewed and signed by:   Peri Narvaez MD   Signed on 01/24/23 at 15:18   Increased total protein. Increased gamma globulin, polyclonal. No   paraprotein bands detected.      Latest Reference Range & Units Most Recent   Iron 45 - 160 ug/dL 75  1/23/23 08:40   TIBC 250 - 450 ug/dL 290  1/23/23 08:40   Saturated Iron 20 - 50 % 26  1/23/23 08:40   Transferrin 200 - 375 mg/dL 196 (L)  1/23/23 08:40   Ferritin 20.0 - 300.0 ng/mL 123  1/23/23 08:40   Folate 4.0  - 24.0 ng/mL 3.5 (L)  1/23/23 08:40   Vitamin B-12 210 - 950 pg/mL 675  1/23/23 08:40   (L): Data is abnormally low     Latest Reference Range & Units Most Recent   WBC 3.90 - 12.70 K/uL 3.32 (L)  4/10/23 07:08   RBC 4.60 - 6.20 M/uL 3.13 (L)  4/10/23 07:08   Hemoglobin 14.0 - 18.0 g/dL 10.2 (L)  4/10/23 07:08   Hematocrit 40.0 - 54.0 % 30.8 (L)  4/10/23 07:08   MCV 82 - 98 fL 98  4/10/23 07:08   MCH 27.0 - 31.0 pg 32.6 (H)  4/10/23 07:08   MCHC 32.0 - 36.0 g/dL 33.1  4/10/23 07:08   RDW 11.5 - 14.5 % 14.8 (H)  4/10/23 07:08   Platelets 150 - 450 K/uL 211  4/10/23 07:08   MPV 9.2 - 12.9 fL 10.0  4/10/23 07:08   Gran % 38.0 - 73.0 % 52.5  2/14/23 11:54   Lymph % 18.0 - 48.0 % 39.9  2/14/23 11:54   Mono % 4.0 - 15.0 % 5.9  2/14/23 11:54   Eosinophil % 0.0 - 8.0 % 0.5  2/14/23 11:54   Basophil % 0.0 - 1.9 % 1.0  2/14/23 11:54   Immature Granulocytes 0.0 - 0.5 % 0.2  2/14/23 11:54   Gran # (ANC) 1.8 - 7.7 K/uL 2.2  2/14/23 11:54   Lymph # 1.0 - 4.8 K/uL 1.7  2/14/23 11:54   Mono # 0.3 - 1.0 K/uL 0.3  2/14/23 11:54   Eos # 0.0 - 0.5 K/uL 0.0  2/14/23 11:54   Baso # 0.00 - 0.20 K/uL 0.04  2/14/23 11:54   Immature Grans (Abs) 0.00 - 0.04 K/uL 0.01  2/14/23 11:54   nRBC 0 /100 WBC 0  2/14/23 11:54   (L): Data is abnormally low  (H): Data is abnormally high     Latest Reference Range & Units 04/10/23 07:08   Sodium 136 - 145 mmol/L 127 (L)   Potassium 3.5 - 5.1 mmol/L 3.6   Chloride 95 - 110 mmol/L 91 (L)   CO2 23 - 29 mmol/L 30 (H)   Anion Gap 8 - 16 mmol/L 6 (L)   BUN 8 - 23 mg/dL 17   Creatinine 0.5 - 1.4 mg/dL 1.1   eGFR >60 mL/min/1.73 m^2 >60.0   Glucose 70 - 110 mg/dL 97   Calcium 8.7 - 10.5 mg/dL 8.7   Alkaline Phosphatase 55 - 135 U/L 128   PROTEIN TOTAL 6.0 - 8.4 g/dL 8.8 (H)   Albumin 3.5 - 5.2 g/dL 2.9 (L)   BILIRUBIN TOTAL 0.1 - 1.0 mg/dL 1.3 (H)   AST 10 - 40 U/L 40   ALT 10 - 44 U/L 16   (L): Data is abnormally low  (H): Data is abnormally high   Latest Reference Range & Units 01/23/23 08:40   Protein, Serum  6.0 - 8.4 g/dL 8.8 (H)   Albumin grams/dl 3.35 - 5.55 g/dL 3.10 (L)   Alpha-1 grams/dl 0.17 - 0.41 g/dL 0.22   Alpha-2 0.43 - 0.99 g/dL 0.69   Beta 0.50 - 1.10 g/dL 1.20 (H)   Gamma 0.67 - 1.58 g/dL 3.60 (H)   Pathologist Interpretation SPE  REVIEWED   Kappa Free Light Chains 0.33 - 1.94 mg/dL 10.68 (H)   Lambda Free Light Chains 0.57 - 2.63 mg/dL 9.10 (H)   Kappa/Lambda FLC Ratio 0.26 - 1.65  1.17   (H): Data is abnormally high  (L): Data is abnormally low  Collected: 01/23/23 0840   Result status: Final   Resulting lab: [facility]   Value: REVIEWED   Comment:   Electronically reviewed and signed by:   Peri Narvaez MD   Signed on 01/24/23 at 15:18   Increased total protein. Increased gamma globulin, polyclonal. No   paraprotein bands detected.     Vent. Rate : 086 BPM     Atrial Rate : 086 BPM      P-R Int : 198 ms          QRS Dur : 096 ms       QT Int : 414 ms       P-R-T Axes : 056 -58 061 degrees      QTc Int : 495 ms     Sinus rhythm with Premature supraventricular complexes   Left anterior fascicular block   Cannot rule out Anterior infarct (cited on or before 14-FEB-2023)   Abnormal ECG   When compared with ECG of 10-APR-2023 07:00,   Sinus rhythm has replaced Atrial flutter   Confirmed by Neisha JOHNSON, Davis ([id number]) on 4/12/2023 6:52:31 PM   Assessment / Plan:        Discoid lupus  -     JULIANA Screen w/Reflex; Future; Expected date: 04/19/2023  -     triamcinolone acetonide 0.1% (KENALOG) 0.1 % cream; AAA bid  Dispense: 454 g; Refill: 3  Bx poven, several years, last JULIANA 2021 negative, denies joint pain  Has declined treatment other than topicals  Repeat JULIANA  Would love to have rheum input (has polyclonal gammopathy, declined Dr Blanco evaluation, high tprot and very low albumin, declined BM biopsy  Now with more widespread itch, start plaquenil 200mg daily with plan to titer up to 200mg BID if tolerated and patient compliant  Schedule eye exam with Dr Culp at [facility]  "([address]    Weight loss  Soap "starts with L, bar, same for decades"  No moisturizer use  Never had a colonoscopy  Under care of Dr Javier for undesired weight loss (30lbs), extensive lab work unrevealing except for polyclonal gammopathy. Last notes reviewed    Pruritus  -     triamcinolone acetonide 0.1% (KENALOG) 0.1 % cream; AAA bid  Dispense: 454 g; Refill: 3  Difficult to help this patient who refuses most of the tests and interventions offered by me and other MDs. Could be paraneoplastic (not up to date with age appropriate cancer screenings, marked muscle mass loss noted from prior visit, 30lbs per patient, non desired), declined CT chest abdomen pelvis per Dr Javier's note. Could be secondary to xerosis (no regular moisturizer use) or discoid lupus (although skin exam is mild in areas other than scalp).  Repeat JULIANA             No follow-ups on file.  "

## 2023-04-21 ENCOUNTER — TELEPHONE (OUTPATIENT)
Dept: DERMATOLOGY | Facility: CLINIC | Age: 73
End: 2023-04-21
Payer: MEDICARE

## 2023-04-21 NOTE — TELEPHONE ENCOUNTER
----- Message from Nola Platt sent at 4/21/2023 12:51 PM CDT -----  Name of Who is Calling: Iris ,         What is the request in detail: pt is wanting to know if he was supposed to already have the JULIANA Screening or if he is needing to have it done before next appt        Can the clinic reply by [facility]: yes        What Number to Call Back if not [facility]:[phone]

## 2023-04-27 ENCOUNTER — LAB VISIT (OUTPATIENT)
Dept: LAB | Facility: HOSPITAL | Age: 73
End: 2023-04-27
Attending: DERMATOLOGY
Payer: MEDICARE

## 2023-04-27 DIAGNOSIS — L93.0 DISCOID LUPUS: ICD-10-CM

## 2023-04-27 PROCEDURE — 36415 COLL VENOUS BLD VENIPUNCTURE: CPT | Performed by: DERMATOLOGY

## 2023-04-27 PROCEDURE — 86038 ANTINUCLEAR ANTIBODIES: CPT | Performed by: DERMATOLOGY

## 2023-04-28 LAB — ANA SER QL IF: NORMAL

## 2023-06-06 ENCOUNTER — OFFICE VISIT (OUTPATIENT)
Dept: FAMILY MEDICINE | Facility: CLINIC | Age: 73
End: 2023-06-06
Payer: MEDICARE

## 2023-06-06 VITALS
SYSTOLIC BLOOD PRESSURE: 123 MMHG | HEART RATE: 81 BPM | OXYGEN SATURATION: 93 % | HEIGHT: 69 IN | DIASTOLIC BLOOD PRESSURE: 76 MMHG | WEIGHT: 142.31 LBS | BODY MASS INDEX: 21.08 KG/M2

## 2023-06-06 DIAGNOSIS — F17.210 CIGARETTE SMOKER: ICD-10-CM

## 2023-06-06 DIAGNOSIS — R63.4 UNINTENTIONAL WEIGHT LOSS: Primary | ICD-10-CM

## 2023-06-06 DIAGNOSIS — Z12.11 COLON CANCER SCREENING: ICD-10-CM

## 2023-06-06 DIAGNOSIS — Z13.6 ENCOUNTER FOR ABDOMINAL AORTIC ANEURYSM (AAA) SCREENING: ICD-10-CM

## 2023-06-06 DIAGNOSIS — Z12.2 ENCOUNTER FOR SCREENING FOR LUNG CANCER: ICD-10-CM

## 2023-06-06 PROCEDURE — 99213 OFFICE O/P EST LOW 20 MIN: CPT | Mod: S$PBB,AQ,, | Performed by: FAMILY MEDICINE

## 2023-06-06 PROCEDURE — 99215 OFFICE O/P EST HI 40 MIN: CPT | Performed by: FAMILY MEDICINE

## 2023-06-06 PROCEDURE — 99213 PR OFFICE/OUTPT VISIT, EST, LEVL III, 20-29 MIN: ICD-10-PCS | Mod: S$PBB,AQ,, | Performed by: FAMILY MEDICINE

## 2023-06-07 ENCOUNTER — TELEPHONE (OUTPATIENT)
Dept: FAMILY MEDICINE | Facility: CLINIC | Age: 73
End: 2023-06-07

## 2023-06-07 DIAGNOSIS — Z13.6 ENCOUNTER FOR ABDOMINAL AORTIC ANEURYSM (AAA) SCREENING: Primary | ICD-10-CM

## 2023-06-07 NOTE — TELEPHONE ENCOUNTER
----- Message from Ying Olivo sent at 6/7/2023 11:23 AM CDT -----  Regarding: US ABDOMINAL AORTA     Hi,  you have placed an order for US Abdominal Aorta, but used a screening diagnosis code.   The current order is for patients with a known aneurysm.   Please, change the order to:  US AAA Screening  ([id number]).          If the patient has already been diagnosed with an aneurysm,  please, change the diagnosis code to a non-screening code.        Please, call the department at [phone]  if you have any questions.    Thank You!    Ying Olivo  ([id number])

## 2023-06-19 LAB — NONINV COLON CA DNA+OCC BLD SCRN STL QL: NEGATIVE

## 2023-06-23 ENCOUNTER — HOSPITAL ENCOUNTER (OUTPATIENT)
Dept: RADIOLOGY | Facility: HOSPITAL | Age: 73
Discharge: HOME | End: 2023-06-23
Attending: FAMILY MEDICINE
Payer: MEDICARE

## 2023-06-23 DIAGNOSIS — F17.210 CIGARETTE SMOKER: ICD-10-CM

## 2023-06-23 DIAGNOSIS — Z12.2 ENCOUNTER FOR SCREENING FOR LUNG CANCER: ICD-10-CM

## 2023-06-23 DIAGNOSIS — R63.4 UNINTENTIONAL WEIGHT LOSS: ICD-10-CM

## 2023-06-23 DIAGNOSIS — Z13.6 ENCOUNTER FOR ABDOMINAL AORTIC ANEURYSM (AAA) SCREENING: ICD-10-CM

## 2023-06-23 PROCEDURE — 71271 CT THORAX LUNG CANCER SCR C-: CPT | Mod: TC,PO

## 2023-06-23 PROCEDURE — 76706 US ABDL AORTA SCREEN AAA: CPT | Mod: TC,PO

## 2023-06-30 ENCOUNTER — TELEPHONE (OUTPATIENT)
Dept: DERMATOLOGY | Facility: CLINIC | Age: 73
End: 2023-06-30
Payer: MEDICARE

## 2023-06-30 NOTE — TELEPHONE ENCOUNTER
----- Message from Yesenia Chowdhury Patient Care Assistant sent at 6/30/2023  1:30 PM CDT -----  Regarding: appointment  Contact: pt  Type:  Sooner Appointment Request    Caller is requesting a sooner appointment.  Caller declined first available appointment listed below.  Caller will not accept being placed on the waitlist and is requesting a message be sent to doctor.    Name of Caller:  pt     When is the first available appointment?  2023    Symptoms:  itching     Best Call Back Number:  [phone] (home)     Additional Information:  please call pt to advise. Thanks!

## 2023-07-31 ENCOUNTER — OFFICE VISIT (OUTPATIENT)
Dept: DERMATOLOGY | Facility: CLINIC | Age: 73
End: 2023-07-31
Payer: MEDICARE

## 2023-07-31 VITALS — WEIGHT: 142 LBS | BODY MASS INDEX: 21.03 KG/M2 | HEIGHT: 69 IN

## 2023-07-31 DIAGNOSIS — L93.0 DISCOID LUPUS: Primary | ICD-10-CM

## 2023-07-31 DIAGNOSIS — L29.9 PRURITUS: ICD-10-CM

## 2023-07-31 DIAGNOSIS — R77.8 ABNORMAL SPEP: ICD-10-CM

## 2023-07-31 DIAGNOSIS — Z51.81 MEDICATION MONITORING ENCOUNTER: ICD-10-CM

## 2023-07-31 DIAGNOSIS — R63.4 UNINTENTIONAL WEIGHT LOSS: ICD-10-CM

## 2023-07-31 PROCEDURE — 99214 OFFICE O/P EST MOD 30 MIN: CPT | Mod: S$GLB,,, | Performed by: DERMATOLOGY

## 2023-07-31 PROCEDURE — 99214 PR OFFICE/OUTPT VISIT, EST, LEVL IV, 30-39 MIN: ICD-10-PCS | Mod: S$GLB,,, | Performed by: DERMATOLOGY

## 2023-07-31 RX ORDER — BETAMETHASONE DIPROPIONATE 0.5 MG/G
LOTION TOPICAL
Qty: 60 ML | Refills: 2 | Status: SHIPPED | OUTPATIENT
Start: 2023-07-31

## 2023-07-31 RX ORDER — HYDROXYCHLOROQUINE SULFATE 200 MG/1
TABLET, FILM COATED ORAL
Qty: 60 TABLET | Refills: 2 | Status: SHIPPED | OUTPATIENT
Start: 2023-07-31 | End: 2023-09-12

## 2023-07-31 NOTE — PROGRESS NOTES
"  Subjective:      Patient ID:  Sid Cuello Jr. is a 72 y.o. male who presents for   Chief Complaint   Patient presents with    Itching     LOV 4/19/23- Discoid Lupus    Patient here today for itch to body x "months"  Pt states triamcinolone controls itch for an hour.  Pt states itch to body is worsening.    02/2021 Skin, right upper arm, punch biopsy:   -DISCOID LUPUS ERYTHEMATOSUS     2016  [facility] DIAGNOSIS:  LEFT CHEEK, PUNCH:  Perivascular and periadnexal dermatitis with increased interstitial mucin and focal  interface damage.    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  - he previously declined to go see Dr Blanco at [facility] who is a paraproteinemia/myeloma specialist  - discussed Dr Sotomayor's concerns with the patient and recommended consideration for CT Chest/Abdom/pelvis but patient is not inclined to having any scans done at this time        Derm Hx:  Denies Phx of NMSC  Denies Fhx of MM    Current Outpatient Medications:   ·  apixaban (ELIQUIS) 2.5 mg Tab, Take by mouth once daily., Disp: , Rfl:   ·  atorvastatin (LIPITOR) 10 MG tablet, Take 10 mg by mouth once daily., Disp: , Rfl:   ·  betamethasone dipropionate (DIPROLENE) 0.05 % lotion, Apply thin film to scalp QHS PRN itch (Patient taking differently: Apply 1 application  topically nightly. Apply thin film to scalp QHS PRN itch), Disp: 60 mL, Rfl: 2  ·  clopidogrel (PLAVIX) 75 mg tablet, Take 75 mg by mouth once daily., Disp: , Rfl:   ·  cyanocobalamin 1,000 mcg/mL injection, Inject 1,000 mcg into the muscle every 30 days., Disp: , Rfl:   ·  fluticasone-umeclidin-vilanter (TRELEGY ELLIPTA) 100-62.5-25 mcg DsDv, Inhale 1 puff into the lungs once daily., Disp: 60 each, Rfl: 3  ·  lisinopriL (PRINIVIL,ZESTRIL) 5 " MG tablet, Take 0.5 tablets (2.5 mg total) by mouth once daily., Disp: , Rfl:   ·  sotaloL (BETAPACE) 80 MG tablet, Take 80 mg by mouth 2 (two) times daily., Disp: , Rfl:   ·  triamcinolone acetonide 0.1% (KENALOG) 0.1 % cream, AAA bid, Disp: 454 g, Rfl: 3  ·  furosemide (LASIX) 40 MG tablet, Take 1 tablet (40 mg total) by mouth once daily., Disp: 30 tablet, Rfl: 0    Review of Systems   Constitutional:  Positive for fatigue. Negative for fever, chills, weight loss, weight gain, night sweats and malaise.   HENT:  Negative for mouth sores.    Respiratory:  Negative for cough and shortness of breath.    Genitourinary:  Negative for frequency.   Musculoskeletal:  Positive for arthralgias (some back pain, somewhat acute, hands hips knees with no issue). Negative for myalgias, joint swelling and muscle weakness.   Skin:  Positive for itching, activity-related sunscreen use and wears hat. Negative for rash, sun sensitivity and daily sunscreen use.   Neurological:  Negative for seizures.   Hematologic/Lymphatic: Bruises/bleeds easily.       Objective:   Physical Exam   Constitutional: He appears well-developed and well-nourished. No distress.   Neurological: He is alert and oriented to person, place, and time. He is not disoriented.   Psychiatric: He has a normal mood and affect.   Skin:   Areas Examined (abnormalities noted in diagram):   Scalp / Hair Palpated and Inspected  Head / Face Inspection Performed  Neck Inspection Performed  Chest / Axilla Inspection Performed  Abdomen Inspection Performed  Back Inspection Performed  RUE Inspected  LUE Inspection Performed  Nails and Digits Inspection Performed                 Diagram Legend     Erythematous scaling macule/papule c/w actinic keratosis       Vascular papule c/w angioma      Pigmented verrucoid papule/plaque c/w seborrheic keratosis      Yellow umbilicated papule c/w sebaceous hyperplasia      Irregularly shaped tan macule c/w lentigo     1-2 mm smooth white  papules consistent with Milia      Movable subcutaneous cyst with punctum c/w epidermal inclusion cyst      Subcutaneous movable cyst c/w pilar cyst      Firm pink to brown papule c/w dermatofibroma      Pedunculated fleshy papule(s) c/w skin tag(s)      Evenly pigmented macule c/w junctional nevus     Mildly variegated pigmented, slightly irregular-bordered macule c/w mildly atypical nevus      Flesh colored to evenly pigmented papule c/w intradermal nevus       Pink pearly papule/plaque c/w basal cell carcinoma      Erythematous hyperkeratotic cursted plaque c/w SCC      Surgical scar with no sign of skin cancer recurrence      Open and closed comedones      Inflammatory papules and pustules      Verrucoid papule consistent consistent with wart     Erythematous eczematous patches and plaques     Dystrophic onycholytic nail with subungual debris c/w onychomycosis     Umbilicated papule    Erythematous-base heme-crusted tan verrucoid plaque consistent with inflamed seborrheic keratosis     Erythematous Silvery Scaling Plaque c/w Psoriasis     See annotation      Electronically reviewed and signed by:   Peri Narvaez MD   Signed on 01/24/23 at 15:18   Increased total protein. Increased gamma globulin, polyclonal. No   paraprotein bands detected.   Assessment / Plan:        Discoid lupus  -     hydrOXYchloroQUINE (PLAQUENIL) 200 mg tablet; One tab PO daily for 2 weeks, then increase to 1 tab PO BID  Dispense: 60 tablet; Refill: 2  -     betamethasone dipropionate (DIPROLENE) 0.05 % lotion; Apply thin film to scalp QHS PRN itch  Dispense: 60 mL; Refill: 2    Discussed benefits and risks for treatment with Plaquenil with patient. Patient will require a baseline and yearly ophthalmologic examination. Needs baseline CBC and LFT's. Will need CBC q month x 3 then q 3 - 6 months. Patient expresses understanding.  Needs aggressive sun protection.    Must D/C smoking when taking Plaquenil as smoking decreases  effectiveness (current smoker)    Pruritus  Out of proportion with skin findings. No excoriations  Lupus can be pruritic, although clinical exam is mild at this time. Unintentional weight loss and abnormal SPEP, concerning for paraneoplastic? Under care of oncologist    Medication monitoring encounter  -     CBC Auto Differential; Future; Expected date: 08/31/2023  Check labs in one month (plaquenil start)    Abnormal SPEP (polyclonal), under care of Dr Javier  Unintentional weight loss  Does not wish to see Dr Francis felder NO         No follow-ups on file.

## 2023-08-01 ENCOUNTER — TELEPHONE (OUTPATIENT)
Dept: HEMATOLOGY/ONCOLOGY | Facility: CLINIC | Age: 73
End: 2023-08-01

## 2023-08-01 ENCOUNTER — PATIENT MESSAGE (OUTPATIENT)
Dept: HEMATOLOGY/ONCOLOGY | Facility: CLINIC | Age: 73
End: 2023-08-01

## 2023-08-01 DIAGNOSIS — D53.9 NUTRITIONAL ANEMIA, UNSPECIFIED: ICD-10-CM

## 2023-08-01 DIAGNOSIS — R77.8 ABNORMAL SPEP: Primary | ICD-10-CM

## 2023-08-01 DIAGNOSIS — D64.9 ANEMIA, UNSPECIFIED TYPE: ICD-10-CM

## 2023-08-02 ENCOUNTER — TELEPHONE (OUTPATIENT)
Dept: FAMILY MEDICINE | Facility: CLINIC | Age: 73
End: 2023-08-02

## 2023-08-14 ENCOUNTER — TELEPHONE (OUTPATIENT)
Dept: FAMILY MEDICINE | Facility: CLINIC | Age: 73
End: 2023-08-14

## 2023-08-15 ENCOUNTER — HOSPITAL ENCOUNTER (EMERGENCY)
Facility: HOSPITAL | Age: 73
Discharge: HOME | End: 2023-08-15
Attending: STUDENT IN AN ORGANIZED HEALTH CARE EDUCATION/TRAINING PROGRAM
Payer: MEDICARE

## 2023-08-15 VITALS
TEMPERATURE: 98 F | RESPIRATION RATE: 23 BRPM | HEIGHT: 69 IN | OXYGEN SATURATION: 90 % | SYSTOLIC BLOOD PRESSURE: 143 MMHG | BODY MASS INDEX: 20.88 KG/M2 | DIASTOLIC BLOOD PRESSURE: 77 MMHG | HEART RATE: 75 BPM | WEIGHT: 141 LBS

## 2023-08-15 DIAGNOSIS — R06.02 SHORTNESS OF BREATH: ICD-10-CM

## 2023-08-15 DIAGNOSIS — I50.9 CONGESTIVE HEART FAILURE, UNSPECIFIED HF CHRONICITY, UNSPECIFIED HEART FAILURE TYPE: Primary | ICD-10-CM

## 2023-08-15 DIAGNOSIS — D64.9 ANEMIA, UNSPECIFIED TYPE: ICD-10-CM

## 2023-08-15 DIAGNOSIS — L29.9 PRURITUS: ICD-10-CM

## 2023-08-15 LAB
ALBUMIN SERPL BCP-MCNC: 3.3 G/DL (ref 3.5–5.2)
ALP SERPL-CCNC: 91 U/L (ref 55–135)
ALT SERPL W/O P-5'-P-CCNC: 13 U/L (ref 10–44)
ANION GAP SERPL CALC-SCNC: 8 MMOL/L (ref 8–16)
AST SERPL-CCNC: 29 U/L (ref 10–40)
BASOPHILS # BLD AUTO: 0.06 K/UL (ref 0–0.2)
BASOPHILS NFR BLD: 1.2 % (ref 0–1.9)
BILIRUB SERPL-MCNC: 1.9 MG/DL (ref 0.1–1)
BNP SERPL-MCNC: 1407 PG/ML (ref 0–99)
BUN SERPL-MCNC: 25 MG/DL (ref 8–23)
CALCIUM SERPL-MCNC: 8.4 MG/DL (ref 8.7–10.5)
CHLORIDE SERPL-SCNC: 98 MMOL/L (ref 95–110)
CO2 SERPL-SCNC: 20 MMOL/L (ref 23–29)
CREAT SERPL-MCNC: 1.2 MG/DL (ref 0.5–1.4)
CREAT SERPL-MCNC: 1.2 MG/DL (ref 0.5–1.4)
DIFFERENTIAL METHOD: ABNORMAL
EOSINOPHIL # BLD AUTO: 0.1 K/UL (ref 0–0.5)
EOSINOPHIL NFR BLD: 2.4 % (ref 0–8)
ERYTHROCYTE [DISTWIDTH] IN BLOOD BY AUTOMATED COUNT: 16.4 % (ref 11.5–14.5)
EST. GFR  (NO RACE VARIABLE): >60 ML/MIN/1.73 M^2
GLUCOSE SERPL-MCNC: 91 MG/DL (ref 70–110)
HCT VFR BLD AUTO: 21.4 % (ref 40–54)
HGB BLD-MCNC: 7.1 G/DL (ref 14–18)
IMM GRANULOCYTES # BLD AUTO: 0.01 K/UL (ref 0–0.04)
IMM GRANULOCYTES NFR BLD AUTO: 0.2 % (ref 0–0.5)
LYMPHOCYTES # BLD AUTO: 1.8 K/UL (ref 1–4.8)
LYMPHOCYTES NFR BLD: 35.3 % (ref 18–48)
MAGNESIUM SERPL-MCNC: 2 MG/DL (ref 1.6–2.6)
MCH RBC QN AUTO: 28.6 PG (ref 27–31)
MCHC RBC AUTO-ENTMCNC: 33.2 G/DL (ref 32–36)
MCV RBC AUTO: 86 FL (ref 82–98)
MONOCYTES # BLD AUTO: 0.4 K/UL (ref 0.3–1)
MONOCYTES NFR BLD: 7.7 % (ref 4–15)
NEUTROPHILS # BLD AUTO: 2.7 K/UL (ref 1.8–7.7)
NEUTROPHILS NFR BLD: 53.2 % (ref 38–73)
NRBC BLD-RTO: 0 /100 WBC
PLATELET # BLD AUTO: 357 K/UL (ref 150–450)
PMV BLD AUTO: 9.8 FL (ref 9.2–12.9)
POTASSIUM SERPL-SCNC: 4.3 MMOL/L (ref 3.5–5.1)
PROT SERPL-MCNC: 9.5 G/DL (ref 6–8.4)
RBC # BLD AUTO: 2.48 M/UL (ref 4.6–6.2)
SAMPLE: NORMAL
SARS-COV-2 RDRP RESP QL NAA+PROBE: NEGATIVE
SODIUM SERPL-SCNC: 126 MMOL/L (ref 136–145)
TROPONIN I SERPL HS-MCNC: 8.7 PG/ML (ref 0–14.9)
WBC # BLD AUTO: 5.07 K/UL (ref 3.9–12.7)

## 2023-08-15 PROCEDURE — 63600175 PHARM REV CODE 636 W HCPCS: Performed by: STUDENT IN AN ORGANIZED HEALTH CARE EDUCATION/TRAINING PROGRAM

## 2023-08-15 PROCEDURE — U0002 COVID-19 LAB TEST NON-CDC: HCPCS | Performed by: EMERGENCY MEDICINE

## 2023-08-15 PROCEDURE — 84484 ASSAY OF TROPONIN QUANT: CPT | Performed by: EMERGENCY MEDICINE

## 2023-08-15 PROCEDURE — 25500020 PHARM REV CODE 255: Performed by: STUDENT IN AN ORGANIZED HEALTH CARE EDUCATION/TRAINING PROGRAM

## 2023-08-15 PROCEDURE — 96374 THER/PROPH/DIAG INJ IV PUSH: CPT

## 2023-08-15 PROCEDURE — 82565 ASSAY OF CREATININE: CPT | Mod: 59

## 2023-08-15 PROCEDURE — 80053 COMPREHEN METABOLIC PANEL: CPT | Performed by: EMERGENCY MEDICINE

## 2023-08-15 PROCEDURE — 85025 COMPLETE CBC W/AUTO DIFF WBC: CPT | Performed by: EMERGENCY MEDICINE

## 2023-08-15 PROCEDURE — 93010 EKG 12-LEAD: ICD-10-PCS | Mod: ,,, | Performed by: INTERNAL MEDICINE

## 2023-08-15 PROCEDURE — 83735 ASSAY OF MAGNESIUM: CPT | Performed by: EMERGENCY MEDICINE

## 2023-08-15 PROCEDURE — 99285 EMERGENCY DEPT VISIT HI MDM: CPT | Mod: 25

## 2023-08-15 PROCEDURE — 25000003 PHARM REV CODE 250: Performed by: STUDENT IN AN ORGANIZED HEALTH CARE EDUCATION/TRAINING PROGRAM

## 2023-08-15 PROCEDURE — 93010 ELECTROCARDIOGRAM REPORT: CPT | Mod: ,,, | Performed by: INTERNAL MEDICINE

## 2023-08-15 PROCEDURE — 93005 ELECTROCARDIOGRAM TRACING: CPT | Performed by: INTERNAL MEDICINE

## 2023-08-15 PROCEDURE — 83880 ASSAY OF NATRIURETIC PEPTIDE: CPT | Performed by: EMERGENCY MEDICINE

## 2023-08-15 RX ORDER — HYDROXYZINE HYDROCHLORIDE 10 MG/1
10 TABLET, FILM COATED ORAL
Status: COMPLETED | OUTPATIENT
Start: 2023-08-15 | End: 2023-08-15

## 2023-08-15 RX ORDER — HYDROXYZINE HYDROCHLORIDE 25 MG/1
25 TABLET, FILM COATED ORAL EVERY 6 HOURS
Qty: 28 TABLET | Refills: 0 | Status: ON HOLD | OUTPATIENT
Start: 2023-08-15 | End: 2023-08-23 | Stop reason: HOSPADM

## 2023-08-15 RX ORDER — FUROSEMIDE 10 MG/ML
40 INJECTION INTRAMUSCULAR; INTRAVENOUS
Status: COMPLETED | OUTPATIENT
Start: 2023-08-15 | End: 2023-08-15

## 2023-08-15 RX ADMIN — FUROSEMIDE 40 MG: 20 INJECTION, SOLUTION INTRAMUSCULAR; INTRAVENOUS at 01:08

## 2023-08-15 RX ADMIN — IOHEXOL 100 ML: 350 INJECTION, SOLUTION INTRAVENOUS at 11:08

## 2023-08-15 RX ADMIN — HYDROXYZINE HYDROCHLORIDE 10 MG: 10 TABLET, FILM COATED ORAL at 03:08

## 2023-08-15 NOTE — ED PROVIDER NOTES
"Encounter Date: 8/15/2023       History     Chief Complaint   Patient presents with    Shortness of Breath     Pt reports SOB for 2 days, worsening in exertion, "I cant' walk twenty feet, feels like a ran a mile"     Cough     Pt states " I'm a smoker, I been coughing, I notice the blood" reports he noticed blood in sputum x 2 days ago,    itching     Pt states " I been to five six dermatologist I'm yet to get any relief" reports generalized itching to body " for years but it's been getting worse"      Montez Cuello is a 72-year-old male with multiple complaints.  He is a daily pack per day smoker, with COPD, echo with mild mitral regurg, eccentric & a history of discoid lupus who presents for shortness of breath ongoing for 2 days.  He awoke 2 days ago short of breath and feels like he can not walk get a cup of coffee without getting short of breath.  This is new.  He also has cough which is minimally productive but also has blood-tinged sputum.  He has not had those symptoms for he denies wheezing.  She denies fevers or chills.  He denies chest pain.  Patient also reports 2 week history of diffuse pruritus.  He follows with a dermatologist who prescribed Kenalog which has not improved his symptoms.  He also recently started Plaquenil once daily to prevent a lupus flare, but that has not helped his pruritis.      Review of patient's allergies indicates:  No Known Allergies  Past Medical History:   Diagnosis Date    Abnormal SPEP 4/29/2021    Coronary artery disease 2007    MI   1 c. 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;  Surgeon: David Mera MD;  Location: [facility] OR;  Service: Urology;  Laterality: Right;    TREATMENT OF CARDIAC ARRHYTHMIA N/A 4/10/2023    Procedure: Cardioversion or Defibrillation;  Surgeon: Raymond Sotomayor MD;  Location: [facility] CATH/EP LAB;  Service: Cardiology;  Laterality: N/A;    VASECTOMY       Family History   Problem Relation Age of Onset    No Known Problems Mother     Melanoma Neg Hx     Psoriasis Neg Hx     Lupus Neg Hx     Eczema Neg Hx      Social History     Tobacco Use    Smoking status: Every Day     Current packs/day: 0.00     Types: Cigarettes    Smokeless tobacco: Never   Substance Use Topics    Alcohol use: Yes     Comment: social    Drug use: Never     Review of Systems   Constitutional:  Negative for activity change and appetite change.        Pruritis   HENT:  Negative for congestion and drooling.    Eyes:  Negative for discharge and itching.   Respiratory:  Positive for shortness of breath. Negative for cough and chest tightness.    Cardiovascular:  Negative for chest pain and leg swelling.   Gastrointestinal:  Negative for abdominal distention and abdominal pain.   Genitourinary:  Negative for difficulty urinating and dysuria.   Musculoskeletal:  Negative for arthralgias.   Skin:  Negative for color change and pallor.   Neurological:  Negative for dizziness and facial asymmetry.   Psychiatric/Behavioral:  Negative for agitation and behavioral problems.        Physical Exam     Initial Vitals [08/15/23 0856]   BP Pulse Resp Temp SpO2   122/73 72 20 97.7 °F (36.5 °C) 96 %      MAP       --         Physical Exam    Nursing note and vitals reviewed.  Constitutional: He appears well-developed and well-nourished.   HENT:   Head: Normocephalic and atraumatic.   Mouth/Throat: Oropharynx is clear and moist.   Eyes: Conjunctivae and EOM are normal. Pupils are equal, round, and reactive to light.   Neck: No thyromegaly present.   Normal  range of motion.  Cardiovascular:  Normal rate, regular rhythm and intact distal pulses.           Pulmonary/Chest: No respiratory distress. He has no wheezes. He has rhonchi.   Abdominal: Abdomen is soft. Bowel sounds are normal. He exhibits no distension. There is no abdominal tenderness.   Musculoskeletal:         General: No tenderness or edema. Normal range of motion.      Cervical back: Normal range of motion.     Neurological: He is alert and oriented to person, place, and time. He has normal strength. No cranial nerve deficit.   Skin: Skin is warm and dry. No rash noted.   Psychiatric: He has a normal mood and affect. His behavior is normal. Thought content normal.         ED Course   Procedures  Labs Reviewed   CBC W/ AUTO DIFFERENTIAL - Abnormal; Notable for the following components:       Result Value    RBC 2.48 (*)     Hemoglobin 7.1 (*)     Hematocrit 21.4 (*)     RDW 16.4 (*)     All other components within normal limits   COMPREHENSIVE METABOLIC PANEL - Abnormal; Notable for the following components:    Sodium 126 (*)     CO2 20 (*)     BUN 25 (*)     Calcium 8.4 (*)     Total Protein 9.5 (*)     Albumin 3.3 (*)     Total Bilirubin 1.9 (*)     All other components within normal limits   B-TYPE NATRIURETIC PEPTIDE - Abnormal; Notable for the following components:    BNP 1,407 (*)     All other components within normal limits   TROPONIN I HIGH SENSITIVITY   MAGNESIUM   SARS-COV-2 RNA AMPLIFICATION, QUAL   ISTAT CREATININE        ECG Results              EKG 12-lead (In process)  Result time 08/15/23 10:05:49      In process by Interface, Lab In [facility] (08/15/23 10:05:49)                   Narrative:    Test Reason : R06.02,    Vent. Rate : 075 BPM     Atrial Rate : 075 BPM     P-R Int : 204 ms          QRS Dur : 096 ms      QT Int : 432 ms       P-R-T Axes : 052 -14 044 degrees     QTc Int : 482 ms    Normal sinus rhythm  Prolonged QT  Abnormal ECG  When compared with ECG of 10-APR-2023  09:04,  Premature supraventricular complexes are no longer Present  Left anterior fascicular block is no longer Present    Referred By: [email or web address]   SELF           Confirmed By:                                   Imaging Results              CTA Chest Non-Coronary (PE Studies) (Final result)  Result time 08/15/23 12:26:12      Final result by Johnathan Flowers MD (08/15/23 12:26:12)                   Narrative:    CMS MANDATED QUALITY DATA-CT RADIATION DOSE-436  All CT scans at this facility use dose modulation, iterative reconstruction, and or weight-based dosing when appropriate to reduce radiation dose to as low as reasonably achievable.    HISTORY: Pulmonary embolism (PE) suspected, unknown D-dimer  cough and dyspnea.    FINDINGS: Thin axial imaging through the chest was performed with 100 mL Omnipaque 350 IV contrast, with sagittal and coronal reformatted images and MIP reconstructions performed, and images stored in the patient's permanent electronic medical record.    Comparison to multiple prior exams. There are no pulmonary arterial filling defects to suggest pulmonary thromboembolism. The central pulmonary arteries are normal in caliber.    The aorta is normal in caliber and tapers appropriately. The heart is enlarged, with no pericardial effusion. There are scattered coronary arterial calcifications, with several prominent but nonenlarged mediastinal lymph nodes.    There are scattered reticulonodular and groundglass opacities throughout both lungs, with diffuse interlobular septal thickening. There are small layering bilateral pleural effusions, larger on the right, with no consolidation. The central airways are patent.    Images of the upper abdomen show layering densities in the gallbladder suggesting stones or sludge, with a few scattered colon diverticula. No acute fractures.    IMPRESSION:  1. Cardiomegaly, diffuse interstitial pulmonary edema, and small bilateral pleural effusions. Please correlate  with any clinical suspicion of acute congestive failure-hypervolemia.  2. Negative for pulmonary thromboembolism.    Electronically signed by:  Johnathan Flowers MD  8/15/2023 12:26 PM CDT Workstation: [id number]                                     X-Ray Chest AP Portable (Final result)  Result time 08/15/23 10:53:51      Final result by Yarely Malik MD (08/15/23 10:53:51)                   Narrative:    Portable view the chest at 10:15 AM is compared to prior study 2/17/2023    Clinical history CHF    The cardiomediastinal silhouette is normal in size. There are mild increased interstitial markings suggestive of early edema. There are no confluent infiltrates. There are tiny pleural effusions. There are no acute osseous abnormalities.    IMPRESSION: Mild interstitial edema    Electronically signed by:  Yarely Malik MD  8/15/2023 10:53 AM CDT Workstation: [id number]                                     Medications   iohexoL (OMNIPAQUE 350) injection 100 mL (100 mLs Intravenous Given 8/15/23 1154)   furosemide injection 40 mg (40 mg Intravenous Given 8/15/23 1351)   hydrOXYzine HCL tablet 10 mg (10 mg Oral Given 8/15/23 1518)                 ED Course as of 08/15/23 1732   Tue Aug 15, 2023   1130 Chest x-ray with mild interstitial pulmonary edema, worsened from prior [BS]   1131 EKG shows sinus rhythm, regular rate, no acute ST elevations or depressions, normal CO, QRS, QTC is 480.  [BS]   1131 CBC auto differential(!)  Hemoglobin 7.1 today down from 10.4 3 months ago.  Patient states he has had a negative  [BS]   1131 Comprehensive metabolic panel(!)  CMP notable for mild elevation in BUN and decrease in bicarb 20 from baseline of 20, concerning given patient is chronically hypercapnic and chronic CO2 retainer. [BS]   1429 Patient reports feeling much better, wants go home.  Will discharge him [BS]      ED Course User Index  [BS] Dalton Oviedo MD               72-year-old male with history of CHF,  protein gap of unclear exact against, chronic cough, and generalized pruritus presents now for shortness of breath, dyspnea on exertion and can not walk 20 ft, and cough productive of scant hemoptysis over past 2 days in addition to generalized pruritus over 5-6 days.  Vitals notable for sats in the mid 90s, otherwise within normal limits.  On exam, patient has pale skin, mild diffuse wheezing consistent with his known COPD.  Differential includes cancer, pulmonary embolus, pneumonia, heart failure, ACS.  EKG without acute ischemic changes, troponin within normal limits, doubt ACS.  BNP elevated at 1400, x-ray with pulmonary edema, most concerning for heart failure.  CT PE ordered given patient's hemoptysis and smoking history, without evidence of pulmonary embolus but also notable for interstitial edema, no evidence of pericardial effusion.  CBC notable for hemoglobin of 7.1 down from baseline of 10.5.  He has a concerning drop in hemoglobin and recommended admission for further workup including colonoscopy, but patient reports he had Cologuard a few months ago and does not want to have a colonoscopy inpatient at this time.  Does not want further workup at this time.  On reexamination, patient reported he has not taken his diuretic for the past 2-3 days due to feeling bad.  Advised patient to continue taking diuretic and follow-up with GI for colonoscopy and also with Dr. Javier for further management of his new anemia.     Clinical Impression:   Final diagnoses:  [R06.02] Shortness of breath  [D64.9] Anemia, unspecified type  [I50.9] Congestive heart failure, unspecified HF chronicity, unspecified heart failure type (Primary)  [L29.9] Pruritus        ED Disposition Condition    Discharge Stable          ED Prescriptions       Medication Sig Dispense Start Date End Date Auth. Provider    hydrOXYzine HCL (ATARAX) 25 MG tablet Take 1 tablet (25 mg total) by mouth every 6 (six) hours. For itchiness for 7 days 28  tablet 8/15/2023 8/22/2023 Dalton Oviedo MD          Follow-up Information       Follow up With Specialties Details Why Contact Info    Yan Posada MD Family Medicine Call in 1 day To set up a follow-up appointment, To recheck today's symptoms [address] [facility]  [address]  [place] [address]  [phone]      Oksana Roman MD Gastroenterology Call in 1 day To recheck today's symptoms, To set up a follow-up appointment [id number]   [place] [address]  [phone]               Dalton Oviedo MD  08/15/23 1732

## 2023-08-15 NOTE — ED NOTES
C/O UPPER TORSO AND BILATERAL ARM ITCHING.  STATES UNABLE TO SLEEP.  NO RASH NOTED. NON LABORED RESPIRATIONS.  DRY COUGH NOTED. FALLS PRECAUTIONS.  FAMILY AT BS.  MONITORING INITIATED.

## 2023-08-15 NOTE — DISCHARGE INSTRUCTIONS

## 2023-08-15 NOTE — FIRST PROVIDER EVALUATION
Medical screening examination initiated.  I have conducted a focused provider triage encounter, findings are as follows:    Brief history of present illness: SOB    There were no vitals filed for this visit.    Pertinent physical exam:  PATIENT HERE WITH COMPLAINT OF SHORTNESS OF BREATH denies any associated fevers or chest pains    Brief workup plan:  labs cxr ekg     Preliminary workup initiated; this workup will be continued and followed by the physician or advanced practice provider that is assigned to the patient when roomed.

## 2023-08-21 ENCOUNTER — CLINICAL SUPPORT (OUTPATIENT)
Dept: CARDIOLOGY | Facility: HOSPITAL | Age: 73
DRG: 291 | End: 2023-08-21
Attending: EMERGENCY MEDICINE
Payer: MEDICARE

## 2023-08-21 ENCOUNTER — HOSPITAL ENCOUNTER (INPATIENT)
Facility: HOSPITAL | Age: 73
LOS: 2 days | Discharge: HOME | DRG: 291 | End: 2023-08-23
Attending: EMERGENCY MEDICINE | Admitting: STUDENT IN AN ORGANIZED HEALTH CARE EDUCATION/TRAINING PROGRAM
Payer: MEDICARE

## 2023-08-21 VITALS — HEIGHT: 69 IN | WEIGHT: 141.13 LBS | BODY MASS INDEX: 20.9 KG/M2

## 2023-08-21 DIAGNOSIS — I50.23 ACUTE ON CHRONIC SYSTOLIC CONGESTIVE HEART FAILURE: ICD-10-CM

## 2023-08-21 DIAGNOSIS — R06.02 SHORTNESS OF BREATH: ICD-10-CM

## 2023-08-21 DIAGNOSIS — R07.9 CHEST PAIN: ICD-10-CM

## 2023-08-21 DIAGNOSIS — D64.9 ANEMIA, UNSPECIFIED TYPE: Primary | ICD-10-CM

## 2023-08-21 PROBLEM — L93.0 DISCOID LUPUS: Status: ACTIVE | Noted: 2023-08-21

## 2023-08-21 PROBLEM — R79.89 ELEVATED TROPONIN: Status: ACTIVE | Noted: 2023-08-21

## 2023-08-21 PROBLEM — Z72.0 TOBACCO ABUSE: Status: ACTIVE | Noted: 2023-08-21

## 2023-08-21 PROBLEM — I48.91 A-FIB: Status: ACTIVE | Noted: 2023-08-21

## 2023-08-21 LAB
ABO + RH BLD: NORMAL
ALBUMIN SERPL BCP-MCNC: 3.2 G/DL (ref 3.5–5.2)
ALP SERPL-CCNC: 100 U/L (ref 55–135)
ALT SERPL W/O P-5'-P-CCNC: 13 U/L (ref 10–44)
ANION GAP SERPL CALC-SCNC: 6 MMOL/L (ref 8–16)
AST SERPL-CCNC: 31 U/L (ref 10–40)
BASOPHILS # BLD AUTO: 0.07 K/UL (ref 0–0.2)
BASOPHILS NFR BLD: 1 % (ref 0–1.9)
BILIRUB SERPL-MCNC: 0.8 MG/DL (ref 0.1–1)
BLD GP AB SCN CELLS X3 SERPL QL: NORMAL
BLD PROD TYP BPU: NORMAL
BLD PROD TYP BPU: NORMAL
BLOOD UNIT EXPIRATION DATE: NORMAL
BLOOD UNIT EXPIRATION DATE: NORMAL
BLOOD UNIT TYPE CODE: 5100
BLOOD UNIT TYPE CODE: 5100
BLOOD UNIT TYPE: NORMAL
BLOOD UNIT TYPE: NORMAL
BNP SERPL-MCNC: 1313 PG/ML (ref 0–99)
BSA FOR ECHO PROCEDURE: 1.77 M2
BUN SERPL-MCNC: 20 MG/DL (ref 8–23)
CALCIUM SERPL-MCNC: 8.3 MG/DL (ref 8.7–10.5)
CHLORIDE SERPL-SCNC: 101 MMOL/L (ref 95–110)
CHOLEST SERPL-MCNC: 108 MG/DL (ref 120–199)
CHOLEST/HDLC SERPL: 2.1 {RATIO} (ref 2–5)
CO2 SERPL-SCNC: 24 MMOL/L (ref 23–29)
CODING SYSTEM: NORMAL
CODING SYSTEM: NORMAL
CREAT SERPL-MCNC: 1.2 MG/DL (ref 0.5–1.4)
CROSSMATCH INTERPRETATION: NORMAL
CROSSMATCH INTERPRETATION: NORMAL
CV ECHO LV RWT: 0.36 CM
DIFFERENTIAL METHOD: ABNORMAL
DISPENSE STATUS: NORMAL
DISPENSE STATUS: NORMAL
E WAVE DECELERATION TIME: 176 MSEC
E/A RATIO: 2.52
E/E' RATIO: 14 M/S
ECHO LV POSTERIOR WALL: 1.04 CM (ref 0.6–1.1)
EOSINOPHIL # BLD AUTO: 0.1 K/UL (ref 0–0.5)
EOSINOPHIL NFR BLD: 1.4 % (ref 0–8)
ERYTHROCYTE [DISTWIDTH] IN BLOOD BY AUTOMATED COUNT: 16.6 % (ref 11.5–14.5)
EST. GFR  (NO RACE VARIABLE): >60 ML/MIN/1.73 M^2
FERRITIN SERPL-MCNC: 39 NG/ML (ref 20–300)
FOLATE SERPL-MCNC: 5.7 NG/ML (ref 4–24)
FRACTIONAL SHORTENING: 25 % (ref 28–44)
GLUCOSE SERPL-MCNC: 115 MG/DL (ref 70–110)
HCT VFR BLD AUTO: 20.9 % (ref 40–54)
HDLC SERPL-MCNC: 51 MG/DL (ref 40–75)
HDLC SERPL: 47.2 % (ref 20–50)
HGB BLD-MCNC: 6.4 G/DL (ref 14–18)
IMM GRANULOCYTES # BLD AUTO: 0.04 K/UL (ref 0–0.04)
IMM GRANULOCYTES NFR BLD AUTO: 0.6 % (ref 0–0.5)
INR PPP: 1 (ref 0.8–1.2)
INTERVENTRICULAR SEPTUM: 1.01 CM (ref 0.6–1.1)
IRON SERPL-MCNC: 14 UG/DL (ref 45–160)
IVC DIAMETER: 2.21 CM
LDLC SERPL CALC-MCNC: 50.8 MG/DL (ref 63–159)
LEFT INTERNAL DIMENSION IN SYSTOLE: 4.35 CM (ref 2.1–4)
LEFT VENTRICLE DIASTOLIC VOLUME INDEX: 93.82 ML/M2
LEFT VENTRICLE DIASTOLIC VOLUME: 167 ML
LEFT VENTRICLE MASS INDEX: 135 G/M2
LEFT VENTRICLE SYSTOLIC VOLUME INDEX: 48 ML/M2
LEFT VENTRICLE SYSTOLIC VOLUME: 85.4 ML
LEFT VENTRICULAR INTERNAL DIMENSION IN DIASTOLE: 5.8 CM (ref 3.5–6)
LEFT VENTRICULAR MASS: 240.74 G
LV LATERAL E/E' RATIO: 11.45 M/S
LV SEPTAL E/E' RATIO: 18 M/S
LYMPHOCYTES # BLD AUTO: 2 K/UL (ref 1–4.8)
LYMPHOCYTES NFR BLD: 28.6 % (ref 18–48)
MAGNESIUM SERPL-MCNC: 1.9 MG/DL (ref 1.6–2.6)
MCH RBC QN AUTO: 25.7 PG (ref 27–31)
MCHC RBC AUTO-ENTMCNC: 30.6 G/DL (ref 32–36)
MCV RBC AUTO: 84 FL (ref 82–98)
MONOCYTES # BLD AUTO: 0.7 K/UL (ref 0.3–1)
MONOCYTES NFR BLD: 9.5 % (ref 4–15)
MV PEAK A VEL: 0.5 M/S
MV PEAK E VEL: 1.26 M/S
NEUTROPHILS # BLD AUTO: 4.1 K/UL (ref 1.8–7.7)
NEUTROPHILS NFR BLD: 58.9 % (ref 38–73)
NONHDLC SERPL-MCNC: 57 MG/DL
NRBC BLD-RTO: 0 /100 WBC
NUM UNITS TRANS PACKED RBC: NORMAL
NUM UNITS TRANS PACKED RBC: NORMAL
PISA TR MAX VEL: 3.42 M/S
PLATELET # BLD AUTO: 421 K/UL (ref 150–450)
PMV BLD AUTO: 9.4 FL (ref 9.2–12.9)
POTASSIUM SERPL-SCNC: 4.9 MMOL/L (ref 3.5–5.1)
PROT SERPL-MCNC: 9.3 G/DL (ref 6–8.4)
PROTHROMBIN TIME: 10.9 SEC (ref 9–12.5)
RA PRESSURE ESTIMATED: 15 MMHG
RBC # BLD AUTO: 2.49 M/UL (ref 4.6–6.2)
RETICS/RBC NFR AUTO: 2.1 % (ref 0.4–2)
RV TB RVSP: 18 MMHG
SATURATED IRON: 3 % (ref 20–50)
SODIUM SERPL-SCNC: 131 MMOL/L (ref 136–145)
SPECIMEN OUTDATE: NORMAL
TDI LATERAL: 0.11 M/S
TDI SEPTAL: 0.07 M/S
TDI: 0.09 M/S
TOTAL IRON BINDING CAPACITY: 459 UG/DL (ref 250–450)
TR MAX PG: 47 MMHG
TRANSFERRIN SERPL-MCNC: 328 MG/DL (ref 200–375)
TRIGL SERPL-MCNC: 31 MG/DL (ref 30–150)
TROPONIN I SERPL HS-MCNC: 120.8 PG/ML (ref 0–14.9)
TROPONIN I SERPL HS-MCNC: 289 PG/ML (ref 0–14.9)
TROPONIN I SERPL HS-MCNC: 50.4 PG/ML (ref 0–14.9)
TROPONIN I SERPL HS-MCNC: 697.8 PG/ML (ref 0–14.9)
TV REST PULMONARY ARTERY PRESSURE: 62 MMHG
VIT B12 SERPL-MCNC: 1157 PG/ML (ref 210–950)
WBC # BLD AUTO: 6.93 K/UL (ref 3.9–12.7)
Z-SCORE OF LEFT VENTRICULAR DIMENSION IN END DIASTOLE: 1.65
Z-SCORE OF LEFT VENTRICULAR DIMENSION IN END SYSTOLE: 2.83

## 2023-08-21 PROCEDURE — 94761 N-INVAS EAR/PLS OXIMETRY MLT: CPT

## 2023-08-21 PROCEDURE — 93005 ELECTROCARDIOGRAM TRACING: CPT | Performed by: SPECIALIST

## 2023-08-21 PROCEDURE — 94640 AIRWAY INHALATION TREATMENT: CPT

## 2023-08-21 PROCEDURE — 93308 ECHO (CUPID ONLY): ICD-10-PCS | Mod: 26,,, | Performed by: SPECIALIST

## 2023-08-21 PROCEDURE — 25000003 PHARM REV CODE 250: Performed by: STUDENT IN AN ORGANIZED HEALTH CARE EDUCATION/TRAINING PROGRAM

## 2023-08-21 PROCEDURE — 27000221 HC OXYGEN, UP TO 24 HOURS

## 2023-08-21 PROCEDURE — 36415 COLL VENOUS BLD VENIPUNCTURE: CPT | Performed by: EMERGENCY MEDICINE

## 2023-08-21 PROCEDURE — 93010 ELECTROCARDIOGRAM REPORT: CPT | Mod: ,,, | Performed by: INTERNAL MEDICINE

## 2023-08-21 PROCEDURE — 86920 COMPATIBILITY TEST SPIN: CPT | Performed by: EMERGENCY MEDICINE

## 2023-08-21 PROCEDURE — 99900031 HC PATIENT EDUCATION (STAT)

## 2023-08-21 PROCEDURE — 80061 LIPID PANEL: CPT | Performed by: EMERGENCY MEDICINE

## 2023-08-21 PROCEDURE — 20000000 HC ICU ROOM

## 2023-08-21 PROCEDURE — 25000003 PHARM REV CODE 250

## 2023-08-21 PROCEDURE — 93010 EKG 12-LEAD: ICD-10-PCS | Mod: ,,, | Performed by: SPECIALIST

## 2023-08-21 PROCEDURE — 99285 EMERGENCY DEPT VISIT HI MDM: CPT | Mod: 25

## 2023-08-21 PROCEDURE — 83036 HEMOGLOBIN GLYCOSYLATED A1C: CPT | Performed by: EMERGENCY MEDICINE

## 2023-08-21 PROCEDURE — C9113 INJ PANTOPRAZOLE SODIUM, VIA: HCPCS | Performed by: STUDENT IN AN ORGANIZED HEALTH CARE EDUCATION/TRAINING PROGRAM

## 2023-08-21 PROCEDURE — 82607 VITAMIN B-12: CPT | Performed by: EMERGENCY MEDICINE

## 2023-08-21 PROCEDURE — 93010 EKG 12-LEAD: ICD-10-PCS | Mod: ,,, | Performed by: INTERNAL MEDICINE

## 2023-08-21 PROCEDURE — 82728 ASSAY OF FERRITIN: CPT | Performed by: EMERGENCY MEDICINE

## 2023-08-21 PROCEDURE — 83735 ASSAY OF MAGNESIUM: CPT | Performed by: EMERGENCY MEDICINE

## 2023-08-21 PROCEDURE — 83880 ASSAY OF NATRIURETIC PEPTIDE: CPT | Performed by: EMERGENCY MEDICINE

## 2023-08-21 PROCEDURE — 84484 ASSAY OF TROPONIN QUANT: CPT | Mod: 91 | Performed by: INTERNAL MEDICINE

## 2023-08-21 PROCEDURE — 21400001 HC TELEMETRY ROOM

## 2023-08-21 PROCEDURE — 63600175 PHARM REV CODE 636 W HCPCS: Performed by: STUDENT IN AN ORGANIZED HEALTH CARE EDUCATION/TRAINING PROGRAM

## 2023-08-21 PROCEDURE — 83540 ASSAY OF IRON: CPT | Performed by: EMERGENCY MEDICINE

## 2023-08-21 PROCEDURE — 36430 TRANSFUSION BLD/BLD COMPNT: CPT

## 2023-08-21 PROCEDURE — 96374 THER/PROPH/DIAG INJ IV PUSH: CPT

## 2023-08-21 PROCEDURE — 85045 AUTOMATED RETICULOCYTE COUNT: CPT | Performed by: EMERGENCY MEDICINE

## 2023-08-21 PROCEDURE — 25000242 PHARM REV CODE 250 ALT 637 W/ HCPCS: Performed by: STUDENT IN AN ORGANIZED HEALTH CARE EDUCATION/TRAINING PROGRAM

## 2023-08-21 PROCEDURE — 85610 PROTHROMBIN TIME: CPT | Performed by: EMERGENCY MEDICINE

## 2023-08-21 PROCEDURE — 85025 COMPLETE CBC W/AUTO DIFF WBC: CPT | Performed by: EMERGENCY MEDICINE

## 2023-08-21 PROCEDURE — 86900 BLOOD TYPING SEROLOGIC ABO: CPT | Performed by: EMERGENCY MEDICINE

## 2023-08-21 PROCEDURE — 80053 COMPREHEN METABOLIC PANEL: CPT | Performed by: EMERGENCY MEDICINE

## 2023-08-21 PROCEDURE — 93010 ELECTROCARDIOGRAM REPORT: CPT | Mod: ,,, | Performed by: SPECIALIST

## 2023-08-21 PROCEDURE — 82746 ASSAY OF FOLIC ACID SERUM: CPT | Performed by: EMERGENCY MEDICINE

## 2023-08-21 PROCEDURE — 63600175 PHARM REV CODE 636 W HCPCS: Performed by: INTERNAL MEDICINE

## 2023-08-21 PROCEDURE — 99900035 HC TECH TIME PER 15 MIN (STAT)

## 2023-08-21 PROCEDURE — 63600175 PHARM REV CODE 636 W HCPCS: Performed by: EMERGENCY MEDICINE

## 2023-08-21 PROCEDURE — 94799 UNLISTED PULMONARY SVC/PX: CPT

## 2023-08-21 PROCEDURE — P9016 RBC LEUKOCYTES REDUCED: HCPCS | Performed by: EMERGENCY MEDICINE

## 2023-08-21 PROCEDURE — 84466 ASSAY OF TRANSFERRIN: CPT | Performed by: EMERGENCY MEDICINE

## 2023-08-21 PROCEDURE — 84484 ASSAY OF TROPONIN QUANT: CPT | Performed by: EMERGENCY MEDICINE

## 2023-08-21 PROCEDURE — 93005 ELECTROCARDIOGRAM TRACING: CPT | Performed by: INTERNAL MEDICINE

## 2023-08-21 PROCEDURE — 85025 COMPLETE CBC W/AUTO DIFF WBC: CPT | Mod: 91 | Performed by: INTERNAL MEDICINE

## 2023-08-21 PROCEDURE — 93308 TTE F-UP OR LMTD: CPT

## 2023-08-21 PROCEDURE — 93308 TTE F-UP OR LMTD: CPT | Mod: 26,,, | Performed by: SPECIALIST

## 2023-08-21 PROCEDURE — 84484 ASSAY OF TROPONIN QUANT: CPT | Mod: 91 | Performed by: STUDENT IN AN ORGANIZED HEALTH CARE EDUCATION/TRAINING PROGRAM

## 2023-08-21 PROCEDURE — 86920 COMPATIBILITY TEST SPIN: CPT | Performed by: INTERNAL MEDICINE

## 2023-08-21 RX ORDER — HYDROCODONE BITARTRATE AND ACETAMINOPHEN 500; 5 MG/1; MG/1
TABLET ORAL
Status: DISCONTINUED | OUTPATIENT
Start: 2023-08-21 | End: 2023-08-23 | Stop reason: HOSPADM

## 2023-08-21 RX ORDER — FUROSEMIDE 10 MG/ML
40 INJECTION INTRAMUSCULAR; INTRAVENOUS DAILY
Status: DISCONTINUED | OUTPATIENT
Start: 2023-08-21 | End: 2023-08-23

## 2023-08-21 RX ORDER — APIXABAN 5 MG/1
5 TABLET, FILM COATED ORAL 2 TIMES DAILY
COMMUNITY
Start: 2023-06-25

## 2023-08-21 RX ORDER — IPRATROPIUM BROMIDE 0.5 MG/2.5ML
0.5 SOLUTION RESPIRATORY (INHALATION) EVERY 6 HOURS
Status: DISCONTINUED | OUTPATIENT
Start: 2023-08-21 | End: 2023-08-23 | Stop reason: HOSPADM

## 2023-08-21 RX ORDER — PANTOPRAZOLE SODIUM 40 MG/10ML
40 INJECTION, POWDER, LYOPHILIZED, FOR SOLUTION INTRAVENOUS 2 TIMES DAILY
Status: DISCONTINUED | OUTPATIENT
Start: 2023-08-21 | End: 2023-08-23

## 2023-08-21 RX ORDER — FUROSEMIDE 10 MG/ML
40 INJECTION INTRAMUSCULAR; INTRAVENOUS DAILY
Status: DISCONTINUED | OUTPATIENT
Start: 2023-08-21 | End: 2023-08-21

## 2023-08-21 RX ORDER — MAGNESIUM SULFATE 1 G/100ML
1 INJECTION INTRAVENOUS ONCE
Status: COMPLETED | OUTPATIENT
Start: 2023-08-21 | End: 2023-08-21

## 2023-08-21 RX ORDER — HYDROXYCHLOROQUINE SULFATE 200 MG/1
200 TABLET, FILM COATED ORAL 2 TIMES DAILY
Status: DISCONTINUED | OUTPATIENT
Start: 2023-08-21 | End: 2023-08-23 | Stop reason: HOSPADM

## 2023-08-21 RX ORDER — HYDROCODONE BITARTRATE AND ACETAMINOPHEN 5; 325 MG/1; MG/1
1 TABLET ORAL EVERY 4 HOURS PRN
Status: DISCONTINUED | OUTPATIENT
Start: 2023-08-21 | End: 2023-08-23 | Stop reason: HOSPADM

## 2023-08-21 RX ORDER — PANTOPRAZOLE SODIUM 40 MG/10ML
80 INJECTION, POWDER, LYOPHILIZED, FOR SOLUTION INTRAVENOUS ONCE
Status: COMPLETED | OUTPATIENT
Start: 2023-08-21 | End: 2023-08-21

## 2023-08-21 RX ORDER — ATORVASTATIN CALCIUM 10 MG/1
10 TABLET, FILM COATED ORAL DAILY
Status: DISCONTINUED | OUTPATIENT
Start: 2023-08-21 | End: 2023-08-23 | Stop reason: HOSPADM

## 2023-08-21 RX ORDER — ONDANSETRON 2 MG/ML
4 INJECTION INTRAMUSCULAR; INTRAVENOUS EVERY 8 HOURS PRN
Status: DISCONTINUED | OUTPATIENT
Start: 2023-08-21 | End: 2023-08-23 | Stop reason: HOSPADM

## 2023-08-21 RX ORDER — FOLIC ACID 1 MG/1
1 TABLET ORAL DAILY
Status: DISCONTINUED | OUTPATIENT
Start: 2023-08-21 | End: 2023-08-23 | Stop reason: HOSPADM

## 2023-08-21 RX ORDER — ARFORMOTEROL TARTRATE 15 UG/2ML
15 SOLUTION RESPIRATORY (INHALATION) 2 TIMES DAILY
Status: DISCONTINUED | OUTPATIENT
Start: 2023-08-21 | End: 2023-08-23 | Stop reason: HOSPADM

## 2023-08-21 RX ORDER — ONDANSETRON 4 MG/1
4 TABLET, ORALLY DISINTEGRATING ORAL
Status: DISCONTINUED | OUTPATIENT
Start: 2023-08-21 | End: 2023-08-21

## 2023-08-21 RX ORDER — LISINOPRIL 2.5 MG/1
2.5 TABLET ORAL DAILY
Status: DISCONTINUED | OUTPATIENT
Start: 2023-08-21 | End: 2023-08-22

## 2023-08-21 RX ORDER — CLOPIDOGREL BISULFATE 75 MG/1
75 TABLET ORAL DAILY
Status: DISCONTINUED | OUTPATIENT
Start: 2023-08-21 | End: 2023-08-21

## 2023-08-21 RX ORDER — FUROSEMIDE 10 MG/ML
40 INJECTION INTRAMUSCULAR; INTRAVENOUS
Status: COMPLETED | OUTPATIENT
Start: 2023-08-21 | End: 2023-08-21

## 2023-08-21 RX ORDER — METOPROLOL TARTRATE 1 MG/ML
5 INJECTION, SOLUTION INTRAVENOUS EVERY 5 MIN PRN
Status: DISCONTINUED | OUTPATIENT
Start: 2023-08-21 | End: 2023-08-23 | Stop reason: HOSPADM

## 2023-08-21 RX ORDER — MUPIROCIN 20 MG/G
OINTMENT TOPICAL 2 TIMES DAILY
Status: DISCONTINUED | OUTPATIENT
Start: 2023-08-22 | End: 2023-08-23 | Stop reason: HOSPADM

## 2023-08-21 RX ORDER — BUDESONIDE 0.5 MG/2ML
0.5 INHALANT ORAL EVERY 12 HOURS
Status: DISCONTINUED | OUTPATIENT
Start: 2023-08-21 | End: 2023-08-21

## 2023-08-21 RX ORDER — FUROSEMIDE 10 MG/ML
40 INJECTION INTRAMUSCULAR; INTRAVENOUS 2 TIMES DAILY
Status: DISCONTINUED | OUTPATIENT
Start: 2023-08-21 | End: 2023-08-21

## 2023-08-21 RX ORDER — ACETAMINOPHEN 325 MG/1
650 TABLET ORAL EVERY 4 HOURS PRN
Status: DISCONTINUED | OUTPATIENT
Start: 2023-08-21 | End: 2023-08-23 | Stop reason: HOSPADM

## 2023-08-21 RX ORDER — IPRATROPIUM BROMIDE AND ALBUTEROL SULFATE 2.5; .5 MG/3ML; MG/3ML
3 SOLUTION RESPIRATORY (INHALATION) EVERY 4 HOURS PRN
Status: DISCONTINUED | OUTPATIENT
Start: 2023-08-21 | End: 2023-08-23 | Stop reason: HOSPADM

## 2023-08-21 RX ORDER — SODIUM CHLORIDE 0.9 % (FLUSH) 0.9 %
10 SYRINGE (ML) INJECTION
Status: DISCONTINUED | OUTPATIENT
Start: 2023-08-21 | End: 2023-08-23 | Stop reason: HOSPADM

## 2023-08-21 RX ORDER — SOTALOL HYDROCHLORIDE 80 MG/1
80 TABLET ORAL 2 TIMES DAILY
Status: DISCONTINUED | OUTPATIENT
Start: 2023-08-21 | End: 2023-08-21

## 2023-08-21 RX ADMIN — LISINOPRIL 2.5 MG: 2.5 TABLET ORAL at 10:08

## 2023-08-21 RX ADMIN — HYDROXYCHLOROQUINE SULFATE 200 MG: 200 TABLET, FILM COATED ORAL at 09:08

## 2023-08-21 RX ADMIN — ARFORMOTEROL TARTRATE 15 MCG: 15 SOLUTION RESPIRATORY (INHALATION) at 08:08

## 2023-08-21 RX ADMIN — ARFORMOTEROL TARTRATE 15 MCG: 15 SOLUTION RESPIRATORY (INHALATION) at 07:08

## 2023-08-21 RX ADMIN — SODIUM CHLORIDE 125 MG: 9 INJECTION, SOLUTION INTRAVENOUS at 07:08

## 2023-08-21 RX ADMIN — FOLIC ACID 1 MG: 1 TABLET ORAL at 09:08

## 2023-08-21 RX ADMIN — HYDROXYCHLOROQUINE SULFATE 200 MG: 200 TABLET, FILM COATED ORAL at 08:08

## 2023-08-21 RX ADMIN — PANTOPRAZOLE SODIUM 80 MG: 40 INJECTION, POWDER, FOR SOLUTION INTRAVENOUS at 06:08

## 2023-08-21 RX ADMIN — DEXTROSE MONOHYDRATE 5 MG/HR: 50 INJECTION, SOLUTION INTRAVENOUS at 11:08

## 2023-08-21 RX ADMIN — ATORVASTATIN CALCIUM 10 MG: 10 TABLET, FILM COATED ORAL at 10:08

## 2023-08-21 RX ADMIN — BUDESONIDE 0.5 MG: 0.5 INHALANT RESPIRATORY (INHALATION) at 08:08

## 2023-08-21 RX ADMIN — PANTOPRAZOLE SODIUM 40 MG: 40 INJECTION, POWDER, LYOPHILIZED, FOR SOLUTION INTRAVENOUS at 08:08

## 2023-08-21 RX ADMIN — FUROSEMIDE 40 MG: 20 INJECTION, SOLUTION INTRAMUSCULAR; INTRAVENOUS at 04:08

## 2023-08-21 RX ADMIN — IPRATROPIUM BROMIDE 0.5 MG: 0.5 SOLUTION RESPIRATORY (INHALATION) at 01:08

## 2023-08-21 RX ADMIN — METOPROLOL TARTRATE 5 MG: 1 INJECTION, SOLUTION INTRAVENOUS at 09:08

## 2023-08-21 RX ADMIN — FUROSEMIDE 40 MG: 20 INJECTION, SOLUTION INTRAMUSCULAR; INTRAVENOUS at 09:08

## 2023-08-21 RX ADMIN — IPRATROPIUM BROMIDE 0.5 MG: 0.5 SOLUTION RESPIRATORY (INHALATION) at 08:08

## 2023-08-21 RX ADMIN — IPRATROPIUM BROMIDE 0.5 MG: 0.5 SOLUTION RESPIRATORY (INHALATION) at 07:08

## 2023-08-21 RX ADMIN — MAGNESIUM SULFATE 1 G: 1 INJECTION INTRAVENOUS at 06:08

## 2023-08-21 RX ADMIN — SOTALOL HYDROCHLORIDE 80 MG: 80 TABLET ORAL at 09:08

## 2023-08-21 NOTE — PROGRESS NOTES
Automatic Inhaler to Nebulizer Interchange    fluticasone/umeclidinium/vilanterol (Trelegy) 100 mcg/ 62.5 mcg/ 25 mcg changed to budesonide 0.5 mg twice daily AND ipratropium 0.5 mg every 6 hour scheduled AND arformoterol 15 mcg twice daily per [facility] Automatic Therapeutic Substitutions Protocol.    Please contact pharmacy at extension [phone] with any questions.     Thank you,   Eleazar Cartwright

## 2023-08-21 NOTE — H&P
"[facility] - Emergency Dept  Hospital Medicine  History & Physical    Patient Name: Sid Cuello Jr.  MRN: [id number]  Patient Class: IP- Inpatient  Admission Date: 8/21/2023  Attending Physician: Troy Avila MD   Primary Care Provider: Yan Posada MD         Patient information was obtained from patient, spouse/SO, past medical records and ER records.     Subjective:     Principal Problem:Acute on chronic systolic congestive heart failure    Chief Complaint:   Chief Complaint   Patient presents with    Shortness of Breath     With chest pain "once every hour or two"        HPI: Patient is a 72-year-old male with CAD, AFib, hypertension, and current tobacco user who presents with shortness of breath.  Shortness for breath began about 1 day prior and has been worsening.  Shortness of breaths encouraged at rest.  Patient had a cardiac stent placed about 16 years ago.  Patient is on Eliquis and Plavix.  Patient was also cardioverted for AFib earlier this year.  Patient smokes about 1 pack per day.  On review of systems, patient is having some mild chest pain that occurs once every hour to.  Located in the left part of the chest.  Does not radiate.  Patient denies fevers, chills, abdominal pain, bright red blood per rectum or melena.  Patient is on 2 L nasal cannula satting 100%. WBC 6.9 and hemoglobin 6.4.  Sodium 131, BUN 20, creatinine 1.2.  Troponin 50.4.  EKG normal sinus rhythm, no ST elevations.  Mild ST depression in V5.       Past Medical History:   Diagnosis Date    Abnormal SPEP 4/29/2021    Coronary artery disease 2007    MI   1 c. 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;  Surgeon: David Mera MD;  Location: [facility] OR;  Service: Urology;  Laterality: Right;    TREATMENT OF CARDIAC ARRHYTHMIA N/A 4/10/2023    Procedure: Cardioversion or Defibrillation;  Surgeon: Raymond Sotomayor MD;  Location: [facility] CATH/EP LAB;  Service: Cardiology;  Laterality: N/A;    VASECTOMY         Review of patient's allergies indicates:  No Known Allergies    Current Facility-Administered Medications on File Prior to Encounter   Medication    diphenhydrAMINE injection 25 mg    fentaNYL injection 25 mcg    hydromorphone (PF) injection 0.2 mg    lactated ringers infusion    lactated ringers infusion    lidocaine (PF) 10 mg/ml (1%) injection 10 mg    ondansetron injection 4 mg    oxyCODONE immediate release tablet 5 mg    promethazine (PHENERGAN) 6.25 mg in dextrose 5 % 50 mL IVPB    sodium chloride 0.9% flush 3 mL    sodium chloride 0.9% flush 3 mL     Current Outpatient Medications on File Prior to Encounter   Medication Sig    apixaban (ELIQUIS) 2.5 mg Tab Take by mouth once daily.    atorvastatin (LIPITOR) 10 MG tablet Take 10 mg by mouth once daily.    betamethasone dipropionate (DIPROLENE) 0.05 % lotion Apply thin film to scalp QHS PRN itch    clopidogrel (PLAVIX) 75 mg tablet Take 75 mg by mouth once daily.    cyanocobalamin 1,000 mcg/mL injection Inject 1,000 mcg into the muscle every 30 days.    fluticasone-umeclidin-vilanter (TRELEGY ELLIPTA) 100-62.5-25 mcg DsDv Inhale 1 puff into the lungs once daily.    furosemide (LASIX) 40 MG tablet Take 1 tablet (40 mg total) by mouth once daily.    hydrOXYchloroQUINE (PLAQUENIL) 200 mg tablet One tab PO daily for 2 weeks, then increase to 1 tab PO BID    hydrOXYzine HCL (ATARAX) 25 MG tablet Take 1 tablet (25 mg total) by mouth every 6 (six) hours. For itchiness for 7 days    lisinopriL (PRINIVIL,ZESTRIL) 5 MG tablet Take 0.5  tablets (2.5 mg total) by mouth once daily.    sotaloL (BETAPACE) 80 MG tablet Take 80 mg by mouth 2 (two) times daily.    triamcinolone acetonide 0.1% (KENALOG) 0.1 % cream AAA bid     Family History       Problem Relation (Age of Onset)    No Known Problems Mother          Tobacco Use    Smoking status: Every Day     Current packs/day: 0.00     Types: Cigarettes    Smokeless tobacco: Never   Substance and Sexual Activity    Alcohol use: Yes     Comment: social    Drug use: Never    Sexual activity: Not on file     Review of Systems   Constitutional: Negative.    HENT: Negative.     Eyes: Negative.    Respiratory:  Positive for cough and shortness of breath. Negative for wheezing and stridor.    Cardiovascular:  Positive for chest pain and leg swelling. Negative for palpitations.   Gastrointestinal: Negative.    Endocrine: Negative.    Genitourinary: Negative.    Musculoskeletal: Negative.    Skin: Negative.    Neurological: Negative.    Hematological: Negative.    Psychiatric/Behavioral: Negative.       Objective:     Vital Signs (Most Recent):  Temp: 97.8 °F (36.6 °C) (08/21/23 0525)  Pulse: 92 (08/21/23 0525)  Resp: 16 (08/21/23 0525)  BP: 132/78 (08/21/23 0525)  SpO2: 98 % (08/21/23 0525) Vital Signs (24h Range):  Temp:  [97.3 °F (36.3 °C)-97.8 °F (36.6 °C)] 97.8 °F (36.6 °C)  Pulse:  [92-93] 92  Resp:  [16-18] 16  SpO2:  [92 %-98 %] 98 %  BP: (132-134)/(78-87) 132/78     Weight: 64 kg (141 lb)  Body mass index is 20.82 kg/m².     Physical Exam  Vitals and nursing note reviewed.   Constitutional:       Appearance: Normal appearance.   HENT:      Head: Normocephalic and atraumatic.      Nose: Nose normal.   Eyes:      Extraocular Movements: Extraocular movements intact.      Conjunctiva/sclera: Conjunctivae normal.      Pupils: Pupils are equal, round, and reactive to light.   Cardiovascular:      Rate and Rhythm: Normal rate and regular rhythm.      Heart sounds: No murmur heard.  Pulmonary:      Effort:  Pulmonary effort is normal. No respiratory distress.      Breath sounds: Normal breath sounds. No stridor. No wheezing or rales.      Comments: On 2 L nasal cannula  Abdominal:      General: Abdomen is flat. There is no distension.      Palpations: Abdomen is soft.      Tenderness: There is no abdominal tenderness.   Musculoskeletal:         General: No swelling, tenderness or deformity. Normal range of motion.      Cervical back: Normal range of motion and neck supple.      Right lower leg: Edema present.      Left lower leg: Edema present.   Skin:     General: Skin is warm and dry.      Coloration: Skin is not jaundiced.   Neurological:      General: No focal deficit present.      Mental Status: He is alert and oriented to person, place, and time.   Psychiatric:         Mood and Affect: Mood normal.         Behavior: Behavior normal.         Thought Content: Thought content normal.         Judgment: Judgment normal.              CRANIAL NERVES     CN III, IV, VI   Pupils are equal, round, and reactive to light.       Significant Labs: All pertinent labs within the past 24 hours have been reviewed.  Recent Lab Results         08/21/23  0409        Unit Blood Type Code 5100  [P]        5100  [P]       Unit Expiration [id number]  [P]        [id number]  [P]       Unit Blood Type O POS  [P]        O POS  [P]       Albumin 3.2       Alkaline Phosphatase 100       ALT 13       Anion Gap 6       AST 31       Baso # 0.07       Basophil % 1.0       BILIRUBIN TOTAL 0.8  Comment: For infants and newborns, interpretation of results should be based  on gestational age, weight and in agreement with clinical  observations.    Premature Infant recommended reference ranges:  Up to 24 hours.............<8.0 mg/dL  Up to 48 hours............<12.0 mg/dL  3-5 days..................<15.0 mg/dL  6-29 days.................<15.0 mg/dL         BUN 20       Calcium 8.3       Chloride 101       CO2 24       CODING SYSTEM [id number]  [P]         IOTB189  [P]       Creatinine 1.2       Crossmatch Interpretation Compatible  [P]        Compatible  [P]       Differential Method Automated       DISPENSE STATUS CROSSMATCHED  [P]        ISSUED  [P]       eGFR >60.0       Eos # 0.1       Eosinophil % 1.4       Glucose 115       Gran # (ANC) 4.1       Gran % 58.9       Group & Rh O POS       Hematocrit 20.9  Comment: H&H   critical result(s) called and verbal readback obtained from   MARISSA PARRY RN ER. by TC 08/21/2023 04:31         Hemoglobin 6.4  Comment: H&H   critical result(s) called and verbal readback obtained from   MARISSA PARRY RN ER. by [facility] 08/21/2023 04:31         Immature Grans (Abs) 0.04  Comment: Mild elevation in immature granulocytes is non specific and   can be seen in a variety of conditions including stress response,   acute inflammation, trauma and pregnancy. Correlation with other   laboratory and clinical findings is essential.         Immature Granulocytes 0.6       INDIRECT MARY JO NEG       Lymph # 2.0       Lymph % 28.6       Magnesium 1.9       MCH 25.7       MCHC 30.6       MCV 84       Mono # 0.7       Mono % 9.5       MPV 9.4       nRBC 0       Platelets 421       Potassium 4.9       Product Code Q5240O65  [P]        [id number]  [P]       PROTEIN TOTAL 9.3       RBC 2.49       RDW 16.6       Sodium 131       Specimen Outdate 08/24/2023 23:59       Troponin I High Sensitivity 50.4  Comment: Troponin results differ between methods. Do not use   results between Troponin methods interchangeably.    Alkaline Phospatase levels above 400 U/L may   cause false positive results.    Access hsTnI should not be used for patients taking   Asfotase talha (Strensiq).  Troponin critical result(s) called and verbal readback obtained   from Hilda Alcala RN ER  by MS1 08/21/2023 04:59         UNIT NUMBER [id number]  [P]        [id number]  [P]       WBC 6.93                [P] - Preliminary Result               Significant Imaging: I have  reviewed all pertinent imaging results/findings within the past 24 hours.    Assessment/Plan:     * Acute on chronic systolic congestive heart failure  Patient is identified as having Systolic (HFrEF) heart failure that is Acute on chronic. CHF is currently controlled. Latest ECHO performed and demonstrates- Results for orders placed during the hospital encounter of 02/14/23    Echo Saline Bubble? No    Interpretation Summary  · Eccentric hypertrophy and mildly decreased systolic function.  · The estimated ejection fraction is 40%.  · Atrial fibrillation observed.  · Mild left atrial enlargement.  · Severe mitral regurgitation.  · Mild tricuspid regurgitation.  · Normal central venous pressure (3 mmHg).  · The estimated PA systolic pressure is 37 mmHg.  . Continue Beta Blocker and ACE/ARB and monitor clinical status closely. Monitor on telemetry. Patient is on CHF pathway.  Monitor strict Is&Os and daily weights.  Place on fluid restriction of 2 L. Cardiology has been consulted. Continue to stress to patient importance of self efficacy and  on diet for CHF. Last BNP reviewed- and noted below   Recent Labs   Lab 08/15/23  1000   BNP 1,407*   .  Today's BNP pending.  Patient appears clinically volume overloaded.  We will continue IV diuresis.  Cardiology consulted.    Elevated troponin  Troponin 50.4.  EKG normal sinus rhythm, no ST elevations.  Mild ST depression in V5.   Continue Plavix.  Check A1c and lipid panel  Cardiology consulted    Anemia  Hemoglobin 6.4.  We will transfuse 2 units PRBCs.  Check anemia studies and stool occult blood.  Holding Eliquis.  CBC daily.      A-fib  Currently in normal sinus rhythm.  Holding Eliquis in setting of anemia.    Discoid lupus  Continue home medication      CAD s/p PCI  Continue Plavix.        VTE Risk Mitigation (From admission, onward)           Ordered     IP VTE HIGH RISK PATIENT  Once         08/21/23 0539     Place sequential compression device  Until  discontinued         08/21/23 0539                               Troy Avila MD  Department of Hospital Medicine  [facility] - Emergency Dept

## 2023-08-21 NOTE — ASSESSMENT & PLAN NOTE
Troponin 50.4.  EKG normal sinus rhythm, no ST elevations.  Mild ST depression in V5.   Continue Plavix.  Check A1c and lipid panel  Cardiology consulted

## 2023-08-21 NOTE — CONSULTS
"GASTROENTEROLOGY INPATIENT CONSULT NOTE  Patient Name: Sid Cuello Jr.  Patient MRN: [id number]  Patient : 1950    Admit Date: 2023  Service date: 2023    Reason for Consult: anemia    PCP: Yan Posada MD    Chief Complaint   Patient presents with    Shortness of Breath     With chest pain "once every hour or two"       HPI: Patient is a 72 y.o. male with PMHx  coronary artery disease post PCI, AFib status post cardioversion  admitted to the hospital with acute on chronic shortness of breath.  Found to have severe iron-deficiency anemia not associated with overt bleeding present and progressive since earlier this year.  Earlier this year is blood count showed a macrocytic anemia now showing a normocytic anemia.  Admits to drinking moderate to heavily during the day.  Normal platelet counts.  No history of cirrhosis.  No heavy NSAIDs.  Had negative Cologuard 2023    Past Medical History:  Past Medical History:   Diagnosis Date    Abnormal SPEP 2021    Coronary artery disease     MI   1 c. 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;  Surgeon: David Mera MD;  Location: [facility] OR;  Service: Urology;  Laterality: Right;    TREATMENT OF CARDIAC ARRHYTHMIA N/A 4/10/2023    Procedure: Cardioversion or Defibrillation;  Surgeon: Raymond Sotomayor MD;  Location[facility] CATH/EP LAB;  Service: Cardiology;  Laterality: N/A;    VASECTOMY          Home Medications:  (Not in a hospital admission)      Inpatient " Medications:   arformoteroL  15 mcg Nebulization BID    atorvastatin  10 mg Oral Daily    folic acid  1 mg Oral Daily    furosemide (LASIX) injection  40 mg Intravenous Daily    hydrOXYchloroQUINE  200 mg Oral BID    ipratropium  0.5 mg Nebulization Q6H    lisinopriL  2.5 mg Oral Daily    pantoprazole  40 mg Intravenous BID     0.9%  NaCl infusion (for blood administration), acetaminophen, albuterol-ipratropium, HYDROcodone-acetaminophen, ondansetron, sodium chloride 0.9%    Review of patient's allergies indicates:  No Known Allergies    Social History:   Social History     Occupational History    Not on file   Tobacco Use    Smoking status: Every Day     Current packs/day: 0.00     Types: Cigarettes    Smokeless tobacco: Never   Substance and Sexual Activity    Alcohol use: Yes     Comment: social    Drug use: Never    Sexual activity: Not on file       Family History:   Family History   Problem Relation Age of Onset    No Known Problems Mother     Melanoma Neg Hx     Psoriasis Neg Hx     Lupus Neg Hx     Eczema Neg Hx        Review of Systems:  A 10 point review of systems was performed and was normal, except as mentioned in the HPI, including constitutional, HEENT, heme, lymph, cardiovascular, respiratory, gastrointestinal, genitourinary, neurologic, endocrine, psychiatric and musculoskeletal.      OBJECTIVE:    Physical Exam:  24 Hour Vital Sign Ranges: Temp:  [97.3 °F (36.3 °C)-98.7 °F (37.1 °C)] 97.8 °F (36.6 °C)  Pulse:  [74-97] 78  Resp:  [16-38] 25  SpO2:  [92 %-100 %] 99 %  BP: (115-144)/(70-87) 121/73  Most recent vitals: /73   Pulse 78   Temp 97.8 °F (36.6 °C)   Resp (!) 25   Wt 64 kg (141 lb)   SpO2 99%   BMI 20.82 kg/m²    GEN: well-developed, well-nourished, awake and alert, non-toxic appearing adult  HEENT: PERRL, sclera anicteric, oral mucosa pink and moist without lesion  NECK: trachea midline; Good ROM  CV: regular rate and rhythm, no murmurs or gallops  RESP: clear to auscultation  "bilaterally, no wheezes, rhonci or rales  ABD: soft, non-tender, non-distended, normal bowel sounds  EXT: no swelling or edema, 2+ pulses distally  SKIN: no rashes or jaundice  PSYCH: normal affect    Labs:   Recent Labs     08/21/23  0409   WBC 6.93   MCV 84        Recent Labs     08/21/23  0409   *   K 4.9      CO2 24   BUN 20   *     No results for input(s): "ALB" in the last 72 hours.    Invalid input(s): "ALKP", "SGOT", "SGPT", "TBIL", "DBIL", "TPRO"  Recent Labs     08/21/23  0409   INR 1.0         Radiology Review:  X-Ray Chest AP Portable   Final Result            IMPRESSION / RECOMMENDATIONS:  CHF exacerbation   Iron-deficiency anemia   -plan for endoscopy tomorrow with outpatient colonoscopy.  Diuresis for CHF  -trend h/h q 8 hours  -ppi daily    Thank you for this consult.    Ankur Gaona  8/21/2023  2:14 PM        "

## 2023-08-21 NOTE — SUBJECTIVE & OBJECTIVE
Past Medical History:   Diagnosis Date    Abnormal SPEP 4/29/2021    Coronary artery disease 2007    MI   1 c. stent    Discoid lupus     Essential hypertension 8/10/2015    Hyperlipidemia LDL goal < 100 8/10/2015       Past Surgical History:   Procedure Laterality Date    CORONARY ANGIOPLASTY WITH STENT PLACEMENT  2007    CYSTOSCOPY N/A 10/23/2019    Procedure: CYSTOSCOPY (flexible);  Surgeon: David Mera MD;  Location: 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 OR;  Service: Urology;  Laterality: Right;    TREATMENT OF CARDIAC ARRHYTHMIA N/A 4/10/2023    Procedure: Cardioversion or Defibrillation;  Surgeon: Raymond Sotomayor MD;  Location: [facility] CATH/EP LAB;  Service: Cardiology;  Laterality: N/A;    VASECTOMY         Review of patient's allergies indicates:  No Known Allergies    Current Facility-Administered Medications on File Prior to Encounter   Medication    diphenhydrAMINE injection 25 mg    fentaNYL injection 25 mcg    hydromorphone (PF) injection 0.2 mg    lactated ringers infusion    lactated ringers infusion    lidocaine (PF) 10 mg/ml (1%) injection 10 mg    ondansetron injection 4 mg    oxyCODONE immediate release tablet 5 mg    promethazine (PHENERGAN) 6.25 mg in dextrose 5 % 50 mL IVPB    sodium chloride 0.9% flush 3 mL    sodium chloride 0.9% flush 3 mL     Current Outpatient Medications on File Prior to Encounter   Medication Sig    apixaban (ELIQUIS) 2.5 mg Tab Take by mouth once daily.    atorvastatin (LIPITOR) 10 MG tablet Take 10 mg by mouth once daily.    betamethasone dipropionate (DIPROLENE) 0.05 % lotion Apply thin film to scalp QHS PRN itch    clopidogrel (PLAVIX) 75 mg tablet Take 75 mg by mouth once daily.    cyanocobalamin 1,000 mcg/mL injection Inject 1,000 mcg into the muscle every 30 days.     fluticasone-umeclidin-vilanter (TRELEGY ELLIPTA) 100-62.5-25 mcg DsDv Inhale 1 puff into the lungs once daily.    furosemide (LASIX) 40 MG tablet Take 1 tablet (40 mg total) by mouth once daily.    hydrOXYchloroQUINE (PLAQUENIL) 200 mg tablet One tab PO daily for 2 weeks, then increase to 1 tab PO BID    hydrOXYzine HCL (ATARAX) 25 MG tablet Take 1 tablet (25 mg total) by mouth every 6 (six) hours. For itchiness for 7 days    lisinopriL (PRINIVIL,ZESTRIL) 5 MG tablet Take 0.5 tablets (2.5 mg total) by mouth once daily.    sotaloL (BETAPACE) 80 MG tablet Take 80 mg by mouth 2 (two) times daily.    triamcinolone acetonide 0.1% (KENALOG) 0.1 % cream AAA bid     Family History       Problem Relation (Age of Onset)    No Known Problems Mother          Tobacco Use    Smoking status: Every Day     Current packs/day: 0.00     Types: Cigarettes    Smokeless tobacco: Never   Substance and Sexual Activity    Alcohol use: Yes     Comment: social    Drug use: Never    Sexual activity: Not on file     Review of Systems   Constitutional: Negative.    HENT: Negative.     Eyes: Negative.    Respiratory:  Positive for cough and shortness of breath. Negative for wheezing and stridor.    Cardiovascular:  Positive for chest pain and leg swelling. Negative for palpitations.   Gastrointestinal: Negative.    Endocrine: Negative.    Genitourinary: Negative.    Musculoskeletal: Negative.    Skin: Negative.    Neurological: Negative.    Hematological: Negative.    Psychiatric/Behavioral: Negative.       Objective:     Vital Signs (Most Recent):  Temp: 97.8 °F (36.6 °C) (08/21/23 0525)  Pulse: 92 (08/21/23 0525)  Resp: 16 (08/21/23 0525)  BP: 132/78 (08/21/23 0525)  SpO2: 98 % (08/21/23 0525) Vital Signs (24h Range):  Temp:  [97.3 °F (36.3 °C)-97.8 °F (36.6 °C)] 97.8 °F (36.6 °C)  Pulse:  [92-93] 92  Resp:  [16-18] 16  SpO2:  [92 %-98 %] 98 %  BP: (132-134)/(78-87) 132/78     Weight: 64 kg (141 lb)  Body mass index is 20.82 kg/m².     Physical  Exam  Vitals and nursing note reviewed.   Constitutional:       Appearance: Normal appearance.   HENT:      Head: Normocephalic and atraumatic.      Nose: Nose normal.   Eyes:      Extraocular Movements: Extraocular movements intact.      Conjunctiva/sclera: Conjunctivae normal.      Pupils: Pupils are equal, round, and reactive to light.   Cardiovascular:      Rate and Rhythm: Normal rate and regular rhythm.      Heart sounds: No murmur heard.  Pulmonary:      Effort: Pulmonary effort is normal. No respiratory distress.      Breath sounds: Normal breath sounds. No stridor. No wheezing or rales.      Comments: On 2 L nasal cannula  Abdominal:      General: Abdomen is flat. There is no distension.      Palpations: Abdomen is soft.      Tenderness: There is no abdominal tenderness.   Musculoskeletal:         General: No swelling, tenderness or deformity. Normal range of motion.      Cervical back: Normal range of motion and neck supple.      Right lower leg: Edema present.      Left lower leg: Edema present.   Skin:     General: Skin is warm and dry.      Coloration: Skin is not jaundiced.   Neurological:      General: No focal deficit present.      Mental Status: He is alert and oriented to person, place, and time.   Psychiatric:         Mood and Affect: Mood normal.         Behavior: Behavior normal.         Thought Content: Thought content normal.         Judgment: Judgment normal.              CRANIAL NERVES     CN III, IV, VI   Pupils are equal, round, and reactive to light.       Significant Labs: All pertinent labs within the past 24 hours have been reviewed.  Recent Lab Results         08/21/23  0409        Unit Blood Type Code 5100  [P]        5100  [P]       Unit Expiration [id number]  [P]        [id number]  [P]       Unit Blood Type O POS  [P]        O POS  [P]       Albumin 3.2       Alkaline Phosphatase 100       ALT 13       Anion Gap 6       AST 31       Baso # 0.07       Basophil % 1.0        BILIRUBIN TOTAL 0.8  Comment: For infants and newborns, interpretation of results should be based  on gestational age, weight and in agreement with clinical  observations.    Premature Infant recommended reference ranges:  Up to 24 hours.............<8.0 mg/dL  Up to 48 hours............<12.0 mg/dL  3-5 days..................<15.0 mg/dL  6-29 days.................<15.0 mg/dL         BUN 20       Calcium 8.3       Chloride 101       CO2 24       CODING SYSTEM [id number]  [P]        [id number]  [P]       Creatinine 1.2       Crossmatch Interpretation Compatible  [P]        Compatible  [P]       Differential Method Automated       DISPENSE STATUS CROSSMATCHED  [P]        ISSUED  [P]       eGFR >60.0       Eos # 0.1       Eosinophil % 1.4       Glucose 115       Gran # (ANC) 4.1       Gran % 58.9       Group & Rh O POS       Hematocrit 20.9  Comment: H&H   critical result(s) called and verbal readback obtained from   MARISSA PARRY RN ER. by [facility] 08/21/2023 04:31         Hemoglobin 6.4  Comment: H&H   critical result(s) called and verbal readback obtained from   MARISSA PARRY RN ER. by [facility] 08/21/2023 04:31         Immature Grans (Abs) 0.04  Comment: Mild elevation in immature granulocytes is non specific and   can be seen in a variety of conditions including stress response,   acute inflammation, trauma and pregnancy. Correlation with other   laboratory and clinical findings is essential.         Immature Granulocytes 0.6       INDIRECT MARY JO NEG       Lymph # 2.0       Lymph % 28.6       Magnesium 1.9       MCH 25.7       MCHC 30.6       MCV 84       Mono # 0.7       Mono % 9.5       MPV 9.4       nRBC 0       Platelets 421       Potassium 4.9       Product Code P3030U14  [P]        [id number]  [P]       PROTEIN TOTAL 9.3       RBC 2.49       RDW 16.6       Sodium 131       Specimen Outdate 08/24/2023 23:59       Troponin I High Sensitivity 50.4  Comment: Troponin results differ between methods. Do not use   results  between Troponin methods interchangeably.    Alkaline Phospatase levels above 400 U/L may   cause false positive results.    Access hsTnI should not be used for patients taking   Asfotase talha (Strensiq).  Troponin critical result(s) called and verbal readback obtained   from Hilda Alcala RN ER  by MS1 08/21/2023 04:59         UNIT NUMBER [id number]  [P]        [id number]  [P]       WBC 6.93                [P] - Preliminary Result               Significant Imaging: I have reviewed all pertinent imaging results/findings within the past 24 hours.

## 2023-08-21 NOTE — ED PROVIDER NOTES
"Encounter Date: 8/21/2023       History     Chief Complaint   Patient presents with    Shortness of Breath     With chest pain "once every hour or two"     Chief complaint is shortness of breath.  The patient complains of increasing shortness of breath over the last several days.  Says he "can not breathe" he is a smoker has no COPD but has a history of heart failure with a stent placed 16 years ago by Dr. Sotomayor in February of this year he had atrial fibrillation that was treated with cardioversion with success.  He has history hypertension diabetes smoking and has been losing weight.  States that every now and then about once an hour since last night he is had a little few seconds of chest pain.  EKG today reveals normal sinus rhythm OR interval 186 milliseconds no ST elevation mild ST depression fever V6        Review of patient's allergies indicates:  No Known Allergies  Past Medical History:   Diagnosis Date    Abnormal SPEP 4/29/2021    Coronary artery disease 2007    MI   1 c. 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;  Surgeon: David Mera MD;  Location: [facility] OR;  Service: Urology;  Laterality: Right;    TREATMENT OF CARDIAC ARRHYTHMIA N/A 4/10/2023    Procedure: Cardioversion or Defibrillation;  Surgeon: Raymond Sotomayor MD;  Location: [facility] CATH/EP LAB;  Service: Cardiology;  Laterality: N/A;    VASECTOMY       Family History   Problem Relation Age of Onset    No Known Problems Mother     Melanoma Neg Hx     Psoriasis Neg Hx     Lupus Neg Hx     Eczema " Neg Hx      Social History     Tobacco Use    Smoking status: Every Day     Current packs/day: 0.00     Types: Cigarettes    Smokeless tobacco: Never   Substance Use Topics    Alcohol use: Yes     Comment: social    Drug use: Never     Review of Systems   Constitutional:  Negative for chills and fever.   HENT:  Negative for ear pain, rhinorrhea and sore throat.    Eyes:  Negative for pain and visual disturbance.   Respiratory:  Positive for shortness of breath. Negative for cough.    Cardiovascular:  Positive for chest pain. Negative for palpitations.   Gastrointestinal:  Negative for abdominal pain, constipation, diarrhea, nausea and vomiting.   Genitourinary:  Negative for dysuria, frequency, hematuria and urgency.   Musculoskeletal:  Negative for back pain, joint swelling and myalgias.   Skin:  Negative for rash.   Neurological:  Negative for dizziness, seizures, weakness and headaches.   Psychiatric/Behavioral:  Negative for dysphoric mood. The patient is not nervous/anxious.        Physical Exam     Initial Vitals [08/21/23 0358]   BP Pulse Resp Temp SpO2   134/87 93 18 97.3 °F (36.3 °C) (!) 92 %      MAP       --         Physical Exam    Nursing note and vitals reviewed.  Constitutional: He appears well-developed and well-nourished.   HENT:   Head: Normocephalic and atraumatic.   Eyes: Conjunctivae, EOM and lids are normal. Pupils are equal, round, and reactive to light.   Neck: Trachea normal. Neck supple. No thyroid mass present.   Cardiovascular:  Normal rate, regular rhythm and normal heart sounds.           Pulmonary/Chest: No respiratory distress.   Patient with rales in the bases   Abdominal: Abdomen is soft. There is no abdominal tenderness.   Musculoskeletal:         General: Normal range of motion.      Cervical back: Neck supple.     Neurological: He is alert and oriented to person, place, and time. He has normal strength and normal reflexes. No cranial nerve deficit or sensory deficit.   Skin: Skin  is warm and dry.   No lower extremity edema   Psychiatric: He has a normal mood and affect. His speech is normal and behavior is normal. Judgment and thought content normal.         ED Course   Procedures  Labs Reviewed   CBC W/ AUTO DIFFERENTIAL - Abnormal; Notable for the following components:       Result Value    RBC 2.49 (*)     Hemoglobin 6.4 (*)     Hematocrit 20.9 (*)     MCH 25.7 (*)     MCHC 30.6 (*)     RDW 16.6 (*)     Immature Granulocytes 0.6 (*)     All other components within normal limits    Narrative:     H&H   critical result(s) called and verbal readback obtained from   MARISSA PARRY RN ER. by TC1 08/21/2023 04:31   COMPREHENSIVE METABOLIC PANEL - Abnormal; Notable for the following components:    Sodium 131 (*)     Glucose 115 (*)     Calcium 8.3 (*)     Total Protein 9.3 (*)     Albumin 3.2 (*)     Anion Gap 6 (*)     All other components within normal limits   TROPONIN I HIGH SENSITIVITY - Abnormal; Notable for the following components:    Troponin I High Sensitivity 50.4 (*)     All other components within normal limits    Narrative:       Troponin critical result(s) called and verbal readback obtained   from Hilda Alcala RN ER  by MS1 08/21/2023 04:59   MAGNESIUM   IRON AND TIBC   FERRITIN   VITAMIN B12   FOLATE   B-TYPE NATRIURETIC PEPTIDE   FERRITIN   IRON AND TIBC   VITAMIN B12   OCCULT BLOOD X 1, STOOL   FOLATE   TYPE & SCREEN   PREPARE RBC SOFT          Imaging Results              X-Ray Chest AP Portable (In process)    Procedure changed from X-Ray Chest 1 View                    Medications   0.9%  NaCl infusion (for blood administration) (has no administration in time range)   clopidogreL tablet 75 mg (has no administration in time range)   fluticasone-umeclidin-vilanter 100-62.5-25 mcg DsDv 1 puff (has no administration in time range)   hydrOXYchloroQUINE tablet 200 mg (has no administration in time range)   sotaloL tablet 80 mg (has no administration in time range)    albuterol-ipratropium 2.5 mg-0.5 mg/3 mL nebulizer solution 3 mL (has no administration in time range)   furosemide injection 40 mg (40 mg Intravenous Given 8/21/23 4690)     Medical Decision Making  The patient is here for chief complaint of shortness of breath.  The patient has a history of CHF.  Differential diagnosis of course includes CHF acute MI non-STEMI ST elevation MI.  Also had some mild chest pain with differential including cardiac arrhythmia STEMI non-STEMI pericarditis aortic dissection pneumothorax pulmonary embolus bacterial viral pneumonia gastritis pancreatitis cholecystitis among others.  In this case the patient has signs of fluid overload.  His troponin is elevated.  EKG shows no obvious ST elevation at this point time.  Hospitalist contacted the patient be admitted.  Has a diagnosis of symptomatic anemia elevated troponin.    Amount and/or Complexity of Data Reviewed  Labs: ordered.  Radiology: ordered.    Risk  Prescription drug management.  Decision regarding hospitalization.              Attending Attestation:         Attending Critical Care:   Critical Care Times:   Direct Patient Care (initial evaluation, reassessments, and time considering the case)................................................................5 minutes.   Additional History from reviewing old medical records or taking additional history from the family, EMS, PCP, etc.......................10 minutes.   Ordering, Reviewing, and Interpreting Diagnostic Studies...............................................................................................................5 minutes.   Documentation..................................................................................................................................................................................5 minutes.   Consultation with other Physicians.  .................................................................................................................................................5 minutes.   ==============================================================  Total Critical Care Time - exclusive of procedural time: 30 minutes.  ==============================================================  The following critical care procedures were done by me (see procedure notes): blood draw for specimens and pulse oximetry.   Critical care was time spent personally by me on the following activities: examination of patient, development of treatment plan with patient or relative, review of old charts and discussion with consultants.   Critical Care Condition: critical                           Clinical Impression:   Final diagnoses:  [R06.02] Shortness of breath               Winter Ovalle MD  08/21/23 0511       Winter Ovalle MD  08/21/23 0529       Winter Ovalle MD  09/07/23 0517

## 2023-08-21 NOTE — CARE UPDATE
08/21/23 0811   Patient Assessment/Suction   Level of Consciousness (AVPU) alert   Respiratory Effort Normal;Unlabored   Expansion/Accessory Muscles/Retractions expansion symmetric;no retractions;no use of accessory muscles   All Lung Fields Breath Sounds diminished   PRE-TX-O2   Device (Oxygen Therapy) nasal cannula   $ Is the patient on Low Flow Oxygen? Yes   Flow (L/min) 2   SpO2 (!) 93 %   Pulse Oximetry Type Continuous   $ Pulse Oximetry - Multiple Charge Pulse Oximetry - Multiple   Pulse 93   Resp (!) 31   Aerosol Therapy   $ Aerosol Therapy Charges Aerosol Treatment   Daily Review of Necessity (SVN) completed   Respiratory Treatment Status (SVN) given   Treatment Route (SVN) mask;oxygen   Patient Position (SVN) HOB elevated   Post Treatment Assessment (SVN) increased aeration   Signs of Intolerance (SVN) none   Education   $ Education Bronchodilator;15 min   Respiratory Evaluation   $ Care Plan Tech Time 15 min   $ Eval/Re-eval Charges Evaluation

## 2023-08-21 NOTE — ASSESSMENT & PLAN NOTE
Patient is identified as having Systolic (HFrEF) heart failure that is Acute on chronic. CHF is currently controlled. Latest ECHO performed and demonstrates- Results for orders placed during the hospital encounter of 02/14/23    Echo Saline Bubble? No    Interpretation Summary  · Eccentric hypertrophy and mildly decreased systolic function.  · The estimated ejection fraction is 40%.  · Atrial fibrillation observed.  · Mild left atrial enlargement.  · Severe mitral regurgitation.  · Mild tricuspid regurgitation.  · Normal central venous pressure (3 mmHg).  · The estimated PA systolic pressure is 37 mmHg.  . Continue Beta Blocker and ACE/ARB and monitor clinical status closely. Monitor on telemetry. Patient is on CHF pathway.  Monitor strict Is&Os and daily weights.  Place on fluid restriction of 2 L. Cardiology has been consulted. Continue to stress to patient importance of self efficacy and  on diet for CHF. Last BNP reviewed- and noted below   Recent Labs   Lab 08/15/23  1000   BNP 1,407*   .  Today's BNP pending.  Patient appears clinically volume overloaded.  We will continue IV diuresis.  Cardiology consulted.

## 2023-08-21 NOTE — PHARMACY MED REC
"Admission Medication History     The home medication history was taken by Johny Key.    You may go to "Admission" then "Reconcile Home Medications" tabs to review and/or act upon these items.     The home medication list has been updated by the Pharmacy department.   Please read ALL comments highlighted in yellow.   Please address this information as you see fit.    Feel free to contact us if you have any questions or require assistance.        Medications listed below were obtained from: Patient/family and Analytic software- [employer]  Current Facility-Administered Medications on File Prior to Encounter   Medication Dose Route Frequency Provider Last Rate Last Admin    diphenhydrAMINE injection 25 mg  25 mg Intravenous Q6H PRN Dhruv Gutierrez MD        fentaNYL injection 25 mcg  25 mcg Intravenous Q5 Min PRN Dhruv Gutierrez MD        hydromorphone (PF) injection 0.2 mg  0.2 mg Intravenous Q5 Min PRN Dhruv Gutierrez MD        lactated ringers infusion  10 mL/hr Intravenous Continuous Dhruv Gutierrez MD   Stopped at 10/23/19 1450    lactated ringers infusion  75 mL/hr Intravenous Continuous Dhruv Gutierrez MD        lidocaine (PF) 10 mg/ml (1%) injection 10 mg  1 mL Intradermal Once Dhruv Gutierrez MD        ondansetron injection 4 mg  4 mg Intravenous Once Dhruv Gutierrez MD        oxyCODONE immediate release tablet 5 mg  5 mg Oral PRN Dhruv Gutierrez MD        promethazine (PHENERGAN) 6.25 mg in dextrose 5 % 50 mL IVPB  6.25 mg Intravenous Q10 Min PRN Dhruv Gutierrez MD        sodium chloride 0.9% flush 3 mL  3 mL Intravenous Q8H Dhruv Gutierrez MD        sodium chloride 0.9% flush 3 mL  3 mL Intravenous PRN Dhruv Gutierrez MD         Current Outpatient Medications on File Prior to Encounter   Medication Sig Dispense Refill    atorvastatin (LIPITOR) 10 MG tablet Take 10 mg by mouth once daily.      betamethasone dipropionate (DIPROLENE) 0.05 % " lotion Apply thin film to scalp QHS PRN itch 60 mL 2    clopidogrel (PLAVIX) 75 mg tablet Take 75 mg by mouth once daily.      cyanocobalamin 1,000 mcg/mL injection Inject 1,000 mcg into the muscle every 30 days.      ELIQUIS 5 mg Tab Take 5 mg by mouth 2 (two) times daily.      fluticasone-umeclidin-vilanter (TRELEGY ELLIPTA) 100-62.5-25 mcg DsDv Inhale 1 puff into the lungs once daily. 60 each 3    furosemide (LASIX) 40 MG tablet Take 1 tablet (40 mg total) by mouth once daily. 30 tablet 0    hydrOXYchloroQUINE (PLAQUENIL) 200 mg tablet One tab PO daily for 2 weeks, then increase to 1 tab PO BID 60 tablet 2    hydrOXYzine HCL (ATARAX) 25 MG tablet Take 1 tablet (25 mg total) by mouth every 6 (six) hours. For itchiness for 7 days 28 tablet 0    lisinopriL (PRINIVIL,ZESTRIL) 5 MG tablet Take 0.5 tablets (2.5 mg total) by mouth once daily.      sotaloL (BETAPACE) 80 MG tablet Take 80 mg by mouth 2 (two) times daily.      triamcinolone acetonide 0.1% (KENALOG) 0.1 % cream AAA bid 454 g 3    apixaban (ELIQUIS) 2.5 mg Tab Take by mouth once daily.         Potential issues to be addressed PRIOR TO DISCHARGE  Patient reported not taking the following medications: (Eliquis 2.5). These medications remain on the home medication list. Please address accordingly.     Johny Key  [id number]                .

## 2023-08-21 NOTE — ASSESSMENT & PLAN NOTE
Hemoglobin 6.4.  We will transfuse 2 units PRBCs.  Check anemia studies and stool occult blood.  Holding Eliquis.  CBC daily.

## 2023-08-21 NOTE — ASSESSMENT & PLAN NOTE
Had pRBC upon admission   Bowel enteroscopy done on 08/22 was normal  plavix was discontinued forever  Can have aspirin/ NOAC upon discharge  Colonoscopy soon on outpatient basis

## 2023-08-21 NOTE — CONSULTS
[facility]   Cardiology Note    Consult Requested By: hospital medicine  Reason for Consult: CHF    SUBJECTIVE:     History of Present Illness: Patient is a 73 yo male with PMHx of CAD s/p PCI, paroxysmal atrial fibrillation s/p cardioversion on 4/10/2023, HTN, HLP, and tobacco use who presented to the ED with worsening shortness of breath. He states his symptoms started a few days ago and gradually worsened. Reports he could not walk to his mailbox without experiencing dyspnea which he was able to do no problem previously. Her reports associated orthopnea. He does report a sharp pain on the left side of his chest that occurs intermittently throughout the day. It typically lasts a few seconds. Denies aggravating/alleviating factors. It is not associated with exertion. Denies radiation. He denies fevers, chills, edema, dizziness, palpitations, N/V, or cough. ED workup showed troponin elevation to 50, trending. BNP 1313. CXR with pulmonary edema. I cannot see results for EKG. H&H 6.4/20.9. Iron 14, saturated iron 3, ferritin normal. He denies recent bleeding. Cr 1.2. He was started on Iv lasix. Currently receiving 1 unit RBCs. ON 2 L NC. BP stable. Tele reviewed and shows NSR.     Review of patient's allergies indicates:  No Known Allergies    Past Medical History:   Diagnosis Date    Abnormal SPEP 4/29/2021    Coronary artery disease 2007    MI   1 c. stent    Discoid lupus     Essential hypertension 8/10/2015    Hyperlipidemia LDL goal < 100 8/10/2015     Past Surgical History:   Procedure Laterality Date    CORONARY ANGIOPLASTY WITH STENT PLACEMENT  2007    CYSTOSCOPY N/A 10/23/2019    Procedure: CYSTOSCOPY (flexible);  Surgeon: David Mera MD;  Location: 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 OR;  Service:  Urology;  Laterality: Right;    TREATMENT OF CARDIAC ARRHYTHMIA N/A 4/10/2023    Procedure: Cardioversion or Defibrillation;  Surgeon: Raymond Sotomayor MD;  Location: [facility] CATH/EP LAB;  Service: Cardiology;  Laterality: N/A;    VASECTOMY       Family History   Problem Relation Age of Onset    No Known Problems Mother     Melanoma Neg Hx     Psoriasis Neg Hx     Lupus Neg Hx     Eczema Neg Hx      Social History     Tobacco Use    Smoking status: Every Day     Current packs/day: 0.00     Types: Cigarettes    Smokeless tobacco: Never   Substance Use Topics    Alcohol use: Yes     Comment: social    Drug use: Never       Review of Systems:  Review of Systems   Constitutional:  Negative for chills and fever.   Respiratory:  Positive for shortness of breath. Negative for cough.    Cardiovascular:  Positive for chest pain and orthopnea. Negative for palpitations and leg swelling.   Gastrointestinal:  Negative for abdominal pain, nausea and vomiting.   Neurological:  Negative for dizziness.   All other systems reviewed and are negative.      OBJECTIVE:     Vital Signs (Most Recent)  Temp: 97.8 °F (36.6 °C) (08/21/23 0541)  Pulse: 89 (08/21/23 0813)  Resp: (!) 26 (08/21/23 0813)  BP: 132/74 (08/21/23 0643)  SpO2: 96 % (08/21/23 0813)    Vital Signs Range (Last 24H):  Temp:  [97.3 °F (36.3 °C)-97.8 °F (36.6 °C)]   Pulse:  [83-93]   Resp:  [16-31]   BP: (131-135)/(74-87)   SpO2:  [92 %-98 %]     I & O (Last 24H):    Intake/Output Summary (Last 24 hours) at 8/21/2023 0834  Last data filed at 8/21/2023 0637  Gross per 24 hour   Intake --   Output 1000 ml   Net -1000 ml       Current Diet:     Current Diet Order   Procedures    Diet Cardiac Fluid - 2000mL     Order Specific Question:   Fluid restriction:     Answer:   Fluid - 2000mL        Allergies:  Review of patient's allergies indicates:  No Known Allergies    Meds:  Scheduled Meds:   arformoteroL  15 mcg Nebulization BID    budesonide  0.5 mg Nebulization Q12H    clopidogreL   75 mg Oral Daily    folic acid  1 mg Oral Daily    furosemide (LASIX) injection  40 mg Intravenous BID    hydrOXYchloroQUINE  200 mg Oral BID    ipratropium  0.5 mg Nebulization Q6H    pantoprazole  40 mg Intravenous BID    sotaloL  80 mg Oral BID     Continuous Infusions:  PRN Meds:0.9%  NaCl infusion (for blood administration), acetaminophen, albuterol-ipratropium, HYDROcodone-acetaminophen, ondansetron, sodium chloride 0.9%    Oxygen/Ventilator Data (Last 24H):  (if applicable)            Hemodynamic Parameters (Last 24H):   (if applicable)        Laboratory and Radiology Data:  Recent Results (from the past 24 hour(s))   CBC Auto Differential    Collection Time: 08/21/23  4:09 AM   Result Value Ref Range    WBC 6.93 3.90 - 12.70 K/uL    RBC 2.49 (L) 4.60 - 6.20 M/uL    Hemoglobin 6.4 (LL) 14.0 - 18.0 g/dL    Hematocrit 20.9 (LL) 40.0 - 54.0 %    MCV 84 82 - 98 fL    MCH 25.7 (L) 27.0 - 31.0 pg    MCHC 30.6 (L) 32.0 - 36.0 g/dL    RDW 16.6 (H) 11.5 - 14.5 %    Platelets 421 150 - 450 K/uL    MPV 9.4 9.2 - 12.9 fL    Immature Granulocytes 0.6 (H) 0.0 - 0.5 %    Gran # (ANC) 4.1 1.8 - 7.7 K/uL    Immature Grans (Abs) 0.04 0.00 - 0.04 K/uL    Lymph # 2.0 1.0 - 4.8 K/uL    Mono # 0.7 0.3 - 1.0 K/uL    Eos # 0.1 0.0 - 0.5 K/uL    Baso # 0.07 0.00 - 0.20 K/uL    nRBC 0 0 /100 WBC    Gran % 58.9 38.0 - 73.0 %    Lymph % 28.6 18.0 - 48.0 %    Mono % 9.5 4.0 - 15.0 %    Eosinophil % 1.4 0.0 - 8.0 %    Basophil % 1.0 0.0 - 1.9 %    Differential Method Automated    Comprehensive Metabolic Panel    Collection Time: 08/21/23  4:09 AM   Result Value Ref Range    Sodium 131 (L) 136 - 145 mmol/L    Potassium 4.9 3.5 - 5.1 mmol/L    Chloride 101 95 - 110 mmol/L    CO2 24 23 - 29 mmol/L    Glucose 115 (H) 70 - 110 mg/dL    BUN 20 8 - 23 mg/dL    Creatinine 1.2 0.5 - 1.4 mg/dL    Calcium 8.3 (L) 8.7 - 10.5 mg/dL    Total Protein 9.3 (H) 6.0 - 8.4 g/dL    Albumin 3.2 (L) 3.5 - 5.2 g/dL    Total Bilirubin 0.8 0.1 - 1.0 mg/dL     Alkaline Phosphatase 100 55 - 135 U/L    AST 31 10 - 40 U/L    ALT 13 10 - 44 U/L    eGFR >60.0 >60 mL/min/1.73 m^2    Anion Gap 6 (L) 8 - 16 mmol/L   Troponin I High Sensitivity    Collection Time: 08/21/23  4:09 AM   Result Value Ref Range    Troponin I High Sensitivity 50.4 (HH) 0.0 - 14.9 pg/mL   Brain Natriuretic Peptide    Collection Time: 08/21/23  4:09 AM   Result Value Ref Range    BNP 1,313 (H) 0 - 99 pg/mL   Magnesium    Collection Time: 08/21/23  4:09 AM   Result Value Ref Range    Magnesium 1.9 1.6 - 2.6 mg/dL   Type & Screen    Collection Time: 08/21/23  4:09 AM   Result Value Ref Range    Group & Rh O POS     Indirect Avni NEG     Specimen Outdate 08/24/2023 23:59    Prepare RBC 2 Units; anemia    Collection Time: 08/21/23  4:09 AM   Result Value Ref Range    UNIT NUMBER [id number]     Product Code L3952A55     DISPENSE STATUS CROSSMATCHED     CODING SYSTEM [id number]     Unit Blood Type Code 5100     Unit Blood Type O POS     Unit Expiration [id number]     CROSSMATCH INTERPRETATION Compatible     UNIT NUMBER [id number]     Product Code B9043M60     DISPENSE STATUS ISSUED     CODING SYSTEM EUFJ464     Unit Blood Type Code 5100     Unit Blood Type O POS     Unit Expiration [id number]     CROSSMATCH INTERPRETATION Compatible    Ferritin    Collection Time: 08/21/23  4:09 AM   Result Value Ref Range    Ferritin 39 20.0 - 300.0 ng/mL   Iron and TIBC    Collection Time: 08/21/23  4:09 AM   Result Value Ref Range    Iron 14 (L) 45 - 160 ug/dL    Transferrin 328 200 - 375 mg/dL    TIBC 459 (H) 250 - 450 ug/dL    Saturated Iron 3 (L) 20 - 50 %   Vitamin B12    Collection Time: 08/21/23  4:09 AM   Result Value Ref Range    Vitamin B-12 1157 (H) 210 - 950 pg/mL   Folate    Collection Time: 08/21/23  4:09 AM   Result Value Ref Range    Folate 5.7 4.0 - 24.0 ng/mL   Protime-INR    Collection Time: 08/21/23  4:09 AM   Result Value Ref Range    Prothrombin Time 10.9 9.0 - 12.5 sec    INR 1.0 0.8 - 1.2    Reticulocytes    Collection Time: 08/21/23  7:45 AM   Result Value Ref Range    Retic 2.1 (H) 0.4 - 2.0 %     Imaging Results              X-Ray Chest AP Portable (Final result)  Result time 08/21/23 07:00:26   Procedure changed from X-Ray Chest 1 View     Final result by Cleveland Conner MD (08/21/23 07:00:26)                   Narrative:    XR CHEST 1 VIEW    Interpretation time/date:  8/21/2023 6:59 AM CDT    History:shortness of breath    Comparison: August 15, 2023    Findings:  One view chest. Cardiac silhouette stable. Interstitial alveolar pulmonary edema persistently seen diffusely. No pneumothorax.    Impression: Stable abnormal chest    Electronically signed by:  Cleveland Conner MD  8/21/2023 7:00 AM CDT Workstation: [id number]                                    12-lead EKG interpretation:  (if applicable)      Current Cardiac Rhythm:   (if applicable)    Physical Exam:   Physical Exam  Vitals and nursing note reviewed.   Constitutional:       General: He is not in acute distress.     Appearance: Normal appearance.   Cardiovascular:      Rate and Rhythm: Normal rate and regular rhythm.      Pulses: Normal pulses.      Heart sounds: Normal heart sounds. No murmur heard.  Pulmonary:      Effort: Pulmonary effort is normal. No respiratory distress.      Breath sounds: Wheezing present.   Musculoskeletal:      Right lower leg: No edema.      Left lower leg: No edema.   Skin:     General: Skin is warm and dry.      Findings: No rash.   Neurological:      Mental Status: He is alert and oriented to person, place, and time.         ASSESSMENT/PLAN:   Assessment:   Acute on Chronic Systolic Heart Failure - BNP 1313, CXR with pulmonary edema. On 2 L NC. On IV lasix.   Anemia - H&H 6/20. Patient denies bleeding. S/p 1 transfusion   CAD s/p PCI - on plavix, atorvastatin at home. Chest pain is atypical, describes it as a sharp pain that occurs at rest. Troponins elevated to 50, trending. Elevation could be due to  anemia and CHF exacerbation.   Hypertension - BP stable, on lisinopril at home.   Hyperlipidemia   Paroxysmal Atrial Fibrillation - tele reviewed, NSR. Eliquis on hold due to anemia.   Tobacco Use     Echo 2/2023 - EF 40-45%, mod to severe MR      Plan:   EKG results pending.  Trend troponins.  Continue IV lasix.   Strict I/Os.   2L fluid restriction.   Hold sotalol due to heart failure exacerbation.   Start lipitor.   Start lisinopril 2.5 mg daily.   Monitor for chest pain.   Monitor tele.     Thank you for your consult.

## 2023-08-21 NOTE — HPI
Patient is a 72-year-old male with CAD, AFib, hypertension, and current tobacco user who presents with shortness of breath.  Shortness for breath began about 1 day prior and has been worsening.  Shortness of breaths encouraged at rest.  Patient had a cardiac stent placed about 16 years ago.  Patient is on Eliquis and Plavix.  Patient was also cardioverted for AFib earlier this year.  Patient smokes about 1 pack per day.  On review of systems, patient is having some mild chest pain that occurs once every hour to.  Located in the left part of the chest.  Does not radiate.  Patient denies fevers, chills, abdominal pain, bright red blood per rectum or melena.  Patient is on 2 L nasal cannula satting 100%. WBC 6.9 and hemoglobin 6.4.  Sodium 131, BUN 20, creatinine 1.2.  Troponin 50.4.  EKG normal sinus rhythm, no ST elevations.  Mild ST depression in V5.

## 2023-08-22 ENCOUNTER — ANESTHESIA (OUTPATIENT)
Dept: SURGERY | Facility: HOSPITAL | Age: 73
DRG: 291 | End: 2023-08-22
Payer: MEDICARE

## 2023-08-22 ENCOUNTER — ANESTHESIA EVENT (OUTPATIENT)
Dept: SURGERY | Facility: HOSPITAL | Age: 73
DRG: 291 | End: 2023-08-22
Payer: MEDICARE

## 2023-08-22 PROBLEM — I95.9 HYPOTENSION: Status: ACTIVE | Noted: 2023-08-22

## 2023-08-22 LAB
ANION GAP SERPL CALC-SCNC: 5 MMOL/L (ref 8–16)
BASOPHILS # BLD AUTO: 0.07 K/UL (ref 0–0.2)
BASOPHILS NFR BLD: 0.9 % (ref 0–1.9)
BUN SERPL-MCNC: 21 MG/DL (ref 8–23)
CALCIUM SERPL-MCNC: 8.1 MG/DL (ref 8.7–10.5)
CHLORIDE SERPL-SCNC: 98 MMOL/L (ref 95–110)
CO2 SERPL-SCNC: 27 MMOL/L (ref 23–29)
CREAT SERPL-MCNC: 1.2 MG/DL (ref 0.5–1.4)
DIFFERENTIAL METHOD: ABNORMAL
EOSINOPHIL # BLD AUTO: 0.1 K/UL (ref 0–0.5)
EOSINOPHIL NFR BLD: 0.6 % (ref 0–8)
ERYTHROCYTE [DISTWIDTH] IN BLOOD BY AUTOMATED COUNT: 16.9 % (ref 11.5–14.5)
ERYTHROCYTE [DISTWIDTH] IN BLOOD BY AUTOMATED COUNT: 17.1 % (ref 11.5–14.5)
EST. GFR  (NO RACE VARIABLE): >60 ML/MIN/1.73 M^2
GIANT PLATELETS BLD QL SMEAR: PRESENT
GLUCOSE SERPL-MCNC: 103 MG/DL (ref 70–110)
HBA1C MFR BLD: 5.4 % (ref 4.8–5.6)
HCT VFR BLD AUTO: 23.3 % (ref 40–54)
HCT VFR BLD AUTO: 25 % (ref 40–54)
HGB BLD-MCNC: 7.5 G/DL (ref 14–18)
HGB BLD-MCNC: 8 G/DL (ref 14–18)
HGB BLD-MCNC: 8.1 G/DL (ref 14–18)
IMM GRANULOCYTES # BLD AUTO: 0.05 K/UL (ref 0–0.04)
IMM GRANULOCYTES NFR BLD AUTO: 0.6 % (ref 0–0.5)
LYMPHOCYTES # BLD AUTO: 1 K/UL (ref 1–4.8)
LYMPHOCYTES NFR BLD: 12.1 % (ref 18–48)
MAGNESIUM SERPL-MCNC: 2 MG/DL (ref 1.6–2.6)
MCH RBC QN AUTO: 26.7 PG (ref 27–31)
MCH RBC QN AUTO: 26.8 PG (ref 27–31)
MCHC RBC AUTO-ENTMCNC: 32.2 G/DL (ref 32–36)
MCHC RBC AUTO-ENTMCNC: 32.4 G/DL (ref 32–36)
MCV RBC AUTO: 83 FL (ref 82–98)
MCV RBC AUTO: 83 FL (ref 82–98)
MONOCYTES # BLD AUTO: 0.5 K/UL (ref 0.3–1)
MONOCYTES NFR BLD: 5.6 % (ref 4–15)
NEUTROPHILS # BLD AUTO: 6.4 K/UL (ref 1.8–7.7)
NEUTROPHILS NFR BLD: 80.2 % (ref 38–73)
NRBC BLD-RTO: 0 /100 WBC
PLATELET # BLD AUTO: 344 K/UL (ref 150–450)
PLATELET # BLD AUTO: 348 K/UL (ref 150–450)
PLATELET BLD QL SMEAR: ABNORMAL
PMV BLD AUTO: 9.3 FL (ref 9.2–12.9)
PMV BLD AUTO: 9.6 FL (ref 9.2–12.9)
POTASSIUM SERPL-SCNC: 4.1 MMOL/L (ref 3.5–5.1)
RBC # BLD AUTO: 2.8 M/UL (ref 4.6–6.2)
RBC # BLD AUTO: 3.03 M/UL (ref 4.6–6.2)
SODIUM SERPL-SCNC: 130 MMOL/L (ref 136–145)
WBC # BLD AUTO: 7.64 K/UL (ref 3.9–12.7)
WBC # BLD AUTO: 8.02 K/UL (ref 3.9–12.7)

## 2023-08-22 PROCEDURE — 94640 AIRWAY INHALATION TREATMENT: CPT

## 2023-08-22 PROCEDURE — 85018 HEMOGLOBIN: CPT | Performed by: INTERNAL MEDICINE

## 2023-08-22 PROCEDURE — 63600175 PHARM REV CODE 636 W HCPCS: Performed by: NURSE ANESTHETIST, CERTIFIED REGISTERED

## 2023-08-22 PROCEDURE — D9220A PRA ANESTHESIA: Mod: ANES,,, | Performed by: ANESTHESIOLOGY

## 2023-08-22 PROCEDURE — 37000008 HC ANESTHESIA 1ST 15 MINUTES: Performed by: INTERNAL MEDICINE

## 2023-08-22 PROCEDURE — 25000003 PHARM REV CODE 250: Performed by: INTERNAL MEDICINE

## 2023-08-22 PROCEDURE — C9113 INJ PANTOPRAZOLE SODIUM, VIA: HCPCS | Performed by: STUDENT IN AN ORGANIZED HEALTH CARE EDUCATION/TRAINING PROGRAM

## 2023-08-22 PROCEDURE — 25000003 PHARM REV CODE 250: Performed by: STUDENT IN AN ORGANIZED HEALTH CARE EDUCATION/TRAINING PROGRAM

## 2023-08-22 PROCEDURE — 83735 ASSAY OF MAGNESIUM: CPT | Performed by: STUDENT IN AN ORGANIZED HEALTH CARE EDUCATION/TRAINING PROGRAM

## 2023-08-22 PROCEDURE — D9220A PRA ANESTHESIA: ICD-10-PCS | Mod: ANES,,, | Performed by: ANESTHESIOLOGY

## 2023-08-22 PROCEDURE — 36415 COLL VENOUS BLD VENIPUNCTURE: CPT | Performed by: STUDENT IN AN ORGANIZED HEALTH CARE EDUCATION/TRAINING PROGRAM

## 2023-08-22 PROCEDURE — 63600175 PHARM REV CODE 636 W HCPCS: Performed by: STUDENT IN AN ORGANIZED HEALTH CARE EDUCATION/TRAINING PROGRAM

## 2023-08-22 PROCEDURE — 63600175 PHARM REV CODE 636 W HCPCS: Performed by: INTERNAL MEDICINE

## 2023-08-22 PROCEDURE — 94761 N-INVAS EAR/PLS OXIMETRY MLT: CPT

## 2023-08-22 PROCEDURE — 37000009 HC ANESTHESIA EA ADD 15 MINS: Performed by: INTERNAL MEDICINE

## 2023-08-22 PROCEDURE — D9220A PRA ANESTHESIA: Mod: CRNA,,, | Performed by: NURSE ANESTHETIST, CERTIFIED REGISTERED

## 2023-08-22 PROCEDURE — D9220A PRA ANESTHESIA: ICD-10-PCS | Mod: CRNA,,, | Performed by: NURSE ANESTHETIST, CERTIFIED REGISTERED

## 2023-08-22 PROCEDURE — 27000221 HC OXYGEN, UP TO 24 HOURS

## 2023-08-22 PROCEDURE — 94799 UNLISTED PULMONARY SVC/PX: CPT

## 2023-08-22 PROCEDURE — 99900035 HC TECH TIME PER 15 MIN (STAT)

## 2023-08-22 PROCEDURE — 85027 COMPLETE CBC AUTOMATED: CPT | Performed by: STUDENT IN AN ORGANIZED HEALTH CARE EDUCATION/TRAINING PROGRAM

## 2023-08-22 PROCEDURE — 99900031 HC PATIENT EDUCATION (STAT)

## 2023-08-22 PROCEDURE — 21000000 HC CCU ICU ROOM CHARGE

## 2023-08-22 PROCEDURE — 43235 EGD DIAGNOSTIC BRUSH WASH: CPT | Performed by: INTERNAL MEDICINE

## 2023-08-22 PROCEDURE — 36415 COLL VENOUS BLD VENIPUNCTURE: CPT | Performed by: INTERNAL MEDICINE

## 2023-08-22 PROCEDURE — 80048 BASIC METABOLIC PNL TOTAL CA: CPT | Performed by: STUDENT IN AN ORGANIZED HEALTH CARE EDUCATION/TRAINING PROGRAM

## 2023-08-22 PROCEDURE — 25000242 PHARM REV CODE 250 ALT 637 W/ HCPCS: Performed by: STUDENT IN AN ORGANIZED HEALTH CARE EDUCATION/TRAINING PROGRAM

## 2023-08-22 RX ORDER — PROPOFOL 10 MG/ML
VIAL (ML) INTRAVENOUS
Status: DISCONTINUED | OUTPATIENT
Start: 2023-08-22 | End: 2023-08-22

## 2023-08-22 RX ORDER — PHENYLEPHRINE HYDROCHLORIDE 10 MG/ML
INJECTION INTRAVENOUS
Status: DISCONTINUED | OUTPATIENT
Start: 2023-08-22 | End: 2023-08-22

## 2023-08-22 RX ADMIN — IPRATROPIUM BROMIDE 0.5 MG: 0.5 SOLUTION RESPIRATORY (INHALATION) at 12:08

## 2023-08-22 RX ADMIN — PANTOPRAZOLE SODIUM 40 MG: 40 INJECTION, POWDER, LYOPHILIZED, FOR SOLUTION INTRAVENOUS at 08:08

## 2023-08-22 RX ADMIN — FUROSEMIDE 40 MG: 20 INJECTION, SOLUTION INTRAMUSCULAR; INTRAVENOUS at 08:08

## 2023-08-22 RX ADMIN — MUPIROCIN 1 G: 20 OINTMENT TOPICAL at 08:08

## 2023-08-22 RX ADMIN — SODIUM CHLORIDE, SODIUM LACTATE, POTASSIUM CHLORIDE, AND CALCIUM CHLORIDE: .6; .31; .03; .02 INJECTION, SOLUTION INTRAVENOUS at 01:08

## 2023-08-22 RX ADMIN — PROPOFOL 80 MG: 10 INJECTION, EMULSION INTRAVENOUS at 01:08

## 2023-08-22 RX ADMIN — ARFORMOTEROL TARTRATE 15 MCG: 15 SOLUTION RESPIRATORY (INHALATION) at 08:08

## 2023-08-22 RX ADMIN — DEXTROSE MONOHYDRATE 7 MG/HR: 50 INJECTION, SOLUTION INTRAVENOUS at 08:08

## 2023-08-22 RX ADMIN — IPRATROPIUM BROMIDE 0.5 MG: 0.5 SOLUTION RESPIRATORY (INHALATION) at 08:08

## 2023-08-22 RX ADMIN — PHENYLEPHRINE HYDROCHLORIDE 100 MCG: 10 INJECTION INTRAVENOUS at 01:08

## 2023-08-22 RX ADMIN — IPRATROPIUM BROMIDE 0.5 MG: 0.5 SOLUTION RESPIRATORY (INHALATION) at 02:08

## 2023-08-22 NOTE — [employer] PATIENT INSTRUCTIONS
Discharge Summary/Instructions after an Endoscopic Procedure  Patient Name: Sid Cuello  Patient MRN: [id number]  Patient YOB: 1950  Tuesday, August 22, 2023  Doc Merritt III, MD  RESTRICTIONS:  During your procedure today, you received medications for sedation.  These   medications may affect your judgment, balance and coordination.  Therefore,   for 24 hours, you have the following restrictions:   - DO NOT drive a car, operate machinery, make legal/financial decisions,   sign important papers or drink alcohol.    ACTIVITY:  Today: no heavy lifting, straining or running due to procedural   sedation/anesthesia.  The following day: return to full activity including work.  DIET:  Eat and drink normally unless instructed otherwise.     TREATMENT FOR COMMON SIDE EFFECTS:  - Mild abdominal pain, nausea, belching, bloating or excessive gas:  rest,   eat lightly and use a heating pad.  - Sore Throat: treat with throat lozenges and/or gargle with warm salt   water.  - Because air was used during the procedure, expelling large amounts of air   from your rectum or belching is normal.  - If a bowel prep was taken, you may not have a bowel movement for 1-3 days.    This is normal.  SYMPTOMS TO WATCH FOR AND REPORT TO YOUR PHYSICIAN:  1. Abdominal pain or bloating, other than gas cramps.  2. Chest pain.  3. Back pain.  4. Signs of infection such as: chills or fever occurring within 24 hours   after the procedure.  5. Rectal bleeding, which would show as bright red, maroon, or black stools.   (A tablespoon of blood from the rectum is not serious, especially if   hemorrhoids are present.)  6. Vomiting.  7. Weakness or dizziness.  GO DIRECTLY TO THE NEAREST EMERGENCY ROOM IF YOU HAVE ANY OF THE FOLLOWING:      Difficulty breathing              Chills and/or fever over 101 F   Persistent vomiting and/or vomiting blood   Severe abdominal pain   Severe chest pain   Black, tarry stools   Bleeding- more than one  tablespoon   Any other symptom or condition that you feel may need urgent attention  Your doctor recommends these additional instructions:  If any biopsies were taken, your doctors clinic will contact you in 1 to 2   weeks with any results.  - Return patient to hospital [facility] for ongoing care.   - Switch plavix to ASA 81 indefinitely as last PCI >15 years ago and Cards   approved. Will set up for outpatient colon next week off anticoagulation so   that biopsies, polypectomies etc can be done. If any signs of ongoing   bleeding, will do as inpatient. Plan d/w'd patient / family / Cards /   [place]  For questions, problems or results please call your physician - Doc Merritt III, MD at Work:  [phone].  [facility], EMERGENCY ROOM PHONE NUMBER: [phone]  IF A COMPLICATION OR EMERGENCY SITUATION ARISES AND YOU ARE UNABLE TO REACH   YOUR PHYSICIAN - GO DIRECTLY TO THE EMERGENCY ROOM.  Doc Merritt III, MD  8/22/2023 1:57:57 PM  This report has been verified and signed electronically.  Dear patient,  As a result of recent federal legislation (The Federal Cures Act), you may   receive lab or pathology results from your procedure in your [facility]   account before your physician is able to contact you. Your physician or   their representative will relay the results to you with their   recommendations at their soonest availability.  Thank you,  [employer]ATION

## 2023-08-22 NOTE — ANESTHESIA POSTPROCEDURE EVALUATION
Anesthesia Post Evaluation    Patient: Sid Cuello JrHeather    Procedure(s) Performed: Procedure(s) (LRB):  ENTEROSCOPY (N/A)    Final Anesthesia Type: general      Patient location during evaluation: ICU  Patient participation: Yes- Able to Participate  Level of consciousness: awake and alert and oriented  Post-procedure vital signs: reviewed and stable  Pain management: adequate  Airway patency: patent    PONV status at discharge: No PONV  Anesthetic complications: no      Cardiovascular status: blood pressure returned to baseline, hemodynamically stable and stable  Respiratory status: unassisted, spontaneous ventilation and room air  Hydration status: euvolemic  Follow-up not needed.          Vitals Value Taken Time   /51 08/22/23 1516   Temp 97.7 08/22/23 1518   Pulse 92 08/22/23 1515   Resp 26 08/22/23 1515   SpO2 95 % 08/22/23 1515   Vitals shown include unvalidated device data.      No case tracking events are documented in the log.      Pain/Eva Score: No data recorded

## 2023-08-22 NOTE — CONSULTS
"[facility] hospital  Adult Nutrition   Consult Note (Initial Assessment)     SUMMARY     Recommendations  Recommendation/Intervention:   1.) When medically appropriate, advance diet to low sodium diet with fluids recommendations per MD.     2.)  If unable to start diet within 48 hrs consider nutrition support.   3.) RD to monitor and provide recomemndations PRN.  Goals: Advance diet within 48 hrs.  Nutrition Goal Status: progressing towards goal    Dietitian Rounds Brief  Consult for education. Not appropriate for education at this time. Pt remains NPO at this time s/p procedure. Pt admitted with SOB. Will follow and provide education when appropriate.     Diet order:   Current Diet Order: NPO- pt just had procedure done                 Evaluation of Received Nutrient/Fluid Intake  Energy Calories Required: not meeting needs  Protein Required: not meeting needs  Fluid Required: not meeting needs  Comments: Pt admitted with SOB, currently NPO for procedure.     % Intake of Estimated Energy Needs: 0 - 25 %  % Meal Intake: 0 - 25 %      Intake/Output Summary (Last 24 hours) at 8/22/2023 1447  Last data filed at 8/22/2023 1353  Gross per 24 hour   Intake 99.93 ml   Output 850 ml   Net -750.07 ml        Anthropometrics  Temp: 98.2 °F (36.8 °C)  Height Method: Stated  Height: 5' 9" (175.3 cm)  Height (inches): 69 in  Weight Method: Bed Scale  Weight: 64.8 kg (142 lb 13.7 oz)  Weight (lb): 142.86 lb  Ideal Body Weight (IBW), Male: 160 lb  % Ideal Body Weight, Male (lb): 89.15 %  BMI (Calculated): 21.1  BMI Grade: 18.5-24.9 - normal       Estimated/Assessed Needs  Weight Used For Calorie Calculations: 64.8 kg (142 lb 13.7 oz)  Energy Calorie Requirements (kcal): 1543-4077 (25-30 kcal/ kg bw)  Energy Need Method: Kcal/kg  Protein Requirements: 64-78 (1.0-1.2g/ kg bw)  Weight Used For Protein Calculations: 64.8 kg (142 lb 13.7 oz)     Estimated Fluid Requirement Method: RDA Method  RDA Method (mL): 1620       Reason for " Assessment  Reason For Assessment: consult  Relevant Medical History: CAD, AFib, hypertension, and current tobacco user who presents with shortness of breath  Interdisciplinary Rounds: did not attend    Nutrition/Diet History  Patient Reported Diet/Restrictions/Preferences: general  Food Allergies: NKFA  Factors Affecting Nutritional Intake: NPO    Nutrition Risk Screen  Nutrition Risk Screen: no indicators present     MST Score: 0  Have you recently lost weight without trying?: No  Weight loss score: 0  Have you been eating poorly because of a decreased appetite?: No  Appetite score: 0       Weight History:  Wt Readings from Last 10 Encounters:   08/22/23 64.8 kg (142 lb 13.7 oz)   08/21/23 64 kg (141 lb 1.5 oz)   08/15/23 64 kg (141 lb)   07/31/23 64.4 kg (142 lb)   06/06/23 64.5 kg (142 lb 4.8 oz)   04/19/23 64 kg (141 lb)   04/10/23 64 kg (141 lb)   03/02/23 65.5 kg (144 lb 6.4 oz)   02/16/23 67 kg (147 lb 11.3 oz)   02/15/23 68.9 kg (152 lb)        Lab/Procedures/Meds: Pertinent Labs/Meds Reviewed    Medications:Pertinent Medications Reviewed  Scheduled Meds:   arformoteroL  15 mcg Nebulization BID    atorvastatin  10 mg Oral Daily    folic acid  1 mg Oral Daily    furosemide (LASIX) injection  40 mg Intravenous Daily    hydrOXYchloroQUINE  200 mg Oral BID    ipratropium  0.5 mg Nebulization Q6H    lisinopriL  2.5 mg Oral Daily    mupirocin   Nasal BID    pantoprazole  40 mg Intravenous BID     Continuous Infusions:   dilTIAZem 7 mg/hr (08/22/23 0857)     PRN Meds:.0.9%  NaCl infusion (for blood administration), acetaminophen, albuterol-ipratropium, HYDROcodone-acetaminophen, metoprolol, ondansetron, sodium chloride 0.9%    Labs: Pertinent Labs Reviewed  Clinical Chemistry:  Recent Labs   Lab 08/21/23  0409 08/22/23  0354   * 130*   K 4.9 4.1    98   CO2 24 27   * 103   BUN 20 21   CREATININE 1.2 1.2   CALCIUM 8.3* 8.1*   PROT 9.3*  --    ALBUMIN 3.2*  --    BILITOT 0.8  --    ALKPHOS 100  --  "   AST 31  --    ALT 13  --    ANIONGAP 6* 5*   MG 1.9 2.0     CBC:   Recent Labs   Lab 08/22/23  0354   WBC 7.64   RBC 2.80*   HGB 7.5*   HCT 23.3*      MCV 83   MCH 26.8*   MCHC 32.2     Lipid Panel:  Recent Labs   Lab 08/21/23  0409   CHOL 108*   HDL 51   LDLCALC 50.8*   TRIG 31   CHOLHDL 47.2     Cardiac Profile:  Recent Labs   Lab 08/21/23  0409   BNP 1,313*     Inflammatory Labs:  No results for input(s): "CRP" in the last 168 hours.  Diabetes:  No results for input(s): "HGBA1C", "POCTGLUCOSE" in the last 168 hours.  Thyroid & Parathyroid:  No results for input(s): "TSH", "FREET4", "C8CVGYR", "E4EEUTT", "THYROIDAB" in the last 168 hours.    Monitor and Evaluation  Food and Nutrient Intake: energy intake, food and beverage intake  Food and Nutrient Adminstration: diet order  Knowledge/Beliefs/Attitudes: food and nutrition knowledge/skill  Anthropometric Measurements: height/length, weight, weight change  Nutrition-Focused Physical Findings: overall appearance     Nutrition Risk  Level of Risk/Frequency of Follow-up: high     Nutrition Follow-Up  RD Follow-up?: Yes      Betzaida White, RONAN 08/22/2023 2:47 PM      "

## 2023-08-22 NOTE — PLAN OF CARE
[facility]  Initial Discharge Assessment       Primary Care Provider: Yan Posada MD    Admission Diagnosis: Anemia, unspecified type [D64.9]    Admission Date: 8/21/2023  Expected Discharge Date: 8/23/2023    Transition of Care Barriers: None     met with Pt's wife at bedside to complete discharge assessment. Pt was sleeping. Demographics, PCP, and insurance verified. No home health. No dialysis. Pt's wife reports his ability to complete ADLs without assistance. Pt's wife verbalized plan to bring Pt home at discharge. Pt has no other needs to be addressed at this time. CM will continue to follow.     Payor: MEDICARE / Plan: MEDICARE PART A & B / Product Type: Government /     Extended Emergency Contact Information  Primary Emergency Contact: Jovana Cuello  Address: [address]           [place] [address] [facility]  Home Phone: [phone]  Mobile Phone: [phone]  Relation: Spouse  Preferred language: English   needed? No    Discharge Plan A: Home with family  Discharge Plan B: Home with family      [id number] - [place] - [address] [facility]  [address]  [place] [address]  Phone: [phone] Fax: [phone]    [facility] - [facility] [address]  [address] [address]  Phone: [phone] Fax: [phone]      Initial Assessment (most recent)       Adult Discharge Assessment - 08/22/23 1346          Discharge Assessment    Assessment Type Discharge Planning Assessment     Confirmed/corrected address, phone number and insurance Yes     Confirmed Demographics Correct on Facesheet     Source of Information family     Communicated KALINA with patient/caregiver Date not available/Unable to determine     Reason For Admission Acute on chronic systolic congestive heart failure     People in Home spouse     Facility Arrived From: home     Do  you expect to return to your current living situation? Yes     Do you have help at home or someone to help you manage your care at home? No     Prior to hospitilization cognitive status: Unable to Assess     Current cognitive status: Unable to Assess     Equipment Currently Used at Home none     Readmission within 30 days? No     Patient currently being followed by outpatient case management? No     Do you currently have service(s) that help you manage your care at home? No     Do you take prescription medications? Yes     Do you have prescription coverage? Yes     Coverage MEDICARE - MEDICARE PART A & B     Do you have any problems affording any of your prescribed medications? No     Is the patient taking medications as prescribed? yes     Who is going to help you get home at discharge? Jovana Cuello (Spouse)   [phone] (Home Phone)     How do you get to doctors appointments? car, drives self     Are you on dialysis? No     Do you take coumadin? No   plavix    DME Needed Upon Discharge  none     Discharge Plan discussed with: Spouse/sig other     Name(s) and Number(s) Jovana Cuello (Spouse)   [phone] (Home Phone)     Transition of Care Barriers None     Discharge Plan A Home with family     Discharge Plan B Home with family

## 2023-08-22 NOTE — RESPIRATORY THERAPY
08/22/23 0053   Patient Assessment/Suction   Level of Consciousness (AVPU) responds to voice   Respiratory Effort Unlabored   Expansion/Accessory Muscles/Retractions no use of accessory muscles   All Lung Fields Breath Sounds clear;diminished   PRE-TX-O2   Device (Oxygen Therapy) nasal cannula   $ Is the patient on Low Flow Oxygen? Yes   Flow (L/min) 5   SpO2 96 %   Pulse Oximetry Type Continuous   $ Pulse Oximetry - Multiple Charge Pulse Oximetry - Multiple   Pulse (!) 128   Resp (!) 25   Aerosol Therapy   $ Aerosol Therapy Charges Aerosol Treatment   Daily Review of Necessity (SVN) completed   Respiratory Treatment Status (SVN) given   Treatment Route (SVN) mouthpiece;air   Patient Position (SVN) semi-Dale's   Post Treatment Assessment (SVN) breath sounds unchanged   Signs of Intolerance (SVN) none   Breath Sounds Post-Respiratory Treatment   Throughout All Fields Post-Treatment All Fields   Throughout All Fields Post-Treatment no change   Post-treatment Heart Rate (beats/min) 120   Post-treatment Resp Rate (breaths/min) 18   Education   $ Education Bronchodilator;15 min   Respiratory Evaluation   $ Care Plan Tech Time 15 min   $ Eval/Re-eval Charges Re-evaluation

## 2023-08-22 NOTE — PLAN OF CARE
Plan of care reviewed with patient and family. Patient and family verbalized understanding. No questions at this time. VSS. No reports of pain. Awaiting bed for transfer to lower acuity.   Problem: Fall Injury Risk  Goal: Absence of Fall and Fall-Related Injury  Outcome: Ongoing, Progressing  Intervention: Identify and Manage Contributors  Flowsheets (Taken 8/22/2023 1708)  Self-Care Promotion: independence encouraged  Medication Review/Management:   medications reviewed   infusion titrated  Intervention: Promote Injury-Free Environment  Flowsheets (Taken 8/22/2023 1708)  Safety Promotion/Fall Prevention:   side rails raised x 2   Fall Risk reviewed with patient/family   family to remain at bedside     Problem: Dysrhythmia  Goal: Normalized Cardiac Rhythm  Outcome: Ongoing, Progressing  Intervention: Monitor and Manage Cardiac Rhythm Effect  Flowsheets (Taken 8/22/2023 1708)  Dysrhythmia Management: (Cardizem drip titrated down during shift) other (see comments)

## 2023-08-22 NOTE — PROGRESS NOTES
[facility]   Cardiology Note    Consult Requested By: hospital medicine  Reason for Consult: CHF    SUBJECTIVE:     History of Present Illness: Patient is a 73 yo male with PMHx of CAD s/p PCI, paroxysmal atrial fibrillation s/p cardioversion on 4/10/2023, HTN, HLP, and tobacco use who presented to the ED with worsening shortness of breath. He states his symptoms started a few days ago and gradually worsened. Reports he could not walk to his mailbox without experiencing dyspnea which he was able to do no problem previously. Her reports associated orthopnea. He does report a sharp pain on the left side of his chest that occurs intermittently throughout the day. It typically lasts a few seconds. Denies aggravating/alleviating factors. It is not associated with exertion. Denies radiation. He denies fevers, chills, edema, dizziness, palpitations, N/V, or cough. ED workup showed troponin elevation to 50, trending. BNP 1313. CXR with pulmonary edema. I cannot see results for EKG. H&H 6.4/20.9. Iron 14, saturated iron 3, ferritin normal. He denies recent bleeding. Cr 1.2. He was started on Iv lasix. Currently receiving 1 unit RBCs. ON 2 L NC. BP stable. Tele reviewed and shows NSR.     8/22: Patient went into atrial fibrillation yesterday afternoon. He was given metoprolol IV with no improvement of heart rates. He was then started on a cardizem gtt. BP ranging from 90s-100s systolic. Tele shows afib rate controlled in the 80s. Echo showed EF 40%, mild to mod MR, elevated venous pressure, ascites present. Labs reviewed. Troponins elevated to 697. EKG with no acute ST segment changes. Chest pain is atypical. H&H 7.5/23.3 s/p transfusion. GI consulted, plan for endoscopy today and outpatient colonoscopy. UOP 2050 cc.     Review of patient's allergies indicates:  No Known Allergies    Past Medical History:   Diagnosis Date    Abnormal SPEP 4/29/2021    Coronary artery disease 2007    MI   1 c. 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00     Average packs/day: 1 pack/day for 45.6 years (45.6 ttl pk-yrs)     Types: Cigarettes     Start date: 1978    Smokeless tobacco: Never   Substance Use Topics    Alcohol use: Yes     Alcohol/week: 14.0 standard drinks of alcohol     Types: 14 Shots of liquor per week     Comment: 2 daily but has not drank in 4 days    Drug use: Never       Review of Systems:  Review of Systems   Constitutional:  Negative for chills and fever.   Respiratory:  Positive for shortness of breath. Negative for cough.    Cardiovascular:  Positive for chest pain and orthopnea. Negative for palpitations and leg swelling.   Gastrointestinal:  Negative for abdominal pain, nausea and vomiting.   Neurological:  Negative for dizziness.   All other systems reviewed and are negative.      OBJECTIVE:     Vital Signs (Most Recent)  Temp: 98.2 °F (36.8 °C) (08/22/23 0330)  Pulse: 82 (08/22/23 0818)  Resp: 18 (08/22/23 0818)  BP: 101/68  (08/22/23 0615)  SpO2: (!) 92 % (08/22/23 0818)    Vital Signs Range (Last 24H):  Temp:  [97.8 °F (36.6 °C)-99.9 °F (37.7 °C)]   Pulse:  []   Resp:  [15-38]   BP: ()/(56-87)   SpO2:  [79 %-100 %]     I & O (Last 24H):    Intake/Output Summary (Last 24 hours) at 8/22/2023 0823  Last data filed at 8/22/2023 0555  Gross per 24 hour   Intake 586.18 ml   Output 2050 ml   Net -1463.82 ml         Current Diet:     Current Diet Order   Procedures    Diet NPO        Allergies:  Review of patient's allergies indicates:  No Known Allergies    Meds:  Scheduled Meds:   arformoteroL  15 mcg Nebulization BID    atorvastatin  10 mg Oral Daily    folic acid  1 mg Oral Daily    furosemide (LASIX) injection  40 mg Intravenous Daily    hydrOXYchloroQUINE  200 mg Oral BID    ipratropium  0.5 mg Nebulization Q6H    lisinopriL  2.5 mg Oral Daily    mupirocin   Nasal BID    pantoprazole  40 mg Intravenous BID     Continuous Infusions:   dilTIAZem 9 mg/hr (08/22/23 0555)     PRN Meds:0.9%  NaCl infusion (for blood administration), acetaminophen, albuterol-ipratropium, HYDROcodone-acetaminophen, metoprolol, ondansetron, sodium chloride 0.9%    Oxygen/Ventilator Data (Last 24H):  (if applicable)            Hemodynamic Parameters (Last 24H):   (if applicable)        Laboratory and Radiology Data:  Recent Results (from the past 24 hour(s))   Echo    Collection Time: 08/21/23 11:34 AM   Result Value Ref Range    LVIDd 5.80 3.5 - 6.0 cm    LV Systolic Volume 85.40 mL    LVIDs 4.35 (A) 2.1 - 4.0 cm    LV Diastolic Volume 167.00 mL    IVS 1.01 0.6 - 1.1 cm    FS 25 (A) 28 - 44 %    Left Ventricle Relative Wall Thickness 0.36 cm    Posterior Wall 1.04 0.6 - 1.1 cm    LV mass 240.74 g    MV Peak E Omid 1.26 m/s    TDI LATERAL 0.11 m/s    TDI SEPTAL 0.07 m/s    E/E' ratio 14.00 m/s    MV Peak A Omid 0.50 m/s    TR Max Omid 3.42 m/s    E/A ratio 2.52     E wave deceleration time 176.00 msec    LV SEPTAL E/E' RATIO 18.00 m/s    LV LATERAL E/E'  RATIO 11.45 m/s    Triscuspid Valve Regurgitation Peak Gradient 47 mmHg    IVC diameter 2.21 cm    Mean e' 0.09 m/s    BSA 1.77 m2    LV Systolic Volume Index 48.0 mL/m2    LV Diastolic Volume Index 93.82 mL/m2    LV Mass Index 135 g/m2    ZLVIDS 2.83     ZLVIDD 1.65     TV resting pulmonary artery pressure 62 mmHg    RV TB RVSP 18 mmHg    Est. RA pres 15 mmHg   TROPONIN I HIGH SENSITIVITY    Collection Time: 08/21/23 12:13 PM   Result Value Ref Range    Troponin I High Sensitivity 289.0 (HH) 0.0 - 14.9 pg/mL   TROPONIN I HIGH SENSITIVITY    Collection Time: 08/21/23  6:02 PM   Result Value Ref Range    Troponin I High Sensitivity 697.8 (HH) 0.0 - 14.9 pg/mL   CBC auto differential    Collection Time: 08/21/23  6:02 PM   Result Value Ref Range    WBC 8.02 3.90 - 12.70 K/uL    RBC 3.03 (L) 4.60 - 6.20 M/uL    Hemoglobin 8.1 (L) 14.0 - 18.0 g/dL    Hematocrit 25.0 (L) 40.0 - 54.0 %    MCV 83 82 - 98 fL    MCH 26.7 (L) 27.0 - 31.0 pg    MCHC 32.4 32.0 - 36.0 g/dL    RDW 17.1 (H) 11.5 - 14.5 %    Platelets 348 150 - 450 K/uL    MPV 9.3 9.2 - 12.9 fL    Immature Granulocytes 0.6 (H) 0.0 - 0.5 %    Gran # (ANC) 6.4 1.8 - 7.7 K/uL    Immature Grans (Abs) 0.05 (H) 0.00 - 0.04 K/uL    Lymph # 1.0 1.0 - 4.8 K/uL    Mono # 0.5 0.3 - 1.0 K/uL    Eos # 0.1 0.0 - 0.5 K/uL    Baso # 0.07 0.00 - 0.20 K/uL    nRBC 0 0 /100 WBC    Gran % 80.2 (H) 38.0 - 73.0 %    Lymph % 12.1 (L) 18.0 - 48.0 %    Mono % 5.6 4.0 - 15.0 %    Eosinophil % 0.6 0.0 - 8.0 %    Basophil % 0.9 0.0 - 1.9 %    Platelet Estimate Appears normal     Large/Giant Platelets Present     Differential Method Automated    CBC Without Differential    Collection Time: 08/22/23  3:54 AM   Result Value Ref Range    WBC 7.64 3.90 - 12.70 K/uL    RBC 2.80 (L) 4.60 - 6.20 M/uL    Hemoglobin 7.5 (L) 14.0 - 18.0 g/dL    Hematocrit 23.3 (L) 40.0 - 54.0 %    MCV 83 82 - 98 fL    MCH 26.8 (L) 27.0 - 31.0 pg    MCHC 32.2 32.0 - 36.0 g/dL    RDW 16.9 (H) 11.5 - 14.5 %    Platelets  344 150 - 450 K/uL    MPV 9.6 9.2 - 12.9 fL   Basic Metabolic Panel    Collection Time: 08/22/23  3:54 AM   Result Value Ref Range    Sodium 130 (L) 136 - 145 mmol/L    Potassium 4.1 3.5 - 5.1 mmol/L    Chloride 98 95 - 110 mmol/L    CO2 27 23 - 29 mmol/L    Glucose 103 70 - 110 mg/dL    BUN 21 8 - 23 mg/dL    Creatinine 1.2 0.5 - 1.4 mg/dL    Calcium 8.1 (L) 8.7 - 10.5 mg/dL    Anion Gap 5 (L) 8 - 16 mmol/L    eGFR >60.0 >60 mL/min/1.73 m^2   Magnesium    Collection Time: 08/22/23  3:54 AM   Result Value Ref Range    Magnesium 2.0 1.6 - 2.6 mg/dL     Imaging Results              X-Ray Chest AP Portable (Final result)  Result time 08/21/23 07:00:26   Procedure changed from X-Ray Chest 1 View     Final result by Cleveland Conner MD (08/21/23 07:00:26)                   Narrative:    XR CHEST 1 VIEW    Interpretation time/date:  8/21/2023 6:59 AM CDT    History:shortness of breath    Comparison: August 15, 2023    Findings:  One view chest. Cardiac silhouette stable. Interstitial alveolar pulmonary edema persistently seen diffusely. No pneumothorax.    Impression: Stable abnormal chest    Electronically signed by:  Cleveland Conner MD  8/21/2023 7:00 AM CDT Workstation: [id number]                                    12-lead EKG interpretation:  (if applicable)      Current Cardiac Rhythm:   (if applicable)    Physical Exam:   Physical Exam  Vitals and nursing note reviewed.   Constitutional:       General: He is not in acute distress.     Appearance: Normal appearance.   Cardiovascular:      Rate and Rhythm: Normal rate. Rhythm irregular.      Pulses: Normal pulses.      Heart sounds: Normal heart sounds. No murmur heard.  Pulmonary:      Effort: Pulmonary effort is normal. No respiratory distress.      Breath sounds: Wheezing present.   Musculoskeletal:      Right lower leg: No edema.      Left lower leg: No edema.   Skin:     General: Skin is warm and dry.      Findings: No rash.   Neurological:      Mental Status: He is  alert and oriented to person, place, and time.         ASSESSMENT/PLAN:   Assessment:   Acute on Chronic Systolic Heart Failure - BNP 1313, CXR with pulmonary edema. On 2 L NC. On IV lasix.   Anemia - H&H 6/20. Patient denies bleeding. S/p 1 transfusion. H&H now 7.5/23.3. GI consulted, plan for endoscopy today. Eliquis on hold.  CAD s/p PCI - plavix on hold, atorvastatin at home. Chest pain is atypical, describes it as a sharp pain that occurs at rest. Troponins elevated to 600s. Elevation could be due to anemia and CHF exacerbation.   Hypertension - BP ranging from 90-100s systolic.   Hyperlipidemia - on lipitor  Paroxysmal Atrial Fibrillation - patient went into afib yesterday afternoon. Started on cardizem gtt.  Eliquis on hold due to anemia.   Tobacco Use     Echo 2/2023 - EF 40-45%, mod to severe MR  Echo 8/21 showed EF 40%, mild to mod MR, mild pulmonary HTN, IVC: elevated venous pressure.    Plan:   Continue IV lasix.   Diuresing well.   Strict I/Os.   2L fluid restriction.   Hold sotalol due to heart failure exacerbation.   Continue lipitor.   Hold lisinopril due to soft blood pressure.  Monitor for chest pain.   Monitor tele.   Eliquis and plavix on hold due to anemia. GI consulted, appreciate recommendations.  Wean cardizem as tolerated. Can adjust oral medications after endoscopy.   Consider stress test before discharge or at least outpatient.

## 2023-08-22 NOTE — CARE UPDATE
08/21/23 1914   Patient Assessment/Suction   Level of Consciousness (AVPU) alert   Respiratory Effort Normal;Unlabored   Expansion/Accessory Muscles/Retractions no use of accessory muscles;no retractions   HERNAN Breath Sounds clear   LLL Breath Sounds clear   RUL Breath Sounds clear   RML Breath Sounds clear;diminished   RLL Breath Sounds clear;diminished   Rhythm/Pattern, Respiratory unlabored;pattern regular   Cough Frequency no cough   PRE-TX-O2   Device (Oxygen Therapy) nasal cannula   Flow (L/min) 2   SpO2 (!) 92 %   Pulse 89   Aerosol Therapy   $ Aerosol Therapy Charges Aerosol Treatment   Daily Review of Necessity (SVN) completed   Respiratory Treatment Status (SVN) given   Treatment Route (SVN) mouthpiece;oxygen   Patient Position (SVN) semi-Dale's   Post Treatment Assessment (SVN) breath sounds unchanged   Signs of Intolerance (SVN) none

## 2023-08-22 NOTE — TRANSFER OF CARE
"Anesthesia Transfer of Care Note    Patient: Sid Cuello Jr.    Procedure(s) Performed: Procedure(s) (LRB):  ENTEROSCOPY (N/A)    Patient location: ICU    Anesthesia Type: general    Transport from OR: Transported from OR on room air with adequate spontaneous ventilation    Post pain: adequate analgesia    Post assessment: no apparent anesthetic complications    Post vital signs: stable    Level of consciousness: awake    Nausea/Vomiting: no nausea/vomiting    Complications: none    Transfer of care protocol was followed      Last vitals:   Visit Vitals  BP 99/61   Pulse 86   Temp 36.8 °C (98.2 °F) (Oral)   Resp (!) 40   Ht 5' 9" (1.753 m)   Wt 64.8 kg (142 lb 13.7 oz)   SpO2 (!) 91%   BMI 21.10 kg/m²     "

## 2023-08-22 NOTE — NURSING TRANSFER
Nursing Transfer Note      8/22/2023   6:48 PM    Nurse giving handoff: Toni Diaz  Nurse receiving handoff: Toni Dumont    Reason patient is being transferred: decrease    Transfer To: Stepdown    Transfer via wheelchair    Transfer with 2L to O2, cardiac monitoring    Transported by TONI Diaz    Transfer Vital Signs:  Blood Pressure:90/59  Heart Rate:89  O2:98  Temperature:98.0  Respirations:22    Telemetry: Rhythm A fib  Order for Tele Monitor? Yes    Additional Lines: Oxygen    4eyes on Skin: yes    Patient belongings transferred with patient: Yes    Chart send with patient: Yes    Notified: spouse, daughter    Plan of care reviewed with patient and family. Family along with transfer. No questions at this time.

## 2023-08-22 NOTE — NURSING
Patient transferred per wheelchair to room 2216.  Patient went into atrial fib at 2100, Dr Avila notified.  Patient given metoprolol 5 mg IV at 2154 with no change in heart rate or rhythm.  Dr Avila notified at 2300.  Orders entered to transfer patient so he could receive cardizem drip.  Wife Jovana called & notified of transfer.

## 2023-08-22 NOTE — PLAN OF CARE
Pt AAOx4. Afib on the monitor. Diltiazem drip at 9mg/hr to maintain HR <110. BP soft, with MAP >65. 5L NC. Pt made NPO at midnight for AM procedure. Urinal at bedside. Tmax 99.9F.     Plan of care reviewed with pt. Pt verbalized understanding. Bed locked in the lowest position. Call light and personal belongings within reach.

## 2023-08-22 NOTE — CARE UPDATE
08/22/23 0826   Aerosol Therapy   $ Aerosol Therapy Charges Aerosol Treatment   Daily Review of Necessity (SVN) completed   Respiratory Treatment Status (SVN) given   Treatment Route (SVN) mouthpiece;oxygen   Patient Position (SVN) semi-Dale's   Post Treatment Assessment (SVN) breath sounds improved   Signs of Intolerance (SVN) none   Respiratory Evaluation   $ Care Plan Tech Time 15 min   $ Eval/Re-eval Charges Re-evaluation

## 2023-08-22 NOTE — ANESTHESIA PREPROCEDURE EVALUATION
08/22/2023  Sid Cuello Jr. is a 72 y.o., male.         Tobacco Use:  The patient  reports that he has been smoking cigarettes. He started smoking about 45 years ago. He has a 45.6 pack-year smoking history. He has never used smokeless tobacco.     Results for orders placed or performed during the hospital encounter of 08/21/23   EKG 12-lead    Collection Time: 08/21/23 12:09 PM    Narrative    Test Reason : R07.9,    Vent. Rate : 080 BPM     Atrial Rate : 080 BPM     P-R Int : 206 ms          QRS Dur : 092 ms      QT Int : 430 ms       P-R-T Axes : 071 -13 076 degrees     QTc Int : 495 ms    Sinus rhythm with occasional Premature ventricular complexes  Possible Left atrial enlargement  Septal infarct (cited on or before 21-AUG-2023)  Abnormal ECG  When compared with ECG of 21-AUG-2023 04:04,  Premature ventricular complexes are now Present  Questionable change in initial forces of Septal leads  QT has lengthened    Referred By: [email or web address]   SELF           Confirmed By:              Lab Results   Component Value Date    WBC 7.64 08/22/2023    HGB 7.5 (L) 08/22/2023    HCT 23.3 (L) 08/22/2023    MCV 83 08/22/2023     08/22/2023     BMP  Lab Results   Component Value Date     (L) 08/22/2023    K 4.1 08/22/2023    CL 98 08/22/2023    CO2 27 08/22/2023    BUN 21 08/22/2023    CREATININE 1.2 08/22/2023    CALCIUM 8.1 (L) 08/22/2023    ANIONGAP 5 (L) 08/22/2023     08/22/2023     (H) 08/21/2023    GLU 91 08/15/2023       Results for orders placed during the hospital encounter of 02/14/23    Echo Saline Bubble? No    Interpretation Summary  · Eccentric hypertrophy and mildly decreased systolic function.  · The estimated ejection fraction is 40%.  · Atrial fibrillation observed.  · Mild left atrial enlargement.  · Severe mitral regurgitation.  · Mild tricuspid regurgitation.  · Normal  central venous pressure (3 mmHg).  · The estimated PA systolic pressure is 37 mmHg.        Pre-op Assessment    I have reviewed the Patient Summary Reports.     I have reviewed the Nursing Notes. I have reviewed the NPO Status.   I have reviewed the Medications.     Review of Systems  Anesthesia Hx:  No problems with previous Anesthesia  Denies Family Hx of Anesthesia complications.   Denies Personal Hx of Anesthesia complications.   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  Now on Cardizem.) atrial fibrillation CHF hyperlipidemia ECG has been reviewed. Severe MR    EF 40%    Pulmonary:  Pulmonary Normal    Renal/:  Renal/ Normal     Hepatic/GI:  Hepatic/GI Normal    Musculoskeletal:  Musculoskeletal Normal  Rheumatic Disease, Lupus    Neurological:   Neuromuscular Disease,   Peripheral Neuropathy    Endocrine:  Endocrine Normal    Dermatological:   Discoid lupus   Psych:  Psychiatric Normal           Physical Exam  General: Well nourished, Cooperative, Alert and Oriented    Airway:  Mallampati: III   Mouth Opening: Normal  TM Distance: Normal  Tongue: Normal  Neck ROM: Normal ROM    Dental:  Caps / Implants, Periodontal disease    Chest/Lungs:  Clear to auscultation, Normal Respiratory Rate    Heart:  Rate: Tachycardia  Rhythm: Irregularly Irregular        Anesthesia Plan  Type of Anesthesia, risks & benefits discussed:    Anesthesia Type: Gen Natural Airway  Intra-op Monitoring Plan: Standard ASA Monitors  Post Op Pain Control Plan: multimodal analgesia  ASA Score: 3  Anesthesia Plan Notes: Propofol  POM  Mask   Phenylephrine prn      Ready For Surgery From Anesthesia Perspective.     .

## 2023-08-22 NOTE — NURSING
Nurses Note -- 4 Eyes      8/21/2023   11:38 PM      Skin assessed during: Admit      [x] No Altered Skin Integrity Present    []Prevention Measures Documented      [] Yes- Altered Skin Integrity Present or Discovered   [] LDA Added if Not in Epic (Describe Wound)   [] New Altered Skin Integrity was Present on Admit and Documented in LDA   [] Wound Image Taken    Wound Care Consulted? No    Attending Nurse:  Marina Mendieta RN/Staff Member:   [id number]

## 2023-08-23 ENCOUNTER — CLINICAL SUPPORT (OUTPATIENT)
Dept: SMOKING CESSATION | Facility: CLINIC | Age: 73
End: 2023-08-23
Payer: COMMERCIAL

## 2023-08-23 VITALS
HEIGHT: 69 IN | RESPIRATION RATE: 24 BRPM | BODY MASS INDEX: 21.16 KG/M2 | DIASTOLIC BLOOD PRESSURE: 65 MMHG | WEIGHT: 142.88 LBS | OXYGEN SATURATION: 99 % | SYSTOLIC BLOOD PRESSURE: 109 MMHG | TEMPERATURE: 98 F | HEART RATE: 83 BPM

## 2023-08-23 DIAGNOSIS — F17.200 NICOTINE DEPENDENCE: Primary | ICD-10-CM

## 2023-08-23 PROBLEM — I50.23 ACUTE ON CHRONIC SYSTOLIC CONGESTIVE HEART FAILURE: Status: RESOLVED | Noted: 2023-08-21 | Resolved: 2023-08-23

## 2023-08-23 PROBLEM — I48.91 A-FIB: Status: RESOLVED | Noted: 2023-08-21 | Resolved: 2023-08-23

## 2023-08-23 PROBLEM — I95.9 HYPOTENSION: Status: RESOLVED | Noted: 2023-08-22 | Resolved: 2023-08-23

## 2023-08-23 LAB
ANION GAP SERPL CALC-SCNC: 4 MMOL/L (ref 8–16)
BLD PROD TYP BPU: NORMAL
BLOOD UNIT EXPIRATION DATE: NORMAL
BLOOD UNIT TYPE CODE: 5100
BLOOD UNIT TYPE: NORMAL
BUN SERPL-MCNC: 31 MG/DL (ref 8–23)
CALCIUM SERPL-MCNC: 8.1 MG/DL (ref 8.7–10.5)
CHLORIDE SERPL-SCNC: 100 MMOL/L (ref 95–110)
CO2 SERPL-SCNC: 28 MMOL/L (ref 23–29)
CODING SYSTEM: NORMAL
CREAT SERPL-MCNC: 1.4 MG/DL (ref 0.5–1.4)
CROSSMATCH INTERPRETATION: NORMAL
DISPENSE STATUS: NORMAL
ERYTHROCYTE [DISTWIDTH] IN BLOOD BY AUTOMATED COUNT: 17.3 % (ref 11.5–14.5)
EST. GFR  (NO RACE VARIABLE): 53.7 ML/MIN/1.73 M^2
GLUCOSE SERPL-MCNC: 100 MG/DL (ref 70–110)
HCT VFR BLD AUTO: 24.6 % (ref 40–54)
HGB BLD-MCNC: 7.6 G/DL (ref 14–18)
MCH RBC QN AUTO: 25.6 PG (ref 27–31)
MCHC RBC AUTO-ENTMCNC: 30.9 G/DL (ref 32–36)
MCV RBC AUTO: 83 FL (ref 82–98)
NUM UNITS TRANS PACKED RBC: NORMAL
PLATELET # BLD AUTO: 355 K/UL (ref 150–450)
PMV BLD AUTO: 9.3 FL (ref 9.2–12.9)
POTASSIUM SERPL-SCNC: 4.1 MMOL/L (ref 3.5–5.1)
RBC # BLD AUTO: 2.97 M/UL (ref 4.6–6.2)
SODIUM SERPL-SCNC: 132 MMOL/L (ref 136–145)
WBC # BLD AUTO: 6.28 K/UL (ref 3.9–12.7)

## 2023-08-23 PROCEDURE — 25000003 PHARM REV CODE 250: Performed by: STUDENT IN AN ORGANIZED HEALTH CARE EDUCATION/TRAINING PROGRAM

## 2023-08-23 PROCEDURE — 36415 COLL VENOUS BLD VENIPUNCTURE: CPT | Performed by: STUDENT IN AN ORGANIZED HEALTH CARE EDUCATION/TRAINING PROGRAM

## 2023-08-23 PROCEDURE — 25000242 PHARM REV CODE 250 ALT 637 W/ HCPCS: Performed by: STUDENT IN AN ORGANIZED HEALTH CARE EDUCATION/TRAINING PROGRAM

## 2023-08-23 PROCEDURE — 94640 AIRWAY INHALATION TREATMENT: CPT

## 2023-08-23 PROCEDURE — 99406 PT REFUSED TOBACCO CESSATION: ICD-10-PCS | Mod: S$GLB,,, | Performed by: INTERNAL MEDICINE

## 2023-08-23 PROCEDURE — 25000003 PHARM REV CODE 250

## 2023-08-23 PROCEDURE — 99900031 HC PATIENT EDUCATION (STAT)

## 2023-08-23 PROCEDURE — 85027 COMPLETE CBC AUTOMATED: CPT | Performed by: STUDENT IN AN ORGANIZED HEALTH CARE EDUCATION/TRAINING PROGRAM

## 2023-08-23 PROCEDURE — 99406 BEHAV CHNG SMOKING 3-10 MIN: CPT | Mod: S$GLB,,, | Performed by: INTERNAL MEDICINE

## 2023-08-23 PROCEDURE — 27000221 HC OXYGEN, UP TO 24 HOURS

## 2023-08-23 PROCEDURE — 99900035 HC TECH TIME PER 15 MIN (STAT)

## 2023-08-23 PROCEDURE — P9016 RBC LEUKOCYTES REDUCED: HCPCS | Performed by: INTERNAL MEDICINE

## 2023-08-23 PROCEDURE — 94761 N-INVAS EAR/PLS OXIMETRY MLT: CPT

## 2023-08-23 PROCEDURE — 80048 BASIC METABOLIC PNL TOTAL CA: CPT | Performed by: STUDENT IN AN ORGANIZED HEALTH CARE EDUCATION/TRAINING PROGRAM

## 2023-08-23 PROCEDURE — 94799 UNLISTED PULMONARY SVC/PX: CPT

## 2023-08-23 PROCEDURE — 63600175 PHARM REV CODE 636 W HCPCS: Performed by: INTERNAL MEDICINE

## 2023-08-23 PROCEDURE — 25000003 PHARM REV CODE 250: Performed by: INTERNAL MEDICINE

## 2023-08-23 RX ORDER — HYDROCODONE BITARTRATE AND ACETAMINOPHEN 500; 5 MG/1; MG/1
TABLET ORAL
Status: DISCONTINUED | OUTPATIENT
Start: 2023-08-23 | End: 2023-08-23 | Stop reason: HOSPADM

## 2023-08-23 RX ORDER — PANTOPRAZOLE SODIUM 40 MG/1
40 TABLET, DELAYED RELEASE ORAL 2 TIMES DAILY
Status: DISCONTINUED | OUTPATIENT
Start: 2023-08-23 | End: 2023-08-23 | Stop reason: HOSPADM

## 2023-08-23 RX ORDER — FUROSEMIDE 10 MG/ML
10 INJECTION INTRAMUSCULAR; INTRAVENOUS ONCE
Status: DISCONTINUED | OUTPATIENT
Start: 2023-08-23 | End: 2023-08-23

## 2023-08-23 RX ORDER — FUROSEMIDE 10 MG/ML
10 INJECTION INTRAMUSCULAR; INTRAVENOUS ONCE
Status: COMPLETED | OUTPATIENT
Start: 2023-08-23 | End: 2023-08-23

## 2023-08-23 RX ORDER — PANTOPRAZOLE SODIUM 40 MG/1
40 TABLET, DELAYED RELEASE ORAL DAILY
Qty: 30 TABLET | Refills: 0 | Status: SHIPPED | OUTPATIENT
Start: 2023-08-23 | End: 2023-10-19

## 2023-08-23 RX ORDER — METOPROLOL SUCCINATE 25 MG/1
12.5 TABLET, EXTENDED RELEASE ORAL 2 TIMES DAILY
Qty: 30 TABLET | Refills: 0 | Status: SHIPPED | OUTPATIENT
Start: 2023-08-23 | End: 2024-01-11

## 2023-08-23 RX ADMIN — FOLIC ACID 1 MG: 1 TABLET ORAL at 09:08

## 2023-08-23 RX ADMIN — HYDROXYCHLOROQUINE SULFATE 200 MG: 200 TABLET, FILM COATED ORAL at 09:08

## 2023-08-23 RX ADMIN — ATORVASTATIN CALCIUM 10 MG: 10 TABLET, FILM COATED ORAL at 09:08

## 2023-08-23 RX ADMIN — MUPIROCIN 1 G: 20 OINTMENT TOPICAL at 09:08

## 2023-08-23 RX ADMIN — PANTOPRAZOLE SODIUM 40 MG: 40 TABLET, DELAYED RELEASE ORAL at 09:08

## 2023-08-23 RX ADMIN — FUROSEMIDE 10 MG: 10 INJECTION, SOLUTION INTRAMUSCULAR; INTRAVENOUS at 03:08

## 2023-08-23 RX ADMIN — METOPROLOL SUCCINATE 12.5 MG: 25 TABLET, EXTENDED RELEASE ORAL at 09:08

## 2023-08-23 RX ADMIN — ARFORMOTEROL TARTRATE 15 MCG: 15 SOLUTION RESPIRATORY (INHALATION) at 08:08

## 2023-08-23 RX ADMIN — IPRATROPIUM BROMIDE 0.5 MG: 0.5 SOLUTION RESPIRATORY (INHALATION) at 08:08

## 2023-08-23 NOTE — PROGRESS NOTES
Smoking Cessation Education    Pt is a current every day smoker, smokes 1ppd. No interest in quitting at this time. Cessation education provided.   Ambulatory referral sent for outpatient smoking cessation.

## 2023-08-23 NOTE — HOSPITAL COURSE
Pt got admitted with Acute on chronic systolic congestive heart failure in the background of acute anemia  Pt was treated with iv Lasix  Received Prbc for acute anemia  Bowel enteroscopy done on 08/22 was normal  Plavix was discontinued forever(Discussed with Cardiology)  Pt was started on iv Cardizem gtt for A Fib RVR and later rate got controlled  Cardiology DC ed sotalol and pt was started on Toprol  DC ed lsinopril because of persistent low BP'  Later pt was discharged to home  Pt soon will have colonoscopy on outpatient basis

## 2023-08-23 NOTE — SUBJECTIVE & OBJECTIVE
Interval History:       Review of Systems   Constitutional:  Negative for activity change and appetite change.   HENT:  Negative for congestion and dental problem.    Eyes:  Negative for discharge and itching.   Respiratory:  Negative for shortness of breath.    Cardiovascular:  Negative for chest pain.   Gastrointestinal:  Negative for abdominal distention and abdominal pain.   Endocrine: Negative for cold intolerance.   Genitourinary:  Negative for difficulty urinating and dysuria.   Musculoskeletal:  Negative for arthralgias and back pain.   Skin:  Negative for color change.   Neurological:  Negative for dizziness and facial asymmetry.   Hematological:  Negative for adenopathy.   Psychiatric/Behavioral:  Negative for agitation and behavioral problems.      Objective:     Vital Signs (Most Recent):  Temp: 98.2 °F (36.8 °C) (08/22/23 0901)  Pulse: 103 (08/22/23 1930)  Resp: 18 (08/22/23 1930)  BP: (!) 77/46 (08/22/23 1930)  SpO2: (!) 94 % (08/22/23 1930) Vital Signs (24h Range):  Temp:  [98.2 °F (36.8 °C)-99.9 °F (37.7 °C)] 98.2 °F (36.8 °C)  Pulse:  [] 103  Resp:  [15-40] 18  SpO2:  [79 %-100 %] 94 %  BP: ()/(46-87) 77/46     Weight: 64.8 kg (142 lb 13.7 oz)  Body mass index is 21.1 kg/m².    Intake/Output Summary (Last 24 hours) at 8/22/2023 1958  Last data filed at 8/22/2023 1643  Gross per 24 hour   Intake 349.93 ml   Output 1250 ml   Net -900.07 ml         Physical Exam  Vitals and nursing note reviewed.   Constitutional:       Appearance: He is well-developed.   HENT:      Head: Atraumatic.      Right Ear: External ear normal.      Left Ear: External ear normal.      Nose: Nose normal.      Mouth/Throat:      Mouth: Mucous membranes are moist.   Cardiovascular:      Rate and Rhythm: Normal rate.   Pulmonary:      Effort: Pulmonary effort is normal.   Musculoskeletal:         General: Normal range of motion.      Cervical back: Full passive range of motion without pain and normal range of motion.       Right lower leg: Edema present.      Left lower leg: Edema present.   Skin:     General: Skin is warm.   Neurological:      Mental Status: He is alert and oriented to person, place, and time.   Psychiatric:         Behavior: Behavior normal.             Significant Labs: All pertinent labs within the past 24 hours have been reviewed.  CBC:   Recent Labs   Lab 08/21/23  0409 08/21/23  1802 08/22/23  0354   WBC 6.93 8.02 7.64   HGB 6.4* 8.1* 7.5*   HCT 20.9* 25.0* 23.3*    348 344     CMP:   Recent Labs   Lab 08/21/23  0409 08/22/23  0354   * 130*   K 4.9 4.1    98   CO2 24 27   * 103   BUN 20 21   CREATININE 1.2 1.2   CALCIUM 8.3* 8.1*   PROT 9.3*  --    ALBUMIN 3.2*  --    BILITOT 0.8  --    ALKPHOS 100  --    AST 31  --    ALT 13  --    ANIONGAP 6* 5*       Significant Imaging: I have reviewed all pertinent imaging results/findings within the past 24 hours.

## 2023-08-23 NOTE — PROGRESS NOTES
[facility] Medicine  Progress Note    Patient Name: Sid Cuello Jr.  MRN: [id number]  Patient Class: IP- Inpatient   Admission Date: 8/21/2023  Length of Stay: 1 days  Attending Physician: Conor Ramírez MD  Primary Care Provider: Yan Posada MD        Subjective:     Principal Problem:Acute on chronic systolic congestive heart failure        HPI:  Patient is a 72-year-old male with CAD, AFib, hypertension, and current tobacco user who presents with shortness of breath.  Shortness for breath began about 1 day prior and has been worsening.  Shortness of breaths encouraged at rest.  Patient had a cardiac stent placed about 16 years ago.  Patient is on Eliquis and Plavix.  Patient was also cardioverted for AFib earlier this year.  Patient smokes about 1 pack per day.  On review of systems, patient is having some mild chest pain that occurs once every hour to.  Located in the left part of the chest.  Does not radiate.  Patient denies fevers, chills, abdominal pain, bright red blood per rectum or melena.  Patient is on 2 L nasal cannula satting 100%. WBC 6.9 and hemoglobin 6.4.  Sodium 131, BUN 20, creatinine 1.2.  Troponin 50.4.  EKG normal sinus rhythm, no ST elevations.  Mild ST depression in V5.       Overview/Hospital Course:  08/22  Pt on iv cardizem gtt for A FIB RVR  Today had bowel enteroscopy  Diuresing well  BP on lower range      Interval History:       Review of Systems   Constitutional:  Negative for activity change and appetite change.   HENT:  Negative for congestion and dental problem.    Eyes:  Negative for discharge and itching.   Respiratory:  Negative for shortness of breath.    Cardiovascular:  Negative for chest pain.   Gastrointestinal:  Negative for abdominal distention and abdominal pain.   Endocrine: Negative for cold intolerance.   Genitourinary:  Negative for difficulty urinating and dysuria.   Musculoskeletal:  Negative for arthralgias and back pain.   Skin:   Negative for color change.   Neurological:  Negative for dizziness and facial asymmetry.   Hematological:  Negative for adenopathy.   Psychiatric/Behavioral:  Negative for agitation and behavioral problems.      Objective:     Vital Signs (Most Recent):  Temp: 98.2 °F (36.8 °C) (08/22/23 0901)  Pulse: 103 (08/22/23 1930)  Resp: 18 (08/22/23 1930)  BP: (!) 77/46 (08/22/23 1930)  SpO2: (!) 94 % (08/22/23 1930) Vital Signs (24h Range):  Temp:  [98.2 °F (36.8 °C)-99.9 °F (37.7 °C)] 98.2 °F (36.8 °C)  Pulse:  [] 103  Resp:  [15-40] 18  SpO2:  [79 %-100 %] 94 %  BP: ()/(46-87) 77/46     Weight: 64.8 kg (142 lb 13.7 oz)  Body mass index is 21.1 kg/m².    Intake/Output Summary (Last 24 hours) at 8/22/2023 1958  Last data filed at 8/22/2023 1643  Gross per 24 hour   Intake 349.93 ml   Output 1250 ml   Net -900.07 ml         Physical Exam  Vitals and nursing note reviewed.   Constitutional:       Appearance: He is well-developed.   HENT:      Head: Atraumatic.      Right Ear: External ear normal.      Left Ear: External ear normal.      Nose: Nose normal.      Mouth/Throat:      Mouth: Mucous membranes are moist.   Cardiovascular:      Rate and Rhythm: Normal rate.   Pulmonary:      Effort: Pulmonary effort is normal.   Musculoskeletal:         General: Normal range of motion.      Cervical back: Full passive range of motion without pain and normal range of motion.      Right lower leg: Edema present.      Left lower leg: Edema present.   Skin:     General: Skin is warm.   Neurological:      Mental Status: He is alert and oriented to person, place, and time.   Psychiatric:         Behavior: Behavior normal.             Significant Labs: All pertinent labs within the past 24 hours have been reviewed.  CBC:   Recent Labs   Lab 08/21/23  0409 08/21/23  1802 08/22/23  0354   WBC 6.93 8.02 7.64   HGB 6.4* 8.1* 7.5*   HCT 20.9* 25.0* 23.3*    348 344     CMP:   Recent Labs   Lab 08/21/23  0409 08/22/23  0354   NA  131* 130*   K 4.9 4.1    98   CO2 24 27   * 103   BUN 20 21   CREATININE 1.2 1.2   CALCIUM 8.3* 8.1*   PROT 9.3*  --    ALBUMIN 3.2*  --    BILITOT 0.8  --    ALKPHOS 100  --    AST 31  --    ALT 13  --    ANIONGAP 6* 5*       Significant Imaging: I have reviewed all pertinent imaging results/findings within the past 24 hours.      Assessment/Plan:      * Acute on chronic systolic congestive heart failure  Maintain iv lasix     Anemia  Had pRBC upon admission   Bowel enteroscopy done on 08/22 was normal  plavix was discontinued forever  Can have aspirin/ NOAC upon discharge  Colonoscopy soon on outpatient basis       A-fib  Now on iv cardizem gtt   HR was on higher range yesterday night     Hypotension  Discontinue lisinopril        Tobacco abuse  Per chart review       Discoid lupus  Continue home medication      CAD s/p PCI  Stable issue         VTE Risk Mitigation (From admission, onward)         Ordered     IP VTE HIGH RISK PATIENT  Once         08/21/23 0539     Place sequential compression device  Until discontinued         08/21/23 0539                Discharge Planning   KALINA: 8/23/2023     Code Status: Full Code   Is the patient medically ready for discharge?:     Reason for patient still in hospital (select all that apply): Treatment  Discharge Plan A: Home with family                  Conor Ramírez MD  Department of Hospital Medicine   [facility]

## 2023-08-23 NOTE — NURSING
Patient refusing all medications. Indications for each medication explained. Hospitalist group informed.

## 2023-08-23 NOTE — PROGRESS NOTES
[facility]   Cardiology Note    Consult Requested By: hospital medicine  Reason for Consult: CHF    SUBJECTIVE:     History of Present Illness: Patient is a 71 yo male with PMHx of CAD s/p PCI, paroxysmal atrial fibrillation s/p cardioversion on 4/10/2023, HTN, HLP, and tobacco use who presented to the ED with worsening shortness of breath. He states his symptoms started a few days ago and gradually worsened. Reports he could not walk to his mailbox without experiencing dyspnea which he was able to do no problem previously. Her reports associated orthopnea. He does report a sharp pain on the left side of his chest that occurs intermittently throughout the day. It typically lasts a few seconds. Denies aggravating/alleviating factors. It is not associated with exertion. Denies radiation. He denies fevers, chills, edema, dizziness, palpitations, N/V, or cough. ED workup showed troponin elevation to 50, trending. BNP 1313. CXR with pulmonary edema. I cannot see results for EKG. H&H 6.4/20.9. Iron 14, saturated iron 3, ferritin normal. He denies recent bleeding. Cr 1.2. He was started on Iv lasix. Currently receiving 1 unit RBCs. ON 2 L NC. BP stable. Tele reviewed and shows NSR.     8/23--SR this am on tele. H.H 7.6/24.6 cr 1.4, BP stable, SR 80- 90's EGD normal yesterday , u/o -1400. The pt reports feeling much better, no chest pain or Sob. LE are not swollen.     Review of patient's allergies indicates:  No Known Allergies    Past Medical History:   Diagnosis Date    Abnormal SPEP 4/29/2021    Coronary artery disease 2007    MI   1 c. 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;  Surgeon: David Mera MD;  Location: [facility];  Service: Urology;  Laterality: Right;    TREATMENT OF CARDIAC ARRHYTHMIA N/A 4/10/2023    Procedure: Cardioversion or Defibrillation;  Surgeon: Raymond Sotomayor MD;  Location: [facility] CATH/EP LAB;  Service: Cardiology;  Laterality: N/A;    VASECTOMY       Family History   Problem Relation Age of Onset    No Known Problems Mother     Melanoma Neg Hx     Psoriasis Neg Hx     Lupus Neg Hx     Eczema Neg Hx      Social History     Tobacco Use    Smoking status: Every Day     Current packs/day: 1.00     Average packs/day: 1 pack/day for 45.6 years (45.6 ttl pk-yrs)     Types: Cigarettes     Start date: 1978    Smokeless tobacco: Never   Substance Use Topics    Alcohol use: Yes     Alcohol/week: 14.0 standard drinks of alcohol     Types: 14 Shots of liquor per week     Comment: 2 daily but has not drank in 4 days    Drug use: Never       Review of Systems:  Review of Systems   Constitutional:  Negative for chills and fever.   Respiratory:  Negative for cough and shortness of breath.    Cardiovascular:  Negative for chest pain, palpitations, orthopnea and leg swelling.   Gastrointestinal:  Negative for abdominal pain, nausea and vomiting.   Neurological:  Negative for dizziness.   All other systems reviewed and are negative.      OBJECTIVE:     Vital Signs (Most Recent)  Temp: 98.7 °F (37.1 °C) (08/23/23 0701)  Pulse: 79 (08/23/23 0600)  Resp: 17 (08/23/23 0600)  BP: 109/60 (08/23/23 0600)  SpO2: 97 % (08/23/23 0600)    Vital Signs Range (Last 24H):  Temp:  [97.9 °F (36.6 °C)-98.8 °F (37.1 °C)]   Pulse:  []   Resp:  [15-40]   BP: ()/(46-69)   SpO2:  [87 %-100 %]     I & O (Last 24H):    Intake/Output Summary (Last 24 hours) at 8/23/2023 0816  Last data filed at 8/23/2023 0501  Gross per 24 hour   Intake 300 ml   Output 1600 ml   Net -1300 ml          Current Diet:     Current Diet Order   Procedures    Diet Cardiac        Allergies:  Review of patient's allergies indicates:  No Known Allergies    Meds:  Scheduled Meds:   arformoteroL  15 mcg Nebulization BID    atorvastatin  10 mg Oral Daily    folic acid  1 mg Oral Daily    hydrOXYchloroQUINE  200 mg Oral BID    ipratropium  0.5 mg Nebulization Q6H    lactated ringers  500 mL Intravenous Once    mupirocin   Nasal BID    pantoprazole  40 mg Oral BID     Continuous Infusions:  PRN Meds:0.9%  NaCl infusion (for blood administration), acetaminophen, albuterol-ipratropium, HYDROcodone-acetaminophen, metoprolol, ondansetron, sodium chloride 0.9%    Oxygen/Ventilator Data (Last 24H):  (if applicable)            Hemodynamic Parameters (Last 24H):   (if applicable)        Laboratory and Radiology Data:  Recent Results (from the past 24 hour(s))   Hemoglobin    Collection Time: 08/22/23  7:47 PM   Result Value Ref Range    Hemoglobin 8.0 (L) 14.0 - 18.0 g/dL   CBC Without Differential    Collection Time: 08/23/23  5:23 AM   Result Value Ref Range    WBC 6.28 3.90 - 12.70 K/uL    RBC 2.97 (L) 4.60 - 6.20 M/uL    Hemoglobin 7.6 (L) 14.0 - 18.0 g/dL    Hematocrit 24.6 (L) 40.0 - 54.0 %    MCV 83 82 - 98 fL    MCH 25.6 (L) 27.0 - 31.0 pg    MCHC 30.9 (L) 32.0 - 36.0 g/dL    RDW 17.3 (H) 11.5 - 14.5 %    Platelets 355 150 - 450 K/uL    MPV 9.3 9.2 - 12.9 fL   Basic Metabolic Panel    Collection Time: 08/23/23  5:23 AM   Result Value Ref Range    Sodium 132 (L) 136 - 145 mmol/L    Potassium 4.1 3.5 - 5.1 mmol/L    Chloride 100 95 - 110 mmol/L    CO2 28 23 - 29 mmol/L    Glucose 100 70 - 110 mg/dL    BUN 31 (H) 8 - 23 mg/dL    Creatinine 1.4 0.5 - 1.4 mg/dL    Calcium 8.1 (L) 8.7 - 10.5 mg/dL    Anion Gap 4 (L) 8 - 16 mmol/L    eGFR 53.7 (A) >60 mL/min/1.73 m^2     Imaging Results              X-Ray Chest AP Portable (Final result)  Result time 08/21/23 07:00:26   Procedure changed from X-Ray Chest 1 View     Final  result by Head, [place] RODRIGUES MD (08/21/23 07:00:26)                   Narrative:    XR CHEST 1 VIEW    Interpretation time/date:  8/21/2023 6:59 AM CDT    History:shortness of breath    Comparison: August 15, 2023    Findings:  One view chest. Cardiac silhouette stable. Interstitial alveolar pulmonary edema persistently seen diffusely. No pneumothorax.    Impression: Stable abnormal chest    Electronically signed by:  C[place] Conner MD  8/21/2023 7:00 AM CDT Workstation: [id number]                                    12-lead EKG interpretation:  (if applicable)      Current Cardiac Rhythm:   (if applicable)    Physical Exam:   Physical Exam  Vitals and nursing note reviewed.   Constitutional:       General: He is not in acute distress.     Appearance: Normal appearance.   Cardiovascular:      Rate and Rhythm: Normal rate and regular rhythm.      Pulses: Normal pulses.      Heart sounds: Normal heart sounds. No murmur heard.  Pulmonary:      Effort: Pulmonary effort is normal. No respiratory distress.      Breath sounds: No wheezing.   Musculoskeletal:         General: No swelling.      Right lower leg: No edema.      Left lower leg: No edema.   Skin:     General: Skin is warm and dry.      Findings: No rash.   Neurological:      Mental Status: He is alert and oriented to person, place, and time.         ASSESSMENT/PLAN:   Assessment:   Acute on Chronic Systolic Heart Failure - BNP 1313, CXR with pulmonary edema. On 2 L NC. On IV lasix.   Mitral regurgitation-Echo 2/2023- EF 40-45%, mild pulm htn, RVSP 39mmHg, mod to severe MR,  Anemia - H&H 6/20. Patient denies bleeding. S/p 1 transfusion   CAD s/p PCI - on plavix, atorvastatin at home. Chest pain is atypical, describes it as a sharp pain that occurs at rest. Troponins elevated to 50, trending. Elevation could be due to anemia and CHF exacerbation.   Hypertension - BP stable, on lisinopril at   home.   Hyperlipidemia   Paroxysmal Atrial Fibrillation - tele reviewed, NSR.  Eliquis on hold due to anemia.   Tobacco Use     Echo--EF 40-50% mod TR/mild pulm htn, trivial pericardial effusion    Plan:   Hold sotalol due to heart failure   exacerbation.    Continue lisinopril 2.5 mg daily.   Start Toprol 12.5 BID   Hold ASA/Plavix  Continue statin  Colonoscopy scheduled for next Thursday  D/W , will hold DOAC for now until after colonoscopy. Pt H/H improved but remains low 7.6/24.6 today  Pt is candidate for Watchman and will discuss with him at follow up  If pt is d/c home today , he will need to follow up office in 1-2 weeks.     Eleni CARTERC

## 2023-08-23 NOTE — PLAN OF CARE
Patient cleared for discharge from case management standpoint.    Follow up appointments scheduled and added to AVS.    Chart and discharge orders reviewed.  Patient discharged home with no further case management needs.      08/23/23 1420   Final Note   Assessment Type Final Discharge Note   Anticipated Discharge Disposition Home   Hospital Resources/Appts/Education Provided Appointments scheduled and added to AVS   Post-Acute Status   Discharge Delays None known at this time

## 2023-08-24 ENCOUNTER — PATIENT OUTREACH (OUTPATIENT)
Dept: FAMILY MEDICINE | Facility: CLINIC | Age: 73
End: 2023-08-24

## 2023-08-24 ENCOUNTER — PATIENT OUTREACH (OUTPATIENT)
Dept: ADMINISTRATIVE | Facility: CLINIC | Age: 73
End: 2023-08-24
Payer: MEDICARE

## 2023-08-24 NOTE — DISCHARGE SUMMARY
Replaced by [facility]  Discharge Summary      Patient Name: Sid Cuello Jr.  MRN: [id number]  ABHINAV: [id number]  Patient Class: IP- Inpatient  Admission Date: 8/21/2023  Hospital Length of Stay: 2 days  Discharge Date and Time:  08/23/2023 8:06 PM  Attending Physician: No att. providers found   Discharging Provider: Conor Ramírez MD  Primary Care Provider: Yan Posada MD    Primary Care Team: Networked reference to record PCT     HPI:   Patient is a 72-year-old male with CAD, AFib, hypertension, and current tobacco user who presents with shortness of breath.  Shortness for breath began about 1 day prior and has been worsening.  Shortness of breaths encouraged at rest.  Patient had a cardiac stent placed about 16 years ago.  Patient is on Eliquis and Plavix.  Patient was also cardioverted for AFib earlier this year.  Patient smokes about 1 pack per day.  On review of systems, patient is having some mild chest pain that occurs once every hour to.  Located in the left part of the chest.  Does not radiate.  Patient denies fevers, chills, abdominal pain, bright red blood per rectum or melena.  Patient is on 2 L nasal cannula satting 100%. WBC 6.9 and hemoglobin 6.4.  Sodium 131, BUN 20, creatinine 1.2.  Troponin 50.4.  EKG normal sinus rhythm, no ST elevations.  Mild ST depression in V5.       Procedure(s) (LRB):  ENTEROSCOPY (N/A)      Hospital Course:   Pt got admitted with Acute on chronic systolic congestive heart failure in the background of acute anemia  Pt was treated with iv Lasix  Received Prbc for acute anemia  Bowel enteroscopy done on 08/22 was normal  Plavix was discontinued forever(Discussed with Cardiology)  Pt was started on iv Cardizem gtt for A Fib RVR and later rate got controlled  Cardiology DC ed sotalol and pt was started on Toprol  DC ed lsinopril because of persistent low BP'  Later pt was discharged to home  Pt soon will have colonoscopy on outpatient basis        Goals of  Care Treatment Preferences:  Code Status: Full Code      Consults:   Consults (From admission, onward)        Status Ordering Provider     Inpatient consult to Gastroenterology  Once        Provider:  Doc Merritt III, MD    Completed JEAN GARCIA     Inpatient consult to Registered Dietitian/Nutritionist  Once        Provider:  (Not yet assigned)    Completed JOSEF JACKSON     Inpatient consult to Cardiology  Once        Provider:  Raymond Sotomayor MD    Completed JSOEF JACKSON          No new Assessment & Plan notes have been filed under this hospital service since the last note was generated.  Service: Hospital Medicine    Final Active Diagnoses:    Diagnosis Date Noted POA    Anemia [D64.9] 02/16/2023 Yes     Chronic    Tobacco abuse [Z72.0] 08/21/2023 Unknown    CAD s/p PCI [I25.10] 08/10/2015 Yes     Chronic      Problems Resolved During this Admission:    Diagnosis Date Noted Date Resolved POA    PRINCIPAL PROBLEM:  Acute on chronic systolic congestive heart failure [I50.23] 08/21/2023 08/23/2023 Unknown    A-fib [I48.91] 08/21/2023 08/23/2023 Unknown    Hypotension [I95.9] 08/22/2023 08/23/2023 Unknown       Discharged Condition: good    Disposition: Home or Self Care    Follow Up:   Follow-up Information     Raymond Sotomayor MD Follow up in 2 week(s).    Specialties: Cardiology, Interventional Cardiology  Contact information:  [address]  [address] [address] [phone]             Doc Merritt III, MD Follow up in 1 week(s).    Specialty: Gastroenterology  Contact information:  [address] [facility] [address] [phone]                       Patient Instructions:   No discharge procedures on file.    Significant Diagnostic Studies: Labs:   CMP   Recent Labs   Lab 08/22/23  0354 08/23/23  0523   * 132*   K 4.1 4.1   CL 98 100   CO2 27 28    100   BUN 21 31*   CREATININE 1.2 1.4   CALCIUM 8.1* 8.1*   ANIONGAP 5* 4*    and CBC   Recent Labs   Lab  08/22/23  0354 08/22/23  1947 08/23/23  0523   WBC 7.64  --  6.28   HGB 7.5* 8.0* 7.6*   HCT 23.3*  --  24.6*     --  355       Pending Diagnostic Studies:     None         Medications:  Reconciled Home Medications:      Medication List      START taking these medications    metoprolol succinate 25 MG 24 hr tablet  Commonly known as: TOPROL-XL  Take 0.5 tablets (12.5 mg total) by mouth 2 (two) times daily.     pantoprazole 40 MG tablet  Commonly known as: PROTONIX  Take 1 tablet (40 mg total) by mouth once daily.        CHANGE how you take these medications    ELIQUIS 5 mg Tab  Generic drug: apixaban  Take 5 mg by mouth 2 (two) times daily.  What changed: Another medication with the same name was removed. Continue taking this medication, and follow the directions you see here.        CONTINUE taking these medications    atorvastatin 10 MG tablet  Commonly known as: LIPITOR  Take 10 mg by mouth once daily.     betamethasone dipropionate 0.05 % lotion  Commonly known as: DIPROLENE  Apply thin film to scalp QHS PRN itch     cyanocobalamin 1,000 mcg/mL injection  Inject 1,000 mcg into the muscle every 30 days.     fluticasone-umeclidin-vilanter 100-62.5-25 mcg Dsdv  Commonly known as: TRELEGY ELLIPTA  Inhale 1 puff into the lungs once daily.     furosemide 40 MG tablet  Commonly known as: LASIX  Take 1 tablet (40 mg total) by mouth once daily.     hydrOXYchloroQUINE 200 mg tablet  Commonly known as: PlaqueniL  One tab PO daily for 2 weeks, then increase to 1 tab PO BID     triamcinolone acetonide 0.1% 0.1 % cream  Commonly known as: KENALOG  AAA bid        STOP taking these medications    clopidogreL 75 mg tablet  Commonly known as: PLAVIX     hydrOXYzine HCL 25 MG tablet  Commonly known as: ATARAX     lisinopriL 5 MG tablet  Commonly known as: PRINIVIL,ZESTRIL     sotaloL 80 MG tablet  Commonly known as: BETAPACE            Indwelling Lines/Drains at time of discharge:   Lines/Drains/Airways     None                Physical Exam  Cardiovascular:      Rate and Rhythm: Normal rate.   Neurological:      Mental Status: He is alert and oriented to person, place, and time.       Time spent on the discharge of patient: 45 minutes         Conor Ramírez MD  Department of Hospital Medicine  [facility]

## 2023-08-24 NOTE — PROGRESS NOTES
C3 nurse spoke with Sid Cuello Jr. for a TCC post hospital discharge follow up call. The patient has a scheduled HOSFU appointment with Yan Posada MD on 08/30/23 @ 0900.

## 2023-08-24 NOTE — TELEPHONE ENCOUNTER
Discharge Information     Discharge Date:   8/23/23    Primary Discharge Diagnosis:  Acute on chronic systolic congestive heart failure      Discharge Summary:  Reviewed      Medication & Order Review     Were medication changes made or new medications added?   Yes    If so, has the patient filled the prescriptions?  Yes     Was Home Health ordered? No    If so, has Home Health contacted patient and/or initiated services?  N/A    Name of Home Health Agency? N/A    Durable Medical Equipment ordered?  No     If so, has the DME provider contacted patient and delivered equipment?  N/A    Follow Up               Any problems since discharge? No    How is the patient feeling since returning home?  Doing Great    Have you set up recommended follow up appointments?  (cardiology, surgery, etc.) 8/30/23    Schedule Hospital Follow-up appointment within 7-14 days (preferably 7).      Notes:  TCM eligible [id number]            Kathy Link

## 2023-08-25 ENCOUNTER — TELEPHONE (OUTPATIENT)
Dept: HEMATOLOGY/ONCOLOGY | Facility: CLINIC | Age: 73
End: 2023-08-25

## 2023-08-25 NOTE — TELEPHONE ENCOUNTER
I spoke with pt and reminded him to have labs done prior to appt here on 8/31/23 pt verbalized understanding.

## 2023-08-26 ENCOUNTER — LAB VISIT (OUTPATIENT)
Dept: LAB | Facility: HOSPITAL | Age: 73
End: 2023-08-26
Attending: INTERNAL MEDICINE
Payer: MEDICARE

## 2023-08-26 DIAGNOSIS — D53.9 NUTRITIONAL ANEMIA, UNSPECIFIED: ICD-10-CM

## 2023-08-26 DIAGNOSIS — R77.8 ABNORMAL SPEP: ICD-10-CM

## 2023-08-26 DIAGNOSIS — D64.9 ANEMIA, UNSPECIFIED TYPE: ICD-10-CM

## 2023-08-26 LAB
ALBUMIN SERPL BCP-MCNC: 3.2 G/DL (ref 3.5–5.2)
ALP SERPL-CCNC: 100 U/L (ref 55–135)
ALT SERPL W/O P-5'-P-CCNC: 15 U/L (ref 10–44)
ANION GAP SERPL CALC-SCNC: 10 MMOL/L (ref 8–16)
AST SERPL-CCNC: 28 U/L (ref 10–40)
BASOPHILS # BLD AUTO: 0.06 K/UL (ref 0–0.2)
BASOPHILS NFR BLD: 0.9 % (ref 0–1.9)
BILIRUB SERPL-MCNC: 0.8 MG/DL (ref 0.1–1)
BUN SERPL-MCNC: 25 MG/DL (ref 8–23)
CALCIUM SERPL-MCNC: 9 MG/DL (ref 8.7–10.5)
CHLORIDE SERPL-SCNC: 99 MMOL/L (ref 95–110)
CO2 SERPL-SCNC: 25 MMOL/L (ref 23–29)
CREAT SERPL-MCNC: 1.2 MG/DL (ref 0.5–1.4)
DIFFERENTIAL METHOD: ABNORMAL
EOSINOPHIL # BLD AUTO: 0.2 K/UL (ref 0–0.5)
EOSINOPHIL NFR BLD: 2.7 % (ref 0–8)
ERYTHROCYTE [DISTWIDTH] IN BLOOD BY AUTOMATED COUNT: 18.6 % (ref 11.5–14.5)
EST. GFR  (NO RACE VARIABLE): >60 ML/MIN/1.73 M^2
FERRITIN SERPL-MCNC: 126 NG/ML (ref 20–300)
FOLATE SERPL-MCNC: 8 NG/ML (ref 4–24)
GLUCOSE SERPL-MCNC: 88 MG/DL (ref 70–110)
HCT VFR BLD AUTO: 29.2 % (ref 40–54)
HGB BLD-MCNC: 9 G/DL (ref 14–18)
IMM GRANULOCYTES # BLD AUTO: 0.01 K/UL (ref 0–0.04)
IMM GRANULOCYTES NFR BLD AUTO: 0.1 % (ref 0–0.5)
IRON SERPL-MCNC: 29 UG/DL (ref 45–160)
LYMPHOCYTES # BLD AUTO: 1.9 K/UL (ref 1–4.8)
LYMPHOCYTES NFR BLD: 29 % (ref 18–48)
MCH RBC QN AUTO: 27.2 PG (ref 27–31)
MCHC RBC AUTO-ENTMCNC: 30.8 G/DL (ref 32–36)
MCV RBC AUTO: 88 FL (ref 82–98)
MONOCYTES # BLD AUTO: 0.5 K/UL (ref 0.3–1)
MONOCYTES NFR BLD: 7.3 % (ref 4–15)
NEUTROPHILS # BLD AUTO: 4 K/UL (ref 1.8–7.7)
NEUTROPHILS NFR BLD: 60 % (ref 38–73)
NRBC BLD-RTO: 0 /100 WBC
PLATELET # BLD AUTO: 392 K/UL (ref 150–450)
PLATELET BLD QL SMEAR: ABNORMAL
PMV BLD AUTO: 9.4 FL (ref 9.2–12.9)
POTASSIUM SERPL-SCNC: 3.7 MMOL/L (ref 3.5–5.1)
PROT SERPL-MCNC: 9.7 G/DL (ref 6–8.4)
RBC # BLD AUTO: 3.31 M/UL (ref 4.6–6.2)
SATURATED IRON: 7 % (ref 20–50)
SODIUM SERPL-SCNC: 134 MMOL/L (ref 136–145)
TOTAL IRON BINDING CAPACITY: 419 UG/DL (ref 250–450)
TRANSFERRIN SERPL-MCNC: 299 MG/DL (ref 200–375)
VIT B12 SERPL-MCNC: 820 PG/ML (ref 210–950)
WBC # BLD AUTO: 6.68 K/UL (ref 3.9–12.7)

## 2023-08-26 PROCEDURE — 84466 ASSAY OF TRANSFERRIN: CPT | Performed by: INTERNAL MEDICINE

## 2023-08-26 PROCEDURE — 36415 COLL VENOUS BLD VENIPUNCTURE: CPT | Performed by: INTERNAL MEDICINE

## 2023-08-26 PROCEDURE — 82728 ASSAY OF FERRITIN: CPT | Performed by: INTERNAL MEDICINE

## 2023-08-26 PROCEDURE — 82607 VITAMIN B-12: CPT | Performed by: INTERNAL MEDICINE

## 2023-08-26 PROCEDURE — 82746 ASSAY OF FOLIC ACID SERUM: CPT | Performed by: INTERNAL MEDICINE

## 2023-08-26 PROCEDURE — 80053 COMPREHEN METABOLIC PANEL: CPT | Performed by: INTERNAL MEDICINE

## 2023-08-26 PROCEDURE — 85025 COMPLETE CBC W/AUTO DIFF WBC: CPT | Performed by: INTERNAL MEDICINE

## 2023-08-29 LAB
B2 MICROGLOB SERPL-MCNC: 4.27 MCG/ML (ref 1.21–2.7)
IGA SERPL-MCNC: 653 MG/DL (ref 61–356)
IGG SERPL-MCNC: 3240 MG/DL (ref 767–1590)
IGM SERPL-MCNC: 101 MG/DL (ref 37–286)
KAPPA LC FREE SER-MCNC: 11.6 MG/DL (ref 0.33–1.94)
KAPPA LC FREE/LAMBDA FREE SER: 1.51 {RATIO} (ref 0.26–1.65)
LAMBDA LC FREE SERPL-MCNC: 7.68 MG/DL (ref 0.57–2.63)

## 2023-08-31 LAB
ALBUMIN SERPL ELPH-MCNC: 2.9 G/DL (ref 3.4–4.7)
ALBUMIN/GLOB SERPL ELPH: 0.46 {RATIO}
ALPHA1 GLOB SERPL ELPH-MCNC: 0.2 G/DL (ref 0.1–0.3)
ALPHA2 GLOB SERPL ELPH-MCNC: 1 G/DL (ref 0.6–1)
B-GLOBULIN SERPL ELPH-MCNC: 1 G/DL (ref 0.7–1.2)
GAMMA GLOB SERPL ELPH-MCNC: 4 G/DL (ref 0.6–1.6)
M PROTEIN 1 SERPL ELPH-MCNC: ABNORMAL G/L
M PROTEIN 2 SERPL ELPH-MCNC: ABNORMAL G/DL
M PROTEIN ISOTYPE SERPL MS.MALDI-TOF: NORMAL
M PROTEIN SERPL QL: NEGATIVE
PROT PATTERN SERPL ELPH-IMP: ABNORMAL
PROT SERPL-MCNC: 9 G/DL (ref 6.3–7.9)

## 2023-09-12 ENCOUNTER — HOSPITAL ENCOUNTER (OUTPATIENT)
Dept: PREADMISSION TESTING | Facility: HOSPITAL | Age: 73
Discharge: HOME | End: 2023-09-12
Attending: INTERNAL MEDICINE
Payer: MEDICARE

## 2023-09-12 VITALS — WEIGHT: 138 LBS | HEIGHT: 69 IN | BODY MASS INDEX: 20.44 KG/M2

## 2023-09-12 RX ORDER — DIGOXIN 125 MCG
125 TABLET ORAL DAILY
COMMUNITY

## 2023-09-12 NOTE — PRE-PROCEDURE INSTRUCTIONS
History and medicines reviewed with patient via telephone. Instructed patient to take digoxin and metoprolol with sip of water morning of procedure. Remaining instructions as per AVS. Verbalized understanding.

## 2023-09-12 NOTE — DISCHARGE INSTRUCTIONS
To confirm, Your procedure is scheduled for:  Thursday, September 14, 2023    Endoscopy will call the afternoon prior with the final arrival time Wednesday, September 13, 2023    Please report to Outpatient [place] via [facility] entrance. Check in at registration desk.    Do not eat or drink anything after midnight the night before procedure.    TAKE ONLY THESE MEDICATIONS WITH A SMALL SIP OF WATER THE MORNING OF YOUR PROCEDURE:  DIGOXIN / METOPROLOL      DO NOT TAKE THESE MEDICATIONS PRIOR to your procedure or per your surgeon's request: ASPIRIN, ALEVE, ADVIL, IBUPROFEN, FISH OIL VITAMIN E, HERBALS  (May take Tylenol)    ONLY if you are prescribed any types of blood thinners such as:  Aspirin, Coumadin, Plavix, Pradaxa, Xarelto, Aggrenox, Effient, Eliquis, Savasya, Brilinta, or any other, ask your surgeon whether you should stop taking them and how long before surgery you should stop.  You may also need to verify with the prescribing physician if it is ok to stop your medication.      INSTRUCTIONS IMPORTANT!!    Do not smoke, vape or drink alcoholic beverages 24 hours prior to your procedure.     Please leave all jewelry, piercing's and valuables at home.     ONLY for patients requiring bowel prep, written instructions will be given by your doctor's office.    Make arrangements in advance for transportation home by a responsible adult.    You must make arrangements for transportation, TAXI'S, UBER'S OR LYFTS ARE NOT ALLOWED.        If you have any questions about these instructions, call Pre-Op Admit  Nursing at [phone] or the Pre-Op Endoscopy [phone]

## 2023-09-14 ENCOUNTER — HOSPITAL ENCOUNTER (OUTPATIENT)
Facility: HOSPITAL | Age: 73
Discharge: HOME | End: 2023-09-14
Attending: INTERNAL MEDICINE | Admitting: INTERNAL MEDICINE
Payer: MEDICARE

## 2023-09-14 ENCOUNTER — ANESTHESIA (OUTPATIENT)
Dept: SURGERY | Facility: HOSPITAL | Age: 73
End: 2023-09-14
Payer: MEDICARE

## 2023-09-14 ENCOUNTER — ANESTHESIA EVENT (OUTPATIENT)
Dept: SURGERY | Facility: HOSPITAL | Age: 73
End: 2023-09-14
Payer: MEDICARE

## 2023-09-14 VITALS
RESPIRATION RATE: 20 BRPM | HEART RATE: 74 BPM | OXYGEN SATURATION: 99 % | SYSTOLIC BLOOD PRESSURE: 107 MMHG | DIASTOLIC BLOOD PRESSURE: 65 MMHG | HEART RATE: 70 BPM | RESPIRATION RATE: 15 BRPM | SYSTOLIC BLOOD PRESSURE: 116 MMHG | TEMPERATURE: 98 F | DIASTOLIC BLOOD PRESSURE: 73 MMHG | OXYGEN SATURATION: 100 %

## 2023-09-14 DIAGNOSIS — D64.9 ANEMIA: ICD-10-CM

## 2023-09-14 PROCEDURE — D9220A PRA ANESTHESIA: ICD-10-PCS | Mod: CRNA,,, | Performed by: NURSE ANESTHETIST, CERTIFIED REGISTERED

## 2023-09-14 PROCEDURE — 27201114 HC TRAP (ANY): Performed by: INTERNAL MEDICINE

## 2023-09-14 PROCEDURE — 37000009 HC ANESTHESIA EA ADD 15 MINS: Performed by: INTERNAL MEDICINE

## 2023-09-14 PROCEDURE — D9220A PRA ANESTHESIA: Mod: ANES,,, | Performed by: ANESTHESIOLOGY

## 2023-09-14 PROCEDURE — 63600175 PHARM REV CODE 636 W HCPCS: Performed by: NURSE ANESTHETIST, CERTIFIED REGISTERED

## 2023-09-14 PROCEDURE — 25000003 PHARM REV CODE 250: Performed by: INTERNAL MEDICINE

## 2023-09-14 PROCEDURE — D9220A PRA ANESTHESIA: ICD-10-PCS | Mod: ANES,,, | Performed by: ANESTHESIOLOGY

## 2023-09-14 PROCEDURE — D9220A PRA ANESTHESIA: Mod: CRNA,,, | Performed by: NURSE ANESTHETIST, CERTIFIED REGISTERED

## 2023-09-14 PROCEDURE — 45385 COLONOSCOPY W/LESION REMOVAL: CPT | Performed by: INTERNAL MEDICINE

## 2023-09-14 PROCEDURE — 88305 TISSUE EXAM BY PATHOLOGIST: CPT | Mod: TC,59 | Performed by: PATHOLOGY

## 2023-09-14 PROCEDURE — 37000008 HC ANESTHESIA 1ST 15 MINUTES: Performed by: INTERNAL MEDICINE

## 2023-09-14 PROCEDURE — 27201089 HC SNARE, DISP (ANY): Performed by: INTERNAL MEDICINE

## 2023-09-14 RX ORDER — PROPOFOL 10 MG/ML
VIAL (ML) INTRAVENOUS
Status: DISCONTINUED | OUTPATIENT
Start: 2023-09-14 | End: 2023-09-14

## 2023-09-14 RX ADMIN — PROPOFOL 20 MG: 10 INJECTION, EMULSION INTRAVENOUS at 07:09

## 2023-09-14 RX ADMIN — PROPOFOL 70 MG: 10 INJECTION, EMULSION INTRAVENOUS at 07:09

## 2023-09-14 NOTE — ANESTHESIA PREPROCEDURE EVALUATION
09/14/2023  Sid Cuello Jr. is a 72 y.o., male.      Pre-op Assessment    I have reviewed the Patient Summary Reports.     I have reviewed the Nursing Notes. I have reviewed the NPO Status.   I have reviewed the Medications.     Review of Systems  Anesthesia Hx:  No problems with previous Anesthesia  Neg history of prior surgery. Denies Family Hx of Anesthesia complications.   Denies Personal Hx of Anesthesia complications.   Social:  Smoker, No Alcohol Use    Hematology/Oncology:     Oncology Normal    -- Anemia:   EENT/Dental:EENT/Dental Normal   Cardiovascular:   Hypertension CAD asymptomatic CABG/stent (Stent)  hyperlipidemia    Pulmonary:  Pulmonary Normal    Renal/:  Renal/ Normal     Hepatic/GI:  Hepatic/GI Normal    Musculoskeletal:  Musculoskeletal Normal    Neurological:   Peripheral Neuropathy    Endocrine:  Endocrine Normal    Dermatological:  Skin Normal    Psych:  Psychiatric Normal           Physical Exam  General: Well nourished and Alert    Airway:  Mallampati: II   Mouth Opening: Normal  TM Distance: Normal  Tongue: Normal  Neck ROM: Normal ROM    Dental:  Periodontal disease  Very poor dentition, broken and decayed teeth.  Chest/Lungs:  Clear to auscultation, Normal Respiratory Rate    Heart:  Rate: Normal  Rhythm: Regular Rhythm  Sounds: Normal        Anesthesia Plan  Type of Anesthesia, risks & benefits discussed:    Anesthesia Type: Gen Natural Airway  Intra-op Monitoring Plan: Standard ASA Monitors  Post Op Pain Control Plan:   (medical reason for not using multimodal pain management)  Induction:  IV  Informed Consent: Informed consent signed with the Patient and all parties understand the risks and agree with anesthesia plan.  All questions answered. Patient consented to blood products? Yes  ASA Score: 3    Ready For Surgery From Anesthesia Perspective.     .

## 2023-09-14 NOTE — [employer] PATIENT INSTRUCTIONS
Discharge Summary/Instructions after an Endoscopic Procedure  Patient Name: Sid Cuello  Patient MRN: [id number]  Patient YOB: 1950  Thursday, September 14, 2023  Ankur Gaona MD  RESTRICTIONS:  During your procedure today, you received medications for sedation.  These   medications may affect your judgment, balance and coordination.  Therefore,   for 24 hours, you have the following restrictions:   - DO NOT drive a car, operate machinery, make legal/financial decisions,   sign important papers or drink alcohol.    ACTIVITY:  Today: no heavy lifting, straining or running due to procedural   sedation/anesthesia.  The following day: return to full activity including work.  DIET:  Eat and drink normally unless instructed otherwise.     TREATMENT FOR COMMON SIDE EFFECTS:  - Mild abdominal pain, nausea, belching, bloating or excessive gas:  rest,   eat lightly and use a heating pad.  - Sore Throat: treat with throat lozenges and/or gargle with warm salt   water.  - Because air was used during the procedure, expelling large amounts of air   from your rectum or belching is normal.  - If a bowel prep was taken, you may not have a bowel movement for 1-3 days.    This is normal.  SYMPTOMS TO WATCH FOR AND REPORT TO YOUR PHYSICIAN:  1. Abdominal pain or bloating, other than gas cramps.  2. Chest pain.  3. Back pain.  4. Signs of infection such as: chills or fever occurring within 24 hours   after the procedure.  5. Rectal bleeding, which would show as bright red, maroon, or black stools.   (A tablespoon of blood from the rectum is not serious, especially if   hemorrhoids are present.)  6. Vomiting.  7. Weakness or dizziness.  GO DIRECTLY TO THE NEAREST EMERGENCY ROOM IF YOU HAVE ANY OF THE FOLLOWING:      Difficulty breathing              Chills and/or fever over 101 F   Persistent vomiting and/or vomiting blood   Severe abdominal pain   Severe chest pain   Black, tarry stools   Bleeding- more than one  tablespoon   Any other symptom or condition that you feel may need urgent attention  Your doctor recommends these additional instructions:  If any biopsies were taken, your doctors clinic will contact you in 1 to 2   weeks with any results.  - Patient has a contact number available for emergencies.  The signs and   symptoms of potential delayed complications were discussed with the   patient.  Return to normal activities tomorrow.  Written discharge   instructions were provided to the patient.   - Resume previous diet.   - Continue present medications.   - Resume Eliquis (apixaban) at prior dose tomorrow.   - Await pathology results.   - Repeat colonoscopy in 5 years for surveillance.   - Return to GI clinic after studies are complete.   - [place] endoscopy ordered for iron deficiency anemia.  - Discharge patient to home (with escort).  For questions, problems or results please call your physician - Ankur Gaona MD at Work:  [phone].  [facility], EMERGENCY ROOM PHONE NUMBER: [phone]  IF A COMPLICATION OR EMERGENCY SITUATION ARISES AND YOU ARE UNABLE TO REACH   YOUR PHYSICIAN - GO DIRECTLY TO THE EMERGENCY ROOM.  MD Ankur Solorio MD  9/14/2023 7:37:46 AM  This report has been verified and signed electronically.  Dear patient,  As a result of recent federal legislation (The Federal Cures Act), you may   receive lab or pathology results from your procedure in your [facility]   account before your physician is able to contact you. Your physician or   their representative will relay the results to you with their   recommendations at their soonest availability.  Thank you,  PROVATION

## 2023-09-14 NOTE — TRANSFER OF CARE
Anesthesia Transfer of Care Note    Patient: Sid Cuello Jr.    Procedure(s) Performed: Procedure(s) (LRB):  COLONOSCOPY (N/A)    Patient location: GI    Anesthesia Type: general    Transport from OR: Transported from OR on room air with adequate spontaneous ventilation    Post pain: adequate analgesia    Post assessment: no apparent anesthetic complications and tolerated procedure well    Post vital signs: stable    Level of consciousness: awake, alert and oriented    Nausea/Vomiting: no nausea/vomiting    Complications: none    Transfer of care protocol was followed      Last vitals:   Visit Vitals  /72 (BP Location: Left arm, Patient Position: Lying)   Pulse 80   Temp 36.7 °C (98.1 °F) (Oral)   Resp 18   SpO2 (!) 94%

## 2023-09-14 NOTE — ANESTHESIA POSTPROCEDURE EVALUATION
Anesthesia Post Evaluation    Patient: Sid Cuello JrHeather    Procedure(s) Performed: Procedure(s) (LRB):  COLONOSCOPY (N/A)    Final Anesthesia Type: general      Patient location during evaluation: GI PACU  Patient participation: Yes- Able to Participate  Level of consciousness: awake and alert and oriented  Post-procedure vital signs: reviewed and stable  Pain management: adequate  Airway patency: patent    PONV status at discharge: No PONV  Anesthetic complications: no      Cardiovascular status: blood pressure returned to baseline, hemodynamically stable and stable  Respiratory status: unassisted, spontaneous ventilation and room air  Hydration status: euvolemic  Follow-up not needed.          Vitals Value Taken Time   /73 09/14/23 0753   Temp 36.7 °C (98 °F) 09/14/23 0740   Pulse 74 09/14/23 0753   Resp 20 09/14/23 0753   SpO2 99 % 09/14/23 0753         Event Time   Out of Recovery 07:53:00         Pain/Eva Score: Eva Score: 10 (9/14/2023  7:43 AM)

## 2023-09-14 NOTE — H&P
GASTROENTEROLOGY PRE-PROCEDURE H&P NOTE  Patient Name: Sid Cuello Jr.  Patient MRN: [id number]  Patient : 1950    Service date: 2023    PCP: Yan Posada MD    No chief complaint on file.      HPI: Patient is a 72 y.o. male with PMHx as below here for evaluation of  coronary artery disease post PCI, AFib status post cardioversion  admitted to the hospital with acute on chronic shortness of breath.  Found to have severe iron-deficiency anemia not associated with overt bleeding present and progressive since earlier this year. Had negative Cologuard 2023    Past Medical History:  Past Medical History:   Diagnosis Date    Abnormal SPEP 2021    Coronary artery disease     MI   1 c. stent    Discoid lupus     Essential hypertension 8/10/2015    Hyperlipidemia LDL goal < 100 8/10/2015        Past Surgical History:  Past Surgical History:   Procedure Laterality Date    CORONARY ANGIOPLASTY WITH STENT PLACEMENT      CYSTOSCOPY N/A 10/23/2019    Procedure: CYSTOSCOPY (flexible);  Surgeon: David Mera MD;  Location: [facility] OR;  Service: Urology;  Laterality: N/A;    FOOT SURGERY Left     ORCHIECTOMY Right 10/23/2019    Procedure: 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;  Surgeon: David Mera MD;  Location: [facility] OR;  Service: Urology;  Laterality: Right;    SMALL BOWEL ENTEROSCOPY N/A 2023    Procedure: ENTEROSCOPY;  Surgeon: Doc Merritt III, MD;  Location: [facility] ENDO;  Service: Endoscopy;  Laterality: N/A;    TREATMENT OF CARDIAC ARRHYTHMIA N/A 4/10/2023    Procedure: Cardioversion or Defibrillation;  Surgeon: Raymond Sotomayor MD;  Location: [facility] CATH/EP LAB;  Service: Cardiology;  Laterality: N/A;    VASECTOMY          Home Medications:  Medications Prior to Admission   Medication Sig Dispense Refill Last Dose    digoxin (LANOXIN) 125 mcg tablet Take 125 mcg by mouth once daily.   2023    metoprolol succinate (TOPROL-XL) 25 MG 24  hr tablet Take 0.5 tablets (12.5 mg total) by mouth 2 (two) times daily. 30 tablet 0 9/14/2023    atorvastatin (LIPITOR) 10 MG tablet Take 10 mg by mouth once daily.       betamethasone dipropionate (DIPROLENE) 0.05 % lotion Apply thin film to scalp QHS PRN itch 60 mL 2     cyanocobalamin 1,000 mcg/mL injection Inject 1,000 mcg into the muscle every 30 days.       ELIQUIS 5 mg Tab Take 5 mg by mouth 2 (two) times daily.   9/9/2023    fluticasone-umeclidin-vilanter (TRELEGY ELLIPTA) 100-62.5-25 mcg DsDv Inhale 1 puff into the lungs once daily. 60 each 3     furosemide (LASIX) 40 MG tablet Take 1 tablet (40 mg total) by mouth once daily. 30 tablet 0     pantoprazole (PROTONIX) 40 MG tablet Take 1 tablet (40 mg total) by mouth once daily. 30 tablet 0     triamcinolone acetonide 0.1% (KENALOG) 0.1 % cream AAA bid (Patient taking differently: Apply 1 g topically daily as needed. AAA bid) 454 g 3                Review of patient's allergies indicates:  No Known Allergies    Social History:   Social History     Occupational History    Not on file   Tobacco Use    Smoking status: Every Day     Current packs/day: 1.00     Average packs/day: 1 pack/day for 45.7 years (45.7 ttl pk-yrs)     Types: Cigarettes     Start date: 1978    Smokeless tobacco: Never    Tobacco comments:     Pt is a current every day smoker, smokes 1ppd. No interest in quitting at this time.   Substance and Sexual Activity    Alcohol use: Not Currently     Alcohol/week: 14.0 standard drinks of alcohol     Types: 14 Shots of liquor per week    Drug use: Never    Sexual activity: Not on file       Family History:   Family History   Problem Relation Age of Onset    No Known Problems Mother     Melanoma Neg Hx     Psoriasis Neg Hx     Lupus Neg Hx     Eczema Neg Hx        Review of Systems:  A 10 point review of systems was performed and was normal, except as mentioned in the HPI, including constitutional, HEENT, heme, lymph, cardiovascular, respiratory,  "gastrointestinal, genitourinary, neurologic, endocrine, psychiatric and musculoskeletal.      OBJECTIVE:    Physical Exam:  24 Hour Vital Sign Ranges: Temp:  [98.1 °F (36.7 °C)] 98.1 °F (36.7 °C)  Pulse:  [80] 80  Resp:  [18] 18  SpO2:  [94 %] 94 %  BP: (115)/(72) 115/72  Most recent vitals: /72 (BP Location: Left arm, Patient Position: Lying)   Pulse 80   Temp 98.1 °F (36.7 °C) (Oral)   Resp 18   SpO2 (!) 94%    GEN: well-developed, well-nourished, awake and alert, non-toxic appearing adult  HEENT: PERRL, sclera anicteric, oral mucosa pink and moist without lesion  NECK: trachea midline; Good ROM  CV: regular rate and rhythm, no murmurs or gallops  RESP: clear to auscultation bilaterally, no wheezes, rhonci or rales  ABD: soft, non-tender, non-distended, normal bowel sounds  EXT: no swelling or edema, 2+ pulses distally  SKIN: no rashes or jaundice  PSYCH: normal affect    Labs:   No results for input(s): "WBC", "MCV", "PLT" in the last 72 hours.    Invalid input(s): "HGBAU"  No results for input(s): "NA", "K", "CL", "CO2", "BUN", "GLU" in the last 72 hours.    Invalid input(s): "CREA"  No results for input(s): "ALB" in the last 72 hours.    Invalid input(s): "ALKP", "SGOT", "SGPT", "TBIL", "DBIL", "TPRO"  No results for input(s): "PT", "INR", "PTT" in the last 72 hours.      IMPRESSION / RECOMMENDATIONS:  H/o JIMMY  -cscope  with interventions as warranted.   RIsks, benefits, alternatives discussed in detail regarding upcoming procedures and sedation. Some of the more common endoscopic complications include but not limited to immediate or delayed perforation, bleeding, infections, pain, inadvertent injury to surrounding tissue / organs and possible need for surgical evaluation. Patient expressed understanding, all questions answered and will proceed with procedure as planned.     Ankur Gaona  9/14/2023  7:05 AM      "

## 2023-09-20 NOTE — TELEPHONE ENCOUNTER
"Patient scheduled for pre op.  He will proceed on 10/23.  He said to make you aware that he had a vasectomy in 1988.  He also said if he does not have to have his "whole nut" removed, he would prefer not to.    " Zygomaticofacial Flap Text: Given the location of the defect, shape of the defect and the proximity to free margins a zygomaticofacial flap was deemed most appropriate for repair. Using a sterile surgical marker, the appropriate flap was drawn incorporating the defect and placing the expected incisions within the relaxed skin tension lines where possible. The area thus outlined was incised deep to adipose tissue with a #15 scalpel blade with preservation of a vascular pedicle.  The skin margins were undermined to an appropriate distance in all directions utilizing iris scissors. The flap was then carried over into the defect and anchored with interrupted buried subcutaneous sutures.

## 2023-09-28 ENCOUNTER — TELEPHONE (OUTPATIENT)
Dept: SMOKING CESSATION | Facility: CLINIC | Age: 73
End: 2023-09-28
Payer: MEDICARE

## 2023-10-09 ENCOUNTER — TELEPHONE (OUTPATIENT)
Dept: SMOKING CESSATION | Facility: CLINIC | Age: 73
End: 2023-10-09
Payer: MEDICARE

## 2023-10-17 ENCOUNTER — TELEPHONE (OUTPATIENT)
Dept: UROLOGY | Facility: CLINIC | Age: 73
End: 2023-10-17

## 2023-10-17 NOTE — TELEPHONE ENCOUNTER
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

## 2023-10-18 NOTE — TELEPHONE ENCOUNTER
Spoke w pt he was informed of MD recs   For sooner appt to reest and further eval   Pt vu   Appt rescheduled sooner with angelica  Pt agreed with appt

## 2023-10-19 ENCOUNTER — OFFICE VISIT (OUTPATIENT)
Dept: UROLOGY | Facility: CLINIC | Age: 73
End: 2023-10-19
Payer: MEDICARE

## 2023-10-19 VITALS
BODY MASS INDEX: 21.62 KG/M2 | WEIGHT: 145.94 LBS | HEIGHT: 69 IN | SYSTOLIC BLOOD PRESSURE: 116 MMHG | HEART RATE: 74 BPM | DIASTOLIC BLOOD PRESSURE: 73 MMHG

## 2023-10-19 DIAGNOSIS — K40.90 UNILATERAL INGUINAL HERNIA WITHOUT OBSTRUCTION OR GANGRENE, RECURRENCE NOT SPECIFIED: ICD-10-CM

## 2023-10-19 DIAGNOSIS — K40.90 LEFT INGUINAL HERNIA: Primary | ICD-10-CM

## 2023-10-19 PROCEDURE — 99203 PR OFFICE/OUTPT VISIT, NEW, LEVL III, 30-44 MIN: ICD-10-PCS | Mod: S$PBB,,, | Performed by: NURSE PRACTITIONER

## 2023-10-19 PROCEDURE — 99999 PR PBB SHADOW E&M-EST. PATIENT-LVL IV: CPT | Mod: PBBFAC,,, | Performed by: NURSE PRACTITIONER

## 2023-10-19 PROCEDURE — 99203 OFFICE O/P NEW LOW 30 MIN: CPT | Mod: S$PBB,,, | Performed by: NURSE PRACTITIONER

## 2023-10-19 PROCEDURE — 99214 OFFICE O/P EST MOD 30 MIN: CPT | Mod: PBBFAC,PO | Performed by: NURSE PRACTITIONER

## 2023-10-19 PROCEDURE — 99999 PR PBB SHADOW E&M-EST. PATIENT-LVL IV: ICD-10-PCS | Mod: PBBFAC,,, | Performed by: NURSE PRACTITIONER

## 2023-10-19 NOTE — PROGRESS NOTES
"[facility] Note  Staff: TIFFANY Aguirre-JULIETTE    PCP: MD Albino  Urologist:  MD Ede    Chief Complaint: "Swollen area near the bladder"    Subjective:        HPI: Sid Cuello Jr. is a 72 y.o. male presents today for evaluation of "bulging area near the left side of his bladder".  Pt states today he has noticed this for over a month.  It is not painful or bothersome to the pt at this time.  Pt is currently not having any urinary issues.     HX:  Pt was last evaluated by Dr. Mera on 11/8/2019.  right inguinal radical orchiectomy for paratesticular mass.     presented in April 2019 with right scrotal mass, paratesticular.  On examination a for mass was felt in the right inguinal canal which could be brought down into the upper hemiscrotum noting to be a 2-3 cm discrete firm round smooth mobile mass of spermatic cord origin.  He was also noted to have microscopic blood in his urine with 5 red blood cells per high-power field on urinalysis microscopic.  Scrotal ultrasound demonstrated a 2.4 cm cystic mass in the high right hemiscrotum with a thickened septation demonstrating Doppler blood flow raising consideration for neoplasm.  Follow-up CT urogram to workup his microscopic hematuria was negative in the urinary tract, and carried through the mid thigh to evaluate the partesticular mass.  A cystic mass containing moderately thickened septation in the distal right inguinal canal measuring 2.7 cm with a few mildly enlarged bilateral iliofemoral lymph nodes measuring up to 11 mm in short axis was noted.  He was scheduled for cystoscopy to complete his microscopic hematuria workup in further discussion of management of his scrotal mass, but he cancelled.  Of note he has a 1 pack per day smoker and takes aspirin and Plavix. We did have a long discussion about the differential diagnosis of paratesticular masses, noting approximately 70% to be benign, though given imaging findings with " vascularity concerning for neoplasm, discussed surgical excision. He elected to proceed with right radical inguinal orchiectomy (with possible excision of mass only a found to be distinctly separate from spermatic cord) after discussion of risks, benefits, and alternatives.  Elected to proceed with flexible cystoscopy concurrent with this procedure to complete his microhematuria workup.     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   Cystoscopy with moderate prostatic lateral lobe obstruction with minimal bladder trabeculation and hypervascularity over impression of prostate at bladder base otherwise negative     Has done well postop.  Does have right scrotal swelling, minimal.  Has been wearing supportive underwear  No pain  Denies leg swelling, noted previous concern from his wife was after wearing tight socks  Has occasional weak stream but no sperm draining and notes his stream is pretty constant.  No bothersome lower urinary tract symptoms.     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  There is also an epididymal cyst 0.5 cm.      REVIEW OF SYSTEMS:  A comprehensive 10 system review was performed and is negative except as noted above in HPI    PMHx:  Past Medical History:   Diagnosis Date    Abnormal SPEP 4/29/2021    Coronary artery disease 2007    MI   1 c. stent    Discoid lupus     Essential hypertension 8/10/2015    Hyperlipidemia LDL goal < 100 8/10/2015     PSHx:  Past Surgical History:   Procedure Laterality Date    COLONOSCOPY N/A 9/14/2023    Procedure: COLONOSCOPY;  Surgeon: Ankur Gaona MD;  Location: [facility];  Service: Endoscopy;  Laterality: N/A;    COLONOSCOPY W/ POLYPECTOMY  09/14/2023    CORONARY ANGIOPLASTY WITH STENT PLACEMENT  2007    CYSTOSCOPY N/A 10/23/2019    Procedure: CYSTOSCOPY (flexible);   Surgeon: David Mera MD;  Location: [facility] OR;  Service: Urology;  Laterality: N/A;    FOOT SURGERY Left 1999    ORCHIECTOMY Right 10/23/2019    Procedure: 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;  Surgeon: David Mera MD;  Location: [facility] OR;  Service: Urology;  Laterality: Right;    SMALL BOWEL ENTEROSCOPY N/A 08/22/2023    Procedure: ENTEROSCOPY;  Surgeon: Doc Merritt III, MD;  Location: [facility] ENDO;  Service: Endoscopy;  Laterality: N/A;    TREATMENT OF CARDIAC ARRHYTHMIA N/A 04/10/2023    Procedure: Cardioversion or Defibrillation;  Surgeon: Raymond Sotomayor MD;  Location: [facility] CATH/EP LAB;  Service: Cardiology;  Laterality: N/A;    VASECTOMY       Allergies:  Patient has no known allergies.    Medications: reviewed   Objective:     Vitals:    10/19/23 1307   BP: 116/73   Pulse: 74       General:WDWN in NAD  Eyes: PERRLA, normal conjunctiva  Respiratory: no increased work on breathing, clear to auscultation  Cardiovascular: regular rate and rhythm. No obvious extremity edema.  GI: palpation of masses. No tenderness. No hepatosplenomegaly to palpation.  Musculoskeletal: normal range of motion of bilateral upper extremities. Normal muscle strength and tone.  Skin: no obvious rashes or lesions. No tightening of skin noted.  Neurologic: CN grossly normal. Normal sensation.   Psychiatric: awake, alert and oriented x 3. Mood and affect normal. Cooperative.  :  Lower pelvic/abdominal area LEFT SIDE: Soft tissue protrusion palpated on exam.  No tenderness observed. +Left inguinal hernia.  Assessment:       1. Left inguinal hernia    2. Unilateral inguinal hernia without obstruction or gangrene, recurrence not specified          Plan:     Pt will defer any referral to general surgeon at this time.  Area of concern is not bothersome.  He will schedule f/up in the future with a General surgeon should his symptoms change.    [facility]: Active    Maru Madden, NAVDEEPP-C

## 2023-10-30 PROBLEM — D64.9 NORMOCHROMIC NORMOCYTIC ANEMIA: Status: ACTIVE | Noted: 2023-10-30

## 2023-10-30 PROBLEM — D50.9 ANEMIA, IRON DEFICIENCY: Status: ACTIVE | Noted: 2023-10-30

## 2023-10-30 PROBLEM — D63.8 ANEMIA, CHRONIC DISEASE: Status: ACTIVE | Noted: 2023-10-30

## 2023-10-30 NOTE — PROGRESS NOTES
[facility] Hematology/Oncology  PROGRESS NOTE -   Follow-up Visit      Subjective:       Patient ID:   NAME: Sid Cuello Jr. : 1950     73 y.o. male    Referring Doc: Светлана  Other Physicians: Rose Mary Posada           Chief Complaint: abn SPEP f/u       History of Present Illness:     Patient returns today for a regularly scheduled follow-up visit.  The patient is here today to go over the results of the recently ordered labs, tests and studies. He is here by himself.    He was hospitalized couple of times with SOB and fluid issues. He required blood transfusion last admit. He has EGD, colonoscopy and capsule endoscopy with Dr Gaona. (two benign polyps in colon)      He Maru Madden NP with urology on 10/19 due to some left inguinal hernia      He has chronic neuropathy issues in feet; chronic itching; He continues to have residual fatigue and general lack of energy;        Breathing ok. No CP, SOB, HA's or N/V.    He saw Dr Posada in 2023                Discussed covid19 precautions and he had his vaccinations        ROS:   GEN: normal without any fever, night sweats or weight loss; chronic fatigue; chronic neuropathy in feet  HEENT: normal with no HA's, sore throat, stiff neck, changes in vision  CV: normal with no CP, SOB, PND, TRAN or orthopnea  PULM: normal with no SOB, cough, hemoptysis, sputum or pleuritic pain  GI: normal with no abdominal pain, nausea, vomiting, constipation, diarrhea, melanotic stools, BRBPR, or hematemesis  : normal with no hematuria, dysuria  BREAST: normal with no mass, discharge, pain  SKIN: normal with no rash, erythema, bruising, or swelling    Pain Scale: 0    Allergies:  Review of patient's allergies indicates:  No Known Allergies    Medications:    Current Outpatient Medications:     atorvastatin (LIPITOR) 10 MG tablet, Take 10 mg by mouth once daily., Disp: , Rfl:     betamethasone dipropionate (DIPROLENE) 0.05 % lotion, Apply thin film to scalp QHS  PRN itch, Disp: 60 mL, Rfl: 2    cyanocobalamin 1,000 mcg/mL injection, Inject 1,000 mcg into the muscle every 30 days., Disp: , Rfl:     digoxin (LANOXIN) 125 mcg tablet, Take 125 mcg by mouth once daily., Disp: , Rfl:     ELIQUIS 5 mg Tab, Take 5 mg by mouth 2 (two) times daily., Disp: , Rfl:     fluticasone-umeclidin-vilanter (TRELEGY ELLIPTA) 100-62.5-25 mcg DsDv, Inhale 1 puff into the lungs once daily., Disp: 60 each, Rfl: 3    furosemide (LASIX) 40 MG tablet, Take 1 tablet (40 mg total) by mouth once daily., Disp: 30 tablet, Rfl: 0    metoprolol succinate (TOPROL-XL) 25 MG 24 hr tablet, Take 0.5 tablets (12.5 mg total) by mouth 2 (two) times daily., Disp: 30 tablet, Rfl: 0    triamcinolone acetonide 0.1% (KENALOG) 0.1 % cream, AAA bid (Patient taking differently: Apply 1 g topically daily as needed. AAA bid), Disp: 454 g, Rfl: 3  No current facility-administered medications for this visit.    Facility-Administered Medications Ordered in Other Visits:     diphenhydrAMINE injection 25 mg, 25 mg, Intravenous, Q6H PRN, Dhruv Gutierrez MD    fentaNYL injection 25 mcg, 25 mcg, Intravenous, Q5 Min PRN, Dhruv Gutierrez MD    hydromorphone (PF) injection 0.2 mg, 0.2 mg, Intravenous, Q5 Min PRN, Dhruv Gutierrez MD    lactated ringers infusion, 10 mL/hr, Intravenous, Continuous, Dhruv Gutierrez MD, Stopped at 10/23/19 1450    lactated ringers infusion, 75 mL/hr, Intravenous, Continuous, Dhruv Gutierrez MD    lidocaine (PF) 10 mg/ml (1%) injection 10 mg, 1 mL, Intradermal, Once, Dhruv Gutierrez MD    ondansetron injection 4 mg, 4 mg, Intravenous, Once, Dhruv Gutierrez MD    oxyCODONE immediate release tablet 5 mg, 5 mg, Oral, PRN, Dhruv Gutierrez MD    promethazine (PHENERGAN) 6.25 mg in dextrose 5 % 50 mL IVPB, 6.25 mg, Intravenous, Q10 Min PRN, Dhruv Gutierrez MD    sodium chloride 0.9% flush 3 mL, 3 mL, Intravenous, Q8H, Dhruv Gutierrez MD    sodium chloride  "0.9% flush 3 mL, 3 mL, Intravenous, PRN, Dhruv Gutierrez MD    PMHx/PSHx Updates:  See patient's last visit with me on 3/2/2023  See H&P on 4/30/2021        Pathology:   Cancer Staging   No matching staging information was found for the patient.    Bone marrow biopsy 1/10/2022:    BONE MARROW, RIGHT ILIAC CREST, ASPIRATE, CLOT SECTION, AND CORE BIOPSY:   --CELLULAR MARROW (APPROXIMATELY 25% TO 35%) WITH TRILINEAGE    HEMATOPOIETIC ELEMENTS AND OCCASIONAL   PLASMA CELLS; FURTHER CHARACTERIZATION PENDING IMMUNOHISTOCHEMISTRY;    FINAL DIAGNOSIS TO FOLLOW.   --PERIPHERAL BLOOD WITH NORMAL THROMBOCYTE COUNT (164,000/MICROLITER);    ANEMIA (HEMOGLOBIN 12.5 GRAM/     DECILITER); AND LEUKOPENIA (3,390/MICROLITER).     Plasma cells (bright CD38+/+) comprise approximately 2% of the    total sample and demonstrate polyclonal expression of immunoglobulin    light chains. The plasma cell population co-expresses CD19.      INTERPRETATION:     Immunophenotyping fails to identify any abnormal cell populations. A    small population (approximately 2%) of polyclonal plasma cells is    present.      Objective:     Vitals:  Blood pressure 133/88, pulse 106, temperature 97.2 °F (36.2 °C), resp. rate 16, height 5' 9" (1.753 m), weight 67.9 kg (149 lb 9.6 oz).    Physical Examination:   GEN: no apparent distress, comfortable; AAOx3; thin  HEAD: atraumatic and normocephalic; alopecia stable due to the lupus  EYES: no pallor, no icterus, PERRLA  ENT: OMM, no pharyngeal erythema, external ears WNL; no nasal discharge; no thrush  NECK: no masses, thyroid normal, trachea midline, no LAD/LN's, supple  CV: RRR with no murmur; normal pulse; normal S1 and S2; no pedal edema  CHEST: Normal respiratory effort; CTAB; normal breath sounds; no wheeze or crackles  ABDOM: nontender and nondistended; soft; normal bowel sounds; no rebound/guarding  MUSC/Skeletal: ROM normal; no crepitus; joints normal; no deformities or arthropathy  EXTREM: no " clubbing, cyanosis, inflammation or swelling  SKIN: no rashes, lesions, ulcers, petechiae or subcutaneous nodules; chronic age related skin changes; 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  : no toussaint  NEURO: grossly intact; motor/sensory WNL; AAOx3; no tremors  PSYCH: normal mood, affect and behavior  LYMPH: normal cervical, supraclavicular, axillary and groin LN's        Labs:     Lab Results   Component Value Date    WBC 6.68 08/26/2023    HGB 9.0 (L) 08/26/2023    HCT 29.2 (L) 08/26/2023    MCV 88 08/26/2023     08/26/2023     CMP  Sodium   Date Value Ref Range Status   08/26/2023 134 (L) 136 - 145 mmol/L Final     Potassium   Date Value Ref Range Status   08/26/2023 3.7 3.5 - 5.1 mmol/L Final     Chloride   Date Value Ref Range Status   08/26/2023 99 95 - 110 mmol/L Final     CO2   Date Value Ref Range Status   08/26/2023 25 23 - 29 mmol/L Final     Glucose   Date Value Ref Range Status   08/26/2023 88 70 - 110 mg/dL Final     BUN   Date Value Ref Range Status   08/26/2023 25 (H) 8 - 23 mg/dL Final     Creatinine   Date Value Ref Range Status   08/26/2023 1.2 0.5 - 1.4 mg/dL Final     Calcium   Date Value Ref Range Status   08/26/2023 9.0 8.7 - 10.5 mg/dL Final     Total Protein   Date Value Ref Range Status   08/26/2023 9.7 (H) 6.0 - 8.4 g/dL Final     Albumin   Date Value Ref Range Status   08/26/2023 3.2 (L) 3.5 - 5.2 g/dL Final     Total Bilirubin   Date Value Ref Range Status   08/26/2023 0.8 0.1 - 1.0 mg/dL Final     Comment:     For infants and newborns, interpretation of results should be based  on gestational age, weight and in agreement with clinical  observations.    Premature Infant recommended reference ranges:  Up to 24 hours.............<8.0 mg/dL  Up to 48 hours............<12.0 mg/dL  3-5 days..................<15.0 mg/dL  6-29 days.................<15.0 mg/dL       Alkaline Phosphatase   Date Value Ref Range Status   08/26/2023 100 55 - 135 U/L Final     AST   Date Value Ref Range Status    08/26/2023 28 10 - 40 U/L Final     ALT   Date Value Ref Range Status   08/26/2023 15 10 - 44 U/L Final     Anion Gap   Date Value Ref Range Status   08/26/2023 10 8 - 16 mmol/L Final     eGFR if    Date Value Ref Range Status   07/21/2022 >60.0 >60 mL/min/1.73 m^2 Final     eGFR if non    Date Value Ref Range Status   07/21/2022 >60.0 >60 mL/min/1.73 m^2 Final     Comment:     Calculation used to obtain the estimated glomerular filtration  rate (eGFR) is the CKD-EPI equation.         Kappa/Lambda FLC Ratio 0.26 - 1.65 1.17       Beta-2 Microglobulin 0.0 - 2.5 ug/mL 5.2        IgG 650 - 1600 mg/dL 3848 High   3756   Comment: IgG Cord Blood Reference Range: 650-1600 mg/dL.   IgA 40 - 350 mg/dL 848 High   783   Comment: IgA Cord Blood Reference Range: <5 mg/dL.   IgM 50 - 300 mg/dL 141  128   Comment: IgM Cord Blood Reference Range: <25 mg/dL.       Pathologist Interpretation UPE REVIEWED    Comment:    Electronically reviewed and signed by:   Peri Narvaez MD   Signed on 01/27/23 at 10:46   No paraprotein bands identified.        Pathologist Interpretation SPE REVIEWED    Comment:    Electronically reviewed and signed by:   Peri Narvaez MD   Signed on 01/24/23 at 15:18   Increased total protein. Increased gamma globulin, polyclonal. No   paraprotein bands detected.        Lab Results   Component Value Date    IRON 29 (L) 08/26/2023    TRANSFERRIN 299 08/26/2023    TIBC 419 08/26/2023    FESATURATED 7 (L) 08/26/2023      Lab Results   Component Value Date    FERRITIN 126 08/26/2023     Lab Results   Component Value Date    LHTLPLTO28 820 08/26/2023     Lab Results   Component Value Date    FOLATE 8.0 08/26/2023     Kappa/Lambda FLC Ratio 0.2600 - 1.65 1.51      SPE Interp.  SEE BELOW       Comment: Small abnormality in gamma fraction.     Polyclonal hypergammaglobulinemia      M-Protein Isotype  No monoclonal protein detected     IgA 61 - 356 mg/dL 653 High      IgM 37 -  286 mg/dL 101      IgG 767 - 1590 mg/dL 3240      Beta-2 Microglobulin 1.21 - 2.70 mcg/mL 4.27        Radiology/Diagnostic Studies:    No results found.    I have reviewed all available lab results and radiology reports.    Assessment/Plan:   (1) 73 y.o. male  with diagnosis of abnormal SPEP who has been referred by Dr Sotomayor for evaluation by medical hematology/oncology.   -  He had an SPEP on 4/16/2021 with no evidence of any M-protein He sees Dr Anglin for discoid Lupus and I suspect that this is the cause of the protein issues seen on the labs.    5/27/2021:  - IgG is elevated but the immunofoxation studies in the serum and urine are both negative for any M-protein  - kappa/lambda ratio is also WNL  - I do not suspect multiple myeloma at this time  - I recommended referral to see Dr Blanco at [facility] (who is a paraproteinemia specialist) and also consideration for a bone marrow biopsy to further elucidate - however, he is not inclined to doing either at this time  - will repeat protein studies every 6 months      8/26/2021:  - patient here for short-term f/u visit  - repeat protein studies are due in Nov 2021  -recommended referral to see Dr Blanco at [facility] (who is a paraproteinemia specialist) and also consideration for a bone marrow biopsy to further elucidate - however, he was not inclined to doing either previously and does not want to go to [place]  - I have already told the patient that this is the only way one can get a second opinion    1/3/2022:  - he is adamant that he does not want to go to [place] or [facility]  - discussed again with the patient about getting a bone marrow biopsy and he is now willing to consider getting one done  - I explained to him that we are limited as to what I can do for him by what he will or will not do     2/1/2022:  - recent bone marrow biopsy on 1/10/2022 with only a 2% population of polyclonal plasma cells with on abnormal immunophenotypical population identified  - he  declined referral to Dr Blanco at [facility]  - will proceed with observation and monitoring of his labs and protein studies for now    8/1/2022:  - discussed the results of the latest protein studies and they seem to be relatively stable; again there in no monoclonal component to his gammopathy  - he is adamant that he does not want to be evaluated by my proteinopathy specialist Dr Blanco at [facility]  - continue with observtaion    3/2/2023:  - his protein studies are relatively stable with no M-protein component; I do not suspect he has multiple myeloma  - 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  - he previously declined to go see Dr Blanco at [facility] who is a paraproteinemia/myeloma specialist  - discussed Dr Sotomayor's concerns with the patient and recommended consideration for CT Chest/Abdom/pelvis but patient is not inclined to having any scans done at this time    10/31/2023:  - protein studies in Aug 2023 relatively stable with no Monoclonal or M-protein component  - K/L ratio WNL and Ig panel stable    (3) Anemia - NCNC parameters - iron and iron sat were both low  - most likely a multifactorial process with underlying anemia of chronic diease and iron deficiency anemia    10/31/2023:  - he required blood transfusion  - latest labs from Sept 2023 with Dr Sotomayor hgb was 10  - his iron levels are low - he is not on any supplements  - he had EGD, colonoscopy and capsule endoscopy with Dr Gaona/René since last visit - only two benign colon polyps  - start ICAr-C po daily and monitor labs monthly, if unable to tolerate or ineffective, then will consider IV iron     (2) CAD s/p stent - followed by Dr Sotomayor     (3) HTN and Hypercholeterolemia     (4) Discoid Lupus/SLE - followed by Dr Anglin       (5) Testicular/Inguinal hernia - s/p surgery - followed by Dr Mera      VISIT DIAGNOSES:      Abnormal SPEP    Smoker    Tobacco abuse    Anemia, unspecified  type    Elevated MCV    Normochromic normocytic anemia    Anemia, chronic disease    Iron deficiency anemia, unspecified iron deficiency anemia type          PLAN:  1. continue with observation and regular monitoring of labs  2. he previously declined referral to see Dr Blanco at [facility] and is not inclined to having any scans done at this time  3. Encourage tobacco cessation  4. Previously recommended consideration for referral to neurology for his neuropathy issues  5. Repeat protein studies every 6 months  6. F/u with PCP, Card, Derm and        RTC in 6 months  Fax note to Albino Sotomayor Pinsky, Erickson, Safa       Discussion:     COVID-19 Discussion:    I had long discussion with patient and any applicable family about the COVID-19 coronavirus epidemic and the recommended precautions with regard to cancer and/or hematology patients. I have re-iterated the CDC recommendations for adequate hand washing, use of hand -like products, and coughing into elbow, etc. In addition, especially for our patients who are on chemotherapy and/or our otherwise immunocompromised patients, I have recommended avoidance of crowds, including movie theaters, restaurants, churches, etc. I have recommended avoidance of any sick or symptomatic family members and/or friends. I have also recommended avoidance of any raw and unwashed food products, and general avoidance of food items that have not been prepared by themselves. The patient has been asked to call us immediately with any symptom developments, issues, questions or other general concerns.       I spent over 25 mins of time with the patient. Reviewed results of the recently ordered labs, tests and studies; made directives with regards to the results. Over half of this time was spent couseling and coordinating care.    I have explained all of the above in detail and the patient understands all of the current recommendation(s). I have answered all of their questions to  the best of my ability and to their complete satisfaction.   The patient is to continue with the current management plan.            Electronically signed by Antolin Javier MD                       Answers submitted by the patient for this visit:  Review of Systems Questionnaire (Submitted on 10/28/2023)  appetite change : No  unexpected weight change: Yes  mouth sores: No  visual disturbance: No  cough: No  shortness of breath: Yes  chest pain: No  abdominal pain: No  diarrhea: No  frequency: Yes  back pain: No  rash: No  headaches: No  adenopathy: No  nervous/ anxious: No

## 2023-10-31 ENCOUNTER — OFFICE VISIT (OUTPATIENT)
Dept: HEMATOLOGY/ONCOLOGY | Facility: CLINIC | Age: 73
End: 2023-10-31
Payer: MEDICARE

## 2023-10-31 VITALS
HEIGHT: 69 IN | SYSTOLIC BLOOD PRESSURE: 133 MMHG | HEART RATE: 106 BPM | DIASTOLIC BLOOD PRESSURE: 88 MMHG | BODY MASS INDEX: 22.16 KG/M2 | TEMPERATURE: 97 F | RESPIRATION RATE: 16 BRPM | WEIGHT: 149.63 LBS

## 2023-10-31 DIAGNOSIS — D50.9 IRON DEFICIENCY ANEMIA, UNSPECIFIED IRON DEFICIENCY ANEMIA TYPE: ICD-10-CM

## 2023-10-31 DIAGNOSIS — F17.200 SMOKER: Chronic | ICD-10-CM

## 2023-10-31 DIAGNOSIS — Z72.0 TOBACCO ABUSE: ICD-10-CM

## 2023-10-31 DIAGNOSIS — R71.8 ELEVATED MCV: ICD-10-CM

## 2023-10-31 DIAGNOSIS — D64.9 NORMOCHROMIC NORMOCYTIC ANEMIA: ICD-10-CM

## 2023-10-31 DIAGNOSIS — D63.8 ANEMIA, CHRONIC DISEASE: ICD-10-CM

## 2023-10-31 DIAGNOSIS — D64.9 ANEMIA, UNSPECIFIED TYPE: Chronic | ICD-10-CM

## 2023-10-31 DIAGNOSIS — R77.8 ABNORMAL SPEP: Primary | ICD-10-CM

## 2023-10-31 PROCEDURE — 99213 OFFICE O/P EST LOW 20 MIN: CPT | Mod: S$GLB,,, | Performed by: INTERNAL MEDICINE

## 2023-10-31 PROCEDURE — 99213 PR OFFICE/OUTPT VISIT, EST, LEVL III, 20-29 MIN: ICD-10-PCS | Mod: S$GLB,,, | Performed by: INTERNAL MEDICINE

## 2023-10-31 RX ORDER — IRON,CARBONYL/ASCORBIC ACID 100-250 MG
1 TABLET ORAL DAILY
Qty: 30 EACH | Refills: 6 | Status: SHIPPED | OUTPATIENT
Start: 2023-10-31

## 2023-11-29 ENCOUNTER — LAB VISIT (OUTPATIENT)
Dept: LAB | Facility: HOSPITAL | Age: 73
End: 2023-11-29
Attending: INTERNAL MEDICINE
Payer: MEDICARE

## 2023-11-29 DIAGNOSIS — D64.9 NORMOCHROMIC NORMOCYTIC ANEMIA: ICD-10-CM

## 2023-11-29 DIAGNOSIS — D63.8 ANEMIA, CHRONIC DISEASE: ICD-10-CM

## 2023-11-29 DIAGNOSIS — R77.8 ABNORMAL SPEP: ICD-10-CM

## 2023-11-29 DIAGNOSIS — R71.8 ELEVATED MCV: ICD-10-CM

## 2023-11-29 DIAGNOSIS — D50.9 IRON DEFICIENCY ANEMIA, UNSPECIFIED IRON DEFICIENCY ANEMIA TYPE: ICD-10-CM

## 2023-11-29 DIAGNOSIS — D64.9 ANEMIA, UNSPECIFIED TYPE: Chronic | ICD-10-CM

## 2023-11-29 LAB
ALBUMIN SERPL BCP-MCNC: 3.4 G/DL (ref 3.5–5.2)
ALP SERPL-CCNC: 111 U/L (ref 55–135)
ALT SERPL W/O P-5'-P-CCNC: 12 U/L (ref 10–44)
ANION GAP SERPL CALC-SCNC: 8 MMOL/L (ref 8–16)
AST SERPL-CCNC: 23 U/L (ref 10–40)
BASOPHILS # BLD AUTO: 0.05 K/UL (ref 0–0.2)
BASOPHILS NFR BLD: 1.1 % (ref 0–1.9)
BILIRUB SERPL-MCNC: 0.7 MG/DL (ref 0.1–1)
BUN SERPL-MCNC: 14 MG/DL (ref 8–23)
CALCIUM SERPL-MCNC: 8.9 MG/DL (ref 8.7–10.5)
CHLORIDE SERPL-SCNC: 99 MMOL/L (ref 95–110)
CO2 SERPL-SCNC: 25 MMOL/L (ref 23–29)
CREAT SERPL-MCNC: 1.1 MG/DL (ref 0.5–1.4)
DIFFERENTIAL METHOD: ABNORMAL
EOSINOPHIL # BLD AUTO: 0.1 K/UL (ref 0–0.5)
EOSINOPHIL NFR BLD: 1.8 % (ref 0–8)
ERYTHROCYTE [DISTWIDTH] IN BLOOD BY AUTOMATED COUNT: 19.8 % (ref 11.5–14.5)
EST. GFR  (NO RACE VARIABLE): >60 ML/MIN/1.73 M^2
FERRITIN SERPL-MCNC: 55 NG/ML (ref 20–300)
GLUCOSE SERPL-MCNC: 87 MG/DL (ref 70–110)
HCT VFR BLD AUTO: 34.4 % (ref 40–54)
HGB BLD-MCNC: 10.5 G/DL (ref 14–18)
IMM GRANULOCYTES # BLD AUTO: 0.01 K/UL (ref 0–0.04)
IMM GRANULOCYTES NFR BLD AUTO: 0.2 % (ref 0–0.5)
IRON SERPL-MCNC: 33 UG/DL (ref 45–160)
LYMPHOCYTES # BLD AUTO: 1.8 K/UL (ref 1–4.8)
LYMPHOCYTES NFR BLD: 40.3 % (ref 18–48)
MCH RBC QN AUTO: 28.2 PG (ref 27–31)
MCHC RBC AUTO-ENTMCNC: 30.5 G/DL (ref 32–36)
MCV RBC AUTO: 93 FL (ref 82–98)
MONOCYTES # BLD AUTO: 0.5 K/UL (ref 0.3–1)
MONOCYTES NFR BLD: 11.1 % (ref 4–15)
NEUTROPHILS # BLD AUTO: 2 K/UL (ref 1.8–7.7)
NEUTROPHILS NFR BLD: 45.5 % (ref 38–73)
NRBC BLD-RTO: 0 /100 WBC
PLATELET # BLD AUTO: 320 K/UL (ref 150–450)
PMV BLD AUTO: 9.5 FL (ref 9.2–12.9)
POTASSIUM SERPL-SCNC: 4.2 MMOL/L (ref 3.5–5.1)
PROT SERPL-MCNC: 8.8 G/DL (ref 6–8.4)
RBC # BLD AUTO: 3.72 M/UL (ref 4.6–6.2)
SATURATED IRON: 8 % (ref 20–50)
SODIUM SERPL-SCNC: 132 MMOL/L (ref 136–145)
TOTAL IRON BINDING CAPACITY: 399 UG/DL (ref 250–450)
TRANSFERRIN SERPL-MCNC: 285 MG/DL (ref 200–375)
WBC # BLD AUTO: 4.42 K/UL (ref 3.9–12.7)

## 2023-11-29 PROCEDURE — 82728 ASSAY OF FERRITIN: CPT | Performed by: INTERNAL MEDICINE

## 2023-11-29 PROCEDURE — 85025 COMPLETE CBC W/AUTO DIFF WBC: CPT | Performed by: INTERNAL MEDICINE

## 2023-11-29 PROCEDURE — 83540 ASSAY OF IRON: CPT | Performed by: INTERNAL MEDICINE

## 2023-11-29 PROCEDURE — 36415 COLL VENOUS BLD VENIPUNCTURE: CPT | Performed by: INTERNAL MEDICINE

## 2023-11-29 PROCEDURE — 80053 COMPREHEN METABOLIC PANEL: CPT | Performed by: INTERNAL MEDICINE

## 2023-11-29 PROCEDURE — 84466 ASSAY OF TRANSFERRIN: CPT | Performed by: INTERNAL MEDICINE

## 2023-12-28 ENCOUNTER — OFFICE VISIT (OUTPATIENT)
Dept: DERMATOLOGY | Facility: CLINIC | Age: 73
End: 2023-12-28
Payer: MEDICARE

## 2023-12-28 VITALS — BODY MASS INDEX: 22.07 KG/M2 | HEIGHT: 69 IN | WEIGHT: 149 LBS

## 2023-12-28 DIAGNOSIS — L29.9 PRURITUS: ICD-10-CM

## 2023-12-28 DIAGNOSIS — R77.8 ABNORMAL SPEP: ICD-10-CM

## 2023-12-28 DIAGNOSIS — L93.0 DISCOID LUPUS: Primary | ICD-10-CM

## 2023-12-28 PROCEDURE — 99214 PR OFFICE/OUTPT VISIT, EST, LEVL IV, 30-39 MIN: ICD-10-PCS | Mod: S$GLB,,, | Performed by: DERMATOLOGY

## 2023-12-28 PROCEDURE — 99214 OFFICE O/P EST MOD 30 MIN: CPT | Mod: S$GLB,,, | Performed by: DERMATOLOGY

## 2023-12-28 RX ORDER — TACROLIMUS 1 MG/G
OINTMENT TOPICAL
Qty: 30 G | Refills: 5 | Status: SHIPPED | OUTPATIENT
Start: 2023-12-28

## 2023-12-28 RX ORDER — GABAPENTIN 300 MG/1
300 CAPSULE ORAL NIGHTLY
Qty: 30 CAPSULE | Refills: 11 | Status: SHIPPED | OUTPATIENT
Start: 2023-12-28 | End: 2024-12-27

## 2023-12-28 NOTE — PROGRESS NOTES
Subjective:      Patient ID:  Sid Cuello Jr. is a 73 y.o. male who presents for   Chief Complaint   Patient presents with    Follow-up     Itch to body      LOV 7/31/23- Discoid Lupus Pruritus    02/2021 Skin, right upper arm, punch biopsy:   -DISCOID LUPUS ERYTHEMATOSUS      2016  [facility] DIAGNOSIS:  LEFT CHEEK, PUNCH:  Perivascular and periadnexal dermatitis with increased interstitial mucin and focal  interface damage.     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   Decided not to take plaquenil out of fear of interaction with other meds  Express doubt that the itch is linked to his lupus    Afib on eliquis and digoxin      Derm Hx:  Denies Phx of NMSC  Denies Fhx of MM    Current Outpatient Medications:   ·  atorvastatin (LIPITOR) 10 MG tablet, Take 10 mg by mouth once daily., Disp: , Rfl:   ·  betamethasone dipropionate (DIPROLENE) 0.05 % lotion, Apply thin film to scalp QHS PRN itch, Disp: 60 mL, Rfl: 2  ·  cyanocobalamin 1,000 mcg/mL injection, Inject 1,000 mcg into the muscle every 30 days., Disp: , Rfl:   ·  digoxin (LANOXIN) 125 mcg tablet, Take 125 mcg by mouth once daily., Disp: , Rfl:   ·  ELIQUIS 5 mg Tab, Take 5 mg by mouth 2 (two) times daily., Disp: , Rfl:   ·  fluticasone-umeclidin-vilanter (TRELEGY  ELLIPTA) 100-62.5-25 mcg DsDv, Inhale 1 puff into the lungs once daily., Disp: 60 each, Rfl: 3  ·  iron-vitamin C 100-250 mg, ICAR-C, (ICAR-C) 100-250 mg Tab, Take 1 tablet by mouth once daily., Disp: 30 each, Rfl: 6  ·  triamcinolone acetonide 0.1% (KENALOG) 0.1 % cream, AAA bid (Patient taking differently: Apply 1 g topically daily as needed. AAA bid), Disp: 454 g, Rfl: 3  ·  furosemide (LASIX) 40 MG tablet, Take 1 tablet (40 mg total) by mouth once daily., Disp: 30 tablet, Rfl: 0  ·  metoprolol succinate (TOPROL-XL) 25 MG 24 hr tablet, Take 0.5 tablets (12.5 mg total) by mouth 2 (two) times daily., Disp: 30 tablet, Rfl: 0      Review of Systems   Constitutional:  Positive for fatigue. Negative for fever, chills, weight loss, weight gain, night sweats and malaise.   HENT:  Negative for mouth sores.    Respiratory:  Negative for cough and shortness of breath.    Genitourinary:  Negative for frequency.   Musculoskeletal:  Positive for arthralgias (some back pain, somewhat acute, hands hips knees with no issue). Negative for myalgias, joint swelling and muscle weakness.   Skin:  Positive for itching, activity-related sunscreen use and wears hat. Negative for rash, sun sensitivity and daily sunscreen use.   Neurological:  Negative for seizures.   Hematologic/Lymphatic: Bruises/bleeds easily.       Objective:   Physical Exam   Constitutional: He appears well-developed and well-nourished. No distress.   Neurological: He is alert and oriented to person, place, and time. He is not disoriented.   Psychiatric: He has a normal mood and affect.   Skin:   Areas Examined (abnormalities noted in diagram):   Scalp / Hair Palpated and Inspected  Head / Face Inspection Performed  Neck Inspection Performed  Chest / Axilla Inspection Performed  Abdomen Inspection Performed  Back Inspection Performed  RUE Inspected  LUE Inspection Performed                 Diagram Legend     Erythematous scaling macule/papule c/w actinic keratosis        Vascular papule c/w angioma      Pigmented verrucoid papule/plaque c/w seborrheic keratosis      Yellow umbilicated papule c/w sebaceous hyperplasia      Irregularly shaped tan macule c/w lentigo     1-2 mm smooth white papules consistent with Milia      Movable subcutaneous cyst with punctum c/w epidermal inclusion cyst      Subcutaneous movable cyst c/w pilar cyst      Firm pink to brown papule c/w dermatofibroma      Pedunculated fleshy papule(s) c/w skin tag(s)      Evenly pigmented macule c/w junctional nevus     Mildly variegated pigmented, slightly irregular-bordered macule c/w mildly atypical nevus      Flesh colored to evenly pigmented papule c/w intradermal nevus       Pink pearly papule/plaque c/w basal cell carcinoma      Erythematous hyperkeratotic cursted plaque c/w SCC      Surgical scar with no sign of skin cancer recurrence      Open and closed comedones      Inflammatory papules and pustules      Verrucoid papule consistent consistent with wart     Erythematous eczematous patches and plaques     Dystrophic onycholytic nail with subungual debris c/w onychomycosis     Umbilicated papule    Erythematous-base heme-crusted tan verrucoid plaque consistent with inflamed seborrheic keratosis     Erythematous Silvery Scaling Plaque c/w Psoriasis     See annotation      Assessment / Plan:        Discoid lupus  -     tacrolimus (PROTOPIC) 0.1 % ointment; AAA face daily to BID PRN rash  Dispense: 30 g; Refill: 5    Pruritus  -     gabapentin (NEURONTIN) 300 MG capsule; Take 1 capsule (300 mg total) by mouth every evening.  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

## 2024-01-02 ENCOUNTER — LAB VISIT (OUTPATIENT)
Dept: LAB | Facility: HOSPITAL | Age: 74
End: 2024-01-02
Attending: INTERNAL MEDICINE
Payer: MEDICARE

## 2024-01-02 DIAGNOSIS — R71.8 ELEVATED MCV: ICD-10-CM

## 2024-01-02 DIAGNOSIS — D63.8 ANEMIA, CHRONIC DISEASE: ICD-10-CM

## 2024-01-02 DIAGNOSIS — D50.9 IRON DEFICIENCY ANEMIA, UNSPECIFIED IRON DEFICIENCY ANEMIA TYPE: ICD-10-CM

## 2024-01-02 DIAGNOSIS — R77.8 ABNORMAL SPEP: ICD-10-CM

## 2024-01-02 DIAGNOSIS — D64.9 NORMOCHROMIC NORMOCYTIC ANEMIA: ICD-10-CM

## 2024-01-02 DIAGNOSIS — D64.9 ANEMIA, UNSPECIFIED TYPE: Chronic | ICD-10-CM

## 2024-01-02 LAB
ALBUMIN SERPL BCP-MCNC: 3.3 G/DL (ref 3.5–5.2)
ALP SERPL-CCNC: 112 U/L (ref 55–135)
ALT SERPL W/O P-5'-P-CCNC: 16 U/L (ref 10–44)
ANION GAP SERPL CALC-SCNC: 9 MMOL/L (ref 8–16)
AST SERPL-CCNC: 26 U/L (ref 10–40)
BASOPHILS # BLD AUTO: 0.05 K/UL (ref 0–0.2)
BASOPHILS NFR BLD: 0.9 % (ref 0–1.9)
BILIRUB SERPL-MCNC: 0.8 MG/DL (ref 0.1–1)
BUN SERPL-MCNC: 14 MG/DL (ref 8–23)
CALCIUM SERPL-MCNC: 8.5 MG/DL (ref 8.7–10.5)
CHLORIDE SERPL-SCNC: 101 MMOL/L (ref 95–110)
CO2 SERPL-SCNC: 25 MMOL/L (ref 23–29)
CREAT SERPL-MCNC: 1 MG/DL (ref 0.5–1.4)
DIFFERENTIAL METHOD BLD: ABNORMAL
EOSINOPHIL # BLD AUTO: 0.1 K/UL (ref 0–0.5)
EOSINOPHIL NFR BLD: 1.3 % (ref 0–8)
ERYTHROCYTE [DISTWIDTH] IN BLOOD BY AUTOMATED COUNT: 18.3 % (ref 11.5–14.5)
EST. GFR  (NO RACE VARIABLE): >60 ML/MIN/1.73 M^2
FERRITIN SERPL-MCNC: 136 NG/ML (ref 20–300)
GLUCOSE SERPL-MCNC: 84 MG/DL (ref 70–110)
HCT VFR BLD AUTO: 35.6 % (ref 40–54)
HGB BLD-MCNC: 11 G/DL (ref 14–18)
IMM GRANULOCYTES # BLD AUTO: 0.01 K/UL (ref 0–0.04)
IMM GRANULOCYTES NFR BLD AUTO: 0.2 % (ref 0–0.5)
IRON SERPL-MCNC: 53 UG/DL (ref 45–160)
LYMPHOCYTES # BLD AUTO: 2.2 K/UL (ref 1–4.8)
LYMPHOCYTES NFR BLD: 40.4 % (ref 18–48)
MCH RBC QN AUTO: 29.6 PG (ref 27–31)
MCHC RBC AUTO-ENTMCNC: 30.9 G/DL (ref 32–36)
MCV RBC AUTO: 96 FL (ref 82–98)
MONOCYTES # BLD AUTO: 0.5 K/UL (ref 0.3–1)
MONOCYTES NFR BLD: 8.1 % (ref 4–15)
NEUTROPHILS # BLD AUTO: 2.7 K/UL (ref 1.8–7.7)
NEUTROPHILS NFR BLD: 49.1 % (ref 38–73)
NRBC BLD-RTO: 0 /100 WBC
PLATELET # BLD AUTO: 296 K/UL (ref 150–450)
PMV BLD AUTO: 9.4 FL (ref 9.2–12.9)
POTASSIUM SERPL-SCNC: 3.8 MMOL/L (ref 3.5–5.1)
PROT SERPL-MCNC: 8.5 G/DL (ref 6–8.4)
RBC # BLD AUTO: 3.71 M/UL (ref 4.6–6.2)
SATURATED IRON: 15 % (ref 20–50)
SODIUM SERPL-SCNC: 135 MMOL/L (ref 136–145)
TOTAL IRON BINDING CAPACITY: 354 UG/DL (ref 250–450)
TRANSFERRIN SERPL-MCNC: 253 MG/DL (ref 200–375)
WBC # BLD AUTO: 5.55 K/UL (ref 3.9–12.7)

## 2024-01-02 PROCEDURE — 80053 COMPREHEN METABOLIC PANEL: CPT | Performed by: INTERNAL MEDICINE

## 2024-01-02 PROCEDURE — 36415 COLL VENOUS BLD VENIPUNCTURE: CPT | Performed by: INTERNAL MEDICINE

## 2024-01-02 PROCEDURE — 83540 ASSAY OF IRON: CPT | Performed by: INTERNAL MEDICINE

## 2024-01-02 PROCEDURE — 82728 ASSAY OF FERRITIN: CPT | Performed by: INTERNAL MEDICINE

## 2024-01-02 PROCEDURE — 85025 COMPLETE CBC W/AUTO DIFF WBC: CPT | Performed by: INTERNAL MEDICINE

## 2024-01-11 ENCOUNTER — OFFICE VISIT (OUTPATIENT)
Dept: FAMILY MEDICINE | Facility: CLINIC | Age: 74
End: 2024-01-11
Payer: MEDICARE

## 2024-01-11 VITALS
WEIGHT: 145.88 LBS | HEART RATE: 96 BPM | HEIGHT: 69 IN | OXYGEN SATURATION: 96 % | SYSTOLIC BLOOD PRESSURE: 138 MMHG | BODY MASS INDEX: 21.61 KG/M2 | DIASTOLIC BLOOD PRESSURE: 88 MMHG | RESPIRATION RATE: 18 BRPM | TEMPERATURE: 98 F

## 2024-01-11 DIAGNOSIS — M25.812 IMPINGEMENT OF LEFT SHOULDER: ICD-10-CM

## 2024-01-11 DIAGNOSIS — Z00.00 ENCOUNTER FOR MEDICARE ANNUAL WELLNESS EXAM: ICD-10-CM

## 2024-01-11 DIAGNOSIS — K40.90 LEFT INGUINAL HERNIA: Primary | ICD-10-CM

## 2024-01-11 PROCEDURE — 99215 OFFICE O/P EST HI 40 MIN: CPT | Mod: PBBFAC,PN | Performed by: NURSE PRACTITIONER

## 2024-01-11 PROCEDURE — 99214 OFFICE O/P EST MOD 30 MIN: CPT | Mod: S$PBB,,, | Performed by: NURSE PRACTITIONER

## 2024-01-11 PROCEDURE — 99999 PR PBB SHADOW E&M-EST. PATIENT-LVL V: CPT | Mod: PBBFAC,,, | Performed by: NURSE PRACTITIONER

## 2024-01-11 RX ORDER — DICLOFENAC SODIUM 10 MG/G
2 GEL TOPICAL 3 TIMES DAILY PRN
Qty: 100 G | Refills: 0 | Status: SHIPPED | OUTPATIENT
Start: 2024-01-11

## 2024-01-11 NOTE — PROGRESS NOTES
"    SUBJECTIVE:      Patient ID: Sid Cuello Jr. is a 73 y.o. male.    Chief Complaint: Referral    Montez is a patient here of Dr. Posada here today requesting referral to General surgery.  He was told by Urology several months ago he had a left inguinal hernia, but was instructed since he did not have pain he could watch and wait and follow-up with General surgery if needed.  Patient reports his hernia is becoming more uncomfortable and he is now having a "burning" sensation especially with standing or sitting for long periods.    Patient is also complaining of 4-5 month history of left shoulder pain.  Denies trauma, injury, or falls.  He is reporting limited range of motion and discomfort when trying to get dressed.  Has not tried any over-the-counter medicine for pain, but has been wearing it in a sling.  He is on Eliquis and is unable to take NSAIDs orally.        Family History   Problem Relation Age of Onset    No Known Problems Mother     Melanoma Neg Hx     Psoriasis Neg Hx     Lupus Neg Hx     Eczema Neg Hx       Social History     Socioeconomic History    Marital status:    Tobacco Use    Smoking status: Every Day     Current packs/day: 1.00     Average packs/day: 1 pack/day for 46.0 years (46.0 ttl pk-yrs)     Types: Cigarettes     Start date: 1978    Smokeless tobacco: Never    Tobacco comments:     Pt is a current every day smoker, smokes 1ppd. No interest in quitting at this time.   Substance and Sexual Activity    Alcohol use: Not Currently     Alcohol/week: 14.0 standard drinks of alcohol     Types: 14 Shots of liquor per week    Drug use: Never     Social Determinants of Health     Financial Resource Strain: Low Risk  (12/13/2023)    Overall Financial Resource Strain (CARDIA)     Difficulty of Paying Living Expenses: Not hard at all   Food Insecurity: No Food Insecurity (12/13/2023)    Hunger Vital Sign     Worried About Running Out of Food in the Last Year: Never true     Ran Out of Food " in the Last Year: Never true   Transportation Needs: No Transportation Needs (12/13/2023)    PRAPARE - Transportation     Lack of Transportation (Medical): No     Lack of Transportation (Non-Medical): No   Physical Activity: Inactive (12/13/2023)    Exercise Vital Sign     Days of Exercise per Week: 0 days     Minutes of Exercise per Session: 0 min   Stress: No Stress Concern Present (12/13/2023)    [employer] - Occupational Stress Questionnaire     Feeling of Stress : Not at all   Social Connections: Unknown (12/13/2023)    Social Connection and Isolation Panel [NHANES]     Frequency of Communication with Friends and Family: Once a week     Frequency of Social Gatherings with Friends and Family: Once a week     Active Member of Clubs or Organizations: No     Marital Status:    Housing Stability: Low Risk  (12/13/2023)    Housing Stability Vital Sign     Unable to Pay for Housing in the Last Year: No     Number of Places Lived in the Last Year: 1     Unstable Housing in the Last Year: No     Current Outpatient Medications   Medication Sig Dispense Refill    atorvastatin (LIPITOR) 10 MG tablet Take 10 mg by mouth once daily.      betamethasone dipropionate (DIPROLENE) 0.05 % lotion Apply thin film to scalp QHS PRN itch 60 mL 2    digoxin (LANOXIN) 125 mcg tablet Take 125 mcg by mouth once daily.      ELIQUIS 5 mg Tab Take 5 mg by mouth 2 (two) times daily.      fluticasone-umeclidin-vilanter (TRELEGY ELLIPTA) 100-62.5-25 mcg DsDv Inhale 1 puff into the lungs once daily. 60 each 3    furosemide (LASIX) 40 MG tablet Take 1 tablet (40 mg total) by mouth once daily. 30 tablet 0    gabapentin (NEURONTIN) 300 MG capsule Take 1 capsule (300 mg total) by mouth every evening. 30 capsule 11    iron-vitamin C 100-250 mg, ICAR-C, (ICAR-C) 100-250 mg Tab Take 1 tablet by mouth once daily. 30 each 6    metoprolol succinate (TOPROL-XL) 25 MG 24 hr tablet Take 0.5 tablets (12.5 mg total) by mouth  2 (two) times daily. 30 tablet 0    tacrolimus (PROTOPIC) 0.1 % ointment AAA face daily to BID PRN rash 30 g 5    triamcinolone acetonide 0.1% (KENALOG) 0.1 % cream AAA bid (Patient taking differently: Apply 1 g topically daily as needed. AAA bid) 454 g 3    cyanocobalamin 1,000 mcg/mL injection Inject 1,000 mcg into the muscle every 30 days.      diclofenac sodium (VOLTAREN) 1 % Gel Apply 2 g topically 3 (three) times daily as needed (pain). 100 g 0     No current facility-administered medications for this visit.     Facility-Administered Medications Ordered in Other Visits   Medication Dose Route Frequency Provider Last Rate Last Admin    diphenhydrAMINE injection 25 mg  25 mg Intravenous Q6H PRN Dhruv Gutierrez MD        fentaNYL injection 25 mcg  25 mcg Intravenous Q5 Min PRN Dhruv Gutierrez MD        hydromorphone (PF) injection 0.2 mg  0.2 mg Intravenous Q5 Min PRN Dhruv Gutierrez MD        lactated ringers infusion  10 mL/hr Intravenous Continuous Dhruv Gutierrez MD   Stopped at 10/23/19 1450    lactated ringers infusion  75 mL/hr Intravenous Continuous Dhruv Gutierrez MD        lidocaine (PF) 10 mg/ml (1%) injection 10 mg  1 mL Intradermal Once Dhruv Gutierrez MD        ondansetron injection 4 mg  4 mg Intravenous Once Dhruv Gutierrez MD        oxyCODONE immediate release tablet 5 mg  5 mg Oral PRN Dhruv Gutierrez MD        promethazine (PHENERGAN) 6.25 mg in dextrose 5 % 50 mL IVPB  6.25 mg Intravenous Q10 Min PRN Dhruv Gutierrez MD        sodium chloride 0.9% flush 3 mL  3 mL Intravenous Q8H Dhruv Gutierrez MD        sodium chloride 0.9% flush 3 mL  3 mL Intravenous PRN Dhruv Gutierrez MD         Review of patient's allergies indicates:  No Known Allergies   Past Medical History:   Diagnosis Date    Abnormal SPEP 4/29/2021    Anemia, chronic disease 10/30/2023    Anemia, iron deficiency 10/30/2023    Coronary artery disease 2007    MI   1 c.  stent    Discoid lupus     Essential hypertension 8/10/2015    Hyperlipidemia LDL goal < 100 8/10/2015    Normochromic normocytic anemia 10/30/2023     Past Surgical History:   Procedure Laterality Date    COLONOSCOPY N/A 9/14/2023    Procedure: COLONOSCOPY;  Surgeon: Ankur Gaona MD;  Location: [facility] ENDO;  Service: Endoscopy;  Laterality: N/A;    COLONOSCOPY W/ POLYPECTOMY  09/14/2023    CORONARY ANGIOPLASTY WITH STENT PLACEMENT  2007    CYSTOSCOPY N/A 10/23/2019    Procedure: CYSTOSCOPY (flexible);  Surgeon: David Mera MD;  Location: [facility] OR;  Service: Urology;  Laterality: N/A;    FOOT SURGERY Left 1999    ORCHIECTOMY Right 10/23/2019    Procedure: 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;  Surgeon: David Mera MD;  Location: [facility] OR;  Service: Urology;  Laterality: Right;    SMALL BOWEL ENTEROSCOPY N/A 08/22/2023    Procedure: ENTEROSCOPY;  Surgeon: Doc Merritt III, MD;  Location: [facility] ENDO;  Service: Endoscopy;  Laterality: N/A;    TREATMENT OF CARDIAC ARRHYTHMIA N/A 04/10/2023    Procedure: Cardioversion or Defibrillation;  Surgeon: Raymond Sotomayor MD;  Location: [facility] CATH/EP LAB;  Service: Cardiology;  Laterality: N/A;    VASECTOMY         Review of Systems   Constitutional:  Negative for activity change, appetite change, chills, fever and unexpected weight change.   HENT:  Negative for hearing loss, rhinorrhea and trouble swallowing.    Eyes:  Negative for discharge and visual disturbance.   Respiratory:  Negative for chest tightness, shortness of breath and wheezing.    Cardiovascular:  Negative for chest pain, palpitations and leg swelling.   Gastrointestinal:  Negative for abdominal pain, blood in stool, constipation, diarrhea, nausea and vomiting.   Endocrine: Negative for polydipsia and polyuria.   Genitourinary:  Negative for difficulty urinating, dysuria, frequency, hematuria, testicular pain and urgency.   Musculoskeletal:  Positive for arthralgias.  "Negative for joint swelling and neck pain.   Neurological:  Negative for dizziness, weakness and headaches.   Hematological:  Negative for adenopathy.   Psychiatric/Behavioral:  Negative for confusion and dysphoric mood.       OBJECTIVE:      Vitals:    01/11/24 0742   BP: 138/88   BP Location: Left arm   Patient Position: Sitting   BP Method: Medium (Manual)   Pulse: 96   Resp: 18   Temp: 98.4 °F (36.9 °C)   TempSrc: Oral   SpO2: 96%   Weight: 66.2 kg (145 lb 14.4 oz)   Height: 5' 9" (1.753 m)     Physical Exam  Vitals and nursing note reviewed.   Constitutional:       General: He is not in acute distress.     Appearance: Normal appearance. He is normal weight. He is not ill-appearing.   HENT:      Mouth/Throat:      Mouth: Mucous membranes are moist.   Eyes:      Conjunctiva/sclera: Conjunctivae normal.      Pupils: Pupils are equal, round, and reactive to light.   Cardiovascular:      Rate and Rhythm: Normal rate. Rhythm irregular.      Comments: Patient has been in AFib since October, followed by Cardiology  Pulmonary:      Effort: Pulmonary effort is normal. No respiratory distress.      Breath sounds: Normal breath sounds. No wheezing or rales.   Abdominal:      General: Abdomen is flat. Bowel sounds are normal.      Palpations: Abdomen is soft.      Tenderness: There is no abdominal tenderness.   Musculoskeletal:      Left shoulder: No swelling, deformity, effusion, laceration, tenderness, bony tenderness or crepitus. Decreased range of motion. Normal strength. Normal pulse.        Arms:       Cervical back: Normal range of motion and neck supple.      Right lower leg: No edema.      Left lower leg: No edema.   Lymphadenopathy:      Cervical: No cervical adenopathy.   Skin:     General: Skin is warm and dry.      Coloration: Skin is not jaundiced or pale.   Neurological:      Mental Status: He is alert and oriented to person, place, and time.   Psychiatric:         Mood and Affect: Mood normal.         " Behavior: Behavior normal.        Assessment:       1. Left inguinal hernia    2. Impingement of left shoulder        Plan:       Left inguinal hernia  -     Ambulatory referral/consult to General Surgery; Future; Expected date: 01/18/2024  -referral given for consultation with General surgery; avoid heavy lifting    Impingement of left shoulder  -     diclofenac sodium (VOLTAREN) 1 % Gel; Apply 2 g topically 3 (three) times daily as needed (pain).  Dispense: 100 g; Refill: 0   -Maze topical NSAIDs only, take Tylenol over-the-counter for pain, use warm moist heat and stretching/exercises given handouts today; if pain does not improve in 1-2 weeks, will send to Orthopedics    I spent a total of 30 minutes on the day of the visit.This includes face to face time and non-face to face time preparing to see the patient (eg, review of tests), obtaining and/or reviewing separately obtained history, documenting clinical information in the electronic or other health record, independently interpreting results and communicating results to the patient/family/caregiver, or care coordinator.       Follow up if symptoms worsen or fail to improve.      1/11/2024 POORNIMA Lawrence, FNP    This note was created using [employer] voice recognition software that occasionally misinterprets phrases or words.

## 2024-01-25 ENCOUNTER — LAB VISIT (OUTPATIENT)
Dept: LAB | Facility: HOSPITAL | Age: 74
End: 2024-01-25
Attending: INTERNAL MEDICINE
Payer: MEDICARE

## 2024-01-25 ENCOUNTER — TELEPHONE (OUTPATIENT)
Dept: HEMATOLOGY/ONCOLOGY | Facility: CLINIC | Age: 74
End: 2024-01-25

## 2024-01-25 DIAGNOSIS — D63.8 ANEMIA, CHRONIC DISEASE: ICD-10-CM

## 2024-01-25 DIAGNOSIS — R71.8 ELEVATED MCV: ICD-10-CM

## 2024-01-25 DIAGNOSIS — D50.9 IRON DEFICIENCY ANEMIA, UNSPECIFIED IRON DEFICIENCY ANEMIA TYPE: ICD-10-CM

## 2024-01-25 DIAGNOSIS — D64.9 ANEMIA, UNSPECIFIED TYPE: Chronic | ICD-10-CM

## 2024-01-25 DIAGNOSIS — D64.9 NORMOCHROMIC NORMOCYTIC ANEMIA: ICD-10-CM

## 2024-01-25 DIAGNOSIS — R77.8 ABNORMAL SPEP: ICD-10-CM

## 2024-01-25 LAB
ALBUMIN SERPL BCP-MCNC: 3.4 G/DL (ref 3.5–5.2)
ALP SERPL-CCNC: 129 U/L (ref 55–135)
ALT SERPL W/O P-5'-P-CCNC: 26 U/L (ref 10–44)
ANION GAP SERPL CALC-SCNC: 9 MMOL/L (ref 8–16)
AST SERPL-CCNC: 40 U/L (ref 10–40)
BASOPHILS # BLD AUTO: 0.06 K/UL (ref 0–0.2)
BASOPHILS # BLD AUTO: ABNORMAL K/UL (ref 0–0.2)
BASOPHILS NFR BLD: 1.3 % (ref 0–1.9)
BASOPHILS NFR BLD: ABNORMAL % (ref 0–1.9)
BILIRUB SERPL-MCNC: 1.1 MG/DL (ref 0.1–1)
BUN SERPL-MCNC: 7 MG/DL (ref 8–23)
CALCIUM SERPL-MCNC: 8.8 MG/DL (ref 8.7–10.5)
CHLORIDE SERPL-SCNC: 99 MMOL/L (ref 95–110)
CO2 SERPL-SCNC: 27 MMOL/L (ref 23–29)
CREAT SERPL-MCNC: 1 MG/DL (ref 0.5–1.4)
DIFFERENTIAL METHOD BLD: ABNORMAL
DIFFERENTIAL METHOD BLD: ABNORMAL
EOSINOPHIL # BLD AUTO: 0.1 K/UL (ref 0–0.5)
EOSINOPHIL # BLD AUTO: ABNORMAL K/UL (ref 0–0.5)
EOSINOPHIL NFR BLD: 1.5 % (ref 0–8)
EOSINOPHIL NFR BLD: ABNORMAL % (ref 0–8)
ERYTHROCYTE [DISTWIDTH] IN BLOOD BY AUTOMATED COUNT: 17.4 % (ref 11.5–14.5)
ERYTHROCYTE [DISTWIDTH] IN BLOOD BY AUTOMATED COUNT: ABNORMAL % (ref 11.5–14.5)
EST. GFR  (NO RACE VARIABLE): >60 ML/MIN/1.73 M^2
FERRITIN SERPL-MCNC: 128 NG/ML (ref 20–300)
GLUCOSE SERPL-MCNC: 94 MG/DL (ref 70–110)
HCT VFR BLD AUTO: 39.2 % (ref 40–54)
HCT VFR BLD AUTO: ABNORMAL % (ref 40–54)
HGB BLD-MCNC: 12.3 G/DL (ref 14–18)
HGB BLD-MCNC: ABNORMAL G/DL (ref 14–18)
IMM GRANULOCYTES # BLD AUTO: 0.01 K/UL (ref 0–0.04)
IMM GRANULOCYTES # BLD AUTO: ABNORMAL K/UL (ref 0–0.04)
IMM GRANULOCYTES NFR BLD AUTO: 0.2 % (ref 0–0.5)
IMM GRANULOCYTES NFR BLD AUTO: ABNORMAL % (ref 0–0.5)
IRON SERPL-MCNC: 196 UG/DL (ref 45–160)
LYMPHOCYTES # BLD AUTO: 2 K/UL (ref 1–4.8)
LYMPHOCYTES # BLD AUTO: ABNORMAL K/UL (ref 1–4.8)
LYMPHOCYTES NFR BLD: 43.9 % (ref 18–48)
LYMPHOCYTES NFR BLD: ABNORMAL % (ref 18–48)
MCH RBC QN AUTO: 30.4 PG (ref 27–31)
MCH RBC QN AUTO: ABNORMAL PG (ref 27–31)
MCHC RBC AUTO-ENTMCNC: 31.4 G/DL (ref 32–36)
MCHC RBC AUTO-ENTMCNC: ABNORMAL G/DL (ref 32–36)
MCV RBC AUTO: 97 FL (ref 82–98)
MCV RBC AUTO: ABNORMAL FL (ref 82–98)
MONOCYTES # BLD AUTO: 0.4 K/UL (ref 0.3–1)
MONOCYTES # BLD AUTO: ABNORMAL K/UL (ref 0.3–1)
MONOCYTES NFR BLD: 7.7 % (ref 4–15)
MONOCYTES NFR BLD: ABNORMAL % (ref 4–15)
NEUTROPHILS # BLD AUTO: 2.1 K/UL (ref 1.8–7.7)
NEUTROPHILS # BLD AUTO: ABNORMAL K/UL (ref 1.8–7.7)
NEUTROPHILS NFR BLD: 45.4 % (ref 38–73)
NEUTROPHILS NFR BLD: ABNORMAL % (ref 38–73)
NRBC BLD-RTO: 0 /100 WBC
NRBC BLD-RTO: ABNORMAL /100 WBC
PLATELET # BLD AUTO: 270 K/UL (ref 150–450)
PLATELET # BLD AUTO: ABNORMAL K/UL (ref 150–450)
PMV BLD AUTO: 9.2 FL (ref 9.2–12.9)
PMV BLD AUTO: ABNORMAL FL (ref 9.2–12.9)
POTASSIUM SERPL-SCNC: 4.3 MMOL/L (ref 3.5–5.1)
PROT SERPL-MCNC: 8.7 G/DL (ref 6–8.4)
RBC # BLD AUTO: 4.04 M/UL (ref 4.6–6.2)
RBC # BLD AUTO: ABNORMAL M/UL (ref 4.6–6.2)
SATURATED IRON: 54 % (ref 20–50)
SODIUM SERPL-SCNC: 135 MMOL/L (ref 136–145)
TOTAL IRON BINDING CAPACITY: 363 UG/DL (ref 250–450)
TRANSFERRIN SERPL-MCNC: 259 MG/DL (ref 200–375)
WBC # BLD AUTO: 4.53 K/UL (ref 3.9–12.7)
WBC # BLD AUTO: ABNORMAL K/UL (ref 3.9–12.7)

## 2024-01-25 PROCEDURE — 83540 ASSAY OF IRON: CPT | Performed by: INTERNAL MEDICINE

## 2024-01-25 PROCEDURE — 36415 COLL VENOUS BLD VENIPUNCTURE: CPT | Performed by: INTERNAL MEDICINE

## 2024-01-25 PROCEDURE — 82728 ASSAY OF FERRITIN: CPT | Performed by: INTERNAL MEDICINE

## 2024-01-25 PROCEDURE — 80053 COMPREHEN METABOLIC PANEL: CPT | Performed by: INTERNAL MEDICINE

## 2024-01-25 PROCEDURE — 85025 COMPLETE CBC W/AUTO DIFF WBC: CPT | Performed by: INTERNAL MEDICINE

## 2024-01-25 NOTE — TELEPHONE ENCOUNTER
Felisa from [facility] lab called to say that they are removing results of CBC that are in system now as wrong results were put in patient's chart. She states once removed will also be deleted from Dr. Javier's basket so if has not already seen the incorrect results he will not see once removed.

## 2024-01-26 ENCOUNTER — OFFICE VISIT (OUTPATIENT)
Dept: SURGERY | Facility: CLINIC | Age: 74
End: 2024-01-26
Payer: MEDICARE

## 2024-01-26 VITALS — HEART RATE: 102 BPM | SYSTOLIC BLOOD PRESSURE: 117 MMHG | DIASTOLIC BLOOD PRESSURE: 83 MMHG | TEMPERATURE: 98 F

## 2024-01-26 DIAGNOSIS — K40.90 LEFT INGUINAL HERNIA: ICD-10-CM

## 2024-01-26 PROCEDURE — 99203 OFFICE O/P NEW LOW 30 MIN: CPT | Mod: S$PBB,,, | Performed by: SURGERY

## 2024-01-26 PROCEDURE — 99213 OFFICE O/P EST LOW 20 MIN: CPT | Mod: PBBFAC,PN | Performed by: SURGERY

## 2024-01-26 PROCEDURE — 99999 PR PBB SHADOW E&M-EST. PATIENT-LVL III: CPT | Mod: PBBFAC,,, | Performed by: SURGERY

## 2024-01-29 NOTE — PROGRESS NOTES
[facility] Hematology/Oncology  PROGRESS NOTE -   Follow-up Visit      Subjective:       Patient ID:   NAME: Sid Cuello Jr. : 1950     73 y.o. male    Referring Doc: Светлана  Other Physicians: Rose Mary Posada           Chief Complaint: abn SPEP f/u       History of Present Illness:     Patient returns today for a regularly scheduled follow-up visit.  The patient is here today to go over the results of the recently ordered labs, tests and studies. He is here by himself.    He was previously hospitalized couple of times with SOB and fluid issues, but not recently. He required blood transfusion with last admit. He had EGD, colonoscopy and capsule endoscopy with Dr Gaona. (two benign polyps in colon)      He saw Maru Madden NP with urology on 10/19 due to some left inguinal hernia      He has chronic neuropathy issues in feet; chronic itching issues; He continues to have residual fatigue and general lack of energy;        Breathing ok. No CP, SOB, HA's or N/V.    He saw Ana Moe NP on 2024 and Dr Anlgin with dermatology for the itching 2023                Discussed covid19 precautions and he had his vaccinations        ROS:   GEN: normal without any fever, night sweats or weight loss; chronic fatigue; chronic neuropathy in feet  HEENT: normal with no HA's, sore throat, stiff neck, changes in vision  CV: normal with no CP, SOB, PND, TRAN or orthopnea  PULM: normal with no SOB, cough, hemoptysis, sputum or pleuritic pain  GI: normal with no abdominal pain, nausea, vomiting, constipation, diarrhea, melanotic stools, BRBPR, or hematemesis  : normal with no hematuria, dysuria  BREAST: normal with no mass, discharge, pain  SKIN: normal with no rash, erythema, bruising, or swelling    Pain Scale: 0    Allergies:  Review of patient's allergies indicates:  No Known Allergies    Medications:    Current Outpatient Medications:     atorvastatin (LIPITOR) 10 MG tablet, Take 10 mg by mouth once  daily., Disp: , Rfl:     betamethasone dipropionate (DIPROLENE) 0.05 % lotion, Apply thin film to scalp QHS PRN itch, Disp: 60 mL, Rfl: 2    cyanocobalamin 1,000 mcg/mL injection, Inject 1,000 mcg into the muscle every 30 days., Disp: , Rfl:     diclofenac sodium (VOLTAREN) 1 % Gel, Apply 2 g topically 3 (three) times daily as needed (pain)., Disp: 100 g, Rfl: 0    digoxin (LANOXIN) 125 mcg tablet, Take 125 mcg by mouth once daily., Disp: , Rfl:     ELIQUIS 5 mg Tab, Take 5 mg by mouth 2 (two) times daily., Disp: , Rfl:     fluticasone-umeclidin-vilanter (TRELEGY ELLIPTA) 100-62.5-25 mcg DsDv, Inhale 1 puff into the lungs once daily., Disp: 60 each, Rfl: 3    gabapentin (NEURONTIN) 300 MG capsule, Take 1 capsule (300 mg total) by mouth every evening., Disp: 30 capsule, Rfl: 11    iron-vitamin C 100-250 mg, ICAR-C, (ICAR-C) 100-250 mg Tab, Take 1 tablet by mouth once daily., Disp: 30 each, Rfl: 6    tacrolimus (PROTOPIC) 0.1 % ointment, AAA face daily to BID PRN rash, Disp: 30 g, Rfl: 5    triamcinolone acetonide 0.1% (KENALOG) 0.1 % cream, AAA bid (Patient taking differently: Apply 1 g topically daily as needed. AAA bid), Disp: 454 g, Rfl: 3    furosemide (LASIX) 40 MG tablet, Take 1 tablet (40 mg total) by mouth once daily., Disp: 30 tablet, Rfl: 0    metoprolol succinate (TOPROL-XL) 25 MG 24 hr tablet, Take 0.5 tablets (12.5 mg total) by mouth 2 (two) times daily., Disp: 30 tablet, Rfl: 0  No current facility-administered medications for this visit.    Facility-Administered Medications Ordered in Other Visits:     diphenhydrAMINE injection 25 mg, 25 mg, Intravenous, Q6H PRN, Dhruv Gutierrez MD    fentaNYL injection 25 mcg, 25 mcg, Intravenous, Q5 Min PRN, Dhruv Gutierrez MD    hydromorphone (PF) injection 0.2 mg, 0.2 mg, Intravenous, Q5 Min PRN, Dhruv Gutierrez MD    lactated ringers infusion, 10 mL/hr, Intravenous, Continuous, Dhruv Gutierrez MD, Stopped at 10/23/19 1450    lactated  "ringers infusion, 75 mL/hr, Intravenous, Continuous, Dhruv Gutierrez MD    lidocaine (PF) 10 mg/ml (1%) injection 10 mg, 1 mL, Intradermal, Once, Dhruv Gutierrez MD    ondansetron injection 4 mg, 4 mg, Intravenous, Once, Dhruv Gutierrez MD    oxyCODONE immediate release tablet 5 mg, 5 mg, Oral, PRN, Dhruv Gutierrez MD    promethazine (PHENERGAN) 6.25 mg in dextrose 5 % 50 mL IVPB, 6.25 mg, Intravenous, Q10 Min PRN, Dhruv Gutierrez MD    sodium chloride 0.9% flush 3 mL, 3 mL, Intravenous, Q8H, Dhruv Gutierrez MD    sodium chloride 0.9% flush 3 mL, 3 mL, Intravenous, PRN, Dhruv Gutierrez MD    PMHx/PSHx Updates:  See patient's last visit with me on 10/31/2023  See H&P on 4/30/2021        Pathology:   Cancer Staging   No matching staging information was found for the patient.    Bone marrow biopsy 1/10/2022:    BONE MARROW, RIGHT ILIAC CREST, ASPIRATE, CLOT SECTION, AND CORE BIOPSY:   --CELLULAR MARROW (APPROXIMATELY 25% TO 35%) WITH TRILINEAGE    HEMATOPOIETIC ELEMENTS AND OCCASIONAL   PLASMA CELLS; FURTHER CHARACTERIZATION PENDING IMMUNOHISTOCHEMISTRY;    FINAL DIAGNOSIS TO FOLLOW.   --PERIPHERAL BLOOD WITH NORMAL THROMBOCYTE COUNT (164,000/MICROLITER);    ANEMIA (HEMOGLOBIN 12.5 GRAM/     DECILITER); AND LEUKOPENIA (3,390/MICROLITER).     Plasma cells (bright CD38+/+) comprise approximately 2% of the    total sample and demonstrate polyclonal expression of immunoglobulin    light chains. The plasma cell population co-expresses CD19.      INTERPRETATION:     Immunophenotyping fails to identify any abnormal cell populations. A    small population (approximately 2%) of polyclonal plasma cells is    present.      Objective:     Vitals:  Blood pressure 102/72, pulse 91, temperature 97.8 °F (36.6 °C), resp. rate 16, height 5' 9" (1.753 m), weight 67 kg (147 lb 12.8 oz).    Physical Examination:   GEN: no apparent distress, comfortable; AAOx3; thin  HEAD: atraumatic and " normocephalic; alopecia stable due to the lupus  EYES: no pallor, no icterus, PERRLA  ENT: OMM, no pharyngeal erythema, external ears WNL; no nasal discharge; no thrush  NECK: no masses, thyroid normal, trachea midline, no LAD/LN's, supple  CV: RRR with no murmur; normal pulse; normal S1 and S2; no pedal edema  CHEST: Normal respiratory effort; CTAB; normal breath sounds; no wheeze or crackles  ABDOM: nontender and nondistended; soft; normal bowel sounds; no rebound/guarding  MUSC/Skeletal: ROM normal; no crepitus; joints normal; no deformities or arthropathy  EXTREM: no clubbing, cyanosis, inflammation or swelling  SKIN: no rashes, lesions, ulcers, petechiae or subcutaneous nodules; chronic age related skin changes; 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  : no toussaint  NEURO: grossly intact; motor/sensory WNL; AAOx3; no tremors  PSYCH: normal mood, affect and behavior  LYMPH: normal cervical, supraclavicular, axillary and groin LN's        Labs:     Lab Results   Component Value Date    WBC 4.53 01/25/2024    HGB 12.3 (L) 01/25/2024    HCT 39.2 (L) 01/25/2024    MCV 97 01/25/2024     01/25/2024     CMP  Sodium   Date Value Ref Range Status   01/25/2024 135 (L) 136 - 145 mmol/L Final     Potassium   Date Value Ref Range Status   01/25/2024 4.3 3.5 - 5.1 mmol/L Final     Chloride   Date Value Ref Range Status   01/25/2024 99 95 - 110 mmol/L Final     CO2   Date Value Ref Range Status   01/25/2024 27 23 - 29 mmol/L Final     Glucose   Date Value Ref Range Status   01/25/2024 94 70 - 110 mg/dL Final     BUN   Date Value Ref Range Status   01/25/2024 7 (L) 8 - 23 mg/dL Final     Creatinine   Date Value Ref Range Status   01/25/2024 1.0 0.5 - 1.4 mg/dL Final     Calcium   Date Value Ref Range Status   01/25/2024 8.8 8.7 - 10.5 mg/dL Final     Total Protein   Date Value Ref Range Status   01/25/2024 8.7 (H) 6.0 - 8.4 g/dL Final     Albumin   Date Value Ref Range Status   01/25/2024 3.4 (L) 3.5 - 5.2 g/dL Final      Total Bilirubin   Date Value Ref Range Status   01/25/2024 1.1 (H) 0.1 - 1.0 mg/dL Final     Comment:     For infants and newborns, interpretation of results should be based  on gestational age, weight and in agreement with clinical  observations.    Premature Infant recommended reference ranges:  Up to 24 hours.............<8.0 mg/dL  Up to 48 hours............<12.0 mg/dL  3-5 days..................<15.0 mg/dL  6-29 days.................<15.0 mg/dL       Alkaline Phosphatase   Date Value Ref Range Status   01/25/2024 129 55 - 135 U/L Final     AST   Date Value Ref Range Status   01/25/2024 40 10 - 40 U/L Final     ALT   Date Value Ref Range Status   01/25/2024 26 10 - 44 U/L Final     Anion Gap   Date Value Ref Range Status   01/25/2024 9 8 - 16 mmol/L Final     eGFR if    Date Value Ref Range Status   07/21/2022 >60.0 >60 mL/min/1.73 m^2 Final     eGFR if non    Date Value Ref Range Status   07/21/2022 >60.0 >60 mL/min/1.73 m^2 Final     Comment:     Calculation used to obtain the estimated glomerular filtration  rate (eGFR) is the CKD-EPI equation.         Kappa/Lambda FLC Ratio 0.26 - 1.65 1.17       Beta-2 Microglobulin 0.0 - 2.5 ug/mL 5.2        IgG 650 - 1600 mg/dL 3848 High   3756   Comment: IgG Cord Blood Reference Range: 650-1600 mg/dL.   IgA 40 - 350 mg/dL 848 High   783   Comment: IgA Cord Blood Reference Range: <5 mg/dL.   IgM 50 - 300 mg/dL 141  128   Comment: IgM Cord Blood Reference Range: <25 mg/dL.       Pathologist Interpretation UPE REVIEWED    Comment:    Electronically reviewed and signed by:   Peri Narvaez MD   Signed on 01/27/23 at 10:46   No paraprotein bands identified.        Pathologist Interpretation SPE REVIEWED    Comment:    Electronically reviewed and signed by:   Peri Narvaez MD   Signed on 01/24/23 at 15:18   Increased total protein. Increased gamma globulin, polyclonal. No   paraprotein bands detected.        Lab Results    Component Value Date    IRON 196 (H) 01/25/2024    TRANSFERRIN 259 01/25/2024    TIBC 363 01/25/2024    FESATURATED 54 (H) 01/25/2024      Lab Results   Component Value Date    FERRITIN 128 01/25/2024     Lab Results   Component Value Date    [id number] 820 08/26/2023     Lab Results   Component Value Date    FOLATE 8.0 08/26/2023     Kappa/Lambda FLC Ratio 0.2600 - 1.65 1.51      SPE Interp.  SEE BELOW       Comment: Small abnormality in gamma fraction.     Polyclonal hypergammaglobulinemia      M-Protein Isotype  No monoclonal protein detected     IgA 61 - 356 mg/dL 653 High      IgM 37 - 286 mg/dL 101      IgG 767 - 1590 mg/dL 3240      Beta-2 Microglobulin 1.21 - 2.70 mcg/mL 4.27        Radiology/Diagnostic Studies:    No results found.    I have reviewed all available lab results and radiology reports.    Assessment/Plan:   (1) 73 y.o. male  with diagnosis of abnormal SPEP who has been referred by Dr Sotomayor for evaluation by medical hematology/oncology.   -  He had an SPEP on 4/16/2021 with no evidence of any M-protein He sees Dr Anglin for discoid Lupus and I suspect that this is the cause of the protein issues seen on the labs.    5/27/2021:  - IgG is elevated but the immunofoxation studies in the serum and urine are both negative for any M-protein  - kappa/lambda ratio is also WNL  - I do not suspect multiple myeloma at this time  - I recommended referral to see Dr Blanco at [facility] (who is a paraproteinemia specialist) and also consideration for a bone marrow biopsy to further elucidate - however, he is not inclined to doing either at this time  - will repeat protein studies every 6 months      8/26/2021:  - patient here for short-term f/u visit  - repeat protein studies are due in Nov 2021  -recommended referral to see Dr Blanco at [facility] (who is a paraproteinemia specialist) and also consideration for a bone marrow biopsy to further elucidate - however, he was not inclined to doing either previously and  does not want to go to [place]  - I have already told the patient that this is the only way one can get a second opinion    1/3/2022:  - he is adamant that he does not want to go to [facility] or [facility]  - discussed again with the patient about getting a bone marrow biopsy and he is now willing to consider getting one done  - I explained to him that we are limited as to what I can do for him by what he will or will not do     2/1/2022:  - recent bone marrow biopsy on 1/10/2022 with only a 2% population of polyclonal plasma cells with on abnormal immunophenotypical population identified  - he declined referral to Dr Blanco at [facility]  - will proceed with observation and monitoring of his labs and protein studies for now    8/1/2022:  - discussed the results of the latest protein studies and they seem to be relatively stable; again there in no monoclonal component to his gammopathy  - he is adamant that he does not want to be evaluated by my proteinopathy specialist Dr Blanco at [facility]  - continue with observtaion    3/2/2023:  - his protein studies are relatively stable with no M-protein component; I do not suspect he has multiple myeloma  - 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  - he previously declined to go see Dr Blanco at [facility] who is a paraproteinemia/myeloma specialist  - discussed Dr Sotomayor's concerns with the patient and recommended consideration for CT Chest/Abdom/pelvis but patient is not inclined to having any scans done at this time    10/31/2023:  - protein studies in Aug 2023 relatively stable with no Monoclonal or M-protein component  - K/L ratio WNL and Ig panel stable    (3) Anemia - NCNC parameters - iron and iron sat were both low  - most likely a multifactorial process with underlying anemia of chronic diease and iron deficiency anemia    10/31/2023:  - he required blood transfusion  - latest labs from Sept 2023 with Dr Sotomayor hgb was 10  -  his iron levels are low - he is not on any supplements  - he had EGD, colonoscopy and capsule endoscopy with Dr Gaona/René since last visit - only two benign colon polyps  - start ICAr-C po daily and monitor labs monthly, if unable to tolerate or ineffective, then will consider IV iron    1/30/2024:  - latest hgb at 12.3  - iron panel much improved  - he wants to cut back on the oral iron - ok to change to 3x/week     (2) CAD s/p stent - followed by Dr Sotomayor     (3) HTN and Hypercholeterolemia     (4) Discoid Lupus/SLE - followed by Dr Anglin       (5) Testicular/Inguinal hernia - s/p surgery - followed by Dr Mera      VISIT DIAGNOSES:      Anemia, chronic disease    Normochromic normocytic anemia    Elevated MCV    Anemia, unspecified type    Iron deficiency anemia, unspecified iron deficiency anemia type    Abnormal SPEP    Tobacco abuse          PLAN:  1. continue with observation and regular monitoring of labs every 3 months  2. he previously declined referral to see Dr Blanco at [facility] and is not inclined to having any scans done at this time  3. Encourage tobacco cessation  4. Previously recommended consideration for referral to neurology for his neuropathy issues  5. Repeat protein studies every 6 months  6. F/u with PCP, Card, Derm and        RTC in 6 months  Fax note to Albino Sotomayor Pinsky, Francis Anglin       Discussion:     COVID-19 Discussion:    I had long discussion with patient and any applicable family about the COVID-19 coronavirus epidemic and the recommended precautions with regard to cancer and/or hematology patients. I have re-iterated the CDC recommendations for adequate hand washing, use of hand -like products, and coughing into elbow, etc. In addition, especially for our patients who are on chemotherapy and/or our otherwise immunocompromised patients, I have recommended avoidance of crowds, including movie theaters, restaurants, churches, etc. I have recommended  avoidance of any sick or symptomatic family members and/or friends. I have also recommended avoidance of any raw and unwashed food products, and general avoidance of food items that have not been prepared by themselves. The patient has been asked to call us immediately with any symptom developments, issues, questions or other general concerns.       I spent over 25 mins of time with the patient. Reviewed results of the recently ordered labs, tests and studies; made directives with regards to the results. Over half of this time was spent couseling and coordinating care.    I have explained all of the above in detail and the patient understands all of the current recommendation(s). I have answered all of their questions to the best of my ability and to their complete satisfaction.   The patient is to continue with the current management plan.            Electronically signed by Antolin Javier MD                  Answers submitted by the patient for this visit:  Review of Systems Questionnaire (Submitted on 1/23/2024)  appetite change : No  unexpected weight change: No  mouth sores: No  visual disturbance: No  cough: No  shortness of breath: No  chest pain: No  abdominal pain: No  diarrhea: No  frequency: No  back pain: No  rash: No  headaches: No  adenopathy: No  nervous/ anxious: No

## 2024-01-30 ENCOUNTER — OFFICE VISIT (OUTPATIENT)
Dept: HEMATOLOGY/ONCOLOGY | Facility: CLINIC | Age: 74
End: 2024-01-30
Payer: MEDICARE

## 2024-01-30 VITALS
TEMPERATURE: 98 F | HEIGHT: 69 IN | HEART RATE: 91 BPM | BODY MASS INDEX: 21.89 KG/M2 | DIASTOLIC BLOOD PRESSURE: 72 MMHG | SYSTOLIC BLOOD PRESSURE: 102 MMHG | RESPIRATION RATE: 16 BRPM | WEIGHT: 147.81 LBS

## 2024-01-30 DIAGNOSIS — D50.9 IRON DEFICIENCY ANEMIA, UNSPECIFIED IRON DEFICIENCY ANEMIA TYPE: ICD-10-CM

## 2024-01-30 DIAGNOSIS — R71.8 ELEVATED MCV: ICD-10-CM

## 2024-01-30 DIAGNOSIS — D63.8 ANEMIA, CHRONIC DISEASE: Primary | ICD-10-CM

## 2024-01-30 DIAGNOSIS — Z72.0 TOBACCO ABUSE: ICD-10-CM

## 2024-01-30 DIAGNOSIS — D64.9 ANEMIA, UNSPECIFIED TYPE: Chronic | ICD-10-CM

## 2024-01-30 DIAGNOSIS — D53.9 NUTRITIONAL ANEMIA, UNSPECIFIED: ICD-10-CM

## 2024-01-30 DIAGNOSIS — D64.9 NORMOCHROMIC NORMOCYTIC ANEMIA: ICD-10-CM

## 2024-01-30 DIAGNOSIS — R77.8 ABNORMAL SPEP: ICD-10-CM

## 2024-01-30 PROCEDURE — 99213 OFFICE O/P EST LOW 20 MIN: CPT | Mod: S$GLB,,, | Performed by: INTERNAL MEDICINE

## 2024-01-30 NOTE — H&P
GENERAL SURGERY  OUTPATIENT H&P    REASON FOR VISIT/CC: Left inguinal hernia    HPI: Sid Cuello Jr. is a 73 y.o. male with history of hypertension, coronary artery disease status post stent, long-term anticoagulation use, AFib who presented to General surgery for evaluation of a left inguinal hernia. Patient initially noticed a bulge in his left groin which was relatively asymptomatic. He did present to urology for evaluation who diagnosed him with a left inguinal hernia. Patient was seen by his PCP and subsequently referred to General surgery for evaluation. Patient reports the bulge is soft and usually he can reduce it.  He has no overlying skin changes or symptoms of obstruction. He has never had surgery in the area before. 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    I have reviewed the patient's chart including prior progress notes, procedures and testing.     ROS:   Review of Systems    PROBLEM LIST:  Patient Active Problem List   Diagnosis    Elevated MCV    CAD s/p PCI    S/P coronary artery stent placement    Hyperlipidemia LDL goal < 100    Paratesticular mass    Abnormal SPEP    Paresthesias    Neuropathy, peripheral axonal    Smoker    Anemia    Mitral regurgitation, severe    Discoid lupus    Tobacco abuse    Normochromic normocytic anemia    Anemia, chronic disease    Anemia, iron deficiency         HISTORY  Past Medical History:   Diagnosis Date    Abnormal SPEP 4/29/2021    Anemia, chronic disease 10/30/2023    Anemia, iron deficiency 10/30/2023    Coronary artery disease 2007    MI   1 c. stent    Discoid lupus     Essential hypertension 8/10/2015    Hyperlipidemia LDL goal < 100 8/10/2015    Normochromic normocytic anemia 10/30/2023       Past Surgical History:   Procedure Laterality Date    COLONOSCOPY N/A 9/14/2023    Procedure: COLONOSCOPY;  Surgeon: Ankur Gaona MD;  Location: [facility];  Service: Endoscopy;  Laterality: N/A;     COLONOSCOPY W/ POLYPECTOMY  09/14/2023    CORONARY ANGIOPLASTY WITH STENT PLACEMENT  2007    CYSTOSCOPY N/A 10/23/2019    Procedure: CYSTOSCOPY (flexible);  Surgeon: David Mera MD;  Location: [facility] OR;  Service: Urology;  Laterality: N/A;    FOOT SURGERY Left 1999    ORCHIECTOMY Right 10/23/2019    Procedure: 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;  Surgeon: David Mera MD;  Location: [facility] OR;  Service: Urology;  Laterality: Right;    SMALL BOWEL ENTEROSCOPY N/A 08/22/2023    Procedure: ENTEROSCOPY;  Surgeon: oDc Merritt III, MD;  Location: [facility] ENDO;  Service: Endoscopy;  Laterality: N/A;    TREATMENT OF CARDIAC ARRHYTHMIA N/A 04/10/2023    Procedure: Cardioversion or Defibrillation;  Surgeon: Raymond Sotomayor MD;  Location: [facility] CATH/EP LAB;  Service: Cardiology;  Laterality: N/A;    VASECTOMY         Social History     Tobacco Use    Smoking status: Every Day     Current packs/day: 1.00     Average packs/day: 1 pack/day for 46.1 years (46.1 ttl pk-yrs)     Types: Cigarettes     Start date: 1978    Smokeless tobacco: Never    Tobacco comments:     Pt is a current every day smoker, smokes 1ppd. No interest in quitting at this time.   Substance Use Topics    Alcohol use: Not Currently     Alcohol/week: 14.0 standard drinks of alcohol     Types: 14 Shots of liquor per week    Drug use: Never       Family History   Problem Relation Age of Onset    No Known Problems Mother     Melanoma Neg Hx     Psoriasis Neg Hx     Lupus Neg Hx     Eczema Neg Hx          MEDS:  Current Outpatient Medications on File Prior to Visit   Medication Sig Dispense Refill    atorvastatin (LIPITOR) 10 MG tablet Take 10 mg by mouth once daily.      betamethasone dipropionate (DIPROLENE) 0.05 % lotion Apply thin film to scalp QHS PRN itch 60 mL 2    cyanocobalamin 1,000 mcg/mL injection Inject 1,000 mcg into the muscle every 30 days.      diclofenac sodium (VOLTAREN) 1 % Gel Apply 2 g topically 3 (three)  times daily as needed (pain). 100 g 0    digoxin (LANOXIN) 125 mcg tablet Take 125 mcg by mouth once daily.      ELIQUIS 5 mg Tab Take 5 mg by mouth 2 (two) times daily.      fluticasone-umeclidin-vilanter (TRELEGY ELLIPTA) 100-62.5-25 mcg DsDv Inhale 1 puff into the lungs once daily. 60 each 3    gabapentin (NEURONTIN) 300 MG capsule Take 1 capsule (300 mg total) by mouth every evening. 30 capsule 11    iron-vitamin C 100-250 mg, ICAR-C, (ICAR-C) 100-250 mg Tab Take 1 tablet by mouth once daily. 30 each 6    tacrolimus (PROTOPIC) 0.1 % ointment AAA face daily to BID PRN rash 30 g 5    triamcinolone acetonide 0.1% (KENALOG) 0.1 % cream AAA bid (Patient taking differently: Apply 1 g topically daily as needed. AAA bid) 454 g 3    furosemide (LASIX) 40 MG tablet Take 1 tablet (40 mg total) by mouth once daily. 30 tablet 0    metoprolol succinate (TOPROL-XL) 25 MG 24 hr tablet Take 0.5 tablets (12.5 mg total) by mouth 2 (two) times daily. 30 tablet 0     Current Facility-Administered Medications on File Prior to Visit   Medication Dose Route Frequency Provider Last Rate Last Admin    diphenhydrAMINE injection 25 mg  25 mg Intravenous Q6H PRN Dhruv Gutierrez MD        fentaNYL injection 25 mcg  25 mcg Intravenous Q5 Min PRN Dhruv Gutierrez MD        hydromorphone (PF) injection 0.2 mg  0.2 mg Intravenous Q5 Min PRN Dhruv Gutierrez MD        lactated ringers infusion  10 mL/hr Intravenous Continuous Dhruv Gutierrez MD   Stopped at 10/23/19 1450    lactated ringers infusion  75 mL/hr Intravenous Continuous Dhruv Gutierrez MD        lidocaine (PF) 10 mg/ml (1%) injection 10 mg  1 mL Intradermal Once Dhruv Gutierrez MD        ondansetron injection 4 mg  4 mg Intravenous Once Dhruv Gutierrez MD        oxyCODONE immediate release tablet 5 mg  5 mg Oral PRN Dhruv Gutierrez MD        promethazine (PHENERGAN) 6.25 mg in dextrose 5 % 50 mL IVPB  6.25 mg Intravenous Q10 Min PRN  Dhruv Gutierrez MD        sodium chloride 0.9% flush 3 mL  3 mL Intravenous Q8H Dhruv Gutierrez MD        sodium chloride 0.9% flush 3 mL  3 mL Intravenous PRN Dhruv Gutierrez MD           ALLERGIES:  Review of patient's allergies indicates:  No Known Allergies      VITALS:  Vitals:    01/26/24 1030   BP: 117/83   Pulse: 102   Temp: 97.5 °F (36.4 °C)         PHYSICAL EXAM:  Physical Exam  Vitals reviewed.   Constitutional:       General: He is not in acute distress.     Appearance: Normal appearance. He is well-developed.   HENT:      Head: Normocephalic and atraumatic.   Eyes:      General: No scleral icterus.  Neck:      Trachea: No tracheal deviation.   Cardiovascular:      Rate and Rhythm: Normal rate and regular rhythm.      Pulses: Normal pulses.   Pulmonary:      Effort: Pulmonary effort is normal. No respiratory distress.      Breath sounds: Normal breath sounds.   Abdominal:      General: There is no distension.      Palpations: Abdomen is soft.      Tenderness: There is no abdominal tenderness.      Hernia: A hernia (Moderate-sized but easily reducible left inguinal hernia, likely direct, no scrotal component) is present.   Musculoskeletal:         General: No swelling or tenderness. Normal range of motion.      Cervical back: Normal range of motion and neck supple. No rigidity.   Skin:     General: Skin is warm and dry.      Coloration: Skin is not jaundiced.      Findings: No erythema.   Neurological:      General: No focal deficit present.      Mental Status: He is alert and oriented to person, place, and time. He is not disoriented.      Motor: No weakness or abnormal muscle tone.   Psychiatric:         Mood and Affect: Mood normal.         Behavior: Behavior normal.         Thought Content: Thought content normal.         Judgment: Judgment normal.           LABS:  Lab Results   Component Value Date    WBC 4.53 01/25/2024    RBC 4.04 (L) 01/25/2024    HGB 12.3 (L) 01/25/2024    HCT  39.2 (L) 01/25/2024     01/25/2024     Lab Results   Component Value Date    GLU 94 01/25/2024     (L) 01/25/2024    K 4.3 01/25/2024    CL 99 01/25/2024    CO2 27 01/25/2024    BUN 7 (L) 01/25/2024    CREATININE 1.0 01/25/2024    CALCIUM 8.8 01/25/2024     Lab Results   Component Value Date    ALT 26 01/25/2024    AST 40 01/25/2024    ALKPHOS 129 01/25/2024    BILITOT 1.1 (H) 01/25/2024     Lab Results   Component Value Date    MG 2.0 08/22/2023       STUDIES:  Images and reports were personally reviewed.  N/a    ASSESSMENT & PLAN:  73 y.o. male with a reducible left inguinal hernia  -patient does have a left inguinal hernia however remains reducible and relatively asymptomatic  -we discussed options including surgical intervention versus observation, surgical intervention could be considered through an open approach with or without mesh or robotic approach with mesh, associated risks including pain, bleeding, scarring infection, recurrence, chronic groin pain, injury to bowel, seroma, hematoma were reviewed, patient expressed understanding these risks  -due to the minimally symptomatic and easily reducible in nature the patient is wishing to observe for now   -this is reasonable but I did caution him on signs and symptoms to be aware of, this includes enlarging size, worsening pain, signs of obstruction, inability reduced, etc., if these were to occur the patient needs to go the emergency room or contact our office

## 2024-04-25 ENCOUNTER — LAB VISIT (OUTPATIENT)
Dept: LAB | Facility: HOSPITAL | Age: 74
End: 2024-04-25
Attending: INTERNAL MEDICINE
Payer: MEDICARE

## 2024-04-25 DIAGNOSIS — D50.9 IRON DEFICIENCY ANEMIA, UNSPECIFIED IRON DEFICIENCY ANEMIA TYPE: ICD-10-CM

## 2024-04-25 DIAGNOSIS — D63.8 ANEMIA, CHRONIC DISEASE: ICD-10-CM

## 2024-04-25 DIAGNOSIS — D53.9 NUTRITIONAL ANEMIA, UNSPECIFIED: ICD-10-CM

## 2024-04-25 DIAGNOSIS — D64.9 NORMOCHROMIC NORMOCYTIC ANEMIA: ICD-10-CM

## 2024-04-25 DIAGNOSIS — R71.8 ELEVATED MCV: ICD-10-CM

## 2024-04-25 DIAGNOSIS — R77.8 ABNORMAL SPEP: ICD-10-CM

## 2024-04-25 DIAGNOSIS — D64.9 ANEMIA, UNSPECIFIED TYPE: Chronic | ICD-10-CM

## 2024-04-25 LAB
ALBUMIN SERPL BCP-MCNC: 3.5 G/DL (ref 3.5–5.2)
ALP SERPL-CCNC: 140 U/L (ref 55–135)
ALT SERPL W/O P-5'-P-CCNC: 27 U/L (ref 10–44)
ANION GAP SERPL CALC-SCNC: 7 MMOL/L (ref 8–16)
AST SERPL-CCNC: 43 U/L (ref 10–40)
BASOPHILS # BLD AUTO: 0.05 K/UL (ref 0–0.2)
BASOPHILS NFR BLD: 1 % (ref 0–1.9)
BILIRUB SERPL-MCNC: 1.3 MG/DL (ref 0.1–1)
BUN SERPL-MCNC: 8 MG/DL (ref 8–23)
CALCIUM SERPL-MCNC: 9 MG/DL (ref 8.7–10.5)
CHLORIDE SERPL-SCNC: 98 MMOL/L (ref 95–110)
CO2 SERPL-SCNC: 27 MMOL/L (ref 23–29)
CREAT SERPL-MCNC: 1 MG/DL (ref 0.5–1.4)
DIFFERENTIAL METHOD BLD: ABNORMAL
EOSINOPHIL # BLD AUTO: 0.1 K/UL (ref 0–0.5)
EOSINOPHIL NFR BLD: 1.5 % (ref 0–8)
ERYTHROCYTE [DISTWIDTH] IN BLOOD BY AUTOMATED COUNT: 15.9 % (ref 11.5–14.5)
EST. GFR  (NO RACE VARIABLE): >60 ML/MIN/1.73 M^2
FERRITIN SERPL-MCNC: 89 NG/ML (ref 20–300)
FOLATE SERPL-MCNC: 7 NG/ML (ref 4–24)
GLUCOSE SERPL-MCNC: 82 MG/DL (ref 70–110)
HCT VFR BLD AUTO: 42 % (ref 40–54)
HGB BLD-MCNC: 13.4 G/DL (ref 14–18)
IMM GRANULOCYTES # BLD AUTO: 0.01 K/UL (ref 0–0.04)
IMM GRANULOCYTES NFR BLD AUTO: 0.2 % (ref 0–0.5)
IRON SERPL-MCNC: 126 UG/DL (ref 45–160)
LYMPHOCYTES # BLD AUTO: 2.2 K/UL (ref 1–4.8)
LYMPHOCYTES NFR BLD: 45.2 % (ref 18–48)
MCH RBC QN AUTO: 31.9 PG (ref 27–31)
MCHC RBC AUTO-ENTMCNC: 31.9 G/DL (ref 32–36)
MCV RBC AUTO: 100 FL (ref 82–98)
MONOCYTES # BLD AUTO: 0.5 K/UL (ref 0.3–1)
MONOCYTES NFR BLD: 9.8 % (ref 4–15)
NEUTROPHILS # BLD AUTO: 2 K/UL (ref 1.8–7.7)
NEUTROPHILS NFR BLD: 42.3 % (ref 38–73)
NRBC BLD-RTO: 0 /100 WBC
PLATELET # BLD AUTO: 293 K/UL (ref 150–450)
PMV BLD AUTO: 9.4 FL (ref 9.2–12.9)
POTASSIUM SERPL-SCNC: 4.3 MMOL/L (ref 3.5–5.1)
PROT SERPL-MCNC: 8.9 G/DL (ref 6–8.4)
RBC # BLD AUTO: 4.2 M/UL (ref 4.6–6.2)
SATURATED IRON: 38 % (ref 20–50)
SODIUM SERPL-SCNC: 132 MMOL/L (ref 136–145)
TOTAL IRON BINDING CAPACITY: 336 UG/DL (ref 250–450)
TRANSFERRIN SERPL-MCNC: 240 MG/DL (ref 200–375)
VIT B12 SERPL-MCNC: 375 PG/ML (ref 210–950)
WBC # BLD AUTO: 4.78 K/UL (ref 3.9–12.7)

## 2024-04-25 PROCEDURE — 36415 COLL VENOUS BLD VENIPUNCTURE: CPT | Performed by: INTERNAL MEDICINE

## 2024-04-25 PROCEDURE — 82746 ASSAY OF FOLIC ACID SERUM: CPT | Performed by: INTERNAL MEDICINE

## 2024-04-25 PROCEDURE — 80053 COMPREHEN METABOLIC PANEL: CPT | Performed by: INTERNAL MEDICINE

## 2024-04-25 PROCEDURE — 83540 ASSAY OF IRON: CPT | Performed by: INTERNAL MEDICINE

## 2024-04-25 PROCEDURE — 82728 ASSAY OF FERRITIN: CPT | Performed by: INTERNAL MEDICINE

## 2024-04-25 PROCEDURE — 82607 VITAMIN B-12: CPT | Performed by: INTERNAL MEDICINE

## 2024-04-25 PROCEDURE — 85025 COMPLETE CBC W/AUTO DIFF WBC: CPT | Performed by: INTERNAL MEDICINE

## 2024-04-26 LAB
B2 MICROGLOB SERPL-MCNC: 4.14 MCG/ML (ref 1.21–2.7)
KAPPA LC FREE SER-MCNC: 6.84 MG/DL (ref 0.33–1.94)
KAPPA LC FREE/LAMBDA FREE SER: 1.41 {RATIO} (ref 0.26–1.65)
LAMBDA LC FREE SERPL-MCNC: 4.86 MG/DL (ref 0.57–2.63)

## 2024-04-29 LAB
GLYCOSYLATION: ABNORMAL
IGA SERPL-MCNC: 608 MG/DL (ref 61–356)
IGA SERPL-MCNC: 608 MG/DL (ref 61–356)
IGG SERPL-MCNC: 3030 MG/DL (ref 767–1590)
IGG SERPL-MCNC: 3030 MG/DL (ref 767–1590)
IGM SERPL-MCNC: 76 MG/DL (ref 37–286)
IGM SERPL-MCNC: 76 MG/DL (ref 37–286)
IMMUNOLOGIST REVIEW: ABNORMAL
M PROTEIN SERPL QL: NEGATIVE
M-PROTEIN AK: ABNORMAL
M-PROTEIN AL: ABNORMAL
M-PROTEIN GK: ABNORMAL
M-PROTEIN GL: ABNORMAL
M-PROTEIN MK: ABNORMAL
M-PROTEIN ML: ABNORMAL

## 2024-05-29 ENCOUNTER — OFFICE VISIT (OUTPATIENT)
Dept: FAMILY MEDICINE | Facility: CLINIC | Age: 74
End: 2024-05-29
Payer: MEDICARE

## 2024-05-29 VITALS
OXYGEN SATURATION: 100 % | WEIGHT: 147 LBS | HEIGHT: 69 IN | HEART RATE: 94 BPM | BODY MASS INDEX: 21.77 KG/M2 | SYSTOLIC BLOOD PRESSURE: 146 MMHG | DIASTOLIC BLOOD PRESSURE: 90 MMHG

## 2024-05-29 DIAGNOSIS — I70.0 ATHEROSCLEROSIS OF AORTA: ICD-10-CM

## 2024-05-29 DIAGNOSIS — I50.22 CHRONIC SYSTOLIC CONGESTIVE HEART FAILURE: ICD-10-CM

## 2024-05-29 DIAGNOSIS — I48.19 PERSISTENT ATRIAL FIBRILLATION: ICD-10-CM

## 2024-05-29 DIAGNOSIS — I10 ESSENTIAL HYPERTENSION: Primary | ICD-10-CM

## 2024-05-29 PROCEDURE — 99214 OFFICE O/P EST MOD 30 MIN: CPT | Mod: S$PBB,AQ,, | Performed by: FAMILY MEDICINE

## 2024-05-29 PROCEDURE — 99214 OFFICE O/P EST MOD 30 MIN: CPT | Mod: PBBFAC,PN | Performed by: FAMILY MEDICINE

## 2024-05-29 PROCEDURE — 99999 PR PBB SHADOW E&M-EST. PATIENT-LVL IV: CPT | Mod: PBBFAC,,, | Performed by: FAMILY MEDICINE

## 2024-05-29 NOTE — PROGRESS NOTES
SUBJECTIVE:    Patient ID: Sid Cuello Jr. is a 73 y.o. male.    Chief Complaint: Hernia and Temporomandibular Joint Pain  73-year-old male last seen more than 1 year ago. The chief complaint is inguinal hernia, but he has followed up with surgery and has declined surgery, it is non-painful and it is easily reducable.    Pt had no complaint of joint pains at this visit today.    His blood pressure is elevated today, he was taken off all of his blood pressure medications, he is being followed by cardiology for CAD, Atrial fib, and diastolic HF. Pt has been cardioverted but reverted back to a-fib, he was offered a cardiac ablation but he has declined.    He has not been using his triple therapy inhailer he states that he is not having any SOB, he has continued to smoke.  Pt states that he is only drinking occasionally.    He he is being followed by Hematology for abnormal SPEP.             Significant past medical history  Discoid Taya:   Hyperlipidemia: Atorvastatin 10mg   Hypertension: (Not being treated)   Coronary artery disease status post stent placement 2007: Plavix 75mg  Abnormal SPEP: Getting labs every 3 months  Anemia: Iron  A-Fib: Digoxin 125  Itching: Gabapentin.     Specialist  Hematology/oncology:  Dr. Javier  Dermatology:  Dr. Anglin  Urology:  Dr. Mera  Orthopedics:  Dr. Trevizo  Cardiology: Dr Sotomayor  General Surgery: Dr Gilmore  Ophtho: Dr Culp        Smoke: 1 ppd  ETOH: 4 a day  Exercise: None    Hypertension  This is a chronic problem. The current episode started more than 1 year ago. The problem is unchanged. The problem is uncontrolled. Pertinent negatives include no anxiety, blurred vision, chest pain, headaches, malaise/fatigue, neck pain, orthopnea, palpitations, peripheral edema, PND, shortness of breath or sweats. There are no associated agents to hypertension. Risk factors for coronary artery disease include male gender, sedentary lifestyle, smoking/tobacco exposure  and dyslipidemia. Past treatments include nothing. The current treatment provides no improvement. Compliance problems include exercise and diet.  Hypertensive end-organ damage includes CAD/MI and heart failure.         Past Medical History:   Diagnosis Date    Abnormal SPEP 4/29/2021    Anemia, chronic disease 10/30/2023    Anemia, iron deficiency 10/30/2023    Coronary artery disease 2007    MI   1 c. stent    Discoid lupus     Essential hypertension 8/10/2015    Hyperlipidemia LDL goal < 100 8/10/2015    Normochromic normocytic anemia 10/30/2023     Social History     Socioeconomic History    Marital status:    Tobacco Use    Smoking status: Every Day     Current packs/day: 1.00     Average packs/day: 1 pack/day for 46.4 years (46.4 ttl pk-yrs)     Types: Cigarettes     Start date: 1978    Smokeless tobacco: Never    Tobacco comments:     Pt is a current every day smoker, smokes 1ppd. No interest in quitting at this time.   Substance and Sexual Activity    Alcohol use: Not Currently     Alcohol/week: 14.0 standard drinks of alcohol     Types: 14 Shots of liquor per week    Drug use: Never     Social Determinants of Health     Financial Resource Strain: Low Risk  (5/27/2024)    Overall Financial Resource Strain (CARDIA)     Difficulty of Paying Living Expenses: Not hard at all   Food Insecurity: No Food Insecurity (5/27/2024)    Hunger Vital Sign     Worried About Running Out of Food in the Last Year: Never true     Ran Out of Food in the Last Year: Never true   Transportation Needs: No Transportation Needs (12/13/2023)    PRAPARE - Transportation     Lack of Transportation (Medical): No     Lack of Transportation (Non-Medical): No   Physical Activity: Inactive (5/27/2024)    Exercise Vital Sign     Days of Exercise per Week: 0 days     Minutes of Exercise per Session: 0 min   Stress: No Stress Concern Present (5/27/2024)    [employer] [place] [employer] - Occupational Stress Questionnaire      Feeling of Stress : Not at all   Housing Stability: Low Risk  (12/13/2023)    Housing Stability Vital Sign     Unable to Pay for Housing in the Last Year: No     Number of Places Lived in the Last Year: 1     Unstable Housing in the Last Year: No     Past Surgical History:   Procedure Laterality Date    COLONOSCOPY N/A 9/14/2023    Procedure: COLONOSCOPY;  Surgeon: Ankur Gaona MD;  Location: [facility] ENDO;  Service: Endoscopy;  Laterality: N/A;    COLONOSCOPY W/ POLYPECTOMY  09/14/2023    CORONARY ANGIOPLASTY WITH STENT PLACEMENT  2007    CYSTOSCOPY N/A 10/23/2019    Procedure: CYSTOSCOPY (flexible);  Surgeon: David Mera MD;  Location: [facility] OR;  Service: Urology;  Laterality: N/A;    FOOT SURGERY Left 1999    ORCHIECTOMY Right 10/23/2019    Procedure: 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;  Surgeon: David Mera MD;  Location: [facility] OR;  Service: Urology;  Laterality: Right;    SMALL BOWEL ENTEROSCOPY N/A 08/22/2023    Procedure: ENTEROSCOPY;  Surgeon: Doc Merritt III, MD;  Location: [facility] ENDO;  Service: Endoscopy;  Laterality: N/A;    TREATMENT OF CARDIAC ARRHYTHMIA N/A 04/10/2023    Procedure: Cardioversion or Defibrillation;  Surgeon: Raymond Sotomayor MD;  Location: [facility] CATH/EP LAB;  Service: Cardiology;  Laterality: N/A;    VASECTOMY       Family History   Problem Relation Name Age of Onset    No Known Problems Mother      Melanoma Neg Hx      Psoriasis Neg Hx      Lupus Neg Hx      Eczema Neg Hx       Current Outpatient Medications   Medication Sig Dispense Refill    atorvastatin (LIPITOR) 10 MG tablet Take 10 mg by mouth once daily.      betamethasone dipropionate (DIPROLENE) 0.05 % lotion Apply thin film to scalp QHS PRN itch 60 mL 2    digoxin (LANOXIN) 125 mcg tablet Take 125 mcg by mouth once daily.      ELIQUIS 5 mg Tab Take 5 mg by mouth 2 (two) times daily.      furosemide (LASIX) 40 MG tablet Take 1 tablet (40 mg total) by mouth once daily. 30 tablet 0     gabapentin (NEURONTIN) 300 MG capsule Take 1 capsule (300 mg total) by mouth every evening. 30 capsule 11    iron-vitamin C 100-250 mg, ICAR-C, (ICAR-C) 100-250 mg Tab Take 1 tablet by mouth once daily. 30 each 6    metoprolol succinate (TOPROL-XL) 25 MG 24 hr tablet Take 0.5 tablets (12.5 mg total) by mouth 2 (two) times daily. 30 tablet 0     No current facility-administered medications for this visit.     Facility-Administered Medications Ordered in Other Visits   Medication Dose Route Frequency Provider Last Rate Last Admin    diphenhydrAMINE injection 25 mg  25 mg Intravenous Q6H PRN Dhruv Gutierrez MD        fentaNYL injection 25 mcg  25 mcg Intravenous Q5 Min PRN Dhruv Gutierrez MD        hydromorphone (PF) injection 0.2 mg  0.2 mg Intravenous Q5 Min PRN Dhruv Gutierrez MD        lactated ringers infusion  10 mL/hr Intravenous Continuous Dhruv Gutierrez MD   Stopped at 10/23/19 1450    lactated ringers infusion  75 mL/hr Intravenous Continuous Dhruv Gutierrez MD        lidocaine (PF) 10 mg/ml (1%) injection 10 mg  1 mL Intradermal Once Dhruv Gutierrez MD        ondansetron injection 4 mg  4 mg Intravenous Once Dhruv Gutierrez MD        oxyCODONE immediate release tablet 5 mg  5 mg Oral PRN Dhruv Gutierrez MD        promethazine (PHENERGAN) 6.25 mg in dextrose 5 % 50 mL IVPB  6.25 mg Intravenous Q10 Min PRN Dhruv Gutierrez MD        sodium chloride 0.9% flush 3 mL  3 mL Intravenous Q8H Dhruv Gutierrez MD        sodium chloride 0.9% flush 3 mL  3 mL Intravenous PRN Dhruv Gutierrez MD         Review of patient's allergies indicates:  No Known Allergies    Review of Systems   Constitutional:  Negative for activity change, chills, diaphoresis, fatigue, fever, malaise/fatigue and unexpected weight change.   HENT:  Negative for congestion, hearing loss, rhinorrhea and trouble swallowing.    Eyes:  Negative for blurred vision, discharge and visual  "disturbance.   Respiratory:  Negative for chest tightness, shortness of breath and wheezing.    Cardiovascular:  Negative for chest pain, palpitations, orthopnea and PND.   Gastrointestinal:  Negative for abdominal distention, abdominal pain, anal bleeding, blood in stool, constipation, diarrhea and vomiting.   Endocrine: Negative for polydipsia and polyuria.   Genitourinary:  Negative for difficulty urinating, hematuria and urgency.   Musculoskeletal:  Negative for arthralgias, joint swelling and neck pain.   Neurological:  Negative for weakness and headaches.   Psychiatric/Behavioral:  Negative for confusion and dysphoric mood.           Blood pressure (!) 146/90, pulse 94, height 5' 9" (1.753 m), weight 66.7 kg (147 lb), SpO2 100%. Body mass index is 21.71 kg/m².   Objective:      Physical Exam  Vitals reviewed.   Constitutional:       General: He is not in acute distress.     Appearance: Normal appearance. He is not toxic-appearing.      Comments: Thin male     HENT:      Head: Normocephalic and atraumatic.      Right Ear: Tympanic membrane normal.      Left Ear: Tympanic membrane normal.      Nose: Nose normal. No congestion or rhinorrhea.      Mouth/Throat:      Mouth: Mucous membranes are moist.      Pharynx: No oropharyngeal exudate or posterior oropharyngeal erythema.   Cardiovascular:      Rate and Rhythm: Normal rate and regular rhythm.      Heart sounds: Normal heart sounds. No murmur heard.  Pulmonary:      Effort: Pulmonary effort is normal. No respiratory distress.      Breath sounds: Normal breath sounds. No wheezing.   Skin:     General: Skin is warm and dry.      Capillary Refill: Capillary refill takes less than 2 seconds.   Neurological:      Mental Status: He is alert and oriented to person, place, and time.             Assessment:       1. Essential hypertension    2. Persistent atrial fibrillation    3. Atherosclerosis of aorta    4. Chronic systolic congestive heart failure         Plan:     "       Essential hypertension  Blood pressure is slightly elevated, his medications were discontinued by cardiology, he follows up every 6 month.    Persistent atrial fibrillation  Pt currently anticoagulated, rate is controlled.    Atherosclerosis of aorta  On Statin will  continue    Chronic systolic congestive heart failure  No evidence of fluid over load today, on Lasix, pt will follow up with cardiology.

## 2024-06-28 ENCOUNTER — OFFICE VISIT (OUTPATIENT)
Dept: DERMATOLOGY | Facility: CLINIC | Age: 74
End: 2024-06-28
Payer: MEDICARE

## 2024-06-28 DIAGNOSIS — L93.0 DISCOID LUPUS: Primary | ICD-10-CM

## 2024-06-28 NOTE — PROGRESS NOTES
"  Subjective:      Patient ID:  Sid Cuello Jr. is a 73 y.o. male who presents for   Chief Complaint   Patient presents with    Follow-up     Discoid lupus     LOV 12/28/23 - discoid lupus, pruritus    02/2021 Skin, right upper arm, punch biopsy:   -DISCOID LUPUS ERYTHEMATOSUS      2016  [facility] DIAGNOSIS:  LEFT CHEEK, PUNCH:  Perivascular and periadnexal dermatitis with increased interstitial mucin and focal  interface damage.     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 Took gabapentin for few months, states it helped with itching.   Discontinued gabapentin and the itch has still not come back  States face is still itchy and flared. Protopic made it worse  Sun makes it worse, even when wearing a hat  Diprolene helps some with itch and lessening flare    Took plaquenil for 3 months "years ago" and found that "it didn't work"     Afib on eliquis and digoxin     Derm Hx:  Denies Phx of NMSC  Denies Fhx of MM    Current Outpatient Medications:   ·  atorvastatin (LIPITOR) 10 MG tablet, Take 10 mg by mouth once daily., Disp: , Rfl:   ·  betamethasone dipropionate (DIPROLENE) 0.05 % lotion, Apply thin film to scalp QHS PRN itch, Disp: 60 mL, Rfl: 2  ·  digoxin (LANOXIN) 125 mcg tablet, Take 125 mcg by mouth once daily., Disp: , Rfl:   ·  ELIQUIS 5 mg Tab, Take 5 mg by mouth 2 (two) " times daily., Disp: , Rfl:   ·  gabapentin (NEURONTIN) 300 MG capsule, Take 1 capsule (300 mg total) by mouth every evening., Disp: 30 capsule, Rfl: 11  ·  iron-vitamin C 100-250 mg, ICAR-C, (ICAR-C) 100-250 mg Tab, Take 1 tablet by mouth once daily., Disp: 30 each, Rfl: 6  ·  furosemide (LASIX) 40 MG tablet, Take 1 tablet (40 mg total) by mouth once daily., Disp: 30 tablet, Rfl: 0  ·  metoprolol succinate (TOPROL-XL) 25 MG 24 hr tablet, Take 0.5 tablets (12.5 mg total) by mouth 2 (two) times daily., Disp: 30 tablet, Rfl: 0              Review of Systems   Constitutional:  Positive for fatigue. Negative for fever, chills, weight loss, weight gain, night sweats and malaise.   HENT:  Negative for mouth sores.    Respiratory:  Negative for cough and shortness of breath.    Genitourinary:  Negative for frequency.   Musculoskeletal:  Positive for arthralgias (some back pain, somewhat acute, hands hips knees with no issue). Negative for myalgias, joint swelling and muscle weakness.   Skin:  Positive for itching, activity-related sunscreen use and wears hat. Negative for rash, sun sensitivity and daily sunscreen use.   Neurological:  Negative for seizures.   Hematologic/Lymphatic: Bruises/bleeds easily.       Objective:   Physical Exam   Constitutional: He appears well-developed and well-nourished. No distress.   Neurological: He is alert and oriented to person, place, and time. He is not disoriented.   Psychiatric: He has a normal mood and affect.   Skin:   Areas Examined (abnormalities noted in diagram):   Scalp / Hair Palpated and Inspected  Head / Face Inspection Performed  Neck Inspection Performed  Chest / Axilla Inspection Performed  Abdomen Inspection Performed  Back Inspection Performed  RUE Inspected  LUE Inspection Performed                 Diagram Legend     Erythematous scaling macule/papule c/w actinic keratosis       Vascular papule c/w angioma      Pigmented verrucoid papule/plaque c/w seborrheic keratosis       Yellow umbilicated papule c/w sebaceous hyperplasia      Irregularly shaped tan macule c/w lentigo     1-2 mm smooth white papules consistent with Milia      Movable subcutaneous cyst with punctum c/w epidermal inclusion cyst      Subcutaneous movable cyst c/w pilar cyst      Firm pink to brown papule c/w dermatofibroma      Pedunculated fleshy papule(s) c/w skin tag(s)      Evenly pigmented macule c/w junctional nevus     Mildly variegated pigmented, slightly irregular-bordered macule c/w mildly atypical nevus      Flesh colored to evenly pigmented papule c/w intradermal nevus       Pink pearly papule/plaque c/w basal cell carcinoma      Erythematous hyperkeratotic cursted plaque c/w SCC      Surgical scar with no sign of skin cancer recurrence      Open and closed comedones      Inflammatory papules and pustules      Verrucoid papule consistent consistent with wart     Erythematous eczematous patches and plaques     Dystrophic onycholytic nail with subungual debris c/w onychomycosis     Umbilicated papule    Erythematous-base heme-crusted tan verrucoid plaque consistent with inflamed seborrheic keratosis     Erythematous Silvery Scaling Plaque c/w Psoriasis     See annotation     Latest Reference Range & Units 04/25/24 10:36   WBC 3.90 - 12.70 K/uL 4.78   RBC 4.60 - 6.20 M/uL 4.20 (L)   Hemoglobin 14.0 - 18.0 g/dL 13.4 (L)   Hematocrit 40.0 - 54.0 % 42.0   MCV 82 - 98 fL 100 (H)   MCH 27.0 - 31.0 pg 31.9 (H)   MCHC 32.0 - 36.0 g/dL 31.9 (L)   RDW 11.5 - 14.5 % 15.9 (H)   Platelet Count 150 - 450 K/uL 293   MPV 9.2 - 12.9 fL 9.4   Gran % 38.0 - 73.0 % 42.3   Lymph % 18.0 - 48.0 % 45.2   Mono % 4.0 - 15.0 % 9.8   Eos % 0.0 - 8.0 % 1.5   Basophil % 0.0 - 1.9 % 1.0   Immature Granulocytes 0.0 - 0.5 % 0.2   Gran # (ANC) 1.8 - 7.7 K/uL 2.0   Lymph # 1.0 - 4.8 K/uL 2.2   Mono # 0.3 - 1.0 K/uL 0.5   Eos # 0.0 - 0.5 K/uL 0.1   Baso # 0.00 - 0.20 K/uL 0.05   Immature Grans (Abs) 0.00 - 0.04 K/uL 0.01   nRBC 0 /100 WBC  "0   Differential Method  Automated   Iron 45 - 160 ug/dL 126   TIBC 250 - 450 ug/dL 336   Saturated Iron 20 - 50 % 38   Transferrin 200 - 375 mg/dL 240   Ferritin 20.0 - 300.0 ng/mL 89   Folate 4.0 - 24.0 ng/mL 7.0   Vitamin B12 210 - 950 pg/mL 375   Sodium 136 - 145 mmol/L 132 (L)   Potassium 3.5 - 5.1 mmol/L 4.3   Chloride 95 - 110 mmol/L 98   CO2 23 - 29 mmol/L 27   Anion Gap 8 - 16 mmol/L 7 (L)   BUN 8 - 23 mg/dL 8   Creatinine 0.5 - 1.4 mg/dL 1.0   eGFR >60 mL/min/1.73 m^2 >60   Glucose 70 - 110 mg/dL 82   Calcium 8.7 - 10.5 mg/dL 9.0   ALP 55 - 135 U/L 140 (H)   PROTEIN TOTAL 6.0 - 8.4 g/dL 8.9 (H)   Albumin 3.5 - 5.2 g/dL 3.5   BILIRUBIN TOTAL 0.1 - 1.0 mg/dL 1.3 (H)   AST 10 - 40 U/L 43 (H)   ALT 10 - 44 U/L 27   Beta-2 Microglobulin 1.21 - 2.70 mcg/mL 4.14 (H)   IgG 767 - 1590 mg/dL  767 - 1590 mg/dL 3030 (H)  3030 (H)   IgM 37 - 286 mg/dL  37 - 286 mg/dL 76  76   IgA Level 61 - 356 mg/dL  61 - 356 mg/dL 608 (H)  608 (H)   Kappa/Lambda FLC Ratio 0.2600 - 1.65  1.41   Flag, M-Protein Isotype Negative  Negative   Free Kappa Lt Chains,S 0.3300 - 1.94 mg/dL 6.84 (H)   Free Lambda Lt Chains,S 0.5700 - 2.63 mg/dL 4.86 (H)   Glycosylation  Test Not Performed   M-protein AK  Test Not Performed   M-protein AL  Test Not Performed   M-protein GK  Test Not Performed   M-protein GL  Test Not Performed   M-protein MK  Test Not Performed   M-protein ML  Test Not Performed   QMPTS Interpretation  No monoclonal protein detected.   (L): Data is abnormally low  (H): Data is abnormally high            Assessment / Plan:        Discoid lupus    Facial + scalp flare  Truncal rash cleared, no longer itchy  Continues to decline plaquenil "it has side effects"  Reassured patient regarding absence of interaction with digoxin  Smoker, would limit efficacy  Discussed second line tx: MTX, cellcept, soriatane, all with less favorable side effect profile than plaquenil. Patient declines  Trial of diprolene cream BID x 7 days to face and " "anterior scalp (currently using on average 3 times per week "when too itchy"  Strict sun protection (denies outdoor activities)           No follow-ups on file.  "

## 2024-07-22 ENCOUNTER — LAB VISIT (OUTPATIENT)
Dept: LAB | Facility: HOSPITAL | Age: 74
End: 2024-07-22
Attending: INTERNAL MEDICINE
Payer: MEDICARE

## 2024-07-22 DIAGNOSIS — R77.8 ABNORMAL SPEP: ICD-10-CM

## 2024-07-22 DIAGNOSIS — D64.9 NORMOCHROMIC NORMOCYTIC ANEMIA: ICD-10-CM

## 2024-07-22 DIAGNOSIS — D50.9 IRON DEFICIENCY ANEMIA, UNSPECIFIED IRON DEFICIENCY ANEMIA TYPE: ICD-10-CM

## 2024-07-22 DIAGNOSIS — D53.9 NUTRITIONAL ANEMIA, UNSPECIFIED: ICD-10-CM

## 2024-07-22 DIAGNOSIS — R71.8 ELEVATED MCV: ICD-10-CM

## 2024-07-22 DIAGNOSIS — D63.8 ANEMIA, CHRONIC DISEASE: ICD-10-CM

## 2024-07-22 DIAGNOSIS — D64.9 ANEMIA, UNSPECIFIED TYPE: Chronic | ICD-10-CM

## 2024-07-22 LAB
ALBUMIN SERPL BCP-MCNC: 3.3 G/DL (ref 3.5–5.2)
ALP SERPL-CCNC: 145 U/L (ref 55–135)
ALT SERPL W/O P-5'-P-CCNC: 20 U/L (ref 10–44)
ANION GAP SERPL CALC-SCNC: 9 MMOL/L (ref 8–16)
AST SERPL-CCNC: 34 U/L (ref 10–40)
BASOPHILS # BLD AUTO: 0.05 K/UL (ref 0–0.2)
BASOPHILS NFR BLD: 0.9 % (ref 0–1.9)
BILIRUB SERPL-MCNC: 1 MG/DL (ref 0.1–1)
BUN SERPL-MCNC: 10 MG/DL (ref 8–23)
CALCIUM SERPL-MCNC: 9.1 MG/DL (ref 8.7–10.5)
CHLORIDE SERPL-SCNC: 101 MMOL/L (ref 95–110)
CO2 SERPL-SCNC: 27 MMOL/L (ref 23–29)
CREAT SERPL-MCNC: 1.1 MG/DL (ref 0.5–1.4)
DIFFERENTIAL METHOD BLD: ABNORMAL
EOSINOPHIL # BLD AUTO: 0.2 K/UL (ref 0–0.5)
EOSINOPHIL NFR BLD: 2.8 % (ref 0–8)
ERYTHROCYTE [DISTWIDTH] IN BLOOD BY AUTOMATED COUNT: 15 % (ref 11.5–14.5)
EST. GFR  (NO RACE VARIABLE): >60 ML/MIN/1.73 M^2
FERRITIN SERPL-MCNC: 121 NG/ML (ref 20–300)
FOLATE SERPL-MCNC: 9 NG/ML (ref 4–24)
GLUCOSE SERPL-MCNC: 109 MG/DL (ref 70–110)
HCT VFR BLD AUTO: 41.2 % (ref 40–54)
HGB BLD-MCNC: 13.4 G/DL (ref 14–18)
IMM GRANULOCYTES # BLD AUTO: 0 K/UL (ref 0–0.04)
IMM GRANULOCYTES NFR BLD AUTO: 0 % (ref 0–0.5)
IRON SERPL-MCNC: 145 UG/DL (ref 45–160)
LYMPHOCYTES # BLD AUTO: 2.4 K/UL (ref 1–4.8)
LYMPHOCYTES NFR BLD: 45.2 % (ref 18–48)
MCH RBC QN AUTO: 33.9 PG (ref 27–31)
MCHC RBC AUTO-ENTMCNC: 32.5 G/DL (ref 32–36)
MCV RBC AUTO: 104 FL (ref 82–98)
MONOCYTES # BLD AUTO: 0.5 K/UL (ref 0.3–1)
MONOCYTES NFR BLD: 9.6 % (ref 4–15)
NEUTROPHILS # BLD AUTO: 2.2 K/UL (ref 1.8–7.7)
NEUTROPHILS NFR BLD: 41.5 % (ref 38–73)
NRBC BLD-RTO: 0 /100 WBC
PLATELET # BLD AUTO: 240 K/UL (ref 150–450)
PMV BLD AUTO: 9.1 FL (ref 9.2–12.9)
POTASSIUM SERPL-SCNC: 4.2 MMOL/L (ref 3.5–5.1)
PROT SERPL-MCNC: 8.2 G/DL (ref 6–8.4)
RBC # BLD AUTO: 3.95 M/UL (ref 4.6–6.2)
SATURATED IRON: 46 % (ref 20–50)
SODIUM SERPL-SCNC: 137 MMOL/L (ref 136–145)
TOTAL IRON BINDING CAPACITY: 315 UG/DL (ref 250–450)
TRANSFERRIN SERPL-MCNC: 225 MG/DL (ref 200–375)
VIT B12 SERPL-MCNC: 336 PG/ML (ref 210–950)
WBC # BLD AUTO: 5.4 K/UL (ref 3.9–12.7)

## 2024-07-22 PROCEDURE — 80053 COMPREHEN METABOLIC PANEL: CPT | Performed by: INTERNAL MEDICINE

## 2024-07-22 PROCEDURE — 83540 ASSAY OF IRON: CPT | Performed by: INTERNAL MEDICINE

## 2024-07-22 PROCEDURE — 82607 VITAMIN B-12: CPT | Performed by: INTERNAL MEDICINE

## 2024-07-22 PROCEDURE — 85025 COMPLETE CBC W/AUTO DIFF WBC: CPT | Performed by: INTERNAL MEDICINE

## 2024-07-22 PROCEDURE — 82746 ASSAY OF FOLIC ACID SERUM: CPT | Performed by: INTERNAL MEDICINE

## 2024-07-22 PROCEDURE — 82728 ASSAY OF FERRITIN: CPT | Performed by: INTERNAL MEDICINE

## 2024-07-22 PROCEDURE — 36415 COLL VENOUS BLD VENIPUNCTURE: CPT | Performed by: INTERNAL MEDICINE

## 2024-07-23 LAB
B2 MICROGLOB SERPL-MCNC: 4.3 MCG/ML (ref 1.21–2.7)
KAPPA LC FREE SER-MCNC: 7.74 MG/DL (ref 0.33–1.94)
KAPPA LC FREE/LAMBDA FREE SER: 1.58 {RATIO} (ref 0.26–1.65)
LAMBDA LC FREE SERPL-MCNC: 4.9 MG/DL (ref 0.57–2.63)

## 2024-07-24 LAB
GLYCOSYLATION: ABNORMAL
IGA SERPL-MCNC: 544 MG/DL (ref 61–356)
IGA SERPL-MCNC: 544 MG/DL (ref 61–356)
IGG SERPL-MCNC: 2680 MG/DL (ref 767–1590)
IGG SERPL-MCNC: 2680 MG/DL (ref 767–1590)
IGM SERPL-MCNC: 66 MG/DL (ref 37–286)
IGM SERPL-MCNC: 66 MG/DL (ref 37–286)
IMMUNOLOGIST REVIEW: ABNORMAL
M PROTEIN SERPL QL: NEGATIVE
M-PROTEIN AK: ABNORMAL
M-PROTEIN AL: ABNORMAL
M-PROTEIN GK: ABNORMAL
M-PROTEIN GL: ABNORMAL
M-PROTEIN MK: ABNORMAL
M-PROTEIN ML: ABNORMAL

## 2024-07-31 ENCOUNTER — TELEPHONE (OUTPATIENT)
Facility: CLINIC | Age: 74
End: 2024-07-31

## 2024-10-08 DIAGNOSIS — L93.0 DISCOID LUPUS: ICD-10-CM

## 2024-10-08 RX ORDER — BETAMETHASONE DIPROPIONATE 0.5 MG/G
LOTION TOPICAL
Qty: 60 ML | Refills: 2 | Status: SHIPPED | OUTPATIENT
Start: 2024-10-08

## 2024-10-15 ENCOUNTER — LAB VISIT (OUTPATIENT)
Dept: LAB | Facility: HOSPITAL | Age: 74
End: 2024-10-15
Attending: INTERNAL MEDICINE
Payer: MEDICARE

## 2024-10-15 DIAGNOSIS — D64.9 NORMOCHROMIC NORMOCYTIC ANEMIA: ICD-10-CM

## 2024-10-15 DIAGNOSIS — R77.8 ABNORMAL SPEP: ICD-10-CM

## 2024-10-15 DIAGNOSIS — D63.8 ANEMIA, CHRONIC DISEASE: ICD-10-CM

## 2024-10-15 DIAGNOSIS — D64.9 ANEMIA, UNSPECIFIED TYPE: Chronic | ICD-10-CM

## 2024-10-15 DIAGNOSIS — R71.8 ELEVATED MCV: ICD-10-CM

## 2024-10-15 DIAGNOSIS — D50.9 IRON DEFICIENCY ANEMIA, UNSPECIFIED IRON DEFICIENCY ANEMIA TYPE: ICD-10-CM

## 2024-10-15 LAB
ALBUMIN SERPL BCP-MCNC: 3.1 G/DL (ref 3.5–5.2)
ALP SERPL-CCNC: 150 U/L (ref 55–135)
ALT SERPL W/O P-5'-P-CCNC: 20 U/L (ref 10–44)
ANION GAP SERPL CALC-SCNC: 10 MMOL/L (ref 8–16)
AST SERPL-CCNC: 38 U/L (ref 10–40)
BASOPHILS # BLD AUTO: 0.03 K/UL (ref 0–0.2)
BASOPHILS NFR BLD: 0.6 % (ref 0–1.9)
BILIRUB SERPL-MCNC: 0.9 MG/DL (ref 0.1–1)
BUN SERPL-MCNC: 8 MG/DL (ref 8–23)
CALCIUM SERPL-MCNC: 8.8 MG/DL (ref 8.7–10.5)
CHLORIDE SERPL-SCNC: 102 MMOL/L (ref 95–110)
CO2 SERPL-SCNC: 26 MMOL/L (ref 23–29)
CREAT SERPL-MCNC: 1 MG/DL (ref 0.5–1.4)
DIFFERENTIAL METHOD BLD: ABNORMAL
EOSINOPHIL # BLD AUTO: 0.1 K/UL (ref 0–0.5)
EOSINOPHIL NFR BLD: 1.5 % (ref 0–8)
ERYTHROCYTE [DISTWIDTH] IN BLOOD BY AUTOMATED COUNT: 14.4 % (ref 11.5–14.5)
EST. GFR  (NO RACE VARIABLE): >60 ML/MIN/1.73 M^2
FERRITIN SERPL-MCNC: 163 NG/ML (ref 20–300)
GLUCOSE SERPL-MCNC: 80 MG/DL (ref 70–110)
HCT VFR BLD AUTO: 43.1 % (ref 40–54)
HGB BLD-MCNC: 13.5 G/DL (ref 14–18)
IMM GRANULOCYTES # BLD AUTO: 0.01 K/UL (ref 0–0.04)
IMM GRANULOCYTES NFR BLD AUTO: 0.2 % (ref 0–0.5)
IRON SERPL-MCNC: 77 UG/DL (ref 45–160)
LYMPHOCYTES # BLD AUTO: 1.9 K/UL (ref 1–4.8)
LYMPHOCYTES NFR BLD: 40.1 % (ref 18–48)
MCH RBC QN AUTO: 33.8 PG (ref 27–31)
MCHC RBC AUTO-ENTMCNC: 31.3 G/DL (ref 32–36)
MCV RBC AUTO: 108 FL (ref 82–98)
MONOCYTES # BLD AUTO: 0.4 K/UL (ref 0.3–1)
MONOCYTES NFR BLD: 9.2 % (ref 4–15)
NEUTROPHILS # BLD AUTO: 2.3 K/UL (ref 1.8–7.7)
NEUTROPHILS NFR BLD: 48.4 % (ref 38–73)
NRBC BLD-RTO: 0 /100 WBC
PLATELET # BLD AUTO: 249 K/UL (ref 150–450)
PMV BLD AUTO: 9.9 FL (ref 9.2–12.9)
POTASSIUM SERPL-SCNC: 3.9 MMOL/L (ref 3.5–5.1)
PROT SERPL-MCNC: 8.5 G/DL (ref 6–8.4)
RBC # BLD AUTO: 4 M/UL (ref 4.6–6.2)
SATURATED IRON: 25 % (ref 20–50)
SODIUM SERPL-SCNC: 138 MMOL/L (ref 136–145)
TOTAL IRON BINDING CAPACITY: 307 UG/DL (ref 250–450)
TRANSFERRIN SERPL-MCNC: 219 MG/DL (ref 200–375)
WBC # BLD AUTO: 4.79 K/UL (ref 3.9–12.7)

## 2024-10-15 PROCEDURE — 85025 COMPLETE CBC W/AUTO DIFF WBC: CPT | Performed by: INTERNAL MEDICINE

## 2024-10-15 PROCEDURE — 82728 ASSAY OF FERRITIN: CPT | Performed by: INTERNAL MEDICINE

## 2024-10-15 PROCEDURE — 80053 COMPREHEN METABOLIC PANEL: CPT | Performed by: INTERNAL MEDICINE

## 2024-10-15 PROCEDURE — 83540 ASSAY OF IRON: CPT | Performed by: INTERNAL MEDICINE

## 2024-10-22 ENCOUNTER — LAB VISIT (OUTPATIENT)
Dept: LAB | Facility: HOSPITAL | Age: 74
End: 2024-10-22
Attending: INTERNAL MEDICINE
Payer: MEDICARE

## 2024-10-22 DIAGNOSIS — D64.9 ANEMIA, UNSPECIFIED TYPE: Chronic | ICD-10-CM

## 2024-10-22 DIAGNOSIS — D50.9 IRON DEFICIENCY ANEMIA, UNSPECIFIED IRON DEFICIENCY ANEMIA TYPE: ICD-10-CM

## 2024-10-22 DIAGNOSIS — R71.8 ELEVATED MCV: ICD-10-CM

## 2024-10-22 DIAGNOSIS — D64.9 NORMOCHROMIC NORMOCYTIC ANEMIA: ICD-10-CM

## 2024-10-22 DIAGNOSIS — R77.8 ABNORMAL SPEP: ICD-10-CM

## 2024-10-22 DIAGNOSIS — D63.8 ANEMIA, CHRONIC DISEASE: ICD-10-CM

## 2024-10-22 LAB
PROT 24H UR-MRATE: 165 MG/SPEC (ref 0–100)
PROT UR-MCNC: 33 MG/DL (ref 0–15)
URINE COLLECTION DURATION: 24 HR
URINE VOLUME: 500 ML

## 2024-10-22 PROCEDURE — 84156 ASSAY OF PROTEIN URINE: CPT | Performed by: INTERNAL MEDICINE

## 2024-10-24 ENCOUNTER — PATIENT MESSAGE (OUTPATIENT)
Dept: GASTROENTEROLOGY | Facility: CLINIC | Age: 74
End: 2024-10-24
Payer: MEDICARE

## 2024-11-26 ENCOUNTER — OFFICE VISIT (OUTPATIENT)
Dept: FAMILY MEDICINE | Facility: CLINIC | Age: 74
End: 2024-11-26
Payer: MEDICARE

## 2024-11-26 VITALS
SYSTOLIC BLOOD PRESSURE: 126 MMHG | OXYGEN SATURATION: 95 % | TEMPERATURE: 98 F | HEART RATE: 94 BPM | DIASTOLIC BLOOD PRESSURE: 82 MMHG | WEIGHT: 154 LBS | RESPIRATION RATE: 18 BRPM | BODY MASS INDEX: 22.81 KG/M2 | HEIGHT: 69 IN

## 2024-11-26 DIAGNOSIS — I25.10 CORONARY ARTERY DISEASE INVOLVING NATIVE CORONARY ARTERY OF NATIVE HEART WITHOUT ANGINA PECTORIS: Chronic | ICD-10-CM

## 2024-11-26 DIAGNOSIS — L93.0 DISCOID LUPUS: ICD-10-CM

## 2024-11-26 DIAGNOSIS — F17.210 CIGARETTE SMOKER: ICD-10-CM

## 2024-11-26 DIAGNOSIS — F17.200 SMOKER: Primary | ICD-10-CM

## 2024-11-26 PROCEDURE — 99215 OFFICE O/P EST HI 40 MIN: CPT | Mod: PBBFAC,PN | Performed by: FAMILY MEDICINE

## 2024-11-26 PROCEDURE — 99999 PR PBB SHADOW E&M-EST. PATIENT-LVL V: CPT | Mod: PBBFAC,,, | Performed by: FAMILY MEDICINE

## 2024-11-26 PROCEDURE — 99213 OFFICE O/P EST LOW 20 MIN: CPT | Mod: S$PBB,AQ,, | Performed by: FAMILY MEDICINE

## 2024-11-26 RX ORDER — CLOTRIMAZOLE AND BETAMETHASONE DIPROPIONATE 10; .64 MG/G; MG/G
CREAM TOPICAL 2 TIMES DAILY
Qty: 15 G | Refills: 1 | Status: SHIPPED | OUTPATIENT
Start: 2024-11-26

## 2024-11-26 NOTE — PROGRESS NOTES
SUBJECTIVE:    Patient ID: Sid Cuello Jr. is a 74 y.o. male.    Chief Complaint: Hypertension  73-year-old male last seen 6 months ago.  Follow-up on his hypertension his blood pressure today is well controlled he is currently on metoprolol XL 25 mg.  Patient was recently informed by his insurance company that his cardiologist is no longer and the plan, he will need a referral to a new cardiologist he has significant cardiac history he has coronary artery disease he is status post stent placement in 2007 currently on Plavix.  He has a history of AFib on digoxin 125 mg.    He has a history of abnormal SPEP he is being followed by Hematology-Oncology he is getting labs every 3 months patient recently missed his most recent hematology visit but he has completed the labs, his IgA was elevated at 697, his IgG is elevated at 2730.  Explained to the patient the importance of getting his labs and following up with Hematology.               Significant past medical history  Discoid Taya:   Hyperlipidemia: Atorvastatin 10mg   Hypertension: Metoprolol XL25mg  Coronary artery disease status post stent placement 2007: Plavix 75mg  Abnormal SPEP: Getting labs every 3 months  Anemia: Iron  A-Fib: Digoxin 125  Itching: Gabapentin.     Specialist  Hematology/oncology:  Dr. Javier  Dermatology:  Dr. Anglin  Urology:  Dr. Mera  Orthopedics:  Dr. Trevizo  Cardiology: Dr Sotomayor  General Surgery:  [facility]  Ophtho: Dr Culp         Smoke: 1 ppd  ETOH: 1 a day  Exercise: None    Hypertension  This is a chronic problem. The current episode started more than 1 year ago. The problem is unchanged. The problem is controlled. Pertinent negatives include no anxiety, blurred vision, chest pain, headaches, malaise/fatigue, neck pain, orthopnea, palpitations, peripheral edema, PND or sweats. Risk factors for coronary artery disease include dyslipidemia, male gender and sedentary lifestyle. Past treatments include beta  blockers. The current treatment provides moderate improvement. Compliance problems include exercise and diet.    Hyperlipidemia  This is a chronic problem. The current episode started more than 1 year ago. Condition status: Unknown. Pertinent negatives include no chest pain.         Past Medical History:   Diagnosis Date    Abnormal SPEP 4/29/2021    Anemia, chronic disease 10/30/2023    Anemia, iron deficiency 10/30/2023    Coronary artery disease 2007    MI   1 c. stent    Discoid lupus     Essential hypertension 8/10/2015    Hyperlipidemia LDL goal < 100 8/10/2015    Normochromic normocytic anemia 10/30/2023     Social History     Socioeconomic History    Marital status:    Tobacco Use    Smoking status: Every Day     Current packs/day: 1.00     Average packs/day: 1 pack/day for 46.9 years (46.9 ttl pk-yrs)     Types: Cigarettes     Start date: 1978    Smokeless tobacco: Never    Tobacco comments:     Pt is a current every day smoker, smokes 1ppd. No interest in quitting at this time.   Substance and Sexual Activity    Alcohol use: Not Currently     Alcohol/week: 14.0 standard drinks of alcohol     Types: 14 Shots of liquor per week    Drug use: Never     Social Drivers of Health     Financial Resource Strain: Low Risk  (5/27/2024)    Overall Financial Resource Strain (CARDIA)     Difficulty of Paying Living Expenses: Not hard at all   Food Insecurity: No Food Insecurity (5/27/2024)    Hunger Vital Sign     Worried About Running Out of Food in the Last Year: Never true     Ran Out of Food in the Last Year: Never true   Transportation Needs: No Transportation Needs (12/13/2023)    PRAPARE - Transportation     Lack of Transportation (Medical): No     Lack of Transportation (Non-Medical): No   Physical Activity: Inactive (5/27/2024)    Exercise Vital Sign     Days of Exercise per Week: 0 days     Minutes of Exercise per Session: 0 min   Stress: No Stress Concern Present (5/27/2024)     [facility] - Occupational Stress Questionnaire     Feeling of Stress : Not at all   Housing Stability: Low Risk  (12/13/2023)    Housing Stability Vital Sign     Unable to Pay for Housing in the Last Year: No     Number of Places Lived in the Last Year: 1     Unstable Housing in the Last Year: No     Past Surgical History:   Procedure Laterality Date    COLONOSCOPY N/A 9/14/2023    Procedure: COLONOSCOPY;  Surgeon: Ankur Gaona MD;  Location: [facility] ENDO;  Service: Endoscopy;  Laterality: N/A;    COLONOSCOPY W/ POLYPECTOMY  09/14/2023    CORONARY ANGIOPLASTY WITH STENT PLACEMENT  2007    CYSTOSCOPY N/A 10/23/2019    Procedure: CYSTOSCOPY (flexible);  Surgeon: David Mera MD;  Location: [facility] OR;  Service: Urology;  Laterality: N/A;    FOOT SURGERY Left 1999    ORCHIECTOMY Right 10/23/2019    Procedure: 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;  Surgeon: Daivd Mera MD;  Location: [facility] OR;  Service: Urology;  Laterality: Right;    SMALL BOWEL ENTEROSCOPY N/A 08/22/2023    Procedure: ENTEROSCOPY;  Surgeon: Doc Merritt III, MD;  Location: [facility] ENDO;  Service: Endoscopy;  Laterality: N/A;    TREATMENT OF CARDIAC ARRHYTHMIA N/A 04/10/2023    Procedure: Cardioversion or Defibrillation;  Surgeon: Raymond Sotomayor MD;  Location: [facility] CATH/EP LAB;  Service: Cardiology;  Laterality: N/A;    VASECTOMY       Family History   Problem Relation Name Age of Onset    No Known Problems Mother      Melanoma Neg Hx      Psoriasis Neg Hx      Lupus Neg Hx      Eczema Neg Hx       Current Outpatient Medications   Medication Sig Dispense Refill    atorvastatin (LIPITOR) 10 MG tablet Take 10 mg by mouth once daily.      betamethasone dipropionate (DIPROLENE) 0.05 % lotion APPLY THIN LAYER TOPICALLY TO THE SCALP EVERY NIGHT AT BEDTIME AS NEEDED FOR ITCHING 60 mL 2    digoxin (LANOXIN) 125 mcg tablet Take 125 mcg by mouth once daily.      ELIQUIS 5 mg  Tab Take 5 mg by mouth 2 (two) times daily.      gabapentin (NEURONTIN) 300 MG capsule Take 1 capsule (300 mg total) by mouth every evening. 30 capsule 11    iron-vitamin C 100-250 mg, ICAR-C, (ICAR-C) 100-250 mg Tab Take 1 tablet by mouth once daily. 30 each 6    clotrimazole-betamethasone 1-0.05% (LOTRISONE) cream Apply topically 2 (two) times daily. For 7 days 15 g 1    furosemide (LASIX) 40 MG tablet Take 1 tablet (40 mg total) by mouth once daily. 30 tablet 0    metoprolol succinate (TOPROL-XL) 25 MG 24 hr tablet Take 0.5 tablets (12.5 mg total) by mouth 2 (two) times daily. 30 tablet 0     No current facility-administered medications for this visit.     Facility-Administered Medications Ordered in Other Visits   Medication Dose Route Frequency Provider Last Rate Last Admin    diphenhydrAMINE injection 25 mg  25 mg Intravenous Q6H PRN Dhruv Gutierrez MD        fentaNYL injection 25 mcg  25 mcg Intravenous Q5 Min PRN Dhruv Gutierrez MD        hydromorphone (PF) injection 0.2 mg  0.2 mg Intravenous Q5 Min PRN Dhruv Gutierrez MD        lactated ringers infusion  10 mL/hr Intravenous Continuous Dhruv Gutierrez MD   Stopped at 10/23/19 1450    lactated ringers infusion  75 mL/hr Intravenous Continuous Dhruv Gutierrez MD        lidocaine (PF) 10 mg/ml (1%) injection 10 mg  1 mL Intradermal Once Dhruv Gutierrez MD        ondansetron injection 4 mg  4 mg Intravenous Once Dhruv Gutierrez MD        oxyCODONE immediate release tablet 5 mg  5 mg Oral PRN Dhruv Gutierrez MD        promethazine (PHENERGAN) 6.25 mg in dextrose 5 % 50 mL IVPB  6.25 mg Intravenous Q10 Min PRN Dhruv Gutierrez MD        sodium chloride 0.9% flush 3 mL  3 mL Intravenous Q8H Dhruv Gutierrez MD        sodium chloride 0.9% flush 3 mL  3 mL Intravenous PRN Dhruv Gutierrez MD         Review of patient's allergies indicates:  No Known Allergies    Review of Systems  "  Constitutional:  Negative for activity change, malaise/fatigue and unexpected weight change.   HENT:  Negative for hearing loss, rhinorrhea and trouble swallowing.    Eyes:  Negative for blurred vision, discharge and visual disturbance.   Respiratory:  Negative for chest tightness and wheezing.    Cardiovascular:  Negative for chest pain, palpitations, orthopnea and PND.   Gastrointestinal:  Negative for blood in stool, constipation, diarrhea and vomiting.   Endocrine: Negative for polydipsia and polyuria.   Genitourinary:  Negative for difficulty urinating, hematuria and urgency.   Musculoskeletal:  Negative for arthralgias, joint swelling and neck pain.   Neurological:  Negative for weakness and headaches.   Psychiatric/Behavioral:  Negative for confusion and dysphoric mood.           Blood pressure 126/82, pulse 94, temperature 98.4 °F (36.9 °C), temperature source Oral, resp. rate 18, height 5' 9" (1.753 m), weight 69.9 kg (154 lb), SpO2 95%. Body mass index is 22.74 kg/m².   Objective:      Physical Exam    Assessment:       1. Smoker    2. Discoid lupus    3. Coronary artery disease involving native coronary artery of native heart without angina pectoris    4. Cigarette smoker         Plan:           Smoker  -     CT Chest Lung Screening Low Dose; Future; Expected date: 11/26/2024    Discoid lupus  -     clotrimazole-betamethasone 1-0.05% (LOTRISONE) cream; Apply topically 2 (two) times daily. For 7 days  Dispense: 15 g; Refill: 1    Coronary artery disease involving native coronary artery of native heart without angina pectoris  -     Ambulatory referral/consult to Cardiology; Future; Expected date: 12/03/2024    Cigarette smoker  -     CT Chest Lung Screening Low Dose; Future; Expected date: 11/26/2024                          "

## 2024-12-02 ENCOUNTER — TELEPHONE (OUTPATIENT)
Dept: CARDIOLOGY | Facility: CLINIC | Age: 74
End: 2024-12-02
Payer: MEDICARE

## 2024-12-02 ENCOUNTER — HOSPITAL ENCOUNTER (OUTPATIENT)
Dept: RADIOLOGY | Facility: HOSPITAL | Age: 74
Discharge: HOME | End: 2024-12-02
Attending: FAMILY MEDICINE
Payer: MEDICARE

## 2024-12-02 DIAGNOSIS — F17.210 CIGARETTE SMOKER: ICD-10-CM

## 2024-12-02 DIAGNOSIS — F17.200 SMOKER: ICD-10-CM

## 2024-12-02 PROCEDURE — 71271 CT THORAX LUNG CANCER SCR C-: CPT | Mod: 26,,, | Performed by: RADIOLOGY

## 2024-12-02 PROCEDURE — 71271 CT THORAX LUNG CANCER SCR C-: CPT | Mod: TC,PO

## 2024-12-02 NOTE — TELEPHONE ENCOUNTER
----- Message from Jaciel sent at 12/2/2024  4:11 PM CST -----  Regarding: return call  Type:  Patient Returning Call    Who Called:patient  Who Left Message for Patient:staff  Does the patient know what this is regarding?:please request old records from/ [facility]  Would the patient rather a call back or a response via [facility]?   Best Call Back Number:[phone]  Additional Information:

## 2024-12-04 NOTE — TELEPHONE ENCOUNTER
I called this patient , with no response. He needs to be seen in our office or come by . We need a signed medical request form .

## 2024-12-18 ENCOUNTER — OFFICE VISIT (OUTPATIENT)
Dept: CARDIOLOGY | Facility: CLINIC | Age: 74
End: 2024-12-18
Payer: MEDICARE

## 2024-12-18 ENCOUNTER — OFFICE VISIT (OUTPATIENT)
Dept: DERMATOLOGY | Facility: CLINIC | Age: 74
End: 2024-12-18
Payer: MEDICARE

## 2024-12-18 VITALS
HEIGHT: 69 IN | SYSTOLIC BLOOD PRESSURE: 128 MMHG | HEART RATE: 95 BPM | DIASTOLIC BLOOD PRESSURE: 70 MMHG | BODY MASS INDEX: 22.98 KG/M2 | OXYGEN SATURATION: 97 % | WEIGHT: 155.19 LBS

## 2024-12-18 VITALS — WEIGHT: 154.13 LBS | BODY MASS INDEX: 22.83 KG/M2 | HEIGHT: 69 IN

## 2024-12-18 DIAGNOSIS — L93.0 DISCOID LUPUS: ICD-10-CM

## 2024-12-18 DIAGNOSIS — I48.19 PERSISTENT ATRIAL FIBRILLATION: ICD-10-CM

## 2024-12-18 DIAGNOSIS — I25.10 CORONARY ARTERY DISEASE INVOLVING NATIVE CORONARY ARTERY OF NATIVE HEART WITHOUT ANGINA PECTORIS: Primary | Chronic | ICD-10-CM

## 2024-12-18 DIAGNOSIS — L82.1 SEBORRHEIC KERATOSES: ICD-10-CM

## 2024-12-18 DIAGNOSIS — L81.4 SOLAR LENTIGO: ICD-10-CM

## 2024-12-18 DIAGNOSIS — Z95.5 S/P CORONARY ARTERY STENT PLACEMENT: ICD-10-CM

## 2024-12-18 DIAGNOSIS — Z72.0 TOBACCO ABUSE: ICD-10-CM

## 2024-12-18 DIAGNOSIS — Z12.83 SKIN CANCER SCREENING: ICD-10-CM

## 2024-12-18 DIAGNOSIS — I51.9 LEFT VENTRICULAR SYSTOLIC DYSFUNCTION (LVSD): ICD-10-CM

## 2024-12-18 DIAGNOSIS — L57.0 ACTINIC KERATOSES: Primary | ICD-10-CM

## 2024-12-18 PROBLEM — I34.0 MITRAL REGURGITATION: Chronic | Status: RESOLVED | Noted: 2023-02-16 | Resolved: 2024-12-18

## 2024-12-18 PROBLEM — I50.22 CHRONIC SYSTOLIC CONGESTIVE HEART FAILURE: Status: RESOLVED | Noted: 2023-08-21 | Resolved: 2024-12-18

## 2024-12-18 PROCEDURE — 17000 DESTRUCT PREMALG LESION: CPT | Mod: AQ,S$GLB,, | Performed by: DERMATOLOGY

## 2024-12-18 PROCEDURE — 99204 OFFICE O/P NEW MOD 45 MIN: CPT | Mod: S$PBB,,, | Performed by: INTERNAL MEDICINE

## 2024-12-18 PROCEDURE — 99999 PR PBB SHADOW E&M-EST. PATIENT-LVL IV: CPT | Mod: PBBFAC,,, | Performed by: INTERNAL MEDICINE

## 2024-12-18 PROCEDURE — 99213 OFFICE O/P EST LOW 20 MIN: CPT | Mod: 25,AQ,S$GLB, | Performed by: DERMATOLOGY

## 2024-12-18 PROCEDURE — 99214 OFFICE O/P EST MOD 30 MIN: CPT | Mod: PBBFAC,PN | Performed by: INTERNAL MEDICINE

## 2024-12-18 RX ORDER — METOPROLOL SUCCINATE 25 MG/1
25 TABLET, EXTENDED RELEASE ORAL 2 TIMES DAILY
Qty: 180 TABLET | Refills: 3 | Status: SHIPPED | OUTPATIENT
Start: 2024-12-18 | End: 2025-12-18

## 2024-12-18 NOTE — PROGRESS NOTES
[facility]    Subjective:     Patient ID:  Sid Cuello Jr. is a 74 y.o. male patient here for evaluation Coronary Artery Disease (Switching  from Dr Kimball )      HPI:  74-year-old male here to establish care.  Transition of care from an outside cardiologist due to insurance issues.  History of CAD status post PCIs 17 years ago.  Cardioverted from AFib in April 20, 2023 and reverted back into AFib, EF of sent, required diuretics when he had AFib RVR in April, off diuretics since then.  Has been doing well without any exertional chest pain or shortness of breath, no pedal edema.  Reports having a stress test in October 2024.    Review of Systems   All other systems reviewed and are negative.       Past Medical History:   Diagnosis Date    Abnormal SPEP 4/29/2021    Anemia, chronic disease 10/30/2023    Anemia, iron deficiency 10/30/2023    Coronary artery disease 2007    MI   1 c. stent    Discoid lupus     Essential hypertension 8/10/2015    Hyperlipidemia LDL goal < 100 8/10/2015    Normochromic normocytic anemia 10/30/2023       Past Surgical History:   Procedure Laterality Date    COLONOSCOPY N/A 9/14/2023    Procedure: COLONOSCOPY;  Surgeon: Ankur Gaona MD;  Location: [facility];  Service: Endoscopy;  Laterality: N/A;    COLONOSCOPY W/ POLYPECTOMY  09/14/2023    CORONARY ANGIOPLASTY WITH STENT PLACEMENT  2007    CYSTOSCOPY N/A 10/23/2019    Procedure: CYSTOSCOPY (flexible);  Surgeon: David Mera MD;  Location: [facility] OR;  Service: Urology;  Laterality: N/A;    FOOT SURGERY Left 1999    ORCHIECTOMY Right 10/23/2019    Procedure: 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;  Surgeon: David Mera MD;  Location: [facility] OR;  Service: Urology;  Laterality: Right;    SMALL BOWEL ENTEROSCOPY N/A 08/22/2023    Procedure: ENTEROSCOPY;  Surgeon: Doc Merritt III, MD;  Location: [facility];  Service: Endoscopy;   Laterality: N/A;    TREATMENT OF CARDIAC ARRHYTHMIA N/A 04/10/2023    Procedure: Cardioversion or Defibrillation;  Surgeon: Raymond Sotomayor MD;  Location: [facility] CATH/EP LAB;  Service: Cardiology;  Laterality: N/A;    VASECTOMY         Family History   Problem Relation Name Age of Onset    No Known Problems Mother      Melanoma Neg Hx      Psoriasis Neg Hx      Lupus Neg Hx      Eczema Neg Hx         Social History     Socioeconomic History    Marital status:    Tobacco Use    Smoking status: Every Day     Current packs/day: 1.00     Average packs/day: 1 pack/day for 47.0 years (47.0 ttl pk-yrs)     Types: Cigarettes     Start date: 1978    Smokeless tobacco: Never    Tobacco comments:     Pt is a current every day smoker, smokes 1ppd. No interest in quitting at this time.   Substance and Sexual Activity    Alcohol use: Not Currently     Alcohol/week: 14.0 standard drinks of alcohol     Types: 14 Shots of liquor per week    Drug use: Never     Social Drivers of Health     Financial Resource Strain: Low Risk  (5/27/2024)    Overall Financial Resource Strain (CARDIA)     Difficulty of Paying Living Expenses: Not hard at all   Food Insecurity: No Food Insecurity (5/27/2024)    Hunger Vital Sign     Worried About Running Out of Food in the Last Year: Never true     Ran Out of Food in the Last Year: Never true   Transportation Needs: No Transportation Needs (12/13/2023)    PRAPARE - Transportation     Lack of Transportation (Medical): No     Lack of Transportation (Non-Medical): No   Physical Activity: Inactive (5/27/2024)    Exercise Vital Sign     Days of Exercise per Week: 0 days     Minutes of Exercise per Session: 0 min   Stress: No Stress Concern Present (5/27/2024)    [employer] [place] [employer] - Occupational Stress Questionnaire     Feeling of Stress : Not at all   Housing Stability: Low Risk  (12/13/2023)    Housing Stability Vital Sign     Unable to Pay for Housing in the Last Year: No      Number of Places Lived in the Last Year: 1     Unstable Housing in the Last Year: No       Current Outpatient Medications   Medication Sig Dispense Refill    atorvastatin (LIPITOR) 10 MG tablet Take 10 mg by mouth once daily.      betamethasone dipropionate (DIPROLENE) 0.05 % lotion APPLY THIN LAYER TOPICALLY TO THE SCALP EVERY NIGHT AT BEDTIME AS NEEDED FOR ITCHING 60 mL 2    clotrimazole-betamethasone 1-0.05% (LOTRISONE) cream Apply topically 2 (two) times daily. For 7 days 15 g 1    digoxin (LANOXIN) 125 mcg tablet Take 125 mcg by mouth once daily.      ELIQUIS 5 mg Tab Take 5 mg by mouth 2 (two) times daily.      gabapentin (NEURONTIN) 300 MG capsule Take 1 capsule (300 mg total) by mouth every evening. 30 capsule 11    iron-vitamin C 100-250 mg, ICAR-C, (ICAR-C) 100-250 mg Tab Take 1 tablet by mouth once daily. 30 each 6    metoprolol succinate (TOPROL-XL) 25 MG 24 hr tablet Take 1 tablet (25 mg total) by mouth 2 (two) times daily. 180 tablet 3     No current facility-administered medications for this visit.     Facility-Administered Medications Ordered in Other Visits   Medication Dose Route Frequency Provider Last Rate Last Admin    diphenhydrAMINE injection 25 mg  25 mg Intravenous Q6H PRN Dhruv Gutierrez MD           Review of patient's allergies indicates:  No Known Allergies      Objective:        Vitals:    12/18/24 1519   BP: 128/70   Pulse: 95       Physical Exam  Vitals reviewed.   Constitutional:       Appearance: Normal appearance.   Cardiovascular:      Rate and Rhythm: Tachycardia present. Rhythm irregular.      Pulses: Normal pulses.      Heart sounds: Normal heart sounds. No murmur heard.     No gallop.   Pulmonary:      Effort: Pulmonary effort is normal.      Breath sounds: Normal breath sounds.   Skin:     General: Skin is warm.   Neurological:      General: No focal deficit present.      Mental Status: He is alert and oriented to person, place, and time.         LIPIDS - LAST 2   Lab  Results   Component Value Date    CHOL 108 (L) 08/21/2023    CHOL 164 02/06/2007    HDL 51 08/21/2023    HDL 53.0 02/06/2007    LDLCALC 50.8 (L) 08/21/2023    LDLCALC 91.6 02/06/2007    TRIG 31 08/21/2023    TRIG 97 02/06/2007    CHOLHDL 47.2 08/21/2023    CHOLHDL 32.3 02/06/2007       CBC - LAST 2  Lab Results   Component Value Date    WBC 4.79 10/15/2024    WBC 5.40 07/22/2024    RBC 4.00 (L) 10/15/2024    RBC 3.95 (L) 07/22/2024    HGB 13.5 (L) 10/15/2024    HGB 13.4 (L) 07/22/2024    HCT 43.1 10/15/2024    HCT 41.2 07/22/2024     (H) 10/15/2024     (H) 07/22/2024    MCH 33.8 (H) 10/15/2024    MCH 33.9 (H) 07/22/2024    MCHC 31.3 (L) 10/15/2024    MCHC 32.5 07/22/2024    RDW 14.4 10/15/2024    RDW 15.0 (H) 07/22/2024     10/15/2024     07/22/2024    MPV 9.9 10/15/2024    MPV 9.1 (L) 07/22/2024    GRAN 2.3 10/15/2024    GRAN 48.4 10/15/2024    LYMPH 1.9 10/15/2024    LYMPH 40.1 10/15/2024    MONO 0.4 10/15/2024    MONO 9.2 10/15/2024    BASO 0.03 10/15/2024    BASO 0.05 07/22/2024    NRBC 0 10/15/2024    NRBC 0 07/22/2024       CHEMISTRY & LIVER FUNCTION - LAST 2  Lab Results   Component Value Date     10/15/2024     07/22/2024    K 3.9 10/15/2024    K 4.2 07/22/2024     10/15/2024     07/22/2024    CO2 26 10/15/2024    CO2 27 07/22/2024    ANIONGAP 10 10/15/2024    ANIONGAP 9 07/22/2024    BUN 8 10/15/2024    BUN 10 07/22/2024    CREATININE 1.0 10/15/2024    CREATININE 1.1 07/22/2024    GLU 80 10/15/2024     07/22/2024    CALCIUM 8.8 10/15/2024    CALCIUM 9.1 07/22/2024    MG 2.0 08/22/2023    MG 1.9 08/21/2023    ALBUMIN 3.1 (L) 10/15/2024    ALBUMIN 3.3 (L) 07/22/2024    PROT 8.5 (H) 10/15/2024    PROT 8.2 07/22/2024    ALKPHOS 150 (H) 10/15/2024    ALKPHOS 145 (H) 07/22/2024    ALT 20 10/15/2024    ALT 20 07/22/2024    AST 38 10/15/2024    AST 34 07/22/2024    BILITOT 0.9 10/15/2024    BILITOT 1.0 07/22/2024        CARDIAC PROFILE - LAST 2  Lab Results    Component Value Date    BNP 1,313 (H) 08/21/2023    BNP 1,407 (H) 08/15/2023    CPK 57 02/14/2023     05/28/2019        COAGULATION - LAST 2  Lab Results   Component Value Date    LABPT 14.1 (H) 01/10/2022    INR 1.0 08/21/2023    INR 1.1 02/14/2023    APTT 30.5 01/10/2022       ENDOCRINE & PSA - LAST 2  Lab Results   Component Value Date    TSH 3.440 02/14/2023    TSH 1.252 08/20/2021    PSA 1.0 05/06/2019    PSA 1.4 08/10/2015        ECHOCARDIOGRAM RESULTS  Results for orders placed during the hospital encounter of 08/21/23    Echo    Interpretation Summary    Left Ventricle: The left ventricle is mildly dilated. Increased ventricular mass. Mildly increased wall thickness. There is mild concentric hypertrophy. Mild global hypokinesis present. There is mildly reduced systolic function with a visually estimated ejection fraction of 40 - 50%.   EF 40% There is normal diastolic function. Elevated left ventricular filling pressure. Tissue Doppler velocity is normal.  Mean left atrial pressure is approximately 11.45    Right Ventricle: Normal right ventricular cavity size. Systolic function is normal.    Tricuspid Valve: There is mild to moderate regurgitation with a centrally directed jet. There is mild pulmonary hypertension.    IVC/SVC: Elevated venous pressure at 15 mmHg.    Pericardium: There is a trivial effusion adjacent to the right ventricle. Pericardial effusion is echolucent. No indication of cardiac tamponade. Ascites present.      CURRENT/PREVIOUS VISIT EKG  Results for orders placed or performed during the hospital encounter of 08/21/23   EKG 12-lead    Collection Time: 08/21/23 12:09 PM    Narrative    Test Reason : R07.9,    Vent. Rate : 080 BPM     Atrial Rate : 080 BPM     P-R Int : 206 ms          QRS Dur : 092 ms      QT Int : 430 ms       P-R-T Axes : 071 -13 076 degrees     QTc Int : 495 ms    Sinus rhythm with occasional Premature ventricular complexes  Possible Left atrial  enlargement  Low voltage    Septal infarct (cited on or before 21-AUG-2023)  Abnormal ECG  When compared with ECG of 21-AUG-2023 04:04,  Premature ventricular complexes are now Present  Questionable change in initial forces of Septal leads  QT has lengthened  Confirmed by Ramón JOHNSON, David RODRIGUES ([id number]) on 8/23/2023 5:41:36 PM    Referred By: ROGELIO   SELF           Confirmed By:David Melgar MD     No valid procedures specified.   No results found for this or any previous visit.    No valid procedures specified.        Assessment:       1. Coronary artery disease involving native coronary artery of native heart without angina pectoris    2. Persistent atrial fibrillation    3. S/P coronary artery stent placement    4. Tobacco abuse    5. Left ventricular systolic dysfunction (LVSD)           Plan:       Coronary artery disease involving native coronary artery of native heart without angina pectoris  -     Ambulatory referral/consult to Cardiology  -     IN OFFICE EKG 12-LEAD (to Muse)    Persistent atrial fibrillation  -     IN OFFICE EKG 12-LEAD (to Muse)  -     metoprolol succinate (TOPROL-XL) 25 MG 24 hr tablet; Take 1 tablet (25 mg total) by mouth 2 (two) times daily.  Dispense: 180 tablet; Refill: 3  -     Ambulatory referral/consult to Electrophysiology; Future; Expected date: 12/25/2024    S/P coronary artery stent placement    Tobacco abuse    Left ventricular systolic dysfunction (LVSD)    Continue with Eliquis.  Increase metoprolol to 25 mg twice a day, patient was 25 mg in the morning and 12.5 at night.  Refer to EP.  Will get a copy of the patient's stress test that was recently done.  Patient advised tobacco cessation.  Last LDL at target, continue with statin.    Follow up in about 6 weeks (around 1/29/2025) for f/u Afib, stress test from [facility].          MD Felicit[facility] [place]  Felicita

## 2024-12-18 NOTE — PROGRESS NOTES
"  Subjective:      Patient ID:  Sid Cuello Jr. is a 74 y.o. male who presents for   Chief Complaint   Patient presents with    Lupus     Discoid Lupus      LOV 6/28/24 Discoid Lupus    02/2021 Skin, right upper arm, punch biopsy:   -DISCOID LUPUS ERYTHEMATOSUS      2016  [facility] DIAGNOSIS:  LEFT CHEEK, PUNCH:  Perivascular and periadnexal dermatitis with increased interstitial mucin and focal  interface damage.     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  Controlling with Diprolene when needed, "gives some relief."  Does not wish to take plaquenil, fear of interaction with digoxin  Strict sun protecion    Derm Hx:  Denies Phx of NMSC  Denies Fhx of MM    Current Outpatient Medications:   ·  atorvastatin (LIPITOR) 10 MG tablet, Take 10 mg by mouth once daily., Disp: , Rfl:   ·  betamethasone dipropionate (DIPROLENE) 0.05 % lotion, APPLY THIN LAYER TOPICALLY TO THE SCALP EVERY NIGHT AT BEDTIME AS NEEDED FOR ITCHING, Disp: 60 mL, Rfl: 2  ·  clotrimazole-betamethasone 1-0.05% (LOTRISONE) cream, Apply topically 2 (two) times daily. For 7 days, Disp: 15 g, Rfl: 1  ·  digoxin (LANOXIN) 125 mcg tablet, Take 125 mcg by mouth once daily., Disp: , Rfl:   ·  ELIQUIS 5 mg Tab, Take 5 mg by mouth 2 (two) times daily., Disp: , Rfl:   ·  gabapentin (NEURONTIN) 300 MG capsule, Take 1 capsule (300 mg total) by mouth every evening., Disp: 30 capsule, Rfl: 11  ·  " iron-vitamin C 100-250 mg, ICAR-C, (ICAR-C) 100-250 mg Tab, Take 1 tablet by mouth once daily., Disp: 30 each, Rfl: 6  ·  metoprolol succinate (TOPROL-XL) 25 MG 24 hr tablet, Take 0.5 tablets (12.5 mg total) by mouth 2 (two) times daily., Disp: 30 tablet, Rfl: 0    Lupus        Review of Systems   Constitutional:  Positive for fatigue. Negative for fever, chills, weight loss, weight gain, night sweats and malaise.   HENT:  Negative for mouth sores.    Respiratory:  Negative for cough and shortness of breath.    Genitourinary:  Negative for frequency.   Musculoskeletal:  Positive for arthralgias (some back pain, somewhat acute, hands hips knees with no issue). Negative for myalgias, joint swelling and muscle weakness.   Skin:  Positive for itching, activity-related sunscreen use and wears hat. Negative for rash, sun sensitivity and daily sunscreen use.   Neurological:  Negative for seizures.   Hematologic/Lymphatic: Bruises/bleeds easily.       Objective:   Physical Exam   Constitutional: He appears well-developed and well-nourished. No distress.   Neurological: He is alert and oriented to person, place, and time. He is not disoriented.   Psychiatric: He has a normal mood and affect.   Skin:   Areas Examined (abnormalities noted in diagram):   Scalp / Hair Palpated and Inspected  Head / Face Inspection Performed  Neck Inspection Performed  Chest / Axilla Inspection Performed  Abdomen Inspection Performed  Back Inspection Performed  RUE Inspected  LUE Inspection Performed                 Diagram Legend     Erythematous scaling macule/papule c/w actinic keratosis       Vascular papule c/w angioma      Pigmented verrucoid papule/plaque c/w seborrheic keratosis      Yellow umbilicated papule c/w sebaceous hyperplasia      Irregularly shaped tan macule c/w lentigo     1-2 mm smooth white papules consistent with Milia      Movable subcutaneous cyst with punctum c/w epidermal inclusion cyst      Subcutaneous movable cyst  c/w pilar cyst      Firm pink to brown papule c/w dermatofibroma      Pedunculated fleshy papule(s) c/w skin tag(s)      Evenly pigmented macule c/w junctional nevus     Mildly variegated pigmented, slightly irregular-bordered macule c/w mildly atypical nevus      Flesh colored to evenly pigmented papule c/w intradermal nevus       Pink pearly papule/plaque c/w basal cell carcinoma      Erythematous hyperkeratotic cursted plaque c/w SCC      Surgical scar with no sign of skin cancer recurrence      Open and closed comedones      Inflammatory papules and pustules      Verrucoid papule consistent consistent with wart     Erythematous eczematous patches and plaques     Dystrophic onycholytic nail with subungual debris c/w onychomycosis     Umbilicated papule    Erythematous-base heme-crusted tan verrucoid plaque consistent with inflamed seborrheic keratosis     Erythematous Silvery Scaling Plaque c/w Psoriasis     See annotation   Latest Reference Range & Units 03/22/16 08:07 02/19/21 06:40 04/27/23 08:45   JULIANA Screen Negative <1:80  Negative <1:160 Negative <1:80 Negative <1:80      Latest Reference Range & Units 10/15/24 07:48   WBC 3.90 - 12.70 K/uL 4.79   RBC 4.60 - 6.20 M/uL 4.00 (L)   Hemoglobin 14.0 - 18.0 g/dL 13.5 (L)   Hematocrit 40.0 - 54.0 % 43.1   MCV 82 - 98 fL 108 (H)   MCH 27.0 - 31.0 pg 33.8 (H)   MCHC 32.0 - 36.0 g/dL 31.3 (L)   RDW 11.5 - 14.5 % 14.4   Platelet Count 150 - 450 K/uL 249   MPV 9.2 - 12.9 fL 9.9   Gran % 38.0 - 73.0 % 48.4   Lymph % 18.0 - 48.0 % 40.1   Mono % 4.0 - 15.0 % 9.2   Eos % 0.0 - 8.0 % 1.5   Basophil % 0.0 - 1.9 % 0.6   Immature Granulocytes 0.0 - 0.5 % 0.2   Gran # (ANC) 1.8 - 7.7 K/uL 2.3   Lymph # 1.0 - 4.8 K/uL 1.9   Mono # 0.3 - 1.0 K/uL 0.4   Eos # 0.0 - 0.5 K/uL 0.1   Baso # 0.00 - 0.20 K/uL 0.03   Immature Grans (Abs) 0.00 - 0.04 K/uL 0.01   nRBC 0 /100 WBC 0   Differential Method  Automated   Iron 45 - 160 ug/dL 77   TIBC 250 - 450 ug/dL 307   Saturated Iron 20 - 50  % 25   Transferrin 200 - 375 mg/dL 219   Ferritin 20.0 - 300.0 ng/mL 163   Sodium 136 - 145 mmol/L 138   Potassium 3.5 - 5.1 mmol/L 3.9   Chloride 95 - 110 mmol/L 102   CO2 23 - 29 mmol/L 26   Anion Gap 8 - 16 mmol/L 10   BUN 8 - 23 mg/dL 8   Creatinine 0.5 - 1.4 mg/dL 1.0   eGFR >60 mL/min/1.73 m^2 >60   Glucose 70 - 110 mg/dL 80   Calcium 8.7 - 10.5 mg/dL 8.8   ALP 55 - 135 U/L 150 (H)   PROTEIN TOTAL 6.0 - 8.4 g/dL 8.5 (H)   Albumin 3.5 - 5.2 g/dL 3.1 (L)   BILIRUBIN TOTAL 0.1 - 1.0 mg/dL 0.9   AST 10 - 40 U/L 38   ALT 10 - 44 U/L 20   IgG 767 - 1590 mg/dL 2730 (H)   IgM 37 - 286 mg/dL 72   IgA Level 61 - 356 mg/dL 697 (H)   Flag, M-Protein Isotype Negative  Negative   M-protein AK  Test Not Performed   M-protein AL  Test Not Performed   M-protein GK  Test Not Performed   M-protein GL  Test Not Performed   M-protein MK  Test Not Performed   M-protein ML  Test Not Performed   (L): Data is abnormally low  (H): Data is abnormally high    Assessment / Plan:        Actinic keratoses  Cryosurgery Procedure Note    Verbal consent from the patient is obtained and the patient is aware of the precancerous quality and need for treatment of these lesions. Liquid nitrogen cryosurgery is applied to the 1 actinic keratoses, as detailed in the physical exam, to produce a freeze injury. The patient is aware that blisters may form and is instructed on wound care with gentle cleansing and use of vaseline ointment to keep moist until healed. The patient is supplied a handout on cryosurgery and is instructed to call if lesions do not completely resolve.    Discoid lupus  -     JULIANA Screen w/Reflex; Future; Expected date: 12/18/2024  Patient is very conservative when it comes to systemic medications and wishes to treat topically only  Does not wish to take plaquenil (read package insert, single report of 2 patients with elevated digoxin levels after starting plaquenil)  Cellcept would be my next therapeutic consideration  ROS  reassuring  Due for annual SLE screen    Skin cancer screening  Upper body skin examination performed today including at least 9 points as noted in physical examination. No lesions suspicious for malignancy noted.    Seborrheic keratoses  These are benign inherited growths without a malignant potential. Reassurance given to patient. No treatment is necessary.     Solar lentigo  This is a benign hyperpigmented sun induced lesion. Daily sun protection will reduce the number of new lesions. Treatment of these benign lesions are considered cosmetic.    [facility]             No follow-ups on file.

## 2025-01-17 ENCOUNTER — LAB VISIT (OUTPATIENT)
Dept: LAB | Facility: HOSPITAL | Age: 75
End: 2025-01-17
Attending: INTERNAL MEDICINE
Payer: MEDICARE

## 2025-01-17 DIAGNOSIS — R77.8 ABNORMAL SPEP: ICD-10-CM

## 2025-01-17 DIAGNOSIS — L93.0 DISCOID LUPUS: ICD-10-CM

## 2025-01-17 DIAGNOSIS — D53.9 NUTRITIONAL ANEMIA, UNSPECIFIED: ICD-10-CM

## 2025-01-17 DIAGNOSIS — R71.8 ELEVATED MCV: ICD-10-CM

## 2025-01-17 DIAGNOSIS — D64.9 NORMOCHROMIC NORMOCYTIC ANEMIA: ICD-10-CM

## 2025-01-17 DIAGNOSIS — D63.8 ANEMIA, CHRONIC DISEASE: ICD-10-CM

## 2025-01-17 DIAGNOSIS — D50.9 IRON DEFICIENCY ANEMIA, UNSPECIFIED IRON DEFICIENCY ANEMIA TYPE: ICD-10-CM

## 2025-01-17 DIAGNOSIS — D64.9 ANEMIA, UNSPECIFIED TYPE: Chronic | ICD-10-CM

## 2025-01-17 LAB
ALBUMIN SERPL BCP-MCNC: 2.9 G/DL (ref 3.5–5.2)
ALP SERPL-CCNC: 153 U/L (ref 40–150)
ALT SERPL W/O P-5'-P-CCNC: 16 U/L (ref 10–44)
ANION GAP SERPL CALC-SCNC: 10 MMOL/L (ref 8–16)
AST SERPL-CCNC: 36 U/L (ref 10–40)
BASOPHILS # BLD AUTO: 0.03 K/UL (ref 0–0.2)
BASOPHILS NFR BLD: 0.8 % (ref 0–1.9)
BILIRUB SERPL-MCNC: 1.2 MG/DL (ref 0.1–1)
BUN SERPL-MCNC: 9 MG/DL (ref 8–23)
CALCIUM SERPL-MCNC: 8.1 MG/DL (ref 8.7–10.5)
CHLORIDE SERPL-SCNC: 101 MMOL/L (ref 95–110)
CO2 SERPL-SCNC: 26 MMOL/L (ref 23–29)
CREAT SERPL-MCNC: 0.9 MG/DL (ref 0.5–1.4)
DIFFERENTIAL METHOD BLD: ABNORMAL
EOSINOPHIL # BLD AUTO: 0 K/UL (ref 0–0.5)
EOSINOPHIL NFR BLD: 0.8 % (ref 0–8)
ERYTHROCYTE [DISTWIDTH] IN BLOOD BY AUTOMATED COUNT: 14.5 % (ref 11.5–14.5)
EST. GFR  (NO RACE VARIABLE): >60 ML/MIN/1.73 M^2
FERRITIN SERPL-MCNC: 182 NG/ML (ref 20–300)
FOLATE SERPL-MCNC: 5.8 NG/ML (ref 4–24)
GLUCOSE SERPL-MCNC: 87 MG/DL (ref 70–110)
HCT VFR BLD AUTO: 38.8 % (ref 40–54)
HGB BLD-MCNC: 12.4 G/DL (ref 14–18)
IMM GRANULOCYTES # BLD AUTO: 0.01 K/UL (ref 0–0.04)
IMM GRANULOCYTES NFR BLD AUTO: 0.3 % (ref 0–0.5)
IRON SERPL-MCNC: 80 UG/DL (ref 45–160)
LYMPHOCYTES # BLD AUTO: 1.3 K/UL (ref 1–4.8)
LYMPHOCYTES NFR BLD: 34.8 % (ref 18–48)
MCH RBC QN AUTO: 34 PG (ref 27–31)
MCHC RBC AUTO-ENTMCNC: 32 G/DL (ref 32–36)
MCV RBC AUTO: 106 FL (ref 82–98)
MONOCYTES # BLD AUTO: 0.3 K/UL (ref 0.3–1)
MONOCYTES NFR BLD: 8.8 % (ref 4–15)
NEUTROPHILS # BLD AUTO: 2.1 K/UL (ref 1.8–7.7)
NEUTROPHILS NFR BLD: 54.5 % (ref 38–73)
NRBC BLD-RTO: 0 /100 WBC
PLATELET # BLD AUTO: 233 K/UL (ref 150–450)
PMV BLD AUTO: 10.1 FL (ref 9.2–12.9)
POTASSIUM SERPL-SCNC: 3.7 MMOL/L (ref 3.5–5.1)
PROT SERPL-MCNC: 8.4 G/DL (ref 6–8.4)
RBC # BLD AUTO: 3.65 M/UL (ref 4.6–6.2)
SATURATED IRON: 35 % (ref 20–50)
SODIUM SERPL-SCNC: 137 MMOL/L (ref 136–145)
TOTAL IRON BINDING CAPACITY: 227 UG/DL (ref 250–450)
TRANSFERRIN SERPL-MCNC: 162 MG/DL (ref 200–375)
VIT B12 SERPL-MCNC: 352 PG/ML (ref 210–950)
WBC # BLD AUTO: 3.85 K/UL (ref 3.9–12.7)

## 2025-01-17 PROCEDURE — 82728 ASSAY OF FERRITIN: CPT | Performed by: INTERNAL MEDICINE

## 2025-01-17 PROCEDURE — 36415 COLL VENOUS BLD VENIPUNCTURE: CPT | Performed by: INTERNAL MEDICINE

## 2025-01-17 PROCEDURE — 82607 VITAMIN B-12: CPT | Performed by: INTERNAL MEDICINE

## 2025-01-17 PROCEDURE — 84466 ASSAY OF TRANSFERRIN: CPT | Performed by: INTERNAL MEDICINE

## 2025-01-17 PROCEDURE — 85025 COMPLETE CBC W/AUTO DIFF WBC: CPT | Performed by: INTERNAL MEDICINE

## 2025-01-17 PROCEDURE — 82746 ASSAY OF FOLIC ACID SERUM: CPT | Performed by: INTERNAL MEDICINE

## 2025-01-17 PROCEDURE — 80053 COMPREHEN METABOLIC PANEL: CPT | Performed by: INTERNAL MEDICINE

## 2025-01-20 LAB
ANA PAT SER IF-IMP: ABNORMAL
ANA PAT SER IF-IMP: ABNORMAL
ANA SER QL HEP2 SUBST: ABNORMAL
ANA TITR SER HEP2 SUBST: ABNORMAL {TITER}
ANA TITR SER HEP2 SUBST: ABNORMAL {TITER}
B2 MICROGLOB SERPL-MCNC: 5.49 MCG/ML (ref 1.21–2.7)
CYTOPLASMIC AB PATTERN SER IF-IMP: ABNORMAL
KAPPA LC FREE SER-MCNC: 8.42 MG/DL (ref 0.33–1.94)
KAPPA LC FREE/LAMBDA FREE SER: 1.25 {RATIO} (ref 0.26–1.65)
LABORATORY COMMENT REPORT: ABNORMAL
LAMBDA LC FREE SERPL-MCNC: 6.71 MG/DL (ref 0.57–2.63)

## 2025-01-21 LAB
IGA SERPL-MCNC: 969 MG/DL (ref 61–356)
IGG SERPL-MCNC: 2810 MG/DL (ref 767–1590)
IGM SERPL-MCNC: 68 MG/DL (ref 37–286)

## 2025-01-27 NOTE — PROGRESS NOTES
[facility] Hematology/Oncology  PROGRESS NOTE -   Follow-up Visit      Subjective:       Patient ID:   NAME: Sid Cuello Jr. : 1950     74 y.o. male    Referring Doc: Светлана  Other Physicians: Rose Mary Posada           Chief Complaint: abn SPEP f/u       History of Present Illness:     Patient returns today for a regularly scheduled follow-up visit.  The patient is here today to go over the results of the recently ordered labs, tests and studies. He is here by himself.he last showed for appointment in 2024    He reports he is doing fine with no new issues    He saw Dr Juárez with cardiology and Dr Anglin with dermatology in Dec 2024 for his discoid lupus    He saw Dr Posada on 2024    He has chronic neuropathy issues in feet; chronic itching issues; He continues to have residual fatigue and general lack of energy      Breathing ok. No CP, SOB, HA's or N/V. Itching has been a little worst past couple of weeks especially on his face           ROS:   GEN: normal without any fever, night sweats or weight loss; chronic fatigue; chronic neuropathy in feet  HEENT: normal with no HA's, sore throat, stiff neck, changes in vision  CV: normal with no CP, SOB, PND, TRAN or orthopnea  PULM: normal with no SOB, cough, hemoptysis, sputum or pleuritic pain  GI: normal with no abdominal pain, nausea, vomiting, constipation, diarrhea, melanotic stools, BRBPR, or hematemesis  : normal with no hematuria, dysuria  BREAST: normal with no mass, discharge, pain  SKIN: chronic itching/rash on face with discoid lupus    Pain Scale: 0    Allergies:  Review of patient's allergies indicates:  No Known Allergies    Medications:    Current Outpatient Medications:     atorvastatin (LIPITOR) 10 MG tablet, Take 10 mg by mouth once daily., Disp: , Rfl:     betamethasone dipropionate (DIPROLENE) 0.05 % lotion, APPLY THIN LAYER TOPICALLY TO THE SCALP EVERY NIGHT AT BEDTIME AS NEEDED FOR ITCHING, Disp: 60 mL, Rfl: 2     clotrimazole-betamethasone 1-0.05% (LOTRISONE) cream, Apply topically 2 (two) times daily. For 7 days, Disp: 15 g, Rfl: 1    digoxin (LANOXIN) 125 mcg tablet, Take 125 mcg by mouth once daily., Disp: , Rfl:     ELIQUIS 5 mg Tab, Take 5 mg by mouth 2 (two) times daily., Disp: , Rfl:     iron-vitamin C 100-250 mg, ICAR-C, (ICAR-C) 100-250 mg Tab, Take 1 tablet by mouth once daily., Disp: 30 each, Rfl: 6    metoprolol succinate (TOPROL-XL) 25 MG 24 hr tablet, Take 1 tablet (25 mg total) by mouth 2 (two) times daily., Disp: 180 tablet, Rfl: 3    gabapentin (NEURONTIN) 300 MG capsule, Take 1 capsule (300 mg total) by mouth every evening., Disp: 30 capsule, Rfl: 11  No current facility-administered medications for this visit.    Facility-Administered Medications Ordered in Other Visits:     diphenhydrAMINE injection 25 mg, 25 mg, Intravenous, Q6H PRN, Dhruv Gutierrez MD    PMHx/PSHx Updates:  See patient's last visit with me on 1/30/2024  See H&P on 4/30/2021        Pathology:   Cancer Staging   No matching staging information was found for the patient.      Bone marrow biopsy 1/10/2022:    BONE MARROW, RIGHT ILIAC CREST, ASPIRATE, CLOT SECTION, AND CORE BIOPSY:   --CELLULAR MARROW (APPROXIMATELY 25% TO 35%) WITH TRILINEAGE    HEMATOPOIETIC ELEMENTS AND OCCASIONAL   PLASMA CELLS; FURTHER CHARACTERIZATION PENDING IMMUNOHISTOCHEMISTRY;    FINAL DIAGNOSIS TO FOLLOW.   --PERIPHERAL BLOOD WITH NORMAL THROMBOCYTE COUNT (164,000/MICROLITER);    ANEMIA (HEMOGLOBIN 12.5 GRAM/     DECILITER); AND LEUKOPENIA (3,390/MICROLITER).     Plasma cells (bright CD38+/+) comprise approximately 2% of the    total sample and demonstrate polyclonal expression of immunoglobulin    light chains. The plasma cell population co-expresses CD19.      INTERPRETATION:     Immunophenotyping fails to identify any abnormal cell populations. A    small population (approximately 2%) of polyclonal plasma cells is    present.      Objective:  "    Vitals:  Blood pressure (!) 150/85, pulse 102, temperature 98.2 °F (36.8 °C), temperature source Temporal, resp. rate 16, height 5' 9" (1.753 m), weight 68.9 kg (152 lb).    Physical Examination:   GEN: no apparent distress, comfortable; AAOx3; thin  HEAD: atraumatic and normocephalic; alopecia stable due to the lupus  EYES: no pallor, no icterus, PERRLA  ENT: OMM, no pharyngeal erythema, external ears WNL; no nasal discharge; no thrush  NECK: no masses, thyroid normal, trachea midline, no LAD/LN's, supple  CV: RRR with no murmur; normal pulse; normal S1 and S2; no pedal edema  CHEST: Normal respiratory effort; CTAB; normal breath sounds; no wheeze or crackles  ABDOM: nontender and nondistended; soft; normal bowel sounds; no rebound/guarding  MUSC/Skeletal: ROM normal; no crepitus; joints normal; no deformities or arthropathy  EXTREM: no clubbing, cyanosis, inflammation or swelling  SKIN: no rashes, lesions, ulcers, petechiae or subcutaneous nodules; chronic age related skin changes; discoid lupus with recent flare on face  : no toussaint  NEURO: grossly intact; motor/sensory WNL; AAOx3; no tremors  PSYCH: normal mood, affect and behavior  LYMPH: normal cervical, supraclavicular, axillary and groin LN's        Labs:     Lab Results   Component Value Date    WBC 3.85 (L) 01/17/2025    HGB 12.4 (L) 01/17/2025    HCT 38.8 (L) 01/17/2025     (H) 01/17/2025     01/17/2025     CMP  Sodium   Date Value Ref Range Status   01/17/2025 137 136 - 145 mmol/L Final     Potassium   Date Value Ref Range Status   01/17/2025 3.7 3.5 - 5.1 mmol/L Final     Chloride   Date Value Ref Range Status   01/17/2025 101 95 - 110 mmol/L Final     CO2   Date Value Ref Range Status   01/17/2025 26 23 - 29 mmol/L Final     Glucose   Date Value Ref Range Status   01/17/2025 87 70 - 110 mg/dL Final     BUN   Date Value Ref Range Status   01/17/2025 9 8 - 23 mg/dL Final     Creatinine   Date Value Ref Range Status   01/17/2025 0.9 0.5 " - 1.4 mg/dL Final     Calcium   Date Value Ref Range Status   01/17/2025 8.1 (L) 8.7 - 10.5 mg/dL Final     Total Protein   Date Value Ref Range Status   01/17/2025 8.4 6.0 - 8.4 g/dL Final     Albumin   Date Value Ref Range Status   01/17/2025 2.9 (L) 3.5 - 5.2 g/dL Final     Total Bilirubin   Date Value Ref Range Status   01/17/2025 1.2 (H) 0.1 - 1.0 mg/dL Final     Comment:     For infants and newborns, interpretation of results should be based  on gestational age, weight and in agreement with clinical  observations.    Premature Infant recommended reference ranges:  Up to 24 hours.............<8.0 mg/dL  Up to 48 hours............<12.0 mg/dL  3-5 days..................<15.0 mg/dL  6-29 days.................<15.0 mg/dL       Alkaline Phosphatase   Date Value Ref Range Status   01/17/2025 153 (H) 40 - 150 U/L Final     AST   Date Value Ref Range Status   01/17/2025 36 10 - 40 U/L Final     ALT   Date Value Ref Range Status   01/17/2025 16 10 - 44 U/L Final     Anion Gap   Date Value Ref Range Status   01/17/2025 10 8 - 16 mmol/L Final     eGFR if    Date Value Ref Range Status   07/21/2022 >60.0 >60 mL/min/1.73 m^2 Final     eGFR if non    Date Value Ref Range Status   07/21/2022 >60.0 >60 mL/min/1.73 m^2 Final     Comment:     Calculation used to obtain the estimated glomerular filtration  rate (eGFR) is the CKD-EPI equation.         Kappa/Lambda FLC Ratio 0.26 - 1.65 1.17       Beta-2 Microglobulin 0.0 - 2.5 ug/mL 5.2        IgG 650 - 1600 mg/dL 3848 High   3756   Comment: IgG Cord Blood Reference Range: 650-1600 mg/dL.   IgA 40 - 350 mg/dL 848 High   783   Comment: IgA Cord Blood Reference Range: <5 mg/dL.   IgM 50 - 300 mg/dL 141  128   Comment: IgM Cord Blood Reference Range: <25 mg/dL.       Pathologist Interpretation UPE REVIEWED    Comment:    Electronically reviewed and signed by:   Peri Narvaez MD   Signed on 01/27/23 at 10:46   No paraprotein bands identified.         Pathologist Interpretation SPE REVIEWED    Comment:    Electronically reviewed and signed by:   Peri Narvaez MD   Signed on 01/24/23 at 15:18   Increased total protein. Increased gamma globulin, polyclonal. No   paraprotein bands detected.        Lab Results   Component Value Date    IRON 80 01/17/2025    TRANSFERRIN 162 (L) 01/17/2025    TIBC 227 (L) 01/17/2025    FESATURATED 35 01/17/2025      Lab Results   Component Value Date    FERRITIN 182 01/17/2025     Lab Results   Component Value Date    IAVNMASV37 352 01/17/2025     Lab Results   Component Value Date    FOLATE 5.8 01/17/2025     Kappa/Lambda FLC Ratio 0.2600 - 1.65 1.51      SPE Interp.  SEE BELOW       Comment: Small abnormality in gamma fraction.     Polyclonal hypergammaglobulinemia      M-Protein Isotype  No monoclonal protein detected     IgA 61 - 356 mg/dL 653 High      IgM 37 - 286 mg/dL 101      IgG 767 - 1590 mg/dL 3240      Beta-2 Microglobulin 1.21 - 2.70 mcg/mL 4.27        Radiology/Diagnostic Studies:    No results found.    I have reviewed all available lab results and radiology reports.    Assessment/Plan:   (1) 74 y.o. male  with diagnosis of abnormal SPEP who has been referred by Dr Sotomayor for evaluation by medical hematology/oncology.   -  He had an SPEP on 4/16/2021 with no evidence of any M-protein He sees Dr Anglin for discoid Lupus and I suspect that this is the cause of the protein issues seen on the labs.    5/27/2021:  - IgG is elevated but the immunofoxation studies in the serum and urine are both negative for any M-protein  - kappa/lambda ratio is also WNL  - I do not suspect multiple myeloma at this time  - I recommended referral to see Dr Blanco at [facility] (who is a paraproteinemia specialist) and also consideration for a bone marrow biopsy to further elucidate - however, he is not inclined to doing either at this time  - will repeat protein studies every 6 months      8/26/2021:  - patient here for short-term  f/u visit  - repeat protein studies are due in Nov 2021  -recommended referral to see Dr Blanco at [facility] (who is a paraproteinemia specialist) and also consideration for a bone marrow biopsy to further elucidate - however, he was not inclined to doing either previously and does not want to go to [place]  - I have already told the patient that this is the only way one can get a second opinion    1/3/2022:  - he is adamant that he does not want to go to [place] or [facility]  - discussed again with the patient about getting a bone marrow biopsy and he is now willing to consider getting one done  - I explained to him that we are limited as to what I can do for him by what he will or will not do     2/1/2022:  - recent bone marrow biopsy on 1/10/2022 with only a 2% population of polyclonal plasma cells with on abnormal immunophenotypical population identified  - he declined referral to Dr Blanco at [facility]  - will proceed with observation and monitoring of his labs and protein studies for now    8/1/2022:  - discussed the results of the latest protein studies and they seem to be relatively stable; again there in no monoclonal component to his gammopathy  - he is adamant that he does not want to be evaluated by my proteinopathy specialist Dr Blanco at [facility]  - continue with observtaion    3/2/2023:  - his protein studies are relatively stable with no M-protein component; I do not suspect he has multiple myeloma  - 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  - he previously declined to go see Dr Blanco at [facility] who is a paraproteinemia/myeloma specialist  - discussed Dr Sotomayor's concerns with the patient and recommended consideration for CT Chest/Abdom/pelvis but patient is not inclined to having any scans done at this time    10/31/2023:  - protein studies in Aug 2023 relatively stable with no Monoclonal or M-protein component  - K/L ratio WNL and Ig panel  stable    1/28/2025:  - K/L ratio stable at 1.25  - beta2 microglob at 5.49 and a little higher than usual  - IgA 969, IgM 68, IgG 2810 - slight increase in IgA otherwise relatively stable  - SPEP with no M-protein    (3) Anemia - NCNC parameters - iron and iron sat were both low  - most likely a multifactorial process with underlying anemia of chronic diease and iron deficiency anemia    10/31/2023:  - he required blood transfusion  - latest labs from Sept 2023 with Dr Sotomayor hgb was 10  - his iron levels are low - he is not on any supplements  - he had EGD, colonoscopy and capsule endoscopy with Dr Gaona/René since last visit - only two benign colon polyps  - start ICAr-C po daily and monitor labs monthly, if unable to tolerate or ineffective, then will consider IV iron    1/30/2024:  - latest hgb at 12.3  - iron panel much improved  - he wants to cut back on the oral iron - ok to change to 3x/week    1/28/2025:  - latest hgb at 12.4  - iron panel adequate  - continued on oral iron     (2) CAD s/p stent - followed by Dr Sotomayor     (3) HTN and Hypercholeterolemia     (4) Discoid Lupus/SLE - followed by Dr Anglin     1/28/2025:  - he saw Dr Anglin with dermatology in Dec 2024 for his discoid lupus         (5) Testicular/Inguinal hernia - s/p surgery - followed by Dr Mera      VISIT DIAGNOSES:      Abnormal SPEP    Smoker    Iron deficiency anemia, unspecified iron deficiency anemia type    Anemia, chronic disease    Normochromic normocytic anemia    Elevated MCV    Anemia, unspecified type          PLAN:  1. continue with observation and regular monitoring of labs every 3 months and protein studies every 6 months  2. he previously declined referral to see Dr Blanco at [facility] and is not inclined to having any scans done at this time  3. Encourage tobacco cessation  4. Previously recommended consideration for referral to neurology for his neuropathy issues  5. Repeat protein studies every 6 months  6.  F/u with PCP, Card, Derm and        RTC in 6 months  Fax note to Светлана/Franck/Albino lopez Pinsky, Erickson, Safa       Discussion:     COVID-19 Discussion:    I had long discussion with patient and any applicable family about the COVID-19 coronavirus epidemic and the recommended precautions with regard to cancer and/or hematology patients. I have re-iterated the CDC recommendations for adequate hand washing, use of hand -like products, and coughing into elbow, etc. In addition, especially for our patients who are on chemotherapy and/or our otherwise immunocompromised patients, I have recommended avoidance of crowds, including movie theaters, restaurants, churches, etc. I have recommended avoidance of any sick or symptomatic family members and/or friends. I have also recommended avoidance of any raw and unwashed food products, and general avoidance of food items that have not been prepared by themselves. The patient has been asked to call us immediately with any symptom developments, issues, questions or other general concerns.       I spent over 25 mins of time with the patient. Reviewed results of the recently ordered labs, tests and studies; made directives with regards to the results. Over half of this time was spent couseling and coordinating care.    I have explained all of the above in detail and the patient understands all of the current recommendation(s). I have answered all of their questions to the best of my ability and to their complete satisfaction.   The patient is to continue with the current management plan.            Electronically signed by Antolin Javier MD                  Answers submitted by the patient for this visit:  Review of Systems Questionnaire (Submitted on 1/23/2024)  appetite change : No  unexpected weight change: No  mouth sores: No  visual disturbance: No  cough: No  shortness of breath: No  chest pain: No  abdominal pain: No  diarrhea: No  frequency: No  back  pain: No  rash: No  headaches: No  adenopathy: No  nervous/ anxious: No

## 2025-01-28 ENCOUNTER — OFFICE VISIT (OUTPATIENT)
Facility: CLINIC | Age: 75
End: 2025-01-28
Payer: MEDICARE

## 2025-01-28 VITALS
HEIGHT: 69 IN | HEART RATE: 102 BPM | BODY MASS INDEX: 22.51 KG/M2 | RESPIRATION RATE: 16 BRPM | SYSTOLIC BLOOD PRESSURE: 150 MMHG | TEMPERATURE: 98 F | WEIGHT: 152 LBS | DIASTOLIC BLOOD PRESSURE: 85 MMHG

## 2025-01-28 DIAGNOSIS — D50.9 IRON DEFICIENCY ANEMIA, UNSPECIFIED IRON DEFICIENCY ANEMIA TYPE: ICD-10-CM

## 2025-01-28 DIAGNOSIS — D53.9 NUTRITIONAL ANEMIA, UNSPECIFIED: ICD-10-CM

## 2025-01-28 DIAGNOSIS — F17.200 SMOKER: Chronic | ICD-10-CM

## 2025-01-28 DIAGNOSIS — D63.8 ANEMIA, CHRONIC DISEASE: ICD-10-CM

## 2025-01-28 DIAGNOSIS — D64.9 NORMOCHROMIC NORMOCYTIC ANEMIA: ICD-10-CM

## 2025-01-28 DIAGNOSIS — R77.8 ABNORMAL SPEP: Primary | ICD-10-CM

## 2025-01-28 DIAGNOSIS — D64.9 ANEMIA, UNSPECIFIED TYPE: Chronic | ICD-10-CM

## 2025-01-28 DIAGNOSIS — R71.8 ELEVATED MCV: ICD-10-CM

## 2025-01-28 PROCEDURE — 99999 PR PBB SHADOW E&M-EST. PATIENT-LVL III: CPT | Mod: PBBFAC,,, | Performed by: INTERNAL MEDICINE

## 2025-01-28 PROCEDURE — 99213 OFFICE O/P EST LOW 20 MIN: CPT | Mod: PBBFAC,PN | Performed by: INTERNAL MEDICINE

## 2025-02-04 DIAGNOSIS — D64.9 ANEMIA, UNSPECIFIED TYPE: Chronic | ICD-10-CM

## 2025-02-04 DIAGNOSIS — R77.8 ABNORMAL SPEP: ICD-10-CM

## 2025-02-04 DIAGNOSIS — D64.9 NORMOCHROMIC NORMOCYTIC ANEMIA: ICD-10-CM

## 2025-02-04 DIAGNOSIS — D50.9 IRON DEFICIENCY ANEMIA, UNSPECIFIED IRON DEFICIENCY ANEMIA TYPE: ICD-10-CM

## 2025-02-04 DIAGNOSIS — D63.8 ANEMIA, CHRONIC DISEASE: ICD-10-CM

## 2025-02-04 DIAGNOSIS — R71.8 ELEVATED MCV: ICD-10-CM

## 2025-02-05 RX ORDER — IRON,CARBONYL/ASCORBIC ACID 100-250 MG
1 TABLET ORAL DAILY
Qty: 30 TABLET | Refills: 6 | Status: SHIPPED | OUTPATIENT
Start: 2025-02-05

## 2025-02-14 DIAGNOSIS — L29.9 PRURITUS: ICD-10-CM

## 2025-02-14 RX ORDER — GABAPENTIN 300 MG/1
300 CAPSULE ORAL NIGHTLY
Qty: 90 CAPSULE | Refills: 0 | Status: SHIPPED | OUTPATIENT
Start: 2025-02-14

## 2025-02-22 DIAGNOSIS — Z00.00 ENCOUNTER FOR MEDICARE ANNUAL WELLNESS EXAM: ICD-10-CM

## 2025-02-24 PROBLEM — I48.19 PERSISTENT ATRIAL FIBRILLATION: Status: ACTIVE | Noted: 2023-08-21

## 2025-02-24 NOTE — PROGRESS NOTES
Subjective     HPI    I had the pleasure of seeing Sid Cuello Jr. in consultation at your request for the evaluation of AF. He is a 74M with a history of discoid lupus, testicular tumor status-post resection, CAD status-post PCI, ischemic cardiomyopathy with EF 40-50% based on 8/2023 echo, whose history of AF dates back to early 2023. Pt underwent DCCV in 4/2023, with ECG in 8/2023 showing sinus rhythm and outside ECG from 5/2024 showing recurrence of AF. Pt currently on eliquis, toprol xl 25 mg bid, and digoxin 125 ug daily.    A Myoview SPECT in 10/2024 showed fixed distal anterior and inferior wall defects. Echo in 8/2023 showed EF 40-50%.    Pt denies sx associated with AF.    My interpretation of today's ECG is AF 85 bpm.    Review of Systems   Constitutional: Positive for malaise/fatigue. Negative for decreased appetite, weight gain and weight loss.   HENT:  Negative for sore throat.    Eyes:  Negative for blurred vision.   Cardiovascular:  Negative for chest pain, dyspnea on exertion, irregular heartbeat, leg swelling, near-syncope, orthopnea, palpitations, paroxysmal nocturnal dyspnea and syncope.   Respiratory:  Negative for shortness of breath.    Skin:  Negative for rash.   Musculoskeletal:  Negative for arthritis.   Gastrointestinal:  Negative for abdominal pain.   Neurological:  Negative for focal weakness.   Psychiatric/Behavioral:  Negative for altered mental status.       Objective     Physical Exam  Vitals and nursing note reviewed.   Constitutional:       General: He is not in acute distress.     Appearance: He is well-developed.   HENT:      Head: Normocephalic and atraumatic.   Eyes:      General: No scleral icterus.     Pupils: Pupils are equal, round, and reactive to light.   Neck:      Thyroid: No thyromegaly.   Cardiovascular:      Rate and Rhythm: Normal rate. Rhythm irregular.      Pulses: Normal pulses.      Heart sounds: Normal heart sounds. No murmur heard.     No friction  rub. No gallop.   Pulmonary:      Effort: Pulmonary effort is normal.      Breath sounds: Normal breath sounds.   Abdominal:      General: Bowel sounds are normal. There is no distension.      Palpations: Abdomen is soft.      Tenderness: There is no abdominal tenderness.   Musculoskeletal:      Cervical back: Neck supple.   Skin:     General: Skin is warm and dry.      Findings: No rash.   Neurological:      Mental Status: He is alert and oriented to person, place, and time.   Psychiatric:         Behavior: Behavior normal.            Assessment and Plan     1. Coronary artery disease involving native coronary artery of native heart without angina pectoris    2. Atherosclerosis of aorta    3. Persistent atrial fibrillation    4. S/P coronary artery stent placement        Plan:     In summary, Sid Cuello Jr. is a 74M with a history of persistent AF. His [id number] Score is 4 (age, CHF, HTN, CAD) so he should continue eliquis indefinitely.    Plan is repeat DCCV. No NADEEM provided pt continues to be compliant with eliquis. Start sotalol 2-3 days prior, and check QTc post-cardioversion. Will stop digoxin when start sotalol. Check QTc post-cardioversion.    Thank you for allowing me to participate in the care of this patient. Please do not hesitate to call me with any questions or concerns.

## 2025-02-26 ENCOUNTER — OFFICE VISIT (OUTPATIENT)
Dept: CARDIOLOGY | Facility: CLINIC | Age: 75
End: 2025-02-26
Payer: MEDICARE

## 2025-02-26 VITALS
RESPIRATION RATE: 16 BRPM | OXYGEN SATURATION: 92 % | HEIGHT: 69 IN | HEART RATE: 104 BPM | WEIGHT: 149.38 LBS | DIASTOLIC BLOOD PRESSURE: 70 MMHG | SYSTOLIC BLOOD PRESSURE: 130 MMHG | BODY MASS INDEX: 22.13 KG/M2

## 2025-02-26 DIAGNOSIS — I48.0 PAROXYSMAL ATRIAL FIBRILLATION: Primary | ICD-10-CM

## 2025-02-26 DIAGNOSIS — I70.0 ATHEROSCLEROSIS OF AORTA: ICD-10-CM

## 2025-02-26 DIAGNOSIS — I48.19 PERSISTENT ATRIAL FIBRILLATION: ICD-10-CM

## 2025-02-26 DIAGNOSIS — Z95.5 S/P CORONARY ARTERY STENT PLACEMENT: ICD-10-CM

## 2025-02-26 DIAGNOSIS — I25.10 CORONARY ARTERY DISEASE INVOLVING NATIVE CORONARY ARTERY OF NATIVE HEART WITHOUT ANGINA PECTORIS: Primary | Chronic | ICD-10-CM

## 2025-02-26 DIAGNOSIS — I48.91 A-FIB: ICD-10-CM

## 2025-02-26 PROCEDURE — 99999 PR PBB SHADOW E&M-EST. PATIENT-LVL III: CPT | Mod: PBBFAC,,, | Performed by: INTERNAL MEDICINE

## 2025-02-26 PROCEDURE — 99213 OFFICE O/P EST LOW 20 MIN: CPT | Mod: PBBFAC,PN | Performed by: INTERNAL MEDICINE

## 2025-02-26 RX ORDER — SOTALOL HYDROCHLORIDE 80 MG/1
80 TABLET ORAL 2 TIMES DAILY
Qty: 180 TABLET | Refills: 3 | Status: SHIPPED | OUTPATIENT
Start: 2025-02-26 | End: 2026-02-26

## 2025-03-01 ENCOUNTER — HOSPITAL ENCOUNTER (EMERGENCY)
Facility: HOSPITAL | Age: 75
Discharge: HOME | End: 2025-03-01
Attending: STUDENT IN AN ORGANIZED HEALTH CARE EDUCATION/TRAINING PROGRAM
Payer: MEDICARE

## 2025-03-01 VITALS
SYSTOLIC BLOOD PRESSURE: 108 MMHG | DIASTOLIC BLOOD PRESSURE: 72 MMHG | HEART RATE: 90 BPM | RESPIRATION RATE: 20 BRPM | TEMPERATURE: 98 F | BODY MASS INDEX: 22.96 KG/M2 | OXYGEN SATURATION: 98 % | WEIGHT: 155 LBS | HEIGHT: 69 IN

## 2025-03-01 DIAGNOSIS — M79.632 LEFT FOREARM PAIN: Primary | ICD-10-CM

## 2025-03-01 DIAGNOSIS — S59.912A INJURY OF LEFT FOREARM, INITIAL ENCOUNTER: ICD-10-CM
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
## 2025-03-01 PROCEDURE — 90471 IMMUNIZATION ADMIN: CPT

## 2025-03-01 PROCEDURE — 90715 TDAP VACCINE 7 YRS/> IM: CPT

## 2025-03-01 PROCEDURE — 99284 EMERGENCY DEPT VISIT MOD MDM: CPT | Mod: 25

## 2025-03-01 PROCEDURE — 63600175 PHARM REV CODE 636 W HCPCS

## 2025-03-01 PROCEDURE — 25000003 PHARM REV CODE 250

## 2025-03-01 RX ORDER — HYDROCODONE BITARTRATE AND ACETAMINOPHEN 5; 325 MG/1; MG/1
1 TABLET ORAL
Refills: 0 | Status: COMPLETED | OUTPATIENT
Start: 2025-03-01 | End: 2025-03-01

## 2025-03-01 RX ORDER — BACITRACIN 500 [USP'U]/G
OINTMENT TOPICAL
Status: COMPLETED | OUTPATIENT
Start: 2025-03-01 | End: 2025-03-01

## 2025-03-01 RX ADMIN — CLOSTRIDIUM TETANI TOXOID ANTIGEN (FORMALDEHYDE INACTIVATED), CORYNEBACTERIUM DIPHTHERIAE TOXOID ANTIGEN (FORMALDEHYDE INACTIVATED), BORDETELLA PERTUSSIS TOXOID ANTIGEN (GLUTARALDEHYDE INACTIVATED), BORDETELLA PERTUSSIS FILAMENTOUS HEMAGGLUTININ ANTIGEN (FORMALDEHYDE INACTIVATED), BORDETELLA PERTUSSIS PERTACTIN ANTIGEN, AND BORDETELLA PERTUSSIS FIMBRIAE 2/3 ANTIGEN 0.5 ML: 5; 2; 2.5; 5; 3; 5 INJECTION, SUSPENSION INTRAMUSCULAR at 06:03

## 2025-03-01 RX ADMIN — HYDROCODONE BITARTRATE AND ACETAMINOPHEN 1 TABLET: 5; 325 TABLET ORAL at 06:03

## 2025-03-01 RX ADMIN — BACITRACIN: 500 OINTMENT TOPICAL at 06:03

## 2025-03-02 NOTE — DISCHARGE INSTRUCTIONS
Please follow up with Orthopedics as soon as possible for further evaluation rechecked.  Keep the wounds cleaned with warm water and antibacterial soap.  You can wear the Ace wrap to help decrease swelling.  Elevate the extremity home and do ice to the area.      Do not drive, swim, climb to height, take a bath, operate heavy machinery, drink alcohol, or take potentially sedating medications, sign any legal documents, or make any important decisions for 24 hours if you have received any pain medications, sedatives, or mood altering drugs during her ER visit or within 24 hours of taking them if they have been prescribed to you.    Please return to the ED for worsening pain, numbness tingling of the arm, fever, or any new or worsening concerns.
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
## 2025-03-03 ENCOUNTER — TELEPHONE (OUTPATIENT)
Dept: CARDIOLOGY | Facility: CLINIC | Age: 75
End: 2025-03-03
Payer: MEDICARE

## 2025-03-26 ENCOUNTER — TELEPHONE (OUTPATIENT)
Dept: CARDIOLOGY | Facility: CLINIC | Age: 75
End: 2025-03-26
Payer: MEDICARE

## 2025-03-26 DIAGNOSIS — I48.19 PERSISTENT ATRIAL FIBRILLATION: Primary | ICD-10-CM

## 2025-03-27 ENCOUNTER — TELEPHONE (OUTPATIENT)
Dept: CARDIOLOGY | Facility: CLINIC | Age: 75
End: 2025-03-27
Payer: MEDICARE

## 2025-04-10 ENCOUNTER — ANESTHESIA EVENT (OUTPATIENT)
Dept: CARDIOLOGY | Facility: HOSPITAL | Age: 75
End: 2025-04-10
Payer: MEDICARE

## 2025-04-11 ENCOUNTER — HOSPITAL ENCOUNTER (OUTPATIENT)
Facility: HOSPITAL | Age: 75
Discharge: HOME | End: 2025-04-11
Attending: INTERNAL MEDICINE | Admitting: INTERNAL MEDICINE
Payer: MEDICARE

## 2025-04-11 ENCOUNTER — ANESTHESIA (OUTPATIENT)
Dept: CARDIOLOGY | Facility: HOSPITAL | Age: 75
End: 2025-04-11
Payer: MEDICARE

## 2025-04-11 VITALS
OXYGEN SATURATION: 84 % | HEART RATE: 91 BPM | RESPIRATION RATE: 35 BRPM | DIASTOLIC BLOOD PRESSURE: 85 MMHG | SYSTOLIC BLOOD PRESSURE: 121 MMHG

## 2025-04-11 VITALS
OXYGEN SATURATION: 99 % | DIASTOLIC BLOOD PRESSURE: 87 MMHG | TEMPERATURE: 99 F | RESPIRATION RATE: 26 BRPM | BODY MASS INDEX: 22.96 KG/M2 | SYSTOLIC BLOOD PRESSURE: 133 MMHG | HEART RATE: 71 BPM | HEIGHT: 69 IN | WEIGHT: 155 LBS

## 2025-04-11 DIAGNOSIS — I48.91 A-FIB: ICD-10-CM

## 2025-04-11 DIAGNOSIS — I48.19 PERSISTENT ATRIAL FIBRILLATION: ICD-10-CM

## 2025-04-11 LAB
ANION GAP (SMH): 8 MMOL/L (ref 8–16)
APTT PPP: 30.2 SECONDS (ref 21–32)
BUN SERPL-MCNC: 14 MG/DL (ref 8–23)
CALCIUM SERPL-MCNC: 8.5 MG/DL (ref 8.7–10.5)
CHLORIDE SERPL-SCNC: 98 MMOL/L (ref 95–110)
CO2 SERPL-SCNC: 27 MMOL/L (ref 23–29)
CREAT SERPL-MCNC: 0.9 MG/DL (ref 0.5–1.4)
ERYTHROCYTE [DISTWIDTH] IN BLOOD BY AUTOMATED COUNT: 15.9 % (ref 11.5–14.5)
GFR SERPLBLD CREATININE-BSD FMLA CKD-EPI: >60 ML/MIN/1.73/M2
GLUCOSE SERPL-MCNC: 90 MG/DL (ref 70–110)
HCT VFR BLD AUTO: 35.8 % (ref 40–54)
HGB BLD-MCNC: 11.6 GM/DL (ref 14–18)
INR PPP: 1.1 (ref 0.8–1.2)
MAGNESIUM SERPL-MCNC: 1.6 MG/DL (ref 1.6–2.6)
MCH RBC QN AUTO: 35.2 PG (ref 27–31)
MCHC RBC AUTO-ENTMCNC: 32.4 G/DL (ref 32–36)
MCV RBC AUTO: 109 FL (ref 82–98)
PLATELET # BLD AUTO: 146 K/UL (ref 150–450)
PMV BLD AUTO: 10.9 FL (ref 9.2–12.9)
POTASSIUM SERPL-SCNC: 3.8 MMOL/L (ref 3.5–5.1)
PROTHROMBIN TIME: 12.4 SECONDS (ref 9–12.5)
RBC # BLD AUTO: 3.3 M/UL (ref 4.6–6.2)
SODIUM SERPL-SCNC: 133 MMOL/L (ref 136–145)
WBC # BLD AUTO: 4.07 K/UL (ref 3.9–12.7)

## 2025-04-11 PROCEDURE — 37000008 HC ANESTHESIA 1ST 15 MINUTES: Performed by: INTERNAL MEDICINE

## 2025-04-11 PROCEDURE — 85027 COMPLETE CBC AUTOMATED: CPT | Performed by: INTERNAL MEDICINE

## 2025-04-11 PROCEDURE — 63600175 PHARM REV CODE 636 W HCPCS: Performed by: NURSE ANESTHETIST, CERTIFIED REGISTERED

## 2025-04-11 PROCEDURE — 80048 BASIC METABOLIC PNL TOTAL CA: CPT | Performed by: INTERNAL MEDICINE

## 2025-04-11 PROCEDURE — 85730 THROMBOPLASTIN TIME PARTIAL: CPT | Performed by: INTERNAL MEDICINE

## 2025-04-11 PROCEDURE — 92960 CARDIOVERSION ELECTRIC EXT: CPT | Performed by: INTERNAL MEDICINE

## 2025-04-11 PROCEDURE — 85610 PROTHROMBIN TIME: CPT | Performed by: INTERNAL MEDICINE

## 2025-04-11 PROCEDURE — 83735 ASSAY OF MAGNESIUM: CPT | Performed by: INTERNAL MEDICINE

## 2025-04-11 PROCEDURE — 92960 CARDIOVERSION ELECTRIC EXT: CPT | Mod: ,,, | Performed by: INTERNAL MEDICINE

## 2025-04-11 PROCEDURE — 93005 ELECTROCARDIOGRAM TRACING: CPT | Performed by: GENERAL PRACTICE

## 2025-04-11 PROCEDURE — 93010 ELECTROCARDIOGRAM REPORT: CPT | Mod: ,,, | Performed by: GENERAL PRACTICE

## 2025-04-11 RX ORDER — PROPOFOL 10 MG/ML
VIAL (ML) INTRAVENOUS
Status: DISCONTINUED | OUTPATIENT
Start: 2025-04-11 | End: 2025-04-11

## 2025-04-11 RX ORDER — LIDOCAINE HYDROCHLORIDE 10 MG/ML
INJECTION, SOLUTION EPIDURAL; INFILTRATION; INTRACAUDAL; PERINEURAL
Status: DISCONTINUED
Start: 2025-04-11 | End: 2025-04-11 | Stop reason: HOSPADM

## 2025-04-11 RX ADMIN — PROPOFOL 5 MG: 10 INJECTION, EMULSION INTRAVENOUS at 07:04

## 2025-04-11 RX ADMIN — SODIUM CHLORIDE, SODIUM LACTATE, POTASSIUM CHLORIDE, AND CALCIUM CHLORIDE: .6; .31; .03; .02 INJECTION, SOLUTION INTRAVENOUS at 07:04

## 2025-04-11 RX ADMIN — PROPOFOL 50 MG: 10 INJECTION, EMULSION INTRAVENOUS at 07:04

## 2025-04-11 NOTE — ANESTHESIA POSTPROCEDURE EVALUATION
Anesthesia Post Evaluation    Patient: Sid Cuello JrHeather    Procedure(s) Performed: Procedure(s) (LRB):  Cardioversion/Defibrillation (N/A)    Final Anesthesia Type: general      Patient location: [facility].  Patient participation: Yes- Able to Participate  Level of consciousness: awake and alert and oriented  Post-procedure vital signs: reviewed and stable  Pain management: adequate  Airway patency: patent    PONV status at discharge: No PONV  Anesthetic complications: no      Cardiovascular status: blood pressure returned to baseline, hemodynamically stable and stable  Respiratory status: unassisted, spontaneous ventilation and room air  Hydration status: euvolemic  Follow-up not needed.              Vitals Value Taken Time   /80 04/11/25 08:15   Temp 36.7 C 04/11/25 08:18   Pulse 67 04/11/25 08:16   Resp 30 04/11/25 08:16   SpO2 98 % 04/11/25 08:16   Vitals shown include unfiled device data.      No case tracking events are documented in the log.      Pain/Eva Score: Eva Score: 10 (4/11/2025  7:01 AM)

## 2025-04-11 NOTE — H&P
[facility]  Cardiology  History and Physical     Patient Name: Sid Cuello Jr.  MRN: [id number]  Admission Date: 4/11/2025  Code Status: Prior   Attending Provider: Sushant Juárez MD   Primary Care Physician: Yan Posada MD  Principal Problem:<principal problem not specified>    Patient information was obtained from patient and ER records.     Subjective:     74-year-old male here for cardioversion for atrial fibrillation.  Reports taking his sotalol for the past 3 days and has been taking Eliquis as well.    Past Medical History:   Diagnosis Date    Abnormal SPEP 4/29/2021    Anemia, chronic disease 10/30/2023    Anemia, iron deficiency 10/30/2023    Coronary artery disease 2007    MI   1 c. stent    Discoid lupus     Essential hypertension 8/10/2015    Hyperlipidemia LDL goal < 100 8/10/2015    Normochromic normocytic anemia 10/30/2023       Past Surgical History:   Procedure Laterality Date    COLONOSCOPY N/A 9/14/2023    Procedure: COLONOSCOPY;  Surgeon: Ankur Gaona MD;  Location: [facility] ENDO;  Service: Endoscopy;  Laterality: N/A;    COLONOSCOPY W/ POLYPECTOMY  09/14/2023    CORONARY ANGIOPLASTY WITH STENT PLACEMENT  2007    CYSTOSCOPY N/A 10/23/2019    Procedure: CYSTOSCOPY (flexible);  Surgeon: David Mera MD;  Location: [facility] OR;  Service: Urology;  Laterality: N/A;    FOOT SURGERY Left 1999    ORCHIECTOMY Right 10/23/2019    Procedure: 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;  Surgeon: David Mera MD;  Location: [facility] OR;  Service: Urology;  Laterality: Right;    SMALL BOWEL ENTEROSCOPY N/A 08/22/2023    Procedure: ENTEROSCOPY;  Surgeon: Doc Merritt III, MD;  Location: [facility] ENDO;  Service: Endoscopy;  Laterality: N/A;    TREATMENT OF CARDIAC ARRHYTHMIA N/A 04/10/2023    Procedure: Cardioversion or Defibrillation;  Surgeon: Raymond Sotomayor MD;  Location: [facility] CATH/EP LAB;  Service: Cardiology;  Laterality: N/A;    VASECTOMY         Review  of patient's allergies indicates:  No Known Allergies    Current Facility-Administered Medications on File Prior to Encounter   Medication    diphenhydrAMINE injection 25 mg     Current Outpatient Medications on File Prior to Encounter   Medication Sig    atorvastatin (LIPITOR) 10 MG tablet Take 10 mg by mouth once daily.    ELIQUIS 5 mg Tab Take 5 mg by mouth 2 (two) times daily.    gabapentin (NEURONTIN) 300 MG capsule Take 1 capsule (300 mg total) by mouth every evening.    iron-vitamin C 100-250 mg, ICAR-C, 100-250 mg Tab TAKE 1 TABLET BY MOUTH ONCE DAILY    metoprolol succinate (TOPROL-XL) 25 MG 24 hr tablet Take 1 tablet (25 mg total) by mouth 2 (two) times daily.    sotaloL (BETAPACE) 80 MG tablet Take 1 tablet (80 mg total) by mouth 2 (two) times daily. Start 2-3 days before cardioversion. Stop digoxin when start sotalol.    betamethasone dipropionate (DIPROLENE) 0.05 % lotion APPLY THIN LAYER TOPICALLY TO THE SCALP EVERY NIGHT AT BEDTIME AS NEEDED FOR ITCHING    clotrimazole-betamethasone 1-0.05% (LOTRISONE) cream Apply topically 2 (two) times daily. For 7 days    digoxin (LANOXIN) 125 mcg tablet Take 125 mcg by mouth once daily.     Family History       Problem Relation (Age of Onset)    No Known Problems Mother          Tobacco Use    Smoking status: Every Day     Current packs/day: 1.00     Average packs/day: 1 pack/day for 47.3 years (47.3 ttl pk-yrs)     Types: Cigarettes     Start date: 1978    Smokeless tobacco: Never    Tobacco comments:     Pt is a current every day smoker, smokes 1ppd. No interest in quitting at this time.   Substance and Sexual Activity    Alcohol use: Not Currently     Alcohol/week: 14.0 standard drinks of alcohol     Types: 14 Shots of liquor per week    Drug use: Never    Sexual activity: Not on file     Review of Systems   All other systems reviewed and are negative.    Objective:     Vital Signs (Most Recent):  Temp: 99.2 °F (37.3 °C) (04/11/25 0559)  Pulse: 109 (04/11/25  "0700)  Resp: 20 (04/11/25 0559)  BP: 122/87 (04/11/25 0559)  SpO2: (!) 90 % (04/11/25 0559) Vital Signs (24h Range):  Temp:  [99.2 °F (37.3 °C)] 99.2 °F (37.3 °C)  Pulse:  [] 91  Resp:  [20-35] 35  SpO2:  [84 %-100 %] 84 %  BP: (121-140)/(85-91) 121/85     Weight: 70.3 kg (155 lb)  Body mass index is 22.89 kg/m².    SpO2: (!) 90 %       No intake or output data in the 24 hours ending 04/11/25 0738    Lines/Drains/Airways       Peripheral Intravenous Line  Duration                  Peripheral IV - Single Lumen 04/11/25 0555 20 G Right Antecubital <1 day                    Physical Exam  Vitals reviewed.   Constitutional:       Appearance: Normal appearance.   Cardiovascular:      Rate and Rhythm: Tachycardia present. Rhythm irregular.      Heart sounds: No murmur heard.     No gallop.   Pulmonary:      Effort: Pulmonary effort is normal.      Breath sounds: Normal breath sounds.   Abdominal:      General: Abdomen is flat.   Skin:     General: Skin is warm.   Neurological:      General: No focal deficit present.      Mental Status: He is alert and oriented to person, place, and time.         Significant Labs: BMP:   Recent Labs   Lab 04/11/25  0556   *   K 3.8   CO2 27   BUN 14   CREATININE 0.9   CALCIUM 8.5*   MG 1.6   , CBC   Recent Labs   Lab 04/11/25  0556   WBC 4.07   HGB 11.6*   HCT 35.8*   *   , and Troponin No results for input(s): "TROPONINIHS" in the last 48 hours.    Significant Imaging: Echocardiogram: 2D echo with color flow doppler: No results found for this or any previous visit. and Transthoracic echo (TTE) complete (Cupid Only):   Results for orders placed or performed during the hospital encounter of 08/21/23   Echo   Result Value Ref Range    LVIDd 5.80 3.5 - 6.0 cm    LV Systolic Volume 85.40 mL    LVIDs 4.35 (A) 2.1 - 4.0 cm    LV Diastolic Volume 167.00 mL    IVS 1.01 0.6 - 1.1 cm    FS 25 (A) 28 - 44 %    Left Ventricle Relative Wall Thickness 0.36 cm    PW 1.04 0.6 - 1.1 cm "    LV mass 240.74 g    MV Peak E Omid 1.26 m/s    TDI LATERAL 0.11 m/s    TDI SEPTAL 0.07 m/s    E/E' ratio 14.00 m/s    MV Peak A Omid 0.50 m/s    TR Max Omid 3.42 m/s    E/A ratio 2.52     E wave deceleration time 176.00 msec    LV SEPTAL E/E' RATIO 18.00 m/s    LV LATERAL E/E' RATIO 11.45 m/s    Triscuspid Valve Regurgitation Peak Gradient 47 mmHg    IVC diameter 2.21 cm    Mean e' 0.09 m/s    BSA 1.77 m2    LV Systolic Volume Index 48.0 mL/m2    LV Diastolic Volume Index 93.82 mL/m2    LV Mass Index 135 g/m2    ZLVIDS 2.83     ZLVIDD 1.65     TV resting pulmonary artery pressure 62 mmHg    RV TB RVSP 18 mmHg    Est. RA pres 15 mmHg    Narrative      Left Ventricle: The left ventricle is mildly dilated. Increased   ventricular mass. Mildly increased wall thickness. There is mild   concentric hypertrophy. Mild global hypokinesis present. There is mildly   reduced systolic function with a visually estimated ejection fraction of   40 - 50%.   EF 40% There is normal diastolic function. Elevated left   ventricular filling pressure. Tissue Doppler velocity is normal.  Mean   left atrial pressure is approximately 11.45    Right Ventricle: Normal right ventricular cavity size. Systolic   function is normal.    Tricuspid Valve: There is mild to moderate regurgitation with a   centrally directed jet. There is mild pulmonary hypertension.    IVC/SVC: Elevated venous pressure at 15 mmHg.    Pericardium: There is a trivial effusion adjacent to the right   ventricle. Pericardial effusion is echolucent. No indication of cardiac   tamponade. Ascites present.       Assessment and Plan:     There are no hospital problems to display for this patient.  Atrial fibrillation    Plan:  - patient reports compliance with sotalol and Eliquis.  After discussion of risks and benefits, patient would like to proceed with the procedure.    VTE Risk Mitigation (From admission, onward)      None            Sushant Juárez MD  Cardiology   [place]  [facility]

## 2025-04-11 NOTE — DISCHARGE SUMMARY
[facility]  Discharge Note  Short Stay    Procedure(s) (LRB):  Cardioversion/Defibrillation (N/A)      OUTCOME: Patient tolerated treatment/procedure well without complication and is now ready for discharge.    DISPOSITION: Home or Self Care    FINAL DIAGNOSIS:  <principal problem not specified>    FOLLOWUP: In clinic    DISCHARGE INSTRUCTIONS:  No discharge procedures on file.     TIME SPENT ON DISCHARGE: 15 minutes

## 2025-04-11 NOTE — ANESTHESIA PREPROCEDURE EVALUATION
04/10/2025  Sid Cuello Jr. is a 74 y.o., male.      Results for orders placed or performed in visit on 02/26/25   IN OFFICE EKG 12-LEAD (to [facility])    Collection Time: 02/26/25 10:37 AM   Result Value Ref Range    QRS Duration 84 ms    [facility] QTC Calculation 411 ms    Narrative    Test Reason : I48.19,I25.10,Z95.5,    Vent. Rate :  85 BPM     Atrial Rate : 105 BPM     P-R Int :    ms          QRS Dur :  84 ms      QT Int : 346 ms       P-R-T Axes :    -52  57 degrees    QTcB Int : 411 ms    Atrial fibrillation  Left anterior fascicular block  Nonspecific ST abnormality  Abnormal ECG  When compared with ECG of 21-Aug-2023 12:09,  Atrial fibrillation has replaced Sinus rhythm  Left anterior fascicular block is now Present  QT has shortened  Confirmed by Ankur Hoffman ([id number]) on 3/3/2025 12:11:59 PM    Referred By: MERI JOHNSON           Confirmed By: Ankur Hoffman             Lab Results   Component Value Date    WBC 3.85 (L) 01/17/2025    HGB 12.4 (L) 01/17/2025    HCT 38.8 (L) 01/17/2025     (H) 01/17/2025     01/17/2025     BMP  Lab Results   Component Value Date     01/17/2025    K 3.7 01/17/2025     01/17/2025    CO2 26 01/17/2025    BUN 9 01/17/2025    CREATININE 0.9 01/17/2025    CALCIUM 8.1 (L) 01/17/2025    ANIONGAP 10 01/17/2025    GLU 87 01/17/2025    GLU 80 10/15/2024     07/22/2024       Results for orders placed during the hospital encounter of 08/21/23    Echo    Interpretation Summary    Left Ventricle: The left ventricle is mildly dilated. Increased ventricular mass. Mildly increased wall thickness. There is mild concentric hypertrophy. Mild global hypokinesis present. There is mildly reduced systolic function with a visually estimated ejection fraction of 40 - 50%.   EF 40% There is normal diastolic function. Elevated left ventricular filling pressure. Tissue  Doppler velocity is normal.  Mean left atrial pressure is approximately 11.45    Right Ventricle: Normal right ventricular cavity size. Systolic function is normal.    Tricuspid Valve: There is mild to moderate regurgitation with a centrally directed jet. There is mild pulmonary hypertension.    IVC/SVC: Elevated venous pressure at 15 mmHg.    Pericardium: There is a trivial effusion adjacent to the right ventricle. Pericardial effusion is echolucent. No indication of cardiac tamponade. Ascites present.         Pre-op Assessment    I have reviewed the Patient Summary Reports.     I have reviewed the Nursing Notes. I have reviewed the NPO Status.   I have reviewed the Medications.     Review of Systems  Anesthesia Hx:  No problems with previous Anesthesia   Neg history of prior surgery.          Denies Family Hx of Anesthesia complications.    Denies Personal Hx of Anesthesia complications.                    Social:  Smoker, No Alcohol Use       Hematology/Oncology:    Oncology Normal    -- Anemia: Iron Deficiency Anemia  Anemia of Chronic Disease   chronic              Hematology Comments: Eliquis therapy                    EENT/Dental:  EENT/Dental Normal           Cardiovascular:     Hypertension   CAD  asymptomatic CABG/stent (Stent) Dysrhythmias atrial fibrillation      hyperlipidemia   ECG has been reviewed.  Patient on beta blockers Beta blocker taken in last 24 hours                         Pulmonary:  Pulmonary Normal                       Renal/:  Renal/ Normal                 Hepatic/GI:  Hepatic/GI Normal                    Musculoskeletal:  Musculoskeletal Normal        Rheumatic Disease, Lupus         Neurological:        Peripheral Neuropathy                          Endocrine:  Endocrine Normal            Dermatological:  Skin Normal    Psych:  Psychiatric Normal                    Physical Exam  General: Well nourished, Alert, Cooperative and Oriented    Airway:  Mallampati: III / II  Mouth  Opening: Normal  TM Distance: Normal  Tongue: Normal  Neck ROM: Normal ROM    Dental:  Periodontal disease  Very poor dentition, broken and decayed teeth.  Chest/Lungs:  Clear to auscultation, Normal Respiratory Rate    Heart:  Rate: Normal  Rhythm: Regular Rhythm  Sounds: Normal        Anesthesia Plan  Type of Anesthesia, risks & benefits discussed:    Anesthesia Type: Gen Natural Airway  Intra-op Monitoring Plan: Standard ASA Monitors  Post Op Pain Control Plan:   (medical reason for not using multimodal pain management)  Induction:  IV  Informed Consent: Informed consent signed with the Patient and all parties understand the risks and agree with anesthesia plan.  All questions answered. Patient consented to blood products? Yes  ASA Score: 3  Anesthesia Plan Notes: GNA   POM  Propofol     Ready For Surgery From Anesthesia Perspective.     .

## 2025-04-12 LAB
OHS QRS DURATION: 88 MS
OHS QTC CALCULATION: 466 MS

## 2025-04-13 LAB
OHS QRS DURATION: 84 MS
OHS QTC CALCULATION: 479 MS

## 2025-04-23 ENCOUNTER — LAB VISIT (OUTPATIENT)
Dept: LAB | Facility: HOSPITAL | Age: 75
End: 2025-04-23
Attending: INTERNAL MEDICINE
Payer: MEDICARE

## 2025-04-23 DIAGNOSIS — D64.9 NORMOCHROMIC NORMOCYTIC ANEMIA: ICD-10-CM

## 2025-04-23 DIAGNOSIS — D50.9 IRON DEFICIENCY ANEMIA, UNSPECIFIED IRON DEFICIENCY ANEMIA TYPE: ICD-10-CM

## 2025-04-23 DIAGNOSIS — D53.9 NUTRITIONAL ANEMIA, UNSPECIFIED: ICD-10-CM

## 2025-04-23 DIAGNOSIS — R77.8 ABNORMAL SPEP: ICD-10-CM

## 2025-04-23 DIAGNOSIS — D63.8 ANEMIA, CHRONIC DISEASE: ICD-10-CM

## 2025-04-23 DIAGNOSIS — D64.9 ANEMIA, UNSPECIFIED TYPE: ICD-10-CM

## 2025-04-23 DIAGNOSIS — R71.8 ELEVATED MCV: ICD-10-CM

## 2025-04-23 LAB
ABSOLUTE EOSINOPHIL (SMH): 0.07 K/UL
ABSOLUTE MONOCYTE (SMH): 0.34 K/UL (ref 0.3–1)
ABSOLUTE NEUTROPHIL COUNT (SMH): 1.8 K/UL (ref 1.8–7.7)
ALBUMIN SERPL-MCNC: 2.9 G/DL (ref 3.5–5.2)
ALP SERPL-CCNC: 137 UNIT/L (ref 40–150)
ALT SERPL-CCNC: 17 UNIT/L (ref 10–44)
ANION GAP (SMH): 7 MMOL/L (ref 8–16)
AST SERPL-CCNC: 48 UNIT/L (ref 11–45)
BASOPHILS # BLD AUTO: 0.04 K/UL
BASOPHILS NFR BLD AUTO: 1.1 %
BILIRUB SERPL-MCNC: 0.6 MG/DL (ref 0.1–1)
BUN SERPL-MCNC: 11 MG/DL (ref 8–23)
CALCIUM SERPL-MCNC: 8.2 MG/DL (ref 8.7–10.5)
CHLORIDE SERPL-SCNC: 101 MMOL/L (ref 95–110)
CO2 SERPL-SCNC: 26 MMOL/L (ref 23–29)
CREAT SERPL-MCNC: 0.9 MG/DL (ref 0.5–1.4)
ERYTHROCYTE [DISTWIDTH] IN BLOOD BY AUTOMATED COUNT: 14.7 % (ref 11.5–14.5)
FERRITIN SERPL-MCNC: 282 NG/ML (ref 20–300)
FOLATE SERPL-MCNC: 6.4 NG/ML (ref 4–24)
GFR SERPLBLD CREATININE-BSD FMLA CKD-EPI: >60 ML/MIN/1.73/M2
GLUCOSE SERPL-MCNC: 103 MG/DL (ref 70–110)
HCT VFR BLD AUTO: 36.2 % (ref 40–54)
HGB BLD-MCNC: 11.3 GM/DL (ref 14–18)
IMM GRANULOCYTES # BLD AUTO: 0.01 K/UL (ref 0–0.04)
IMM GRANULOCYTES NFR BLD AUTO: 0.3 % (ref 0–0.5)
IRON SATN MFR SERPL: 25 % (ref 20–50)
IRON SERPL-MCNC: 70 UG/DL (ref 45–160)
LYMPHOCYTES # BLD AUTO: 1.57 K/UL (ref 1–4.8)
MCH RBC QN AUTO: 34.2 PG (ref 27–31)
MCHC RBC AUTO-ENTMCNC: 31.2 G/DL (ref 32–36)
MCV RBC AUTO: 110 FL (ref 82–98)
NUCLEATED RBC (/100WBC) (SMH): 0 /100 WBC
PLATELET # BLD AUTO: 201 K/UL (ref 150–450)
PMV BLD AUTO: 10.6 FL (ref 9.2–12.9)
POTASSIUM SERPL-SCNC: 4.1 MMOL/L (ref 3.5–5.1)
PROT SERPL-MCNC: 8.1 GM/DL (ref 6–8.4)
RBC # BLD AUTO: 3.3 M/UL (ref 4.6–6.2)
RELATIVE EOSINOPHIL (SMH): 1.8 % (ref 0–8)
RELATIVE LYMPHOCYTE (SMH): 41.3 % (ref 18–48)
RELATIVE MONOCYTE (SMH): 8.9 % (ref 4–15)
RELATIVE NEUTROPHIL (SMH): 46.6 % (ref 38–73)
SODIUM SERPL-SCNC: 134 MMOL/L (ref 136–145)
TIBC SERPL-MCNC: 275 UG/DL (ref 250–450)
TRANSFERRIN SERPL-MCNC: 186 MG/DL (ref 200–375)
VIT B12 SERPL-MCNC: 534 PG/ML (ref 210–950)
WBC # BLD AUTO: 3.8 K/UL (ref 3.9–12.7)

## 2025-04-23 PROCEDURE — 82784 ASSAY IGA/IGD/IGG/IGM EACH: CPT | Mod: 91

## 2025-04-23 PROCEDURE — 83521 IG LIGHT CHAINS FREE EACH: CPT | Mod: 91

## 2025-04-23 PROCEDURE — 82607 VITAMIN B-12: CPT

## 2025-04-23 PROCEDURE — 84466 ASSAY OF TRANSFERRIN: CPT

## 2025-04-23 PROCEDURE — 36415 COLL VENOUS BLD VENIPUNCTURE: CPT

## 2025-04-23 PROCEDURE — 82728 ASSAY OF FERRITIN: CPT

## 2025-04-23 PROCEDURE — 82746 ASSAY OF FOLIC ACID SERUM: CPT

## 2025-04-23 PROCEDURE — 85025 COMPLETE CBC W/AUTO DIFF WBC: CPT

## 2025-04-23 PROCEDURE — 82784 ASSAY IGA/IGD/IGG/IGM EACH: CPT

## 2025-04-23 PROCEDURE — 82232 ASSAY OF BETA-2 PROTEIN: CPT

## 2025-04-23 PROCEDURE — 84075 ASSAY ALKALINE PHOSPHATASE: CPT

## 2025-04-24 LAB
B2 MICROGLOB SERPL-MCNC: 6 MCG/ML (ref 1.21–2.7)
IGA SERPL-MCNC: 701 MG/DL (ref 61–356)
IGG SERPL-MCNC: 2110 MG/DL (ref 767–1590)
IGM SERPL-MCNC: 44 MG/DL (ref 37–286)
KAPPA LC FREE SER NEPH-MCNC: 11.3 MG/DL (ref 0.33–1.94)
KAPPA LC FREE/LAMBDA FREE SER NEPH: 1.53 {RATIO} (ref 0.26–1.65)
LAMBDA LC FREE SERPL-MCNC: 7.4 MG/DL (ref 0.57–2.63)

## 2025-04-28 LAB
IGA SERPL-MCNC: 917 MG/DL (ref 61–356)
IGG SERPL-MCNC: 2610 MG/DL (ref 767–1590)
IGM SERPL-MCNC: 61 MG/DL (ref 37–286)
IMMUNOLOGIST REVIEW: ABNORMAL
M PROTEIN SERPL QL: NEGATIVE
M THERAPEUTIC ANTIBODY ADMINISTERED?: ABNORMAL

## 2025-04-29 ENCOUNTER — E-VISIT (OUTPATIENT)
Dept: FAMILY MEDICINE | Facility: CLINIC | Age: 75
End: 2025-04-29
Payer: MEDICARE

## 2025-04-29 DIAGNOSIS — L03.115 CELLULITIS OF RIGHT LOWER EXTREMITY: Primary | ICD-10-CM

## 2025-04-29 RX ORDER — MUPIROCIN 20 MG/G
OINTMENT TOPICAL 3 TIMES DAILY
Qty: 22 G | Refills: 0 | Status: SHIPPED | OUTPATIENT
Start: 2025-04-29

## 2025-04-29 RX ORDER — DOXYCYCLINE HYCLATE 100 MG
100 TABLET ORAL 2 TIMES DAILY
Qty: 20 TABLET | Refills: 0 | Status: SHIPPED | OUTPATIENT
Start: 2025-04-29

## 2025-04-29 NOTE — PROGRESS NOTES
Patient ID: Sid Cuello Jr. is a 74 y.o. male.    Chief Complaint: General Illness (Entered automatically based on patient selection in [facility].)    The patient initiated a request through [facility] on 4/29/2025 for evaluation and management with a chief complaint of General Illness (Entered automatically based on patient selection in [facility].)     I evaluated the questionnaire submission on 4/29/2025.    [facility] Peq Evisit Supergroup-Common Problems    4/29/2025 10:14 AM CDT - Filed by Patient   What do you need help with? Other Concern   Do you agree to participate in an E-Visit? Yes   If you have any of the following symptoms, please go to the nearest emergency room or call 911: I acknowledge   What is the main issue you would like addressed today? Infection in lowere leg   Please describe your symptoms. Redness and swelling   Where is your problem located? Just above the ankle   On a scale of 1-10, where 10 is the worst you can imagine, how severe are your symptoms? (range: 1 - 10) 6   Have you had these symptoms before? No   How long have you had these symptoms? A few days   What helps with your symptoms? Have not tried anything   What makes your symptoms feel worse? Have not tried anything   Are your symptoms related to a condition you currently have? No   Please describe any probable cause for your symptoms. I have a wound   Provide any additional information you feel is important.    Please attach any relevant images or files    Are you able to take your vital signs? Yes   Systolic Blood Pressure:    Diastolic Blood Pressure:    Weight:    Height:    Pulse:    Temperature:    Respiration rate:    Pulse Oxygen:          Encounter Diagnosis   Name Primary?    Cellulitis of right lower extremity Yes        No orders of the defined types were placed in this encounter.     Medications Ordered This Encounter   Medications    doxycycline (VIBRA-TABS) 100 MG tablet     Sig: Take 1 tablet (100 mg total) by mouth  2 (two) times daily.     Dispense:  20 tablet     Refill:  0    mupirocin (BACTROBAN) 2 % ointment     Sig: Apply topically 3 (three) times daily.     Dispense:  22 g     Refill:  0        1. Medication Instructions:  Doxycycline Dosage: Take 100mg by mouth twice daily, with a full glass of water. It is important to take the medication at the same time each day to maintain consistent levels in your body.  With or Without Food: Doxycycline can be taken with or without food. If it causes stomach upset, take it with food or milk.  Course Duration: Complete the full course of doxycycline as prescribed, even if symptoms improve before finishing the medication. Stopping early may result in a return of the infection and resistance to antibiotics.    Important Warnings and Side Effects of Doxycycline:  Sun Sensitivity: Doxycycline can increase sensitivity to sunlight. Avoid excessive sun exposure and use sunscreen or wear protective clothing when outdoors.  Esophageal Irritation: To prevent irritation or ulceration of the esophagus, take doxycycline with a full glass of water and remain upright for at least 30 minutes after taking it.  Drug Interactions: Inform your doctor of all medications and supplements you are taking to avoid potential interactions.  Common Side Effects: These can include nausea, diarrhea, and upset stomach. If severe side effects occur, such as severe headache, vision changes, or signs of an allergic reaction (rash, itching, swelling, severe dizziness, or trouble breathing), seek medical attention immediately.    2. Wound Care Instructions:  Clean the Wound: Gently clean the wound with mild soap and water daily. Pat dry with a clean towel.  Apply an Antiseptic: Use Bactroban ointment to the open wound ( I assume this is an open sore under the bandage)  Dress the Wound: Apply a clean, sterile bandage or dressing to the wound. Change the dressing daily or as needed if it becomes wet or dirty.  Monitor  for Signs of Infection: Watch for increased redness, swelling, warmth, or pus. Contact the office if these occur.    3. General Care Instructions:  Elevate the Leg: When sitting or lying down, keep your leg elevated above the level of your heart to reduce swelling.  Use a warm pack to the area to increase the blood flow to help with healing.  Stay Hydrated: Drink plenty of fluids to stay hydrated and help your body fight the infection.  Rest: Ensure you get plenty of rest to support your recovery.    5. Follow-Up:  Schedule a follow-up appointment if the swelling and redness is not improving in 24-48 hours after starting treatment. You may consider using a marker initially to dalton the redness so that it can be tracked if it worsens.    No follow-ups on file.      E-Visit Time Trackin min

## 2025-05-12 NOTE — PROGRESS NOTES
Subjective     HPI    I had the pleasure of seeing Sid Glasssilvino Fontenot in follow-up for hist history of AF. He is a 74M with a history of discoid lupus, testicular tumor status-post resection, CAD status-post PCI, ischemic cardiomyopathy with EF 40-50% based on 8/2023 echo, whose history of AF dates back to early 2023. Pt underwent DCCV in 4/2023, with ECG in 8/2023 showing sinus rhythm and outside ECG from 5/2024 showing recurrence of AF. When pt initially saw me in early 2025 he was on eliquis, toprol xl 25 mg bid, and digoxin 125 ug daily.    A Myoview SPECT in 10/2024 showed fixed distal anterior and inferior wall defects. Echo in 8/2023 showed EF 40-50%.    Pt started on sotalol 80 mg bid, and underwent DCCV in 4/2025. Digoxin stopped.    Pt states that he was checking his Kardiamobile post-procedure, with recurrent AF within 1 week. Did not notice a difference in the way he felt when he was maintaining sinus.    My interpretation of today's ECG is AF 95 bpm.    Review of Systems   Constitutional: Positive for malaise/fatigue. Negative for decreased appetite, weight gain and weight loss.   HENT:  Negative for sore throat.    Eyes:  Negative for blurred vision.   Cardiovascular:  Negative for chest pain, dyspnea on exertion, irregular heartbeat, leg swelling, near-syncope, orthopnea, palpitations, paroxysmal nocturnal dyspnea and syncope.   Respiratory:  Negative for shortness of breath.    Skin:  Negative for rash.   Musculoskeletal:  Negative for arthritis.   Gastrointestinal:  Negative for abdominal pain.   Neurological:  Negative for focal weakness.   Psychiatric/Behavioral:  Negative for altered mental status.       Objective     Physical Exam  Vitals and nursing note reviewed.   Constitutional:       General: He is not in acute distress.     Appearance: He is well-developed.   HENT:      Head: Normocephalic and atraumatic.   Eyes:      General: No scleral icterus.     Pupils: Pupils are equal, round,  and reactive to light.   Neck:      Thyroid: No thyromegaly.   Cardiovascular:      Rate and Rhythm: Normal rate. Rhythm irregular.      Pulses: Normal pulses.      Heart sounds: Normal heart sounds. No murmur heard.     No friction rub. No gallop.   Pulmonary:      Effort: Pulmonary effort is normal.      Breath sounds: Normal breath sounds.   Abdominal:      General: Bowel sounds are normal. There is no distension.      Palpations: Abdomen is soft.      Tenderness: There is no abdominal tenderness.   Musculoskeletal:      Cervical back: Neck supple.   Skin:     General: Skin is warm and dry.      Findings: No rash.   Neurological:      Mental Status: He is alert and oriented to person, place, and time.   Psychiatric:         Behavior: Behavior normal.            Assessment and Plan     1. Persistent atrial fibrillation    2. Coronary artery disease involving native coronary artery of native heart without angina pectoris    3. S/P coronary artery stent placement    4. Hyperlipidemia LDL goal < 100        Plan:     In summary, Sid Cuello Jr. is a 74M with a history of persistent AF. His [id number] Score is 4 (age, CHF, HTN, CAD) so he should continue eliquis indefinitely.    Pt is now status-post sotalol initiation and DCCV. Recurrent AF within 1 week. No sx change following cardioversion.    Plan is to stop sotalol, double toprol xl to 50 mg bid, echo to confirm EF is stable, follow-up 6 months with 3-day Cucoo prior.    Thank you for allowing me to participate in the care of this patient. Please do not hesitate to call me with any questions or concerns.

## 2025-05-14 ENCOUNTER — OFFICE VISIT (OUTPATIENT)
Dept: CARDIOLOGY | Facility: CLINIC | Age: 75
End: 2025-05-14
Payer: MEDICARE

## 2025-05-14 VITALS
BODY MASS INDEX: 21.4 KG/M2 | SYSTOLIC BLOOD PRESSURE: 110 MMHG | HEART RATE: 95 BPM | HEIGHT: 69 IN | WEIGHT: 144.5 LBS | DIASTOLIC BLOOD PRESSURE: 68 MMHG | RESPIRATION RATE: 16 BRPM

## 2025-05-14 DIAGNOSIS — I25.10 CORONARY ARTERY DISEASE INVOLVING NATIVE CORONARY ARTERY OF NATIVE HEART WITHOUT ANGINA PECTORIS: Chronic | ICD-10-CM

## 2025-05-14 DIAGNOSIS — E78.5 HYPERLIPIDEMIA WITH TARGET LOW DENSITY LIPOPROTEIN (LDL) CHOLESTEROL LESS THAN 100 MG/DL: ICD-10-CM

## 2025-05-14 DIAGNOSIS — Z95.5 S/P CORONARY ARTERY STENT PLACEMENT: ICD-10-CM

## 2025-05-14 DIAGNOSIS — I48.19 PERSISTENT ATRIAL FIBRILLATION: Primary | ICD-10-CM

## 2025-05-14 LAB
OHS QRS DURATION: 86 MS
OHS QTC CALCULATION: 490 MS

## 2025-05-14 PROCEDURE — 93005 ELECTROCARDIOGRAM TRACING: CPT | Mod: PBBFAC,PN | Performed by: INTERNAL MEDICINE

## 2025-05-14 PROCEDURE — 93010 ELECTROCARDIOGRAM REPORT: CPT | Mod: S$PBB,,, | Performed by: INTERNAL MEDICINE

## 2025-05-14 PROCEDURE — 99213 OFFICE O/P EST LOW 20 MIN: CPT | Mod: PBBFAC,PN | Performed by: INTERNAL MEDICINE

## 2025-05-14 PROCEDURE — 99999 PR PBB SHADOW E&M-EST. PATIENT-LVL III: CPT | Mod: PBBFAC,,, | Performed by: INTERNAL MEDICINE

## 2025-05-14 RX ORDER — METOPROLOL SUCCINATE 50 MG/1
50 TABLET, EXTENDED RELEASE ORAL 2 TIMES DAILY
Qty: 180 TABLET | Refills: 3 | Status: SHIPPED | OUTPATIENT
Start: 2025-05-14 | End: 2026-05-14

## 2025-05-14 NOTE — HPI
Notes from 10/29/2024 visit address the patient being winded with activity. Routed to PCP to advise.   Patient is a 72-year-old  male with known history of CAD status post stenting in 2007, essential hypertension and discoid lupus who was sent to the ED from PCP's office secondary to new diagnosis of atrial fibrillation.    Patient reports being in his usual state of health until about Anjali when he noticed gradually worsening bilateral lower extremity edema.  This has become severe over the last 2 days.  Also he reports worsening shortness of breath over the last 2 weeks.  Shortness of breath is worse with exertion and better with rest.  Reports easy fatigability however this is a chronic issue for him.  Denies chest pain, palpitations, orthopnea or paroxysmal nocturnal dyspnea.  He has history of abnormal SPEP profile but no evidence of myeloma.  He follows with Dr. Javier.     He is a retired .  Smokes 1 pack per day.  Consumes alcohol about 3-4 times per week.    Rest of the 10 point review of systems is negative except as mentioned above.

## 2025-05-16 ENCOUNTER — HOSPITAL ENCOUNTER (OUTPATIENT)
Dept: CARDIOLOGY | Facility: HOSPITAL | Age: 75
Discharge: HOME | End: 2025-05-16
Attending: INTERNAL MEDICINE
Payer: MEDICARE

## 2025-05-16 VITALS — BODY MASS INDEX: 21.38 KG/M2 | HEIGHT: 69 IN | WEIGHT: 144.38 LBS

## 2025-05-16 DIAGNOSIS — Z95.5 S/P CORONARY ARTERY STENT PLACEMENT: ICD-10-CM

## 2025-05-16 DIAGNOSIS — I48.19 PERSISTENT ATRIAL FIBRILLATION: ICD-10-CM

## 2025-05-16 LAB
AORTIC ROOT ANNULUS: 3.9 CM
AORTIC VALVE CUSP SEPERATION: 1.8 CM
APICAL FOUR CHAMBER EJECTION FRACTION: 53 %
APICAL TWO CHAMBER EJECTION FRACTION: 50 %
AV INDEX (PROSTH): 0.84
AV MEAN GRADIENT: 2 MMHG
AV PEAK GRADIENT: 3 MMHG
AV REGURGITATION PRESSURE HALF TIME: 370 MS
AV VALVE AREA BY VELOCITY RATIO: 3.7 CM²
AV VALVE AREA: 3.5 CM²
AV VELOCITY RATIO: 0.89
BSA FOR ECHO PROCEDURE: 1.79 M2
CV ECHO LV RWT: 0.43 CM
DOP CALC AO PEAK VEL: 0.9 M/S
DOP CALC AO VTI: 14.6 CM
DOP CALC LVOT AREA: 4.2 CM2
DOP CALC LVOT DIAMETER: 2.3 CM
DOP CALC LVOT PEAK VEL: 0.8 M/S
DOP CALC LVOT STROKE VOLUME: 50.7 CM3
DOP CALC MV VTI: 14.3 CM
DOP CALCLVOT PEAK VEL VTI: 12.2 CM
E WAVE DECELERATION TIME: 94 MSEC
E/E' RATIO: 9 M/S
ECHO LV POSTERIOR WALL: 1.1 CM (ref 0.6–1.1)
FRACTIONAL SHORTENING: 25.5 % (ref 28–44)
INTERVENTRICULAR SEPTUM: 1.1 CM (ref 0.6–1.1)
IVC DIAMETER: 1.3 CM
IVRT: 60 MSEC
LEFT ATRIUM AREA SYSTOLIC (APICAL 2 CHAMBER): 26.2 CM2
LEFT ATRIUM AREA SYSTOLIC (APICAL 4 CHAMBER): 26.9 CM2
LEFT ATRIUM SIZE: 4 CM
LEFT ATRIUM VOLUME INDEX MOD: 48 ML/M2
LEFT ATRIUM VOLUME MOD: 87 ML
LEFT INTERNAL DIMENSION IN SYSTOLE: 3.8 CM (ref 2.1–4)
LEFT VENTRICLE DIASTOLIC VOLUME INDEX: 68.89 ML/M2
LEFT VENTRICLE DIASTOLIC VOLUME: 124 ML
LEFT VENTRICLE END DIASTOLIC VOLUME APICAL 2 CHAMBER: 72.3 ML
LEFT VENTRICLE END DIASTOLIC VOLUME APICAL 4 CHAMBER: 73.4 ML
LEFT VENTRICLE END SYSTOLIC VOLUME APICAL 2 CHAMBER: 86.6 ML
LEFT VENTRICLE END SYSTOLIC VOLUME APICAL 4 CHAMBER: 80.7 ML
LEFT VENTRICLE MASS INDEX: 118.8 G/M2
LEFT VENTRICLE SYSTOLIC VOLUME INDEX: 34.4 ML/M2
LEFT VENTRICLE SYSTOLIC VOLUME: 62 ML
LEFT VENTRICULAR INTERNAL DIMENSION IN DIASTOLE: 5.1 CM (ref 3.5–6)
LEFT VENTRICULAR MASS: 213.9 G
LV LATERAL E/E' RATIO: 8.1 M/S
LV SEPTAL E/E' RATIO: 11.1 M/S
LVED V (TEICH): 124 ML
LVES V (TEICH): 62 ML
LVOT MG: 1 MMHG
LVOT MV: 0.52 CM/S
MV MEAN GRADIENT: 3 MMHG
MV PEAK E VEL: 0.89 M/S
MV PEAK GRADIENT: 5 MMHG
MV VALVE AREA BY CONTINUITY EQUATION: 3.54 CM2
OHS CV RV/LV RATIO: 0.43 CM
OHS LV EJECTION FRACTION SIMPSONS BIPLANE MOD: 53 %
PISA AR MAX VEL: 2.12 M/S
PISA MRMAX VEL: 4.74 M/S
PISA TR MAX VEL: 2.8 M/S
PV MV: 0.52 M/S
PV PEAK GRADIENT: 2 MMHG
PV PEAK VELOCITY: 0.76 M/S
RIGHT VENTRICLE DIASTOLIC BASEL DIMENSION: 2.2 CM
RIGHT VENTRICULAR END-DIASTOLIC DIMENSION: 2.2 CM
RV TISSUE DOPPLER FREE WALL SYSTOLIC VELOCITY 1 (APICAL 4 CHAMBER VIEW): 12.7 CM/S
TDI LATERAL: 0.11 M/S
TDI SEPTAL: 0.08 M/S
TDI: 0.1 M/S
TR MAX PG: 30 MMHG
TRICUSPID ANNULAR PLANE SYSTOLIC EXCURSION: 1.3 CM
Z-SCORE OF LEFT VENTRICULAR DIMENSION IN END DIASTOLE: 0.24
Z-SCORE OF LEFT VENTRICULAR DIMENSION IN END SYSTOLE: 1.67

## 2025-05-16 PROCEDURE — 93306 TTE W/DOPPLER COMPLETE: CPT

## 2025-05-16 PROCEDURE — 93306 TTE W/DOPPLER COMPLETE: CPT | Mod: 26,,, | Performed by: INTERNAL MEDICINE

## 2025-05-20 LAB
AORTIC ROOT ANNULUS: 3.9 CM
AORTIC VALVE CUSP SEPERATION: 1.8 CM
APICAL FOUR CHAMBER EJECTION FRACTION: 53 %
APICAL TWO CHAMBER EJECTION FRACTION: 50 %
AV INDEX (PROSTH): 0.84
AV MEAN GRADIENT: 2 MMHG
AV PEAK GRADIENT: 3 MMHG
AV REGURGITATION PRESSURE HALF TIME: 370 MS
AV VALVE AREA BY VELOCITY RATIO: 3.7 CM²
AV VALVE AREA: 3.5 CM²
AV VELOCITY RATIO: 0.89
BSA FOR ECHO PROCEDURE: 1.79 M2
CV ECHO LV RWT: 0.43 CM
DOP CALC AO PEAK VEL: 0.9 M/S
DOP CALC AO VTI: 14.6 CM
DOP CALC LVOT AREA: 4.2 CM2
DOP CALC LVOT DIAMETER: 2.3 CM
DOP CALC LVOT PEAK VEL: 0.8 M/S
DOP CALC LVOT STROKE VOLUME: 50.7 CM3
DOP CALC MV VTI: 14.3 CM
DOP CALCLVOT PEAK VEL VTI: 12.2 CM
E WAVE DECELERATION TIME: 94 MSEC
E/E' RATIO: 9 M/S
ECHO LV POSTERIOR WALL: 1.1 CM (ref 0.6–1.1)
FRACTIONAL SHORTENING: 25.5 % (ref 28–44)
INTERVENTRICULAR SEPTUM: 1.1 CM (ref 0.6–1.1)
IVC DIAMETER: 1.3 CM
IVRT: 60 MSEC
LEFT ATRIUM AREA SYSTOLIC (APICAL 2 CHAMBER): 26.2 CM2
LEFT ATRIUM AREA SYSTOLIC (APICAL 4 CHAMBER): 26.9 CM2
LEFT ATRIUM SIZE: 4 CM
LEFT ATRIUM VOLUME INDEX MOD: 48 ML/M2
LEFT ATRIUM VOLUME MOD: 87 ML
LEFT INTERNAL DIMENSION IN SYSTOLE: 3.8 CM (ref 2.1–4)
LEFT VENTRICLE DIASTOLIC VOLUME INDEX: 68.89 ML/M2
LEFT VENTRICLE DIASTOLIC VOLUME: 124 ML
LEFT VENTRICLE END DIASTOLIC VOLUME APICAL 2 CHAMBER: 72.3 ML
LEFT VENTRICLE END DIASTOLIC VOLUME APICAL 4 CHAMBER: 73.4 ML
LEFT VENTRICLE END SYSTOLIC VOLUME APICAL 2 CHAMBER: 86.6 ML
LEFT VENTRICLE END SYSTOLIC VOLUME APICAL 4 CHAMBER: 80.7 ML
LEFT VENTRICLE MASS INDEX: 118.8 G/M2
LEFT VENTRICLE SYSTOLIC VOLUME INDEX: 34.4 ML/M2
LEFT VENTRICLE SYSTOLIC VOLUME: 62 ML
LEFT VENTRICULAR INTERNAL DIMENSION IN DIASTOLE: 5.1 CM (ref 3.5–6)
LEFT VENTRICULAR MASS: 213.9 G
LV LATERAL E/E' RATIO: 8.1 M/S
LV SEPTAL E/E' RATIO: 11.1 M/S
LVED V (TEICH): 124 ML
LVES V (TEICH): 62 ML
LVOT MG: 1 MMHG
LVOT MV: 0.52 CM/S
MV MEAN GRADIENT: 3 MMHG
MV PEAK E VEL: 0.89 M/S
MV PEAK GRADIENT: 5 MMHG
MV VALVE AREA BY CONTINUITY EQUATION: 3.54 CM2
OHS CV RV/LV RATIO: 0.43 CM
OHS LV EJECTION FRACTION SIMPSONS BIPLANE MOD: 53 %
PISA AR MAX VEL: 2.12 M/S
PISA MRMAX VEL: 4.74 M/S
PISA TR MAX VEL: 2.8 M/S
PV MV: 0.52 M/S
PV PEAK GRADIENT: 2 MMHG
PV PEAK VELOCITY: 0.76 M/S
RA PRESSURE ESTIMATED: 3 MMHG
RIGHT VENTRICLE DIASTOLIC BASEL DIMENSION: 2.2 CM
RIGHT VENTRICULAR END-DIASTOLIC DIMENSION: 2.2 CM
RV TB RVSP: 6 MMHG
RV TISSUE DOPPLER FREE WALL SYSTOLIC VELOCITY 1 (APICAL 4 CHAMBER VIEW): 12.7 CM/S
TDI LATERAL: 0.11 M/S
TDI SEPTAL: 0.08 M/S
TDI: 0.1 M/S
TR MAX PG: 30 MMHG
TRICUSPID ANNULAR PLANE SYSTOLIC EXCURSION: 1.3 CM
TV REST PULMONARY ARTERY PRESSURE: 34 MMHG
Z-SCORE OF LEFT VENTRICULAR DIMENSION IN END DIASTOLE: 0.24
Z-SCORE OF LEFT VENTRICULAR DIMENSION IN END SYSTOLE: 1.67

## 2025-05-21 ENCOUNTER — RESULTS FOLLOW-UP (OUTPATIENT)
Dept: ELECTROPHYSIOLOGY | Facility: CLINIC | Age: 75
End: 2025-05-21
Payer: MEDICARE

## 2025-06-19 ENCOUNTER — LAB VISIT (OUTPATIENT)
Dept: LAB | Facility: HOSPITAL | Age: 75
End: 2025-06-19
Attending: DERMATOLOGY
Payer: MEDICARE

## 2025-06-19 ENCOUNTER — OFFICE VISIT (OUTPATIENT)
Dept: DERMATOLOGY | Facility: CLINIC | Age: 75
End: 2025-06-19
Payer: MEDICARE

## 2025-06-19 VITALS — HEIGHT: 69 IN | BODY MASS INDEX: 21.38 KG/M2 | WEIGHT: 144.38 LBS

## 2025-06-19 DIAGNOSIS — L93.0 DISCOID LUPUS: ICD-10-CM

## 2025-06-19 DIAGNOSIS — L93.0 DISCOID LUPUS: Primary | ICD-10-CM

## 2025-06-19 PROCEDURE — 86235 NUCLEAR ANTIGEN ANTIBODY: CPT | Mod: 59

## 2025-06-19 PROCEDURE — 86225 DNA ANTIBODY NATIVE: CPT | Mod: 59

## 2025-06-19 PROCEDURE — 99214 OFFICE O/P EST MOD 30 MIN: CPT | Mod: S$GLB,,, | Performed by: DERMATOLOGY

## 2025-06-19 PROCEDURE — 36415 COLL VENOUS BLD VENIPUNCTURE: CPT

## 2025-06-19 PROCEDURE — 86038 ANTINUCLEAR ANTIBODIES: CPT

## 2025-06-19 RX ORDER — TRIAMCINOLONE ACETONIDE 1 MG/G
CREAM TOPICAL
Qty: 454 G | Refills: 3 | Status: SHIPPED | OUTPATIENT
Start: 2025-06-19

## 2025-06-19 RX ORDER — HYDROXYCHLOROQUINE SULFATE 100 MG/1
TABLET ORAL
Qty: 90 TABLET | Refills: 5 | Status: SHIPPED | OUTPATIENT
Start: 2025-06-19

## 2025-06-19 NOTE — Clinical Note
Mendy Posada and Concepcion, I am worried about Mr Montez. His skin has continued to deteriorate, he has consistently refused systemic treatment, his JULIANA is not positive with high titer and we discussed plaquenil and rheum referral at last visit, and he seemed agreeable. He was just very unpleasant on the phone with my MA who called to inquire about progress and tell him to expect a call from [facility] rheum dept. He used very harsh language. I wanted to share this with you because I do not think that he will want to see me again in clinic. Maybe he will listen to you? Thank you Svetlana

## 2025-06-19 NOTE — PROGRESS NOTES
"  Subjective:      Patient ID:  Sid Cuello Jr. is a 74 y.o. male who presents for   Chief Complaint   Patient presents with    Follow-up     Discoid Lupus      LOV 12/18/24 - discoid lupus, [place]     02/2021 Skin, right upper arm, punch biopsy:   -DISCOID LUPUS ERYTHEMATOSUS      2016  [facility] DIAGNOSIS:  LEFT CHEEK, PUNCH:  Perivascular and periadnexal dermatitis with increased interstitial mucin and focal  interface damage.     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   Sun makes it worse, even when wearing a hat  Diprolene helps some with itch and lessening flare  Tacrolimus burned too much, did not use    Taking Gabapentin as needed "when he can't sleep."     Took plaquenil for 3 months "years ago" and found that "it didn't work"  Has consistently refused to consider getting back on it  Does not want to go to [facility]  "I don't have a day that I am not miserable"  Under care of Dr Javier     Afib on eliquis and digoxin     Derm Hx:  Denies Phx of NMSC  Denies Fhx of MM    Current Outpatient Medications:   ·  atorvastatin (LIPITOR) 10 MG tablet, Take 10 mg by mouth once daily., Disp: , Rfl:   ·  betamethasone dipropionate (DIPROLENE) 0.05 % lotion, APPLY THIN LAYER TOPICALLY TO THE SCALP EVERY NIGHT AT BEDTIME AS NEEDED FOR ITCHING, Disp: 60 mL, Rfl: 2  ·  clotrimazole-betamethasone " 1-0.05% (LOTRISONE) cream, Apply topically 2 (two) times daily. For 7 days, Disp: 15 g, Rfl: 1  ·  doxycycline (VIBRA-TABS) 100 MG tablet, Take 1 tablet (100 mg total) by mouth 2 (two) times daily., Disp: 20 tablet, Rfl: 0  ·  ELIQUIS 5 mg Tab, Take 5 mg by mouth 2 (two) times daily., Disp: , Rfl:   ·  gabapentin (NEURONTIN) 300 MG capsule, Take 1 capsule (300 mg total) by mouth every evening., Disp: 90 capsule, Rfl: 0  ·  iron-vitamin C 100-250 mg, ICAR-C, 100-250 mg Tab, TAKE 1 TABLET BY MOUTH ONCE DAILY, Disp: 30 tablet, Rfl: 6  ·  metoprolol succinate (TOPROL-XL) 50 MG 24 hr tablet, Take 1 tablet (50 mg total) by mouth 2 (two) times daily., Disp: 180 tablet, Rfl: 3  ·  mupirocin (BACTROBAN) 2 % ointment, Apply topically 3 (three) times daily., Disp: 22 g, Rfl: 0            Review of Systems   Constitutional:  Positive for fatigue. Negative for fever, chills, weight loss, weight gain, night sweats and malaise.   HENT:  Negative for mouth sores.    Respiratory:  Negative for cough and shortness of breath.    Genitourinary:  Negative for frequency.   Musculoskeletal:  Positive for arthralgias (some back pain, somewhat acute, hands hips knees with no issue). Negative for myalgias, joint swelling and muscle weakness.   Skin:  Positive for itching, activity-related sunscreen use and wears hat. Negative for rash, sun sensitivity and daily sunscreen use.   Neurological:  Negative for seizures.   Hematologic/Lymphatic: Bruises/bleeds easily.       Objective:   Physical Exam   Constitutional: He appears well-developed and well-nourished. No distress.   Neurological: He is alert and oriented to person, place, and time. He is not disoriented.   Psychiatric: He has a normal mood and affect.   Skin:   Areas Examined (abnormalities noted in diagram):   Scalp / Hair Palpated and Inspected  Head / Face Inspection Performed  Neck Inspection Performed  Chest / Axilla Inspection Performed  Abdomen Inspection Performed  Back  Inspection Performed  RUE Inspected  LUE Inspection Performed                 Diagram Legend     Erythematous scaling macule/papule c/w actinic keratosis       Vascular papule c/w angioma      Pigmented verrucoid papule/plaque c/w seborrheic keratosis      Yellow umbilicated papule c/w sebaceous hyperplasia      Irregularly shaped tan macule c/w lentigo     1-2 mm smooth white papules consistent with Milia      Movable subcutaneous cyst with punctum c/w epidermal inclusion cyst      Subcutaneous movable cyst c/w pilar cyst      Firm pink to brown papule c/w dermatofibroma      Pedunculated fleshy papule(s) c/w skin tag(s)      Evenly pigmented macule c/w junctional nevus     Mildly variegated pigmented, slightly irregular-bordered macule c/w mildly atypical nevus      Flesh colored to evenly pigmented papule c/w intradermal nevus       Pink pearly papule/plaque c/w basal cell carcinoma      Erythematous hyperkeratotic cursted plaque c/w SCC      Surgical scar with no sign of skin cancer recurrence      Open and closed comedones      Inflammatory papules and pustules      Verrucoid papule consistent consistent with wart     Erythematous eczematous patches and plaques     Dystrophic onycholytic nail with subungual debris c/w onychomycosis     Umbilicated papule    Erythematous-base heme-crusted tan verrucoid plaque consistent with inflamed seborrheic keratosis     Erythematous Silvery Scaling Plaque c/w Psoriasis     See annotation     Latest Reference Range & Units 04/23/25 07:13   WBC 3.90 - 12.70 K/uL 3.80 (L)   RBC 4.60 - 6.20 M/uL 3.30 (L)   Hemoglobin 14.0 - 18.0 gm/dL 11.3 (L)   Hematocrit 40.0 - 54.0 % 36.2 (L)   MCV 82 - 98 fL 110 (H)   MCH 27.0 - 31.0 pg 34.2 (H)   MCHC 32.0 - 36.0 g/dL 31.2 (L)   RDW 11.5 - 14.5 % 14.7 (H)   Platelet Count 150 - 450 K/uL 201   MPV 9.2 - 12.9 fL 10.6   Neut % 38 - 73 % 46.6   LYMPH % 18 - 48 % 41.3   Mono % 4 - 15 % 8.9   Eos % 0 - 8 % 1.8   Basophil % <=1.9 % 1.1   Immature  "Granulocytes 0.0 - 0.5 % 0.3   Neut # 1.8 - 7.7 K/uL 1.8   Lymph # 1 - 4.8 K/uL 1.57   Mono # 0.3 - 1 K/uL 0.34   Eos # <=0.5 K/uL 0.07   Baso # <=0.2 K/uL 0.04   Immature Grans (Abs) 0.00 - 0.04 K/uL 0.01   nRBC <=0 /100 WBC 0   Iron 45 - 160 ug/dL 70   TIBC 250 - 450 ug/dL 275   Transferrin 200 - 375 mg/dL 186 (L)   Ferritin 20.0 - 300.0 ng/mL 282.0   Folate 4.0 - 24.0 ng/mL 6.4   Vitamin B12 210 - 950 pg/mL 534   Iron Saturation 20 - 50 % 25   Sodium 136 - 145 mmol/L 134 (L)   Potassium 3.5 - 5.1 mmol/L 4.1   Chloride 95 - 110 mmol/L 101   CO2 23 - 29 mmol/L 26   Anion Gap 8 - 16 mmol/L 7 (L)   BUN 8 - 23 mg/dL 11   Creatinine 0.5 - 1.4 mg/dL 0.9   eGFR >60 mL/min/1.73/m2 >60   Glucose 70 - 110 mg/dL 103   Calcium 8.7 - 10.5 mg/dL 8.2 (L)   ALP 40 - 150 unit/L 137   PROTEIN TOTAL 6.0 - 8.4 gm/dL 8.1   Albumin 3.5 - 5.2 g/dL 2.9 (L)   BILIRUBIN TOTAL 0.1 - 1.0 mg/dL 0.6   AST 11 - 45 unit/L 48 (H)   ALT 10 - 44 unit/L 17   Kappa/Lambda FLC Ratio 0.2600 - 1.65  1.53   Kappa Free Light Chain 0.3300 - 1.94 mg/dL 11.3 (H)   LAMBDA FREE LIGHT CHAIN 0.5700 - 2.63 mg/dL 7.40 (H)   Flag, M-Protein Isotype Negative  Negative   (L): Data is abnormally low  (H): Data is abnormally high      Assessment / Plan:        Discoid lupus  -     JULIANA Screen w/Reflex; Future; Expected date: 06/19/2025  -     triamcinolone acetonide 0.1% (KENALOG) 0.1 % cream; AAA bid PRN rash  Dispense: 454 g; Refill: 3  -     hydroxychloroquine (PLAQUENIL) 100 mg tablet; Take 1 tab PO QAM and 2 tabs PO QHS  Dispense: 90 tablet; Refill: 5    Skin has progressively worsened over the past 10 years.  Patient has consistently refused to consider systemic meds (plaquenil, MTX or cellcept). Now willing to try plaquenil as he is "miserably itchy"  JULIANA neg 4/2023, now positive with 1:1280 and RADHA negative other than Sm/RNP  Messaged Dr Rincon for input and to schedule rheum evaluation.      Addendum 6/25/2025  Patient used very inflammatory verbiage when MA " "called him today to remind him to expect a call from Rheum and to determine whether he had started plaquenil. He replied that he would not go to see a rheumatologist, and that there was nothing wrong with him and that I was "f-ing crazy" and if MA works for me, she should not. I fear that our patient-doctor relationship is compromised.  Message PCP Dr Posada and Dr Javier with whom he has follow ups in the coming months.             No follow-ups on file.  "

## 2025-06-20 LAB
ANA (OHS): POSITIVE
ANA PATTERN 1 (OHS): ABNORMAL
ANA TITER 1 (OHS): ABNORMAL

## 2025-06-23 LAB
DSDNA ANTIBODY (OHS): NORMAL
DSDNA ANTIBODY TITER (OHS): NORMAL
SM  ANTIBODY (OHS): 2.54 RATIO
SM INTERPRETATION (OHS): POSITIVE
SM/RNP ANTIBODY (OHS): 7.66 RATIO
SM/RNP INTERPRETATION (OHS): POSITIVE
SSA  ANTIBODY (OHS): 0.06 RATIO (ref 0–0.99)
SSA INTERPRETATION (OHS): NEGATIVE
SSB  ANTIBODY (OHS): 0.06 RATIO
SSB INTERPRETATION (OHS): NEGATIVE

## 2025-06-25 ENCOUNTER — RESULTS FOLLOW-UP (OUTPATIENT)
Dept: DERMATOLOGY | Facility: CLINIC | Age: 75
End: 2025-06-25
Payer: MEDICARE

## 2025-06-25 DIAGNOSIS — L93.0 DISCOID LUPUS: Primary | ICD-10-CM

## 2025-06-25 DIAGNOSIS — R76.8 ANA POSITIVE: ICD-10-CM

## 2025-06-25 DIAGNOSIS — R76.8 POSITIVE SM/RNP ANTIBODY: ICD-10-CM

## 2025-07-17 ENCOUNTER — LAB VISIT (OUTPATIENT)
Dept: LAB | Facility: HOSPITAL | Age: 75
End: 2025-07-17
Attending: INTERNAL MEDICINE
Payer: MEDICARE

## 2025-07-17 DIAGNOSIS — D63.8 ANEMIA, CHRONIC DISEASE: ICD-10-CM

## 2025-07-17 DIAGNOSIS — D53.9 NUTRITIONAL ANEMIA, UNSPECIFIED: ICD-10-CM

## 2025-07-17 DIAGNOSIS — D64.9 NORMOCHROMIC NORMOCYTIC ANEMIA: ICD-10-CM

## 2025-07-17 DIAGNOSIS — R71.8 ELEVATED MCV: ICD-10-CM

## 2025-07-17 DIAGNOSIS — R77.8 ABNORMAL SPEP: ICD-10-CM

## 2025-07-17 DIAGNOSIS — D50.9 IRON DEFICIENCY ANEMIA, UNSPECIFIED IRON DEFICIENCY ANEMIA TYPE: ICD-10-CM

## 2025-07-17 DIAGNOSIS — D64.9 ANEMIA, UNSPECIFIED TYPE: ICD-10-CM

## 2025-07-17 LAB
ABSOLUTE EOSINOPHIL (SMH): 0.05 K/UL
ABSOLUTE MONOCYTE (SMH): 0.36 K/UL (ref 0.3–1)
ABSOLUTE NEUTROPHIL COUNT (SMH): 1.5 K/UL (ref 1.8–7.7)
ALBUMIN SERPL-MCNC: 2.5 G/DL (ref 3.5–5.2)
ALP SERPL-CCNC: 124 UNIT/L (ref 40–150)
ALT SERPL-CCNC: 11 UNIT/L (ref 10–44)
ANION GAP (SMH): 9 MMOL/L (ref 8–16)
AST SERPL-CCNC: 33 UNIT/L (ref 11–45)
BASOPHILS # BLD AUTO: 0.04 K/UL
BASOPHILS NFR BLD AUTO: 1.2 %
BILIRUB SERPL-MCNC: 0.7 MG/DL (ref 0.1–1)
BUN SERPL-MCNC: 9 MG/DL (ref 8–23)
CALCIUM SERPL-MCNC: 7.9 MG/DL (ref 8.7–10.5)
CHLORIDE SERPL-SCNC: 99 MMOL/L (ref 95–110)
CO2 SERPL-SCNC: 26 MMOL/L (ref 23–29)
CREAT SERPL-MCNC: 1 MG/DL (ref 0.5–1.4)
ERYTHROCYTE [DISTWIDTH] IN BLOOD BY AUTOMATED COUNT: 14.2 % (ref 11.5–14.5)
FERRITIN SERPL-MCNC: 286.6 NG/ML (ref 20–300)
GFR SERPLBLD CREATININE-BSD FMLA CKD-EPI: >60 ML/MIN/1.73/M2
GLUCOSE SERPL-MCNC: 82 MG/DL (ref 70–110)
HCT VFR BLD AUTO: 34.8 % (ref 40–54)
HGB BLD-MCNC: 11.1 GM/DL (ref 14–18)
IMM GRANULOCYTES # BLD AUTO: 0 K/UL (ref 0–0.04)
IMM GRANULOCYTES NFR BLD AUTO: 0 % (ref 0–0.5)
IRON SATN MFR SERPL: 25 % (ref 20–50)
IRON SERPL-MCNC: 53 UG/DL (ref 45–160)
LYMPHOCYTES # BLD AUTO: 1.53 K/UL (ref 1–4.8)
MCH RBC QN AUTO: 33.9 PG (ref 27–31)
MCHC RBC AUTO-ENTMCNC: 31.9 G/DL (ref 32–36)
MCV RBC AUTO: 106 FL (ref 82–98)
NUCLEATED RBC (/100WBC) (SMH): 0 /100 WBC
PLATELET # BLD AUTO: 188 K/UL (ref 150–450)
PMV BLD AUTO: 11.1 FL (ref 9.2–12.9)
POTASSIUM SERPL-SCNC: 4 MMOL/L (ref 3.5–5.1)
PROT SERPL-MCNC: 8.2 GM/DL (ref 6–8.4)
RBC # BLD AUTO: 3.27 M/UL (ref 4.6–6.2)
RELATIVE EOSINOPHIL (SMH): 1.4 % (ref 0–8)
RELATIVE LYMPHOCYTE (SMH): 44.1 % (ref 18–48)
RELATIVE MONOCYTE (SMH): 10.4 % (ref 4–15)
RELATIVE NEUTROPHIL (SMH): 42.9 % (ref 38–73)
SODIUM SERPL-SCNC: 134 MMOL/L (ref 136–145)
TIBC SERPL-MCNC: 212 UG/DL (ref 250–450)
TRANSFERRIN SERPL-MCNC: 143 MG/DL (ref 200–375)
WBC # BLD AUTO: 3.47 K/UL (ref 3.9–12.7)

## 2025-07-17 PROCEDURE — 85025 COMPLETE CBC W/AUTO DIFF WBC: CPT

## 2025-07-17 PROCEDURE — 84466 ASSAY OF TRANSFERRIN: CPT

## 2025-07-17 PROCEDURE — 82728 ASSAY OF FERRITIN: CPT

## 2025-07-17 PROCEDURE — 84155 ASSAY OF PROTEIN SERUM: CPT

## 2025-07-17 PROCEDURE — 36415 COLL VENOUS BLD VENIPUNCTURE: CPT

## 2025-07-21 ENCOUNTER — OFFICE VISIT (OUTPATIENT)
Facility: CLINIC | Age: 75
End: 2025-07-21
Payer: MEDICARE

## 2025-07-21 VITALS
RESPIRATION RATE: 16 BRPM | DIASTOLIC BLOOD PRESSURE: 89 MMHG | SYSTOLIC BLOOD PRESSURE: 129 MMHG | HEART RATE: 124 BPM | TEMPERATURE: 98 F | HEIGHT: 69 IN | BODY MASS INDEX: 21.77 KG/M2 | WEIGHT: 147 LBS | OXYGEN SATURATION: 99 %

## 2025-07-21 DIAGNOSIS — M32.9 SYSTEMIC LUPUS ERYTHEMATOSUS, UNSPECIFIED SLE TYPE, UNSPECIFIED ORGAN INVOLVEMENT STATUS: ICD-10-CM

## 2025-07-21 DIAGNOSIS — R77.8 ABNORMAL SPEP: ICD-10-CM

## 2025-07-21 DIAGNOSIS — D64.9 NORMOCHROMIC NORMOCYTIC ANEMIA: ICD-10-CM

## 2025-07-21 DIAGNOSIS — D64.9 ANEMIA, UNSPECIFIED TYPE: Chronic | ICD-10-CM

## 2025-07-21 DIAGNOSIS — D50.9 IRON DEFICIENCY ANEMIA, UNSPECIFIED IRON DEFICIENCY ANEMIA TYPE: ICD-10-CM

## 2025-07-21 DIAGNOSIS — D63.8 ANEMIA, CHRONIC DISEASE: Primary | ICD-10-CM

## 2025-07-21 DIAGNOSIS — Z72.0 TOBACCO ABUSE: ICD-10-CM

## 2025-07-21 DIAGNOSIS — R71.8 ELEVATED MCV: ICD-10-CM

## 2025-07-21 DIAGNOSIS — D53.9 NUTRITIONAL ANEMIA, UNSPECIFIED: ICD-10-CM

## 2025-07-21 PROCEDURE — 99999 PR PBB SHADOW E&M-EST. PATIENT-LVL IV: CPT | Mod: PBBFAC,,, | Performed by: INTERNAL MEDICINE

## 2025-07-21 PROCEDURE — 99214 OFFICE O/P EST MOD 30 MIN: CPT | Mod: PBBFAC,PN | Performed by: INTERNAL MEDICINE

## 2025-07-21 NOTE — PROGRESS NOTES
[facility] Hematology/Oncology  PROGRESS NOTE -   Follow-up Visit      Subjective:       Patient ID:   NAME: Sid Cuello Jr. : 1950     74 y.o. male    Referring Doc: Светлана  Other Physicians: Rose Mary Posada           Chief Complaint: abn SPEP f/u       History of Present Illness:     Patient returns today for a regularly scheduled follow-up visit.  The patient is here today to go over the results of the recently ordered labs, tests and studies.  He is here by himself today.     He saw Dr Overton with EP and had echo in may 2025. He saw Dr Posada with primary care on 2025. He saw Dr cano in 2025.     He has chronic itching and has discoid lupus. He saw Dr Anglin with dermatology on 2025.         He has chronic neuropathy issues in feet; chronic itching issues; He continues to have residual fatigue and general lack of energy      Breathing ok. No CP, SOB, HA's or N/V.             ROS:   GEN: normal without any fever, night sweats or weight loss; chronic fatigue; chronic neuropathy in feet; chronic itching   HEENT: normal with no HA's, sore throat, stiff neck, changes in vision  CV: normal with no CP, SOB, PND, TRAN or orthopnea  PULM: normal with no SOB, cough, hemoptysis, sputum or pleuritic pain  GI: normal with no abdominal pain, nausea, vomiting, constipation, diarrhea, melanotic stools, BRBPR, or hematemesis  : normal with no hematuria, dysuria  BREAST: normal with no mass, discharge, pain  SKIN: chronic itching/rash on face with discoid lupus    Pain Scale: 0    Allergies:  Review of patient's allergies indicates:  No Known Allergies    Medications:    Current Outpatient Medications:     atorvastatin (LIPITOR) 10 MG tablet, Take 10 mg by mouth once daily., Disp: , Rfl:     betamethasone dipropionate (DIPROLENE) 0.05 % lotion, APPLY THIN LAYER TOPICALLY TO THE SCALP EVERY NIGHT AT BEDTIME AS NEEDED FOR ITCHING, Disp: 60 mL, Rfl: 2    clotrimazole-betamethasone 1-0.05%  (LOTRISONE) cream, Apply topically 2 (two) times daily. For 7 days, Disp: 15 g, Rfl: 1    ELIQUIS 5 mg Tab, Take 5 mg by mouth 2 (two) times daily., Disp: , Rfl:     gabapentin (NEURONTIN) 300 MG capsule, Take 1 capsule (300 mg total) by mouth every evening., Disp: 90 capsule, Rfl: 0    hydroxychloroquine (PLAQUENIL) 100 mg tablet, Take 1 tab PO QAM and 2 tabs PO QHS, Disp: 90 tablet, Rfl: 5    iron-vitamin C 100-250 mg, ICAR-C, 100-250 mg Tab, TAKE 1 TABLET BY MOUTH ONCE DAILY, Disp: 30 tablet, Rfl: 6    metoprolol succinate (TOPROL-XL) 50 MG 24 hr tablet, Take 1 tablet (50 mg total) by mouth 2 (two) times daily., Disp: 180 tablet, Rfl: 3    triamcinolone acetonide 0.1% (KENALOG) 0.1 % cream, AAA bid PRN rash, Disp: 454 g, Rfl: 3    mupirocin (BACTROBAN) 2 % ointment, Apply topically 3 (three) times daily., Disp: 22 g, Rfl: 0  No current facility-administered medications for this visit.    Facility-Administered Medications Ordered in Other Visits:     diphenhydrAMINE injection 25 mg, 25 mg, Intravenous, Q6H PRN, Dhruv Gutierrez MD    PMHx/PSHx Updates:  See patient's last visit with me on 1/28/2025  See H&P on 4/30/2021        Pathology:   Cancer Staging   No matching staging information was found for the patient.      Bone marrow biopsy 1/10/2022:    BONE MARROW, RIGHT ILIAC CREST, ASPIRATE, CLOT SECTION, AND CORE BIOPSY:   --CELLULAR MARROW (APPROXIMATELY 25% TO 35%) WITH TRILINEAGE    HEMATOPOIETIC ELEMENTS AND OCCASIONAL   PLASMA CELLS; FURTHER CHARACTERIZATION PENDING IMMUNOHISTOCHEMISTRY;    FINAL DIAGNOSIS TO FOLLOW.   --PERIPHERAL BLOOD WITH NORMAL THROMBOCYTE COUNT (164,000/MICROLITER);    ANEMIA (HEMOGLOBIN 12.5 GRAM/     DECILITER); AND LEUKOPENIA (3,390/MICROLITER).     Plasma cells (bright CD38+/+) comprise approximately 2% of the    total sample and demonstrate polyclonal expression of immunoglobulin    light chains. The plasma cell population co-expresses CD19.      INTERPRETATION:    "  Immunophenotyping fails to identify any abnormal cell populations. A    small population (approximately 2%) of polyclonal plasma cells is    present.      Objective:     Vitals:  Blood pressure 129/89, pulse (!) 124, temperature 98 °F (36.7 °C), temperature source Temporal, resp. rate 16, height 5' 9" (1.753 m), weight 66.7 kg (147 lb), SpO2 99%.    Physical Examination:   GEN: no apparent distress, comfortable; AAOx3; thin  HEAD: atraumatic and normocephalic; alopecia stable due to the lupus  EYES: no pallor, no icterus, PERRLA  ENT: OMM, no pharyngeal erythema, external ears WNL; no nasal discharge; no thrush  NECK: no masses, thyroid normal, trachea midline, no LAD/LN's, supple  CV: RRR with no murmur; normal pulse; normal S1 and S2; no pedal edema  CHEST: Normal respiratory effort; CTAB; normal breath sounds; no wheeze or crackles  ABDOM: nontender and nondistended; soft; normal bowel sounds; no rebound/guarding  MUSC/Skeletal: ROM normal; no crepitus; joints normal; no deformities or arthropathy  EXTREM: no clubbing, cyanosis, inflammation or swelling  SKIN: no rashes, lesions, ulcers, petechiae or subcutaneous nodules; chronic age related skin changes; discoid lupus with recent flare on face  : no toussaint  NEURO: grossly intact; motor/sensory WNL; AAOx3; no tremors  PSYCH: normal mood, affect and behavior  LYMPH: normal cervical, supraclavicular, axillary and groin LN's        Labs:     Lab Results   Component Value Date    WBC 3.47 (L) 07/17/2025    HGB 11.1 (L) 07/17/2025    HCT 34.8 (L) 07/17/2025     (H) 07/17/2025     07/17/2025     CMP  Sodium   Date Value Ref Range Status   07/17/2025 134 (L) 136 - 145 mmol/L Final     Potassium   Date Value Ref Range Status   07/17/2025 4.0 3.5 - 5.1 mmol/L Final     Chloride   Date Value Ref Range Status   07/17/2025 99 95 - 110 mmol/L Final     CO2   Date Value Ref Range Status   07/17/2025 26 23 - 29 mmol/L Final     Glucose   Date Value Ref Range " Status   07/17/2025 82 70 - 110 mg/dL Final     BUN   Date Value Ref Range Status   07/17/2025 9 8 - 23 mg/dL Final     Creatinine   Date Value Ref Range Status   07/17/2025 1.0 0.5 - 1.4 mg/dL Final     Calcium   Date Value Ref Range Status   07/17/2025 7.9 (L) 8.7 - 10.5 mg/dL Final     Protein Total   Date Value Ref Range Status   07/17/2025 8.2 6.0 - 8.4 gm/dL Final     Albumin   Date Value Ref Range Status   07/17/2025 2.5 (L) 3.5 - 5.2 g/dL Final     Bilirubin Total   Date Value Ref Range Status   07/17/2025 0.7 0.1 - 1.0 mg/dL Final     Comment:     For infants and newborns, interpretation of results should be based   on gestational age, weight and in agreement with clinical   observations.    Premature Infant recommended reference ranges:   0-24 hours:  <8.0 mg/dL   24-48 hours: <12.0 mg/dL   3-5 days:    <15.0 mg/dL   6-29 days:   <15.0 mg/dL     ALP   Date Value Ref Range Status   07/17/2025 124 40 - 150 unit/L Final     AST   Date Value Ref Range Status   07/17/2025 33 11 - 45 unit/L Final     ALT   Date Value Ref Range Status   07/17/2025 11 10 - 44 unit/L Final     Anion Gap   Date Value Ref Range Status   07/17/2025 9 8 - 16 mmol/L Final     eGFR if    Date Value Ref Range Status   07/21/2022 >60.0 >60 mL/min/1.73 m^2 Final     eGFR if non    Date Value Ref Range Status   07/21/2022 >60.0 >60 mL/min/1.73 m^2 Final     Comment:     Calculation used to obtain the estimated glomerular filtration  rate (eGFR) is the CKD-EPI equation.         Kappa/Lambda FLC Ratio 0.26 - 1.65 1.17       Beta-2 Microglobulin 0.0 - 2.5 ug/mL 5.2        IgG 650 - 1600 mg/dL 3848 High   3756   Comment: IgG Cord Blood Reference Range: 650-1600 mg/dL.   IgA 40 - 350 mg/dL 848 High   783   Comment: IgA Cord Blood Reference Range: <5 mg/dL.   IgM 50 - 300 mg/dL 141  128   Comment: IgM Cord Blood Reference Range: <25 mg/dL.       Pathologist Interpretation UPE REVIEWED    Comment:    Electronically  reviewed and signed by:   Peri Narvaez MD   Signed on 01/27/23 at 10:46   No paraprotein bands identified.        Pathologist Interpretation SPE REVIEWED    Comment:    Electronically reviewed and signed by:   Peri Narvaez MD   Signed on 01/24/23 at 15:18   Increased total protein. Increased gamma globulin, polyclonal. No   paraprotein bands detected.        Lab Results   Component Value Date    IRON 53 07/17/2025    TRANSFERRIN 143 (L) 07/17/2025    TIBC 212 (L) 07/17/2025    LABIRON 25 07/17/2025    FESATURATED 35 01/17/2025      Lab Results   Component Value Date    FERRITIN 286.6 07/17/2025     Lab Results   Component Value Date    XDATWYKV42 534 04/23/2025     Lab Results   Component Value Date    FOLATE 6.4 04/23/2025     Kappa/Lambda FLC Ratio 0.2600 - 1.65 1.51      SPE Interp.  SEE BELOW       Comment: Small abnormality in gamma fraction.     Polyclonal hypergammaglobulinemia      M-Protein Isotype  No monoclonal protein detected     IgA 61 - 356 mg/dL 653 High      IgM 37 - 286 mg/dL 101      IgG 767 - 1590 mg/dL 3240      Beta-2 Microglobulin 1.21 - 2.70 mcg/mL 4.27        Radiology/Diagnostic Studies:    No results found.    I have reviewed all available lab results and radiology reports.    Assessment/Plan:   (1) 74 y.o. male  with diagnosis of abnormal SPEP who has been referred by Dr Sotomayor for evaluation by medical hematology/oncology.   -  He had an SPEP on 4/16/2021 with no evidence of any M-protein He sees Dr Anglin for discoid Lupus and I suspect that this is the cause of the protein issues seen on the labs.    5/27/2021:  - IgG is elevated but the immunofoxation studies in the serum and urine are both negative for any M-protein  - kappa/lambda ratio is also WNL  - I do not suspect multiple myeloma at this time  - I recommended referral to see Dr Blanco at [facility] (who is a paraproteinemia specialist) and also consideration for a bone marrow biopsy to further elucidate - however,  he is not inclined to doing either at this time  - will repeat protein studies every 6 months      8/26/2021:  - patient here for short-term f/u visit  - repeat protein studies are due in Nov 2021  -recommended referral to see Dr Blanco at [facility] (who is a paraproteinemia specialist) and also consideration for a bone marrow biopsy to further elucidate - however, he was not inclined to doing either previously and does not want to go to [place]  - I have already told the patient that this is the only way one can get a second opinion    1/3/2022:  - he is adamant that he does not want to go to [facility] or [facility]  - discussed again with the patient about getting a bone marrow biopsy and he is now willing to consider getting one done  - I explained to him that we are limited as to what I can do for him by what he will or will not do     2/1/2022:  - recent bone marrow biopsy on 1/10/2022 with only a 2% population of polyclonal plasma cells with on abnormal immunophenotypical population identified  - he declined referral to Dr Blanco at [facility]  - will proceed with observation and monitoring of his labs and protein studies for now    8/1/2022:  - discussed the results of the latest protein studies and they seem to be relatively stable; again there in no monoclonal component to his gammopathy  - he is adamant that he does not want to be evaluated by my proteinopathy specialist Dr Blanco at [facility]  - continue with observtaion    3/2/2023:  - his protein studies are relatively stable with no M-protein component; I do not suspect he has multiple myeloma  - 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  - he previously declined to go see Dr Blanco at [facility] who is a paraproteinemia/myeloma specialist  - discussed Dr Sotomayor's concerns with the patient and recommended consideration for CT Chest/Abdom/pelvis but patient is not inclined to having any scans done at this  time    10/31/2023:  - protein studies in Aug 2023 relatively stable with no Monoclonal or M-protein component  - K/L ratio WNL and Ig panel stable    1/28/2025:  - K/L ratio stable at 1.25  - beta2 microglob at 5.49 and a little higher than usual  - IgA 969, IgM 68, IgG 2810 - slight increase in IgA otherwise relatively stable  - SPEP with no M-protein    7/21/2025:  - K/L ratio at 1.53  - beta-2 microglob 6.0  - Ig A was 701; IgM 44 and IgG 2110  - no M-protein on SPEP    (3) Anemia - [facility] parameters - iron and iron sat were both low  - most likely a multifactorial process with underlying anemia of chronic diease and iron deficiency anemia    10/31/2023:  - he required blood transfusion  - latest labs from Sept 2023 with Dr Sotomayor hgb was 10  - his iron levels are low - he is not on any supplements  - he had EGD, colonoscopy and capsule endoscopy with Dr Gaona/René since last visit - only two benign colon polyps  - start ICAr-C po daily and monitor labs monthly, if unable to tolerate or ineffective, then will consider IV iron    1/30/2024:  - latest hgb at 12.3  - iron panel much improved  - he wants to cut back on the oral iron - ok to change to 3x/week    1/28/2025:  - latest hgb at 12.4  - iron panel adequate  - continued on oral iron    7/21/2025:  - latest iron panel adequate  - hgb at 11.1  - continued on oral iron     (2) CAD s/p stent - followed by Dr Sotomayor     (3) HTN and Hypercholeterolemia     (4) Discoid Lupus/SLE - followed by Dr Anglin     1/28/2025:  - he saw Dr Anglin with dermatology in Dec 2024 for his discoid lupus    7/21/2025:  - he saw Dr Anglni in June 2025  - will refer him to rheumatology  - he recently resumed taking plaquenil          (5) Testicular/Inguinal hernia - s/p surgery - followed by Dr Mera      VISIT DIAGNOSES:      Anemia, chronic disease    Iron deficiency anemia, unspecified iron deficiency anemia type    Normochromic normocytic anemia    Elevated  MCV    Abnormal SPEP    Tobacco abuse    Anemia, unspecified type          PLAN:  1. continue with observation and regular monitoring of labs every 3 months and protein studies every 6 months  2. he previously declined referral to see Dr Blanco at [facility] and is not inclined to having any scans done at this time  3. Encourage tobacco cessation  4. Previously recommended consideration for referral to neurology for his neuropathy issues  5. Repeat protein studies every 6 months  6. F/u with PCP, Card, Derm and   7. F/u with dermatology as directed; resumed plaquenil  [facility]         [facility] in 6 months  Fax note to  Franck/Albino Overton Pinsky, Eric[place]on, Safa       Discussion:     COVID-19 Discussion:    I had long discussion with patient and any applicable family about the COVID-19 coronavirus epidemic and the recommended precautions with regard to cancer and/or hematology patients. I have re-iterated the CDC recommendations for adequate hand washing, use of hand -like products, and coughing into elbow, etc. In addition, especially for our patients who are on chemotherapy and/or our otherwise immunocompromised patients, I have recommended avoidance of crowds, including movie theaters, restaurants, churches, etc. I have recommended avoidance of any sick or symptomatic family members and/or friends. I have also recommended avoidance of any raw and unwashed food products, and general avoidance of food items that have not been prepared by themselves. The patient has been asked to call us immediately with any symptom developments, issues, questions or other general concerns.       I spent over 25 mins of time with the patient. Reviewed results of the recently ordered labs, tests and studies; made directives with regards to the results. Over half of this time was spent couseling and coordinating care.    I have explained all of the above in detail and the patient understands all of the current  recommendation(s). I have answered all of their questions to the best of my ability and to their complete satisfaction.   The patient is to continue with the current management plan.            Electronically signed by Antolin Javier MD                         Answers submitted by the patient for this visit:  Review of Systems Questionnaire (Submitted on 7/14/2025)  appetite change : No  unexpected weight change: No  mouth sores: No  visual disturbance: No  cough: No  shortness of breath: No  chest pain: No  abdominal pain: No  diarrhea: No  frequency: No  back pain: No  rash: No  headaches: No  adenopathy: No  nervous/ anxious: No

## 2025-08-25 RX ORDER — FLUTICASONE FUROATE, UMECLIDINIUM BROMIDE AND VILANTEROL TRIFENATATE 100; 62.5; 25 UG/1; UG/1; UG/1
1 POWDER RESPIRATORY (INHALATION) DAILY
Qty: 60 EACH | Refills: 2 | Status: SHIPPED | OUTPATIENT
Start: 2025-08-25

## 2025-08-28 ENCOUNTER — OFFICE VISIT (OUTPATIENT)
Dept: FAMILY MEDICINE | Facility: CLINIC | Age: 75
End: 2025-08-28
Payer: MEDICARE

## 2025-08-28 VITALS
OXYGEN SATURATION: 98 % | HEART RATE: 133 BPM | HEIGHT: 69 IN | WEIGHT: 147.06 LBS | SYSTOLIC BLOOD PRESSURE: 131 MMHG | BODY MASS INDEX: 21.78 KG/M2 | DIASTOLIC BLOOD PRESSURE: 82 MMHG

## 2025-08-28 DIAGNOSIS — E78.49 OTHER HYPERLIPIDEMIA: ICD-10-CM

## 2025-08-28 DIAGNOSIS — Z12.5 PROSTATE CANCER SCREENING: ICD-10-CM

## 2025-08-28 DIAGNOSIS — N40.0 BENIGN PROSTATIC HYPERPLASIA, UNSPECIFIED WHETHER LOWER URINARY TRACT SYMPTOMS PRESENT: ICD-10-CM

## 2025-08-28 DIAGNOSIS — I10 ESSENTIAL HYPERTENSION: ICD-10-CM

## 2025-08-28 DIAGNOSIS — I48.21 PERMANENT ATRIAL FIBRILLATION: Primary | ICD-10-CM

## 2025-08-28 PROBLEM — Z23 NEED FOR SHINGLES VACCINE: Status: ACTIVE | Noted: 2025-08-28

## 2025-08-28 PROCEDURE — 99214 OFFICE O/P EST MOD 30 MIN: CPT | Mod: S$PBB,,, | Performed by: FAMILY MEDICINE

## 2025-08-28 PROCEDURE — 99999 PR PBB SHADOW E&M-EST. PATIENT-LVL III: CPT | Mod: PBBFAC,,, | Performed by: FAMILY MEDICINE

## 2025-08-28 PROCEDURE — 99213 OFFICE O/P EST LOW 20 MIN: CPT | Mod: PBBFAC,PN | Performed by: FAMILY MEDICINE

## 2025-08-28 RX ORDER — METOPROLOL SUCCINATE 100 MG/1
100 TABLET, EXTENDED RELEASE ORAL DAILY
Qty: 90 TABLET | Refills: 3 | Status: SHIPPED | OUTPATIENT
Start: 2025-08-28 | End: 2026-08-28

## 2025-08-28 RX ORDER — TAMSULOSIN HYDROCHLORIDE 0.4 MG/1
0.4 CAPSULE ORAL DAILY
Qty: 30 CAPSULE | Refills: 11 | Status: SHIPPED | OUTPATIENT
Start: 2025-08-28 | End: 2026-08-28

## 2025-08-28 RX ORDER — ZOSTER VACCINE RECOMBINANT, ADJUVANTED 50 MCG/0.5
0.5 KIT INTRAMUSCULAR ONCE
Qty: 1 EACH | Refills: 0 | Status: CANCELLED | OUTPATIENT
Start: 2025-08-28 | End: 2025-08-28

## 2025-09-01 PROBLEM — Z23 NEED FOR SHINGLES VACCINE: Status: RESOLVED | Noted: 2025-08-28 | Resolved: 2025-09-01

## 2025-09-02 ENCOUNTER — TELEPHONE (OUTPATIENT)
Dept: ELECTROPHYSIOLOGY | Facility: CLINIC | Age: 75
End: 2025-09-02
Payer: MEDICARE

## (undated) DEVICE — STRAP OR TABLE 5IN X 72IN

## (undated) DEVICE — SEE MEDLINE ITEM 157116

## (undated) DEVICE — SUT 2-0 VICRYL / SH (J417)

## (undated) DEVICE — SYR ONLY LUER LOCK 20CC

## (undated) DEVICE — ADHESIVE MASTISOL VIAL 48/BX

## (undated) DEVICE — SEE MEDLINE ITEM 153151

## (undated) DEVICE — DRAPE MINOR PROCEDURE

## (undated) DEVICE — SEE MEDLINE ITEM 152622

## (undated) DEVICE — GAUZE SPONGE PEANUT STRL

## (undated) DEVICE — LUBRICANT SURGILUBE 2 OZ

## (undated) DEVICE — TRAY DRY SPONGE SCRUB W FOAM

## (undated) DEVICE — SPONGE SUPER KERLIX 6X6.75IN

## (undated) DEVICE — GLOVE SURG ULTRA TOUCH 7

## (undated) DEVICE — CATH URETHRAL 16FR RED

## (undated) DEVICE — DRESSING TRANS 6X8 TEGADERM

## (undated) DEVICE — SLEEVE SCD EXPRESS CALF MEDIUM

## (undated) DEVICE — SEE MEDLINE ITEM 152487

## (undated) DEVICE — SUT 2/0 30IN SILK BLK BRAI

## (undated) DEVICE — SUPPORTER ADLT A3 3 IN. WB LA

## (undated) DEVICE — SOL PVP-I SCRUB 7.5% 4OZ

## (undated) DEVICE — SEE MEDLINE ITEM 157117

## (undated) DEVICE — SET IRR URLGY 2LINE UNIV SPIKE

## (undated) DEVICE — DRESSING TRANS 4X4 3/4

## (undated) DEVICE — PACK CUSTOM UNIV BASIN SLI

## (undated) DEVICE — CLOSURE SKIN STERI STRIP 1/2X4

## (undated) DEVICE — Device

## (undated) DEVICE — ADHESIVE DERMABOND ADVANCED

## (undated) DEVICE — SCRUB 10% POVIDONE IODINE 4OZ

## (undated) DEVICE — UNDERGLOVE BIOGEL PI SZ 6.5 LF

## (undated) DEVICE — SUT 2-0 12-18IN SILK

## (undated) DEVICE — DRAIN PENROSE XRAY 18 X 1/4 ST

## (undated) DEVICE — SOL WATER STRL IRR 1000ML

## (undated) DEVICE — DRESSING XEROFORM FOIL PK 1X8

## (undated) DEVICE — SEE MEDLINE ITEM 157185

## (undated) DEVICE — LINER SUCTION 3000CC

## (undated) DEVICE — SUT SILK 0 STRANDS 30IN BLK

## (undated) DEVICE — SEE MEDLINE ITEM 146292

## (undated) DEVICE — UNDERGLOVES BIOGEL PI SZ 7 LF

## (undated) DEVICE — SPONGE DERMACEA GAUZE 4X4

## (undated) DEVICE — ELECTRODE REM PLYHSV RETURN 9

## (undated) DEVICE — GLOVE SURGEONS ULTRA TOUCH 6.5

## (undated) DEVICE — SUT MONOCRYL 4-0 PS-2